# Patient Record
Sex: MALE | Race: OTHER | NOT HISPANIC OR LATINO | URBAN - METROPOLITAN AREA
[De-identification: names, ages, dates, MRNs, and addresses within clinical notes are randomized per-mention and may not be internally consistent; named-entity substitution may affect disease eponyms.]

---

## 2017-02-10 ENCOUNTER — EMERGENCY (EMERGENCY)
Facility: HOSPITAL | Age: 67
LOS: 1 days | Discharge: HOME | End: 2017-02-10
Attending: EMERGENCY MEDICINE
Payer: COMMERCIAL

## 2017-02-10 VITALS
HEART RATE: 72 BPM | RESPIRATION RATE: 18 BRPM | OXYGEN SATURATION: 99 % | TEMPERATURE: 100 F | WEIGHT: 192.9 LBS | DIASTOLIC BLOOD PRESSURE: 50 MMHG | HEIGHT: 74 IN | SYSTOLIC BLOOD PRESSURE: 121 MMHG

## 2017-02-10 DIAGNOSIS — Z96.41 PRESENCE OF INSULIN PUMP (EXTERNAL) (INTERNAL): ICD-10-CM

## 2017-02-10 DIAGNOSIS — I10 ESSENTIAL (PRIMARY) HYPERTENSION: ICD-10-CM

## 2017-02-10 DIAGNOSIS — Z79.4 LONG TERM (CURRENT) USE OF INSULIN: ICD-10-CM

## 2017-02-10 DIAGNOSIS — J11.1 INFLUENZA DUE TO UNIDENTIFIED INFLUENZA VIRUS WITH OTHER RESPIRATORY MANIFESTATIONS: ICD-10-CM

## 2017-02-10 DIAGNOSIS — E78.5 HYPERLIPIDEMIA, UNSPECIFIED: ICD-10-CM

## 2017-02-10 DIAGNOSIS — E11.9 TYPE 2 DIABETES MELLITUS WITHOUT COMPLICATIONS: ICD-10-CM

## 2017-02-10 LAB
ACETONE SERPL-MCNC: NEGATIVE — SIGNIFICANT CHANGE UP
ANION GAP SERPL CALC-SCNC: 9 MMOL/L — SIGNIFICANT CHANGE UP (ref 5–17)
APPEARANCE UR: CLEAR — SIGNIFICANT CHANGE UP
BASOPHILS # BLD AUTO: 0.1 K/UL — SIGNIFICANT CHANGE UP (ref 0–0.2)
BASOPHILS NFR BLD AUTO: 0.8 % — SIGNIFICANT CHANGE UP (ref 0–2)
BILIRUB UR-MCNC: NEGATIVE — SIGNIFICANT CHANGE UP
BUN SERPL-MCNC: 21 MG/DL — HIGH (ref 7–18)
CALCIUM SERPL-MCNC: 8.3 MG/DL — LOW (ref 8.4–10.5)
CHLORIDE SERPL-SCNC: 103 MMOL/L — SIGNIFICANT CHANGE UP (ref 96–108)
CO2 SERPL-SCNC: 28 MMOL/L — SIGNIFICANT CHANGE UP (ref 22–31)
COLOR SPEC: YELLOW — SIGNIFICANT CHANGE UP
CREAT SERPL-MCNC: 1.15 MG/DL — SIGNIFICANT CHANGE UP (ref 0.5–1.3)
DIFF PNL FLD: ABNORMAL
EOSINOPHIL # BLD AUTO: 0 K/UL — SIGNIFICANT CHANGE UP (ref 0–0.5)
EOSINOPHIL NFR BLD AUTO: 0 % — SIGNIFICANT CHANGE UP (ref 0–6)
GLUCOSE SERPL-MCNC: 292 MG/DL — HIGH (ref 70–99)
GLUCOSE UR QL: 1000 MG/DL
HCT VFR BLD CALC: 35.9 % — LOW (ref 39–50)
HGB BLD-MCNC: 11.6 G/DL — LOW (ref 13–17)
KETONES UR-MCNC: ABNORMAL
LEUKOCYTE ESTERASE UR-ACNC: NEGATIVE — SIGNIFICANT CHANGE UP
LYMPHOCYTES # BLD AUTO: 0.9 K/UL — LOW (ref 1–3.3)
LYMPHOCYTES # BLD AUTO: 13.8 % — SIGNIFICANT CHANGE UP (ref 13–44)
MCHC RBC-ENTMCNC: 29.7 PG — SIGNIFICANT CHANGE UP (ref 27–34)
MCHC RBC-ENTMCNC: 32.3 GM/DL — SIGNIFICANT CHANGE UP (ref 32–36)
MCV RBC AUTO: 91.9 FL — SIGNIFICANT CHANGE UP (ref 80–100)
MONOCYTES # BLD AUTO: 0.7 K/UL — SIGNIFICANT CHANGE UP (ref 0–0.9)
MONOCYTES NFR BLD AUTO: 10.8 % — SIGNIFICANT CHANGE UP (ref 2–14)
NEUTROPHILS # BLD AUTO: 5 K/UL — SIGNIFICANT CHANGE UP (ref 1.8–7.4)
NEUTROPHILS NFR BLD AUTO: 74.5 % — SIGNIFICANT CHANGE UP (ref 43–77)
NITRITE UR-MCNC: NEGATIVE — SIGNIFICANT CHANGE UP
PH UR: 6.5 — SIGNIFICANT CHANGE UP (ref 4.8–8)
PLATELET # BLD AUTO: 208 K/UL — SIGNIFICANT CHANGE UP (ref 150–400)
POTASSIUM SERPL-MCNC: 4 MMOL/L — SIGNIFICANT CHANGE UP (ref 3.5–5.3)
POTASSIUM SERPL-SCNC: 4 MMOL/L — SIGNIFICANT CHANGE UP (ref 3.5–5.3)
PROT UR-MCNC: 30 MG/DL
RBC # BLD: 3.91 M/UL — LOW (ref 4.2–5.8)
RBC # FLD: 13.4 % — SIGNIFICANT CHANGE UP (ref 10.3–14.5)
SODIUM SERPL-SCNC: 140 MMOL/L — SIGNIFICANT CHANGE UP (ref 135–145)
SP GR SPEC: 1.01 — SIGNIFICANT CHANGE UP (ref 1.01–1.02)
UROBILINOGEN FLD QL: NEGATIVE — SIGNIFICANT CHANGE UP
WBC # BLD: 6.7 K/UL — SIGNIFICANT CHANGE UP (ref 3.8–10.5)
WBC # FLD AUTO: 6.7 K/UL — SIGNIFICANT CHANGE UP (ref 3.8–10.5)

## 2017-02-10 PROCEDURE — 87486 CHLMYD PNEUM DNA AMP PROBE: CPT

## 2017-02-10 PROCEDURE — 71046 X-RAY EXAM CHEST 2 VIEWS: CPT

## 2017-02-10 PROCEDURE — 87633 RESP VIRUS 12-25 TARGETS: CPT

## 2017-02-10 PROCEDURE — 81001 URINALYSIS AUTO W/SCOPE: CPT

## 2017-02-10 PROCEDURE — 99284 EMERGENCY DEPT VISIT MOD MDM: CPT | Mod: 25

## 2017-02-10 PROCEDURE — 85027 COMPLETE CBC AUTOMATED: CPT

## 2017-02-10 PROCEDURE — 87581 M.PNEUMON DNA AMP PROBE: CPT

## 2017-02-10 PROCEDURE — 82009 KETONE BODYS QUAL: CPT

## 2017-02-10 PROCEDURE — 96374 THER/PROPH/DIAG INJ IV PUSH: CPT

## 2017-02-10 PROCEDURE — 80048 BASIC METABOLIC PNL TOTAL CA: CPT

## 2017-02-10 PROCEDURE — 99285 EMERGENCY DEPT VISIT HI MDM: CPT

## 2017-02-10 PROCEDURE — 87798 DETECT AGENT NOS DNA AMP: CPT

## 2017-02-10 PROCEDURE — 71020: CPT | Mod: 26

## 2017-02-10 RX ORDER — KETOROLAC TROMETHAMINE 30 MG/ML
15 SYRINGE (ML) INJECTION ONCE
Qty: 0 | Refills: 0 | Status: DISCONTINUED | OUTPATIENT
Start: 2017-02-10 | End: 2017-02-10

## 2017-02-10 RX ORDER — SODIUM CHLORIDE 9 MG/ML
2000 INJECTION INTRAMUSCULAR; INTRAVENOUS; SUBCUTANEOUS ONCE
Qty: 0 | Refills: 0 | Status: COMPLETED | OUTPATIENT
Start: 2017-02-10 | End: 2017-02-10

## 2017-02-10 RX ORDER — ACETAMINOPHEN 500 MG
975 TABLET ORAL ONCE
Qty: 0 | Refills: 0 | Status: COMPLETED | OUTPATIENT
Start: 2017-02-10 | End: 2017-02-10

## 2017-02-10 RX ADMIN — Medication 975 MILLIGRAM(S): at 18:46

## 2017-02-10 RX ADMIN — Medication 15 MILLIGRAM(S): at 21:48

## 2017-02-10 RX ADMIN — SODIUM CHLORIDE 3000 MILLILITER(S): 9 INJECTION INTRAMUSCULAR; INTRAVENOUS; SUBCUTANEOUS at 21:47

## 2017-02-10 NOTE — ED PROVIDER NOTE - OBJECTIVE STATEMENT
67 y/o male with significant PMHx of HLD HTN, IDDM (on insulin pump) presents to the ED c/o cough, fever, body aches and congestion x 2 days. Pt describes cough as non productive in nature, associated with diffuse body aches and a fever of Tmax:103F. Pt states he took Tylenol to no relief. Pt denies sore throat, rhinorrhea, N/V/D, abd pain, cough, SOB, CP, recent travel, recent sick contacts, or any other complaints. NKDA. (+) Flu-Shot UTD. 65 y/o male with significant PMHx of HLD HTN, IDDM (on insulin pump) presents to the ED c/o cough, fever, body aches and congestion x 2 days. Pt describes cough as non productive in nature, associated with diffuse body aches and a fever of Tmax:103F. Pt states cough is worse at night, with no alleviating factors; took Tylenol to no relief. Pt states he took OTC meds, making him feel weak. Pt notes decreased oral intake with liquids and solids.  Pt denies sore throat, rhinorrhea, N/V/D, abd pain, cough, SOB, CP, recent travel, recent sick contacts, or any other complaints. NKDA. (+) Flu-Shot UTD. 67 y/o male with significant PMHx of HLD HTN, IDDM (on insulin pump) presents to the ED c/o cough, fever, body aches and congestion x 2 days. Pt describes cough as non productive in nature, associated with diffuse body aches and a fever of Tmax:103F. Pt states cough is worse at night, with no alleviating factors; took Tylenol to no relief. Pt states he took OTC meds, making him feel weak. Pt notes decreased oral intake with liquids and solids.  Pt denies sore throat, rhinorrhea, N/V/D, abd pain, SOB, CP, recent travel, recent sick contacts, or any other complaints. NKDA. (+) Flu-Shot UTD.

## 2017-02-10 NOTE — ED ADULT NURSE NOTE - OBJECTIVE STATEMENT
Patient came to the ED a/o x 3 ambulates c/o back pain and fever this afternoon. Patient's temp at triage is 99.8, stated that he had 103 F at home.

## 2017-02-10 NOTE — ED PROVIDER NOTE - MEDICAL DECISION MAKING DETAILS
65 y/o M with cough, fever, and body aches. Will order flu-swab given 48 hours of onset. Will order CXR to r/o PNA, check acetone to r/o DKA; however low suspicion due to being on insulin. Will give IV fluids, and reassess. Likely d/c. Pt is well appearing, and all vital signs WNL. 67 y/o M with cough, fever, and body aches. Will order flu-swab given 48 hours of onset. Will order CXR to r/o PNA, check acetone to r/o DKA; however low suspicion due to being on insulin. Will give IV fluids, and reassess. Likely d/c. Pt is well appearing, and all vital signs WNL. d/w pt treatment options for flu with possible side effects. pt declines tamiflu. will give symptomatic and supportive care. Pt is well appearing, stable for discharge and follow up without fail with medical doctor. I had a detailed discussion with the patient and/or guardian regarding the historical points, exam findings, and any diagnostic results supporting the discharge diagnosis. Pt educated on care and need for follow up. Strict return instructions and red flag signs and symptoms discussed with patient. Medications for discharge discussed and adverse effects reviewed. Questions answered. Pt shows understanding of discharge information and agrees to follow.

## 2017-02-10 NOTE — ED PROVIDER NOTE - PHYSICAL EXAMINATION
GEN: WD, WN, NAD. Non-toxic appearance  HEENT: NC, AT, MMM,  PERRLA, EOMI, no scleral icterus  NECK: FROM, No meningismus, Trachea midline  CV: Regular rate, regular rhythm. Normal S1/2. No extra heart sounds  PULM: CTA-B. No Crackles, No Wheezes, No Ronchi  ABD: soft, ND, NT, no pulsatile masses  SKIN: (+) diaphoretic, warm, no rash  BACK: No midline vertebral tenderness, no CVA tenderness  MSK/EXT: FROM x4. distal pulses intact and palpable, No deformities  NEURO: A&O, CN intact, STOUT, SILTx4, no focal deficits noted  PSYCH: Appropriate Affect, judgment, mood, and insight

## 2017-02-10 NOTE — ED PROVIDER NOTE - NS ED MD SCRIBE ATTENDING SCRIBE SECTIONS
VITAL SIGNS( Pullset)/REVIEW OF SYSTEMS/PHYSICAL EXAM/HISTORY OF PRESENT ILLNESS/HIV/PAST MEDICAL/SURGICAL/SOCIAL HISTORY/DISPOSITION

## 2017-02-11 VITALS
OXYGEN SATURATION: 100 % | RESPIRATION RATE: 17 BRPM | TEMPERATURE: 98 F | HEART RATE: 55 BPM | SYSTOLIC BLOOD PRESSURE: 136 MMHG | DIASTOLIC BLOOD PRESSURE: 59 MMHG

## 2017-02-11 LAB
FLUBV RNA SPEC QL NAA+PROBE: DETECTED
RAPID RVP RESULT: DETECTED

## 2019-02-10 PROCEDURE — 0: CUSTOM

## 2021-10-06 NOTE — ED ADULT TRIAGE NOTE - AS O2 DELIVERY
room air Alert-The patient is alert, awake and responds to voice. The patient is oriented to time, place, and person. The triage nurse is able to obtain subjective information.

## 2022-05-04 ENCOUNTER — EMERGENCY (EMERGENCY)
Facility: HOSPITAL | Age: 72
LOS: 1 days | Discharge: HOME | End: 2022-05-04
Attending: EMERGENCY MEDICINE
Payer: COMMERCIAL

## 2022-05-04 VITALS
OXYGEN SATURATION: 98 % | DIASTOLIC BLOOD PRESSURE: 70 MMHG | SYSTOLIC BLOOD PRESSURE: 126 MMHG | HEART RATE: 72 BPM | RESPIRATION RATE: 18 BRPM | TEMPERATURE: 98 F

## 2022-05-04 VITALS
HEIGHT: 74 IN | TEMPERATURE: 98 F | DIASTOLIC BLOOD PRESSURE: 53 MMHG | SYSTOLIC BLOOD PRESSURE: 97 MMHG | RESPIRATION RATE: 18 BRPM | WEIGHT: 199.96 LBS | HEART RATE: 90 BPM | OXYGEN SATURATION: 99 %

## 2022-05-04 LAB
ANION GAP SERPL CALC-SCNC: 13 MMOL/L — SIGNIFICANT CHANGE UP (ref 5–17)
BASOPHILS # BLD AUTO: 0.02 K/UL — SIGNIFICANT CHANGE UP (ref 0–0.2)
BASOPHILS NFR BLD AUTO: 0.2 % — SIGNIFICANT CHANGE UP (ref 0–2)
BUN SERPL-MCNC: 27 MG/DL — HIGH (ref 7–18)
CALCIUM SERPL-MCNC: 9.2 MG/DL — SIGNIFICANT CHANGE UP (ref 8.4–10.5)
CHLORIDE SERPL-SCNC: 104 MMOL/L — SIGNIFICANT CHANGE UP (ref 96–108)
CO2 SERPL-SCNC: 21 MMOL/L — LOW (ref 22–31)
CREAT SERPL-MCNC: 1.15 MG/DL — SIGNIFICANT CHANGE UP (ref 0.5–1.3)
EGFR: 67 ML/MIN/1.73M2 — SIGNIFICANT CHANGE UP
EOSINOPHIL # BLD AUTO: 0 K/UL — SIGNIFICANT CHANGE UP (ref 0–0.5)
EOSINOPHIL NFR BLD AUTO: 0 % — SIGNIFICANT CHANGE UP (ref 0–6)
GLUCOSE SERPL-MCNC: 316 MG/DL — HIGH (ref 70–99)
HCT VFR BLD CALC: 32.8 % — LOW (ref 39–50)
HGB BLD-MCNC: 10.6 G/DL — LOW (ref 13–17)
IMM GRANULOCYTES NFR BLD AUTO: 0.5 % — SIGNIFICANT CHANGE UP (ref 0–1.5)
LYMPHOCYTES # BLD AUTO: 0.62 K/UL — LOW (ref 1–3.3)
LYMPHOCYTES # BLD AUTO: 6 % — LOW (ref 13–44)
MCHC RBC-ENTMCNC: 29.3 PG — SIGNIFICANT CHANGE UP (ref 27–34)
MCHC RBC-ENTMCNC: 32.3 GM/DL — SIGNIFICANT CHANGE UP (ref 32–36)
MCV RBC AUTO: 90.6 FL — SIGNIFICANT CHANGE UP (ref 80–100)
MONOCYTES # BLD AUTO: 0.74 K/UL — SIGNIFICANT CHANGE UP (ref 0–0.9)
MONOCYTES NFR BLD AUTO: 7.1 % — SIGNIFICANT CHANGE UP (ref 2–14)
NEUTROPHILS # BLD AUTO: 8.99 K/UL — HIGH (ref 1.8–7.4)
NEUTROPHILS NFR BLD AUTO: 86.2 % — HIGH (ref 43–77)
NRBC # BLD: 0 /100 WBCS — SIGNIFICANT CHANGE UP (ref 0–0)
PLATELET # BLD AUTO: 226 K/UL — SIGNIFICANT CHANGE UP (ref 150–400)
POTASSIUM SERPL-MCNC: 4.1 MMOL/L — SIGNIFICANT CHANGE UP (ref 3.5–5.3)
POTASSIUM SERPL-SCNC: 4.1 MMOL/L — SIGNIFICANT CHANGE UP (ref 3.5–5.3)
RBC # BLD: 3.62 M/UL — LOW (ref 4.2–5.8)
RBC # FLD: 14.3 % — SIGNIFICANT CHANGE UP (ref 10.3–14.5)
SODIUM SERPL-SCNC: 138 MMOL/L — SIGNIFICANT CHANGE UP (ref 135–145)
WBC # BLD: 10.42 K/UL — SIGNIFICANT CHANGE UP (ref 3.8–10.5)
WBC # FLD AUTO: 10.42 K/UL — SIGNIFICANT CHANGE UP (ref 3.8–10.5)

## 2022-05-04 PROCEDURE — 99285 EMERGENCY DEPT VISIT HI MDM: CPT

## 2022-05-04 PROCEDURE — 85025 COMPLETE CBC W/AUTO DIFF WBC: CPT

## 2022-05-04 PROCEDURE — 99283 EMERGENCY DEPT VISIT LOW MDM: CPT

## 2022-05-04 PROCEDURE — 80048 BASIC METABOLIC PNL TOTAL CA: CPT

## 2022-05-04 PROCEDURE — 93005 ELECTROCARDIOGRAM TRACING: CPT

## 2022-05-04 PROCEDURE — 84484 ASSAY OF TROPONIN QUANT: CPT

## 2022-05-04 PROCEDURE — 82962 GLUCOSE BLOOD TEST: CPT

## 2022-05-04 PROCEDURE — 36415 COLL VENOUS BLD VENIPUNCTURE: CPT

## 2022-05-04 RX ORDER — SODIUM CHLORIDE 9 MG/ML
2000 INJECTION INTRAMUSCULAR; INTRAVENOUS; SUBCUTANEOUS ONCE
Refills: 0 | Status: COMPLETED | OUTPATIENT
Start: 2022-05-04 | End: 2022-05-04

## 2022-05-04 RX ADMIN — SODIUM CHLORIDE 2000 MILLILITER(S): 9 INJECTION INTRAMUSCULAR; INTRAVENOUS; SUBCUTANEOUS at 13:05

## 2022-05-04 NOTE — ED PROVIDER NOTE - CLINICAL SUMMARY MEDICAL DECISION MAKING FREE TEXT BOX
Patient with hyperglycemia due to pump malfunction, vomiting, b/l arm pain. Will check EKG, labs, give IVF, reassess.

## 2022-05-04 NOTE — ED PROVIDER NOTE - SKIN, MLM
The patient is a 33y Male complaining of ear pain.
Skin normal color for race, warm, dry and intact. No evidence of rash.

## 2022-05-04 NOTE — ED PROVIDER NOTE - PATIENT PORTAL LINK FT
You can access the FollowMyHealth Patient Portal offered by [facility] by registering at the following website: [email or web address]. By joining [employer]’s FollowMyHealth portal, you will also be able to view your health information using other applications (apps) compatible with our system.

## 2022-05-04 NOTE — ED PROVIDER NOTE - OBJECTIVE STATEMENT
Patient reports his insulin pump was malfunctioning last night. His blood sugar went up to the 400's. He had pain in both his arms and vomited x1, NBNB. No fever, cp, sob, ap, diarrhea. Now feels much better. Pump now working.

## 2022-05-04 NOTE — ED PROVIDER NOTE - PROGRESS NOTE DETAILS
Patient is resting comfortably, NAD. Benign abdominal exam. Tolerating water. To f/u with PMD in 1-2 days. Return to the ED immediately if getting worse, not improving, or if having any new or troubling symptoms.

## 2022-07-10 ENCOUNTER — EMERGENCY (EMERGENCY)
Facility: HOSPITAL | Age: 72
LOS: 1 days | Discharge: HOME | End: 2022-07-10
Attending: STUDENT IN AN ORGANIZED HEALTH CARE EDUCATION/TRAINING PROGRAM
Payer: COMMERCIAL

## 2022-07-10 VITALS
RESPIRATION RATE: 16 BRPM | WEIGHT: 205.03 LBS | SYSTOLIC BLOOD PRESSURE: 142 MMHG | OXYGEN SATURATION: 96 % | HEART RATE: 80 BPM | HEIGHT: 74 IN | TEMPERATURE: 100 F | DIASTOLIC BLOOD PRESSURE: 65 MMHG

## 2022-07-10 VITALS
TEMPERATURE: 98 F | SYSTOLIC BLOOD PRESSURE: 133 MMHG | HEART RATE: 65 BPM | OXYGEN SATURATION: 96 % | RESPIRATION RATE: 18 BRPM | DIASTOLIC BLOOD PRESSURE: 76 MMHG

## 2022-07-10 LAB
ALBUMIN SERPL ELPH-MCNC: 2.9 G/DL — LOW (ref 3.5–5)
ALP SERPL-CCNC: 63 U/L — SIGNIFICANT CHANGE UP (ref 40–120)
ALT FLD-CCNC: 22 U/L DA — SIGNIFICANT CHANGE UP (ref 10–60)
ANION GAP SERPL CALC-SCNC: 4 MMOL/L — LOW (ref 5–17)
AST SERPL-CCNC: 20 U/L — SIGNIFICANT CHANGE UP (ref 10–40)
BASOPHILS # BLD AUTO: 0.01 K/UL — SIGNIFICANT CHANGE UP (ref 0–0.2)
BASOPHILS NFR BLD AUTO: 0.2 % — SIGNIFICANT CHANGE UP (ref 0–2)
BILIRUB SERPL-MCNC: 0.4 MG/DL — SIGNIFICANT CHANGE UP (ref 0.2–1.2)
BUN SERPL-MCNC: 17 MG/DL — SIGNIFICANT CHANGE UP (ref 7–18)
CALCIUM SERPL-MCNC: 8.8 MG/DL — SIGNIFICANT CHANGE UP (ref 8.4–10.5)
CHLORIDE SERPL-SCNC: 109 MMOL/L — HIGH (ref 96–108)
CO2 SERPL-SCNC: 27 MMOL/L — SIGNIFICANT CHANGE UP (ref 22–31)
CREAT SERPL-MCNC: 0.84 MG/DL — SIGNIFICANT CHANGE UP (ref 0.5–1.3)
EGFR: 92 ML/MIN/1.73M2 — SIGNIFICANT CHANGE UP
EOSINOPHIL # BLD AUTO: 0 K/UL — SIGNIFICANT CHANGE UP (ref 0–0.5)
EOSINOPHIL NFR BLD AUTO: 0 % — SIGNIFICANT CHANGE UP (ref 0–6)
GLUCOSE SERPL-MCNC: 226 MG/DL — HIGH (ref 70–99)
HCT VFR BLD CALC: 32.9 % — LOW (ref 39–50)
HGB BLD-MCNC: 10.7 G/DL — LOW (ref 13–17)
IMM GRANULOCYTES NFR BLD AUTO: 0.3 % — SIGNIFICANT CHANGE UP (ref 0–1.5)
LYMPHOCYTES # BLD AUTO: 0.4 K/UL — LOW (ref 1–3.3)
LYMPHOCYTES # BLD AUTO: 6.5 % — LOW (ref 13–44)
MCHC RBC-ENTMCNC: 29.4 PG — SIGNIFICANT CHANGE UP (ref 27–34)
MCHC RBC-ENTMCNC: 32.5 GM/DL — SIGNIFICANT CHANGE UP (ref 32–36)
MCV RBC AUTO: 90.4 FL — SIGNIFICANT CHANGE UP (ref 80–100)
MONOCYTES # BLD AUTO: 0.55 K/UL — SIGNIFICANT CHANGE UP (ref 0–0.9)
MONOCYTES NFR BLD AUTO: 9 % — SIGNIFICANT CHANGE UP (ref 2–14)
NEUTROPHILS # BLD AUTO: 5.13 K/UL — SIGNIFICANT CHANGE UP (ref 1.8–7.4)
NEUTROPHILS NFR BLD AUTO: 84 % — HIGH (ref 43–77)
NRBC # BLD: 0 /100 WBCS — SIGNIFICANT CHANGE UP (ref 0–0)
PLATELET # BLD AUTO: 212 K/UL — SIGNIFICANT CHANGE UP (ref 150–400)
POTASSIUM SERPL-MCNC: 3.7 MMOL/L — SIGNIFICANT CHANGE UP (ref 3.5–5.3)
POTASSIUM SERPL-SCNC: 3.7 MMOL/L — SIGNIFICANT CHANGE UP (ref 3.5–5.3)
PROT SERPL-MCNC: 6.5 G/DL — SIGNIFICANT CHANGE UP (ref 6–8.3)
RBC # BLD: 3.64 M/UL — LOW (ref 4.2–5.8)
RBC # FLD: 14.7 % — HIGH (ref 10.3–14.5)
SARS-COV-2 RNA SPEC QL NAA+PROBE: DETECTED
SODIUM SERPL-SCNC: 140 MMOL/L — SIGNIFICANT CHANGE UP (ref 135–145)
WBC # BLD: 6.3 K/UL — SIGNIFICANT CHANGE UP (ref 3.8–10.5)
WBC # FLD AUTO: 6.3 K/UL — SIGNIFICANT CHANGE UP (ref 3.8–10.5)

## 2022-07-10 PROCEDURE — 71045 X-RAY EXAM CHEST 1 VIEW: CPT | Mod: 26

## 2022-07-10 PROCEDURE — 85025 COMPLETE CBC W/AUTO DIFF WBC: CPT

## 2022-07-10 PROCEDURE — 72170 X-RAY EXAM OF PELVIS: CPT

## 2022-07-10 PROCEDURE — 72170 X-RAY EXAM OF PELVIS: CPT | Mod: 26

## 2022-07-10 PROCEDURE — 96374 THER/PROPH/DIAG INJ IV PUSH: CPT

## 2022-07-10 PROCEDURE — 99284 EMERGENCY DEPT VISIT MOD MDM: CPT

## 2022-07-10 PROCEDURE — 36415 COLL VENOUS BLD VENIPUNCTURE: CPT

## 2022-07-10 PROCEDURE — 80053 COMPREHEN METABOLIC PANEL: CPT

## 2022-07-10 PROCEDURE — 99285 EMERGENCY DEPT VISIT HI MDM: CPT | Mod: 25

## 2022-07-10 PROCEDURE — 71045 X-RAY EXAM CHEST 1 VIEW: CPT

## 2022-07-10 PROCEDURE — 87635 SARS-COV-2 COVID-19 AMP PRB: CPT

## 2022-07-10 PROCEDURE — 87040 BLOOD CULTURE FOR BACTERIA: CPT

## 2022-07-10 RX ORDER — KETOROLAC TROMETHAMINE 30 MG/ML
30 SYRINGE (ML) INJECTION ONCE
Refills: 0 | Status: DISCONTINUED | OUTPATIENT
Start: 2022-07-10 | End: 2022-07-10

## 2022-07-10 RX ORDER — ACETAMINOPHEN 500 MG
650 TABLET ORAL ONCE
Refills: 0 | Status: COMPLETED | OUTPATIENT
Start: 2022-07-10 | End: 2022-07-10

## 2022-07-10 RX ADMIN — Medication 30 MILLIGRAM(S): at 04:35

## 2022-07-10 NOTE — ED ADULT NURSE NOTE - NS ED NURSE LEVEL OF CONSCIOUSNESS MENTAL STATUS
Awake/Alert Returned Demonstration/Simple: Patient demonstrates quick and easy understanding/Verbalized Understanding

## 2022-07-10 NOTE — ED ADULT NURSE NOTE - NSFALLRSKUNASSIST_ED_ALL_ED
Encounter addended by: Dori Pratt on: 5/29/2017  8:44 AM<BR>    Actions taken: Letter status changed no

## 2022-07-10 NOTE — ED PROVIDER NOTE - OBJECTIVE STATEMENT
71 year old male with a PHMx of hypertension and diabetes presents to the ED with complaints of bilateral hip pain. Patient also noted experiencing cough and fever. Patient endorses having difficulty getting out of bed today. Patient denies any nausea, vomiting, dysuria, abdominal pain and fall. NKDA.

## 2022-07-10 NOTE — ED ADULT NURSE NOTE - OBJECTIVE STATEMENT
Pt presents to the ED with c/o bilateral hip pain and fever x 1 day. As per pt, he was having difficulty getting out of bed to use the bathroom today. Denies nausea, vomiting, shortness of breath, or fall.

## 2022-07-10 NOTE — ED PROVIDER NOTE - CLINICAL SUMMARY MEDICAL DECISION MAKING FREE TEXT BOX
Patient presenting with cough and hip pain. noting low grade temp. saturation stable. likely viral illness with bodyache. will obtain xray, r.o bony injury, lab, ed obs and reassess

## 2022-07-10 NOTE — ED ADULT NURSE NOTE - NSIMPLEMENTINTERV_GEN_ALL_ED
Implemented All Universal Safety Interventions:  [place] to call system. Call bell, personal items and telephone within reach. Instruct patient to call for assistance. Room bathroom lighting operational. Non-slip footwear when patient is off stretcher. Physically safe environment: no spills, clutter or unnecessary equipment. Stretcher in lowest position, wheels locked, appropriate side rails in place.

## 2022-07-10 NOTE — ED PROVIDER NOTE - PATIENT PORTAL LINK FT
You can access the FollowMyHealth Patient Portal offered by [facility] by registering at the following website: [email or web address]. By joining [employer]’s FollowMyHealth portal, you will also be able to view your health information using other applications (apps) compatible with our system.

## 2022-07-10 NOTE — ED PROVIDER NOTE - PROGRESS NOTE DETAILS
Patient lab wnl. no signs of distress. noting + covid 19 with stable saturation. given return precautions and instructed to f.u pmd, patient state he has pulse ox and antipyretic at home.

## 2022-07-15 LAB
CULTURE RESULTS: SIGNIFICANT CHANGE UP
CULTURE RESULTS: SIGNIFICANT CHANGE UP
SPECIMEN SOURCE: SIGNIFICANT CHANGE UP
SPECIMEN SOURCE: SIGNIFICANT CHANGE UP

## 2023-03-15 ENCOUNTER — INPATIENT (INPATIENT)
Facility: HOSPITAL | Age: 73
LOS: 6 days | Discharge: ANOTHER HOSPITAL | DRG: 305 | End: 2023-03-22
Attending: STUDENT IN AN ORGANIZED HEALTH CARE EDUCATION/TRAINING PROGRAM | Admitting: STUDENT IN AN ORGANIZED HEALTH CARE EDUCATION/TRAINING PROGRAM
Payer: MEDICARE

## 2023-03-15 VITALS
HEART RATE: 107 BPM | DIASTOLIC BLOOD PRESSURE: 44 MMHG | TEMPERATURE: 98 F | RESPIRATION RATE: 18 BRPM | WEIGHT: 199.96 LBS | OXYGEN SATURATION: 98 % | SYSTOLIC BLOOD PRESSURE: 108 MMHG | HEIGHT: 74 IN

## 2023-03-15 DIAGNOSIS — E11.10 TYPE 2 DIABETES MELLITUS WITH KETOACIDOSIS WITHOUT COMA: ICD-10-CM

## 2023-03-15 LAB
ALBUMIN SERPL ELPH-MCNC: 3.5 G/DL — SIGNIFICANT CHANGE UP (ref 3.5–5)
ALP SERPL-CCNC: 70 U/L — SIGNIFICANT CHANGE UP (ref 40–120)
ALT FLD-CCNC: 30 U/L DA — SIGNIFICANT CHANGE UP (ref 10–60)
ANION GAP SERPL CALC-SCNC: 22 MMOL/L — HIGH (ref 5–17)
APPEARANCE UR: CLEAR — SIGNIFICANT CHANGE UP
AST SERPL-CCNC: 25 U/L — SIGNIFICANT CHANGE UP (ref 10–40)
BACTERIA # UR AUTO: ABNORMAL /HPF
BASE EXCESS BLDA CALC-SCNC: -11.7 MMOL/L — LOW (ref -2–3)
BASOPHILS # BLD AUTO: 0.03 K/UL — SIGNIFICANT CHANGE UP (ref 0–0.2)
BASOPHILS NFR BLD AUTO: 0.2 % — SIGNIFICANT CHANGE UP (ref 0–2)
BILIRUB SERPL-MCNC: 0.7 MG/DL — SIGNIFICANT CHANGE UP (ref 0.2–1.2)
BILIRUB UR-MCNC: NEGATIVE — SIGNIFICANT CHANGE UP
BLOOD GAS COMMENTS ARTERIAL: SIGNIFICANT CHANGE UP
BUN SERPL-MCNC: 35 MG/DL — HIGH (ref 7–18)
CALCIUM SERPL-MCNC: 9.4 MG/DL — SIGNIFICANT CHANGE UP (ref 8.4–10.5)
CHLORIDE SERPL-SCNC: 97 MMOL/L — SIGNIFICANT CHANGE UP (ref 96–108)
CO2 SERPL-SCNC: 14 MMOL/L — LOW (ref 22–31)
COLOR SPEC: YELLOW — SIGNIFICANT CHANGE UP
CREAT SERPL-MCNC: 1.68 MG/DL — HIGH (ref 0.5–1.3)
DIFF PNL FLD: NEGATIVE — SIGNIFICANT CHANGE UP
EGFR: 42 ML/MIN/1.73M2 — LOW
EOSINOPHIL # BLD AUTO: 0 K/UL — SIGNIFICANT CHANGE UP (ref 0–0.5)
EOSINOPHIL NFR BLD AUTO: 0 % — SIGNIFICANT CHANGE UP (ref 0–6)
EPI CELLS # UR: SIGNIFICANT CHANGE UP /HPF
GLUCOSE BLDC GLUCOMTR-MCNC: 513 MG/DL — CRITICAL HIGH (ref 70–99)
GLUCOSE SERPL-MCNC: 652 MG/DL — CRITICAL HIGH (ref 70–99)
GLUCOSE UR QL: 1000 MG/DL
HCO3 BLDA-SCNC: 12 MMOL/L — LOW (ref 21–28)
HCT VFR BLD CALC: 35.8 % — LOW (ref 39–50)
HGB BLD-MCNC: 11.1 G/DL — LOW (ref 13–17)
HIV 1 & 2 AB SERPL IA.RAPID: SIGNIFICANT CHANGE UP
HOROWITZ INDEX BLDA+IHG-RTO: 21 — SIGNIFICANT CHANGE UP
IMM GRANULOCYTES NFR BLD AUTO: 0.7 % — SIGNIFICANT CHANGE UP (ref 0–0.9)
KETONES UR-MCNC: ABNORMAL
LEUKOCYTE ESTERASE UR-ACNC: ABNORMAL
LIDOCAIN IGE QN: 40 U/L — LOW (ref 73–393)
LYMPHOCYTES # BLD AUTO: 0.73 K/UL — LOW (ref 1–3.3)
LYMPHOCYTES # BLD AUTO: 5.4 % — LOW (ref 13–44)
MAGNESIUM SERPL-MCNC: 1.9 MG/DL — SIGNIFICANT CHANGE UP (ref 1.6–2.6)
MCHC RBC-ENTMCNC: 28.7 PG — SIGNIFICANT CHANGE UP (ref 27–34)
MCHC RBC-ENTMCNC: 31 GM/DL — LOW (ref 32–36)
MCV RBC AUTO: 92.5 FL — SIGNIFICANT CHANGE UP (ref 80–100)
MONOCYTES # BLD AUTO: 0.97 K/UL — HIGH (ref 0–0.9)
MONOCYTES NFR BLD AUTO: 7.1 % — SIGNIFICANT CHANGE UP (ref 2–14)
NEUTROPHILS # BLD AUTO: 11.79 K/UL — HIGH (ref 1.8–7.4)
NEUTROPHILS NFR BLD AUTO: 86.6 % — HIGH (ref 43–77)
NITRITE UR-MCNC: NEGATIVE — SIGNIFICANT CHANGE UP
NRBC # BLD: 0 /100 WBCS — SIGNIFICANT CHANGE UP (ref 0–0)
NT-PROBNP SERPL-SCNC: 381 PG/ML — SIGNIFICANT CHANGE UP (ref 0–450)
PCO2 BLDA: 21 MMHG — LOW (ref 35–48)
PH BLDA: 7.36 — SIGNIFICANT CHANGE UP (ref 7.35–7.45)
PH UR: 5 — SIGNIFICANT CHANGE UP (ref 5–8)
PLATELET # BLD AUTO: 283 K/UL — SIGNIFICANT CHANGE UP (ref 150–400)
PO2 BLDA: 123 MMHG — HIGH (ref 83–108)
POTASSIUM SERPL-MCNC: 5.9 MMOL/L — HIGH (ref 3.5–5.3)
POTASSIUM SERPL-SCNC: 5.9 MMOL/L — HIGH (ref 3.5–5.3)
PROT SERPL-MCNC: 7.2 G/DL — SIGNIFICANT CHANGE UP (ref 6–8.3)
PROT UR-MCNC: 15
RBC # BLD: 3.87 M/UL — LOW (ref 4.2–5.8)
RBC # FLD: 14.8 % — HIGH (ref 10.3–14.5)
RBC CASTS # UR COMP ASSIST: SIGNIFICANT CHANGE UP /HPF (ref 0–2)
SAO2 % BLDA: 97 % — SIGNIFICANT CHANGE UP
SARS-COV-2 RNA SPEC QL NAA+PROBE: SIGNIFICANT CHANGE UP
SODIUM SERPL-SCNC: 133 MMOL/L — LOW (ref 135–145)
SP GR SPEC: 1.01 — SIGNIFICANT CHANGE UP (ref 1.01–1.02)
TROPONIN I, HIGH SENSITIVITY RESULT: 26.1 NG/L — SIGNIFICANT CHANGE UP
UROBILINOGEN FLD QL: NEGATIVE — SIGNIFICANT CHANGE UP
WBC # BLD: 13.61 K/UL — HIGH (ref 3.8–10.5)
WBC # FLD AUTO: 13.61 K/UL — HIGH (ref 3.8–10.5)
WBC UR QL: ABNORMAL /HPF (ref 0–5)

## 2023-03-15 PROCEDURE — 99291 CRITICAL CARE FIRST HOUR: CPT

## 2023-03-15 PROCEDURE — 71045 X-RAY EXAM CHEST 1 VIEW: CPT | Mod: 26

## 2023-03-15 RX ORDER — CEFTRIAXONE 500 MG/1
1000 INJECTION, POWDER, FOR SOLUTION INTRAMUSCULAR; INTRAVENOUS EVERY 24 HOURS
Refills: 0 | Status: DISCONTINUED | OUTPATIENT
Start: 2023-03-15 | End: 2023-03-20

## 2023-03-15 RX ORDER — SODIUM CHLORIDE 9 MG/ML
1000 INJECTION INTRAMUSCULAR; INTRAVENOUS; SUBCUTANEOUS ONCE
Refills: 0 | Status: COMPLETED | OUTPATIENT
Start: 2023-03-15 | End: 2023-03-15

## 2023-03-15 RX ORDER — ONDANSETRON 8 MG/1
4 TABLET, FILM COATED ORAL ONCE
Refills: 0 | Status: COMPLETED | OUTPATIENT
Start: 2023-03-15 | End: 2023-03-15

## 2023-03-15 RX ORDER — CHLORHEXIDINE GLUCONATE 213 G/1000ML
1 SOLUTION TOPICAL
Refills: 0 | Status: DISCONTINUED | OUTPATIENT
Start: 2023-03-15 | End: 2023-03-16

## 2023-03-15 RX ORDER — INSULIN HUMAN 100 [IU]/ML
9 INJECTION, SOLUTION SUBCUTANEOUS
Qty: 100 | Refills: 0 | Status: DISCONTINUED | OUTPATIENT
Start: 2023-03-15 | End: 2023-03-16

## 2023-03-15 RX ORDER — INSULIN HUMAN 100 [IU]/ML
18 INJECTION, SOLUTION SUBCUTANEOUS ONCE
Refills: 0 | Status: COMPLETED | OUTPATIENT
Start: 2023-03-15 | End: 2023-03-15

## 2023-03-15 RX ORDER — SODIUM CHLORIDE 9 MG/ML
1000 INJECTION INTRAMUSCULAR; INTRAVENOUS; SUBCUTANEOUS
Refills: 0 | Status: DISCONTINUED | OUTPATIENT
Start: 2023-03-15 | End: 2023-03-16

## 2023-03-15 RX ADMIN — SODIUM CHLORIDE 1000 MILLILITER(S): 9 INJECTION INTRAMUSCULAR; INTRAVENOUS; SUBCUTANEOUS at 21:09

## 2023-03-15 RX ADMIN — INSULIN HUMAN 9 UNIT(S)/HR: 100 INJECTION, SOLUTION SUBCUTANEOUS at 23:20

## 2023-03-15 RX ADMIN — SODIUM CHLORIDE 150 MILLILITER(S): 9 INJECTION INTRAMUSCULAR; INTRAVENOUS; SUBCUTANEOUS at 22:51

## 2023-03-15 RX ADMIN — ONDANSETRON 4 MILLIGRAM(S): 8 TABLET, FILM COATED ORAL at 22:55

## 2023-03-15 RX ADMIN — INSULIN HUMAN 18 UNIT(S): 100 INJECTION, SOLUTION SUBCUTANEOUS at 22:52

## 2023-03-15 NOTE — ED ADULT TRIAGE NOTE - CHIEF COMPLAINT QUOTE
Pt BIBA for c/o high sugar. Pt is on insulin pump. pt c/o stomach pain with nausea, vomiting and diarrhea. Pt fingerstick in triage was 551mg/dl

## 2023-03-15 NOTE — ED ADULT TRIAGE NOTE - PAIN RATING/NUMBER SCALE (0-10): ACTIVITY
8 (severe pain) Alert and oriented to person, place and time/Patient baseline mental status/Awake/Symptoms improved

## 2023-03-15 NOTE — ED PROVIDER NOTE - OBJECTIVE STATEMENT
72 y.o. male with H/o IDDM, last dose 5pm, pt c/o feeling lightheaded, weakness this am, today with nonradiating MSCP, dry cough for 3 days, no SOB, diaphoresis, abd pain, dysuria.  Pt with good appetite

## 2023-03-15 NOTE — ED PROVIDER NOTE - CARE PLAN
Principal Discharge DX:	DKA (diabetic ketoacidosis)  Secondary Diagnosis:	JOANNA (acute kidney injury)   1

## 2023-03-15 NOTE — ED ADULT NURSE REASSESSMENT NOTE - NS ED NURSE REASSESS COMMENT FT1
pt transported w/ 2 nurses and monitor, pt vitals stable, pt 9ml/hr insulin, no other concerns noted.

## 2023-03-15 NOTE — ED PROVIDER NOTE - CPE EDP GASTRO NORM
I reviewed the H&P, examined the patient, and there are no changes in the patient’s condition.  
- - -

## 2023-03-16 LAB
ALBUMIN SERPL ELPH-MCNC: 3.2 G/DL — LOW (ref 3.5–5)
ALP SERPL-CCNC: 63 U/L — SIGNIFICANT CHANGE UP (ref 40–120)
ALT FLD-CCNC: 29 U/L DA — SIGNIFICANT CHANGE UP (ref 10–60)
ANION GAP SERPL CALC-SCNC: 20 MMOL/L — HIGH (ref 5–17)
ANION GAP SERPL CALC-SCNC: 5 MMOL/L — SIGNIFICANT CHANGE UP (ref 5–17)
ANION GAP SERPL CALC-SCNC: 9 MMOL/L — SIGNIFICANT CHANGE UP (ref 5–17)
ANION GAP SERPL CALC-SCNC: 9 MMOL/L — SIGNIFICANT CHANGE UP (ref 5–17)
AST SERPL-CCNC: 21 U/L — SIGNIFICANT CHANGE UP (ref 10–40)
BASOPHILS # BLD AUTO: 0.02 K/UL — SIGNIFICANT CHANGE UP (ref 0–0.2)
BASOPHILS NFR BLD AUTO: 0.1 % — SIGNIFICANT CHANGE UP (ref 0–2)
BILIRUB SERPL-MCNC: 0.6 MG/DL — SIGNIFICANT CHANGE UP (ref 0.2–1.2)
BUN SERPL-MCNC: 28 MG/DL — HIGH (ref 7–18)
BUN SERPL-MCNC: 29 MG/DL — HIGH (ref 7–18)
BUN SERPL-MCNC: 33 MG/DL — HIGH (ref 7–18)
BUN SERPL-MCNC: 36 MG/DL — HIGH (ref 7–18)
CALCIUM SERPL-MCNC: 8.5 MG/DL — SIGNIFICANT CHANGE UP (ref 8.4–10.5)
CALCIUM SERPL-MCNC: 8.6 MG/DL — SIGNIFICANT CHANGE UP (ref 8.4–10.5)
CALCIUM SERPL-MCNC: 8.6 MG/DL — SIGNIFICANT CHANGE UP (ref 8.4–10.5)
CALCIUM SERPL-MCNC: 8.7 MG/DL — SIGNIFICANT CHANGE UP (ref 8.4–10.5)
CHLORIDE SERPL-SCNC: 105 MMOL/L — SIGNIFICANT CHANGE UP (ref 96–108)
CHLORIDE SERPL-SCNC: 109 MMOL/L — HIGH (ref 96–108)
CHLORIDE SERPL-SCNC: 109 MMOL/L — HIGH (ref 96–108)
CHLORIDE SERPL-SCNC: 112 MMOL/L — HIGH (ref 96–108)
CK MB BLD-MCNC: 6.9 % — HIGH (ref 0–3.5)
CK MB CFR SERPL CALC: 38.4 NG/ML — HIGH (ref 0–3.6)
CK SERPL-CCNC: 560 U/L — HIGH (ref 35–232)
CO2 SERPL-SCNC: 14 MMOL/L — LOW (ref 22–31)
CO2 SERPL-SCNC: 23 MMOL/L — SIGNIFICANT CHANGE UP (ref 22–31)
CO2 SERPL-SCNC: 24 MMOL/L — SIGNIFICANT CHANGE UP (ref 22–31)
CO2 SERPL-SCNC: 25 MMOL/L — SIGNIFICANT CHANGE UP (ref 22–31)
CREAT SERPL-MCNC: 0.86 MG/DL — SIGNIFICANT CHANGE UP (ref 0.5–1.3)
CREAT SERPL-MCNC: 1.13 MG/DL — SIGNIFICANT CHANGE UP (ref 0.5–1.3)
CREAT SERPL-MCNC: 1.36 MG/DL — HIGH (ref 0.5–1.3)
CREAT SERPL-MCNC: 1.77 MG/DL — HIGH (ref 0.5–1.3)
EGFR: 40 ML/MIN/1.73M2 — LOW
EGFR: 55 ML/MIN/1.73M2 — LOW
EGFR: 68 ML/MIN/1.73M2 — SIGNIFICANT CHANGE UP
EGFR: 91 ML/MIN/1.73M2 — SIGNIFICANT CHANGE UP
EOSINOPHIL # BLD AUTO: 0 K/UL — SIGNIFICANT CHANGE UP (ref 0–0.5)
EOSINOPHIL NFR BLD AUTO: 0 % — SIGNIFICANT CHANGE UP (ref 0–6)
GLUCOSE BLDC GLUCOMTR-MCNC: 115 MG/DL — HIGH (ref 70–99)
GLUCOSE BLDC GLUCOMTR-MCNC: 136 MG/DL — HIGH (ref 70–99)
GLUCOSE BLDC GLUCOMTR-MCNC: 141 MG/DL — HIGH (ref 70–99)
GLUCOSE BLDC GLUCOMTR-MCNC: 151 MG/DL — HIGH (ref 70–99)
GLUCOSE BLDC GLUCOMTR-MCNC: 191 MG/DL — HIGH (ref 70–99)
GLUCOSE BLDC GLUCOMTR-MCNC: 221 MG/DL — HIGH (ref 70–99)
GLUCOSE BLDC GLUCOMTR-MCNC: 222 MG/DL — HIGH (ref 70–99)
GLUCOSE BLDC GLUCOMTR-MCNC: 223 MG/DL — HIGH (ref 70–99)
GLUCOSE BLDC GLUCOMTR-MCNC: 238 MG/DL — HIGH (ref 70–99)
GLUCOSE BLDC GLUCOMTR-MCNC: 242 MG/DL — HIGH (ref 70–99)
GLUCOSE BLDC GLUCOMTR-MCNC: 252 MG/DL — HIGH (ref 70–99)
GLUCOSE BLDC GLUCOMTR-MCNC: 328 MG/DL — HIGH (ref 70–99)
GLUCOSE BLDC GLUCOMTR-MCNC: 63 MG/DL — LOW (ref 70–99)
GLUCOSE BLDC GLUCOMTR-MCNC: 74 MG/DL — SIGNIFICANT CHANGE UP (ref 70–99)
GLUCOSE SERPL-MCNC: 220 MG/DL — HIGH (ref 70–99)
GLUCOSE SERPL-MCNC: 289 MG/DL — HIGH (ref 70–99)
GLUCOSE SERPL-MCNC: 458 MG/DL — CRITICAL HIGH (ref 70–99)
GLUCOSE SERPL-MCNC: 69 MG/DL — LOW (ref 70–99)
HCT VFR BLD CALC: 29.4 % — LOW (ref 39–50)
HCT VFR BLD CALC: 30.6 % — LOW (ref 39–50)
HGB BLD-MCNC: 9.4 G/DL — LOW (ref 13–17)
HGB BLD-MCNC: 9.4 G/DL — LOW (ref 13–17)
IMM GRANULOCYTES NFR BLD AUTO: 0.6 % — SIGNIFICANT CHANGE UP (ref 0–0.9)
LACTATE SERPL-SCNC: 1.8 MMOL/L — SIGNIFICANT CHANGE UP (ref 0.7–2)
LACTATE SERPL-SCNC: 3.4 MMOL/L — HIGH (ref 0.7–2)
LACTATE SERPL-SCNC: 9.4 MMOL/L — CRITICAL HIGH (ref 0.7–2)
LYMPHOCYTES # BLD AUTO: 0.93 K/UL — LOW (ref 1–3.3)
LYMPHOCYTES # BLD AUTO: 5.4 % — LOW (ref 13–44)
MAGNESIUM SERPL-MCNC: 1.7 MG/DL — SIGNIFICANT CHANGE UP (ref 1.6–2.6)
MAGNESIUM SERPL-MCNC: 1.8 MG/DL — SIGNIFICANT CHANGE UP (ref 1.6–2.6)
MAGNESIUM SERPL-MCNC: 1.8 MG/DL — SIGNIFICANT CHANGE UP (ref 1.6–2.6)
MCHC RBC-ENTMCNC: 28.9 PG — SIGNIFICANT CHANGE UP (ref 27–34)
MCHC RBC-ENTMCNC: 29 PG — SIGNIFICANT CHANGE UP (ref 27–34)
MCHC RBC-ENTMCNC: 30.7 GM/DL — LOW (ref 32–36)
MCHC RBC-ENTMCNC: 32 GM/DL — SIGNIFICANT CHANGE UP (ref 32–36)
MCV RBC AUTO: 90.7 FL — SIGNIFICANT CHANGE UP (ref 80–100)
MCV RBC AUTO: 94.2 FL — SIGNIFICANT CHANGE UP (ref 80–100)
MONOCYTES # BLD AUTO: 1.36 K/UL — HIGH (ref 0–0.9)
MONOCYTES NFR BLD AUTO: 7.9 % — SIGNIFICANT CHANGE UP (ref 2–14)
MRSA PCR RESULT.: SIGNIFICANT CHANGE UP
NEUTROPHILS # BLD AUTO: 14.78 K/UL — HIGH (ref 1.8–7.4)
NEUTROPHILS NFR BLD AUTO: 86 % — HIGH (ref 43–77)
NRBC # BLD: 0 /100 WBCS — SIGNIFICANT CHANGE UP (ref 0–0)
NRBC # BLD: 0 /100 WBCS — SIGNIFICANT CHANGE UP (ref 0–0)
PHOSPHATE SERPL-MCNC: 1.2 MG/DL — LOW (ref 2.5–4.5)
PHOSPHATE SERPL-MCNC: 2.3 MG/DL — LOW (ref 2.5–4.5)
PHOSPHATE SERPL-MCNC: 2.6 MG/DL — SIGNIFICANT CHANGE UP (ref 2.5–4.5)
PHOSPHATE SERPL-MCNC: 3.2 MG/DL — SIGNIFICANT CHANGE UP (ref 2.5–4.5)
PLATELET # BLD AUTO: 237 K/UL — SIGNIFICANT CHANGE UP (ref 150–400)
PLATELET # BLD AUTO: 246 K/UL — SIGNIFICANT CHANGE UP (ref 150–400)
POTASSIUM SERPL-MCNC: 3.7 MMOL/L — SIGNIFICANT CHANGE UP (ref 3.5–5.3)
POTASSIUM SERPL-MCNC: 3.7 MMOL/L — SIGNIFICANT CHANGE UP (ref 3.5–5.3)
POTASSIUM SERPL-MCNC: 3.9 MMOL/L — SIGNIFICANT CHANGE UP (ref 3.5–5.3)
POTASSIUM SERPL-MCNC: 4.1 MMOL/L — SIGNIFICANT CHANGE UP (ref 3.5–5.3)
POTASSIUM SERPL-SCNC: 3.7 MMOL/L — SIGNIFICANT CHANGE UP (ref 3.5–5.3)
POTASSIUM SERPL-SCNC: 3.7 MMOL/L — SIGNIFICANT CHANGE UP (ref 3.5–5.3)
POTASSIUM SERPL-SCNC: 3.9 MMOL/L — SIGNIFICANT CHANGE UP (ref 3.5–5.3)
POTASSIUM SERPL-SCNC: 4.1 MMOL/L — SIGNIFICANT CHANGE UP (ref 3.5–5.3)
PROT SERPL-MCNC: 6.4 G/DL — SIGNIFICANT CHANGE UP (ref 6–8.3)
RBC # BLD: 3.24 M/UL — LOW (ref 4.2–5.8)
RBC # BLD: 3.25 M/UL — LOW (ref 4.2–5.8)
RBC # FLD: 14.7 % — HIGH (ref 10.3–14.5)
RBC # FLD: 14.8 % — HIGH (ref 10.3–14.5)
S AUREUS DNA NOSE QL NAA+PROBE: SIGNIFICANT CHANGE UP
SODIUM SERPL-SCNC: 139 MMOL/L — SIGNIFICANT CHANGE UP (ref 135–145)
SODIUM SERPL-SCNC: 139 MMOL/L — SIGNIFICANT CHANGE UP (ref 135–145)
SODIUM SERPL-SCNC: 141 MMOL/L — SIGNIFICANT CHANGE UP (ref 135–145)
SODIUM SERPL-SCNC: 145 MMOL/L — SIGNIFICANT CHANGE UP (ref 135–145)
TROPONIN I, HIGH SENSITIVITY RESULT: HIGH NG/L
WBC # BLD: 17.2 K/UL — HIGH (ref 3.8–10.5)
WBC # BLD: 18.16 K/UL — HIGH (ref 3.8–10.5)
WBC # FLD AUTO: 17.2 K/UL — HIGH (ref 3.8–10.5)
WBC # FLD AUTO: 18.16 K/UL — HIGH (ref 3.8–10.5)

## 2023-03-16 PROCEDURE — 93010 ELECTROCARDIOGRAM REPORT: CPT

## 2023-03-16 PROCEDURE — 93306 TTE W/DOPPLER COMPLETE: CPT | Mod: 26

## 2023-03-16 PROCEDURE — 99222 1ST HOSP IP/OBS MODERATE 55: CPT | Mod: GC

## 2023-03-16 RX ORDER — POTASSIUM PHOSPHATE, MONOBASIC POTASSIUM PHOSPHATE, DIBASIC 236; 224 MG/ML; MG/ML
15 INJECTION, SOLUTION INTRAVENOUS ONCE
Refills: 0 | Status: COMPLETED | OUTPATIENT
Start: 2023-03-16 | End: 2023-03-16

## 2023-03-16 RX ORDER — SODIUM CHLORIDE 9 MG/ML
500 INJECTION, SOLUTION INTRAVENOUS ONCE
Refills: 0 | Status: COMPLETED | OUTPATIENT
Start: 2023-03-16 | End: 2023-03-16

## 2023-03-16 RX ORDER — ENOXAPARIN SODIUM 100 MG/ML
80 INJECTION SUBCUTANEOUS EVERY 12 HOURS
Refills: 0 | Status: DISCONTINUED | OUTPATIENT
Start: 2023-03-16 | End: 2023-03-21

## 2023-03-16 RX ORDER — INSULIN LISPRO 100/ML
7 VIAL (ML) SUBCUTANEOUS
Refills: 0 | Status: DISCONTINUED | OUTPATIENT
Start: 2023-03-16 | End: 2023-03-16

## 2023-03-16 RX ORDER — CHLORHEXIDINE GLUCONATE 213 G/1000ML
1 SOLUTION TOPICAL DAILY
Refills: 0 | Status: DISCONTINUED | OUTPATIENT
Start: 2023-03-16 | End: 2023-03-19

## 2023-03-16 RX ORDER — INSULIN LISPRO 100/ML
VIAL (ML) SUBCUTANEOUS AT BEDTIME
Refills: 0 | Status: DISCONTINUED | OUTPATIENT
Start: 2023-03-16 | End: 2023-03-18

## 2023-03-16 RX ORDER — POTASSIUM PHOSPHATE, MONOBASIC POTASSIUM PHOSPHATE, DIBASIC 236; 224 MG/ML; MG/ML
30 INJECTION, SOLUTION INTRAVENOUS ONCE
Refills: 0 | Status: COMPLETED | OUTPATIENT
Start: 2023-03-16 | End: 2023-03-16

## 2023-03-16 RX ORDER — ATORVASTATIN CALCIUM 80 MG/1
40 TABLET, FILM COATED ORAL AT BEDTIME
Refills: 0 | Status: DISCONTINUED | OUTPATIENT
Start: 2023-03-16 | End: 2023-03-22

## 2023-03-16 RX ORDER — ONDANSETRON 8 MG/1
4 TABLET, FILM COATED ORAL ONCE
Refills: 0 | Status: COMPLETED | OUTPATIENT
Start: 2023-03-16 | End: 2023-03-16

## 2023-03-16 RX ORDER — ONDANSETRON 8 MG/1
4 TABLET, FILM COATED ORAL EVERY 6 HOURS
Refills: 0 | Status: DISCONTINUED | OUTPATIENT
Start: 2023-03-16 | End: 2023-03-22

## 2023-03-16 RX ORDER — DEXTROSE 50 % IN WATER 50 %
50 SYRINGE (ML) INTRAVENOUS ONCE
Refills: 0 | Status: COMPLETED | OUTPATIENT
Start: 2023-03-16 | End: 2023-03-16

## 2023-03-16 RX ORDER — BENZOCAINE AND MENTHOL 5; 1 G/100ML; G/100ML
1 LIQUID ORAL THREE TIMES A DAY
Refills: 0 | Status: DISCONTINUED | OUTPATIENT
Start: 2023-03-16 | End: 2023-03-22

## 2023-03-16 RX ORDER — INSULIN GLARGINE 100 [IU]/ML
22 INJECTION, SOLUTION SUBCUTANEOUS EVERY MORNING
Refills: 0 | Status: DISCONTINUED | OUTPATIENT
Start: 2023-03-16 | End: 2023-03-17

## 2023-03-16 RX ORDER — MECLIZINE HCL 12.5 MG
25 TABLET ORAL EVERY 8 HOURS
Refills: 0 | Status: DISCONTINUED | OUTPATIENT
Start: 2023-03-16 | End: 2023-03-22

## 2023-03-16 RX ORDER — ASPIRIN/CALCIUM CARB/MAGNESIUM 324 MG
324 TABLET ORAL ONCE
Refills: 0 | Status: COMPLETED | OUTPATIENT
Start: 2023-03-16 | End: 2023-03-16

## 2023-03-16 RX ORDER — ENOXAPARIN SODIUM 100 MG/ML
30 INJECTION SUBCUTANEOUS EVERY 24 HOURS
Refills: 0 | Status: DISCONTINUED | OUTPATIENT
Start: 2023-03-16 | End: 2023-03-16

## 2023-03-16 RX ORDER — INSULIN LISPRO 100/ML
VIAL (ML) SUBCUTANEOUS
Refills: 0 | Status: DISCONTINUED | OUTPATIENT
Start: 2023-03-16 | End: 2023-03-18

## 2023-03-16 RX ORDER — ENOXAPARIN SODIUM 100 MG/ML
40 INJECTION SUBCUTANEOUS EVERY 24 HOURS
Refills: 0 | Status: DISCONTINUED | OUTPATIENT
Start: 2023-03-16 | End: 2023-03-16

## 2023-03-16 RX ADMIN — SODIUM CHLORIDE 150 MILLILITER(S): 9 INJECTION INTRAMUSCULAR; INTRAVENOUS; SUBCUTANEOUS at 05:25

## 2023-03-16 RX ADMIN — Medication 50 MILLILITER(S): at 16:36

## 2023-03-16 RX ADMIN — ENOXAPARIN SODIUM 80 MILLIGRAM(S): 100 INJECTION SUBCUTANEOUS at 18:19

## 2023-03-16 RX ADMIN — SODIUM CHLORIDE 1000 MILLILITER(S): 9 INJECTION INTRAMUSCULAR; INTRAVENOUS; SUBCUTANEOUS at 02:00

## 2023-03-16 RX ADMIN — Medication 25 MILLIGRAM(S): at 05:25

## 2023-03-16 RX ADMIN — Medication 1: at 11:35

## 2023-03-16 RX ADMIN — ATORVASTATIN CALCIUM 40 MILLIGRAM(S): 80 TABLET, FILM COATED ORAL at 21:23

## 2023-03-16 RX ADMIN — Medication 7 UNIT(S): at 11:35

## 2023-03-16 RX ADMIN — BENZOCAINE AND MENTHOL 1 LOZENGE: 5; 1 LIQUID ORAL at 12:05

## 2023-03-16 RX ADMIN — CEFTRIAXONE 100 MILLIGRAM(S): 500 INJECTION, POWDER, FOR SOLUTION INTRAMUSCULAR; INTRAVENOUS at 05:25

## 2023-03-16 RX ADMIN — POTASSIUM PHOSPHATE, MONOBASIC POTASSIUM PHOSPHATE, DIBASIC 83.33 MILLIMOLE(S): 236; 224 INJECTION, SOLUTION INTRAVENOUS at 05:25

## 2023-03-16 RX ADMIN — Medication 25 MILLIGRAM(S): at 21:23

## 2023-03-16 RX ADMIN — ENOXAPARIN SODIUM 30 MILLIGRAM(S): 100 INJECTION SUBCUTANEOUS at 05:26

## 2023-03-16 RX ADMIN — INSULIN GLARGINE 22 UNIT(S): 100 INJECTION, SOLUTION SUBCUTANEOUS at 09:09

## 2023-03-16 RX ADMIN — ONDANSETRON 4 MILLIGRAM(S): 8 TABLET, FILM COATED ORAL at 15:31

## 2023-03-16 RX ADMIN — Medication 324 MILLIGRAM(S): at 15:29

## 2023-03-16 RX ADMIN — SODIUM CHLORIDE 500 MILLILITER(S): 9 INJECTION, SOLUTION INTRAVENOUS at 15:31

## 2023-03-16 RX ADMIN — CHLORHEXIDINE GLUCONATE 1 APPLICATION(S): 213 SOLUTION TOPICAL at 05:42

## 2023-03-16 NOTE — H&P ADULT - ASSESSMENT
Patient is 72 years old male with past medical history of Type I DM (50 years ago, on Insulin pump for last 15 years), Vertigo and BPH who was brought to ED due to Abdominal pain, Nausea and vomiting. Patient states that in morning, he changed the insulin pump but wasn't sure what happened and around afternoon, He started feeling abdominal stiffness, rigidity and vomited three times in home and 2 times when came to ED. Patient usually takes 60-70 units of insulin pre meals as he mentioned and he adjusted how many unites based on his diet as he mentioned. In ED, Patient started on IV fluids, got 18 units of insulin and started on Insulin IV infusion. BS >600, Anion Gap 21, Bicarb 14. Patient will be admitted to ICU for DKA management.    #Diabetic Ketoacidosis   #Elevated anion gap metabolic acidosis   #Leukocytosis   #UTI   #Hyponatremia   #Acute renal Injury        Patient is 72 years old male with past medical history of Type I DM (50 years ago, on Insulin pump for last 15 years), Vertigo and BPH who was brought to ED due to Abdominal pain, Nausea and vomiting. Patient states that in morning, he changed the insulin pump but wasn't sure what happened and around afternoon, He started feeling abdominal stiffness, rigidity and vomited three times in home and 2 times when came to ED. Patient usually takes 60-70 units of insulin pre meals as he mentioned and he adjusted how many unites based on his diet as he mentioned. In ED, Patient started on IV fluids, got 18 units of insulin and started on Insulin IV infusion. BS >600, Anion Gap 21, Bicarb 14. Patient will be admitted to ICU for DKA management.    #Diabetic Ketoacidosis   #Elevated anion gap metabolic acidosis   #Leukocytosis   #UTI   #Hyponatremia   #Acute renal Injury     CNS   Patient is AAOx3  Vertigo: Meclizine PRN     CVS   Hypotension   Secondary to fluids loss i/e vomiting   Patient received IV bolus 1L NS in ED   Will give 30cc/kg equal to 2.7L   Keep on Maintenance fluids 200cc/hr      Pulm  No issues   Serial exam for fluid overload     Hematology     Leukocytosis   Likely reactive, However with Urine WBCs although not symptomatic; Will start Rocephin   Repeat CBC     Gastro    NPO for now   IV insulin infusion   IV fluids for replacement     Endocrine     DKA; BS >600  Patient received 18 units bolus then started on 9ml/hr   Hold off insulin pump   Finger Stick q1  BMP q4; Potassium replacement if needed   Monitor Anion gap and Bicarb   Will bridge with closed anion gap and normal Bicarb and patient able to eat   Endo consult when patient is stable       Infectious   As above   Continue on Rocephin     Renal   Acute renal injury   Will repeat BMP after the boluses; Likely due to dehydration   If not improved; Will send U mike    High anion gap metabolic acidosis   Plan as above   Monitor Bicarb and Anion Gap   Replace K if needed     Prophylaxis   Lovenox

## 2023-03-16 NOTE — PROGRESS NOTE ADULT - SUBJECTIVE AND OBJECTIVE BOX
INTERVAL HPI/OVERNIGHT EVENTS:     PRESSORS: [ ] YES [ ] NO  WHICH:    ANTIBIOTICS:                  DATE STARTED:  ANTIBIOTICS:                  DATE STARTED:  ANTIBIOTICS:                  DATE STARTED:    Antimicrobial:  cefTRIAXone   IVPB 1000 milliGRAM(s) IV Intermittent every 24 hours    Cardiovascular:    Pulmonary:    Hematalogic:  enoxaparin Injectable 30 milliGRAM(s) SubCutaneous every 24 hours    Other:  atorvastatin 40 milliGRAM(s) Oral at bedtime  chlorhexidine 4% Liquid 1 Application(s) Topical <User Schedule>  insulin glargine Injectable (LANTUS) 22 Unit(s) SubCutaneous every morning  insulin lispro (ADMELOG) corrective regimen sliding scale   SubCutaneous three times a day before meals  insulin lispro (ADMELOG) corrective regimen sliding scale   SubCutaneous at bedtime  insulin lispro Injectable (ADMELOG) 7 Unit(s) SubCutaneous three times a day before meals  insulin regular Infusion 9 Unit(s)/Hr IV Continuous <Continuous>  meclizine 25 milliGRAM(s) Oral every 8 hours PRN  sodium chloride 0.9%. 1000 milliLiter(s) IV Continuous <Continuous>      Drug Dosing Weight  Height (cm): 188 (15 Mar 2023 20:00)  Weight (kg): 89.6 (16 Mar 2023 00:00)  BMI (kg/m2): 25.4 (16 Mar 2023 00:00)  BSA (m2): 2.16 (16 Mar 2023 00:00)    CENTRAL LINE: [ ] YES [ ] NO  LOCATION:   DATE INSERTED:  REMOVE: [ ] YES [ ] NO  EXPLAIN:    LEONG: [ ] YES [ ] NO    DATE INSERTED:  REMOVE:  [ ] YES [ ] NO  EXPLAIN:    A-LINE:  [ ] YES [ ] NO  LOCATION:   DATE INSERTED:  REMOVE:  [ ] YES [ ] NO  EXPLAIN:    PMH/Social Hx/Fam Hx -reviewed admission note, no change since admission  PAST MEDICAL & SURGICAL HISTORY:  Diabetes      Hypertension      S/P cataract surgery  right eye        Heart faliure: acute [ ] chronic [ ] acute or chronic [ ] diastolic [ ] systolic [ ] combied systolic and diastolic[ ]  JOANNA: ATN[ ] renal medullary necrosis [ ] CKD I [ ]CKDII [ ]CKD III [ ]CKD IV [ ]CKD V [ ]Other pathological lesions [ ]  Abdominal Nutrition Status: malnutrition [ ] cachexia [ ] morbid obesity/BMI=40 [ ] Supplement ordered [___________]     T(C): 37.1 (23 @ 05:00), Max: 37.1 (23 @ 05:00)  HR: 84 (23 @ 07:00)  BP: 104/52 (23 @ 07:00)  BP(mean): 63 (23 @ 07:00)  ABP: --  ABP(mean): --  RR: 16 (23 @ 07:00)  SpO2: 96% (23 @ 07:00)  Wt(kg): --    ABG - ( 15 Mar 2023 20:52 )  pH, Arterial: 7.36  pH, Blood: x     /  pCO2: 21    /  pO2: 123   / HCO3: 12    / Base Excess: -11.7 /  SaO2: 97                    03-15 @ 07:01  -   @ 07:00  --------------------------------------------------------  IN: 2616.2 mL / OUT: 470 mL / NET: 2146.2 mL            PHYSICAL EXAM:    GENERAL: [ ]NAD, [ ]well-groomed, [ ]well-developed  HEAD:  [ ]Atraumatic, [ ]Normocephalic  EYES: [ ]EOMI, [ ]PERRLA, [ ]conjunctiva and sclera clear  ENMT: [ ]No tonsillar erythema, exudates, or enlargement; [ ]Moist mucous membranes, [ ]Good dentition, [ ]No lesions  NECK: [ ]Supple, normal appearance, [ ]No JVD; [ ]Normal thyroid; [ ]Trachea midline  NERVOUS SYSTEM:  [ ]Alert & Oriented X3, [ ]Good concentration; [ ]Motor Strength 5/5 B/L upper and lower extremities; [ ]DTRs 2+ intact and symmetric  CHEST/LUNG: [ ]No chest deformity; [ ]Normal percussion bilaterally; [ ]No rales, rhonchi, wheezing; [ ]Crackles at bases  HEART: [ ]Regular rate and rhythm; [ ]No murmurs, rubs, or gallops  ABDOMEN: [ ]Soft, Nontender, Nondistended; [ ]Bowel sounds present  EXTREMITIES:  [ ]2+ Peripheral Pulses, [ ]No clubbing, cyanosis, or edema [ ]Bilat lower extremity edema  LYMPH: [ ]No lymphadenopathy noted  SKIN: [ ]No rashes or lesions; [ ]Good capillary refill      LABS:  CBC Full  -  ( 16 Mar 2023 03:38 )  WBC Count : 17.20 K/uL  RBC Count : 3.24 M/uL  Hemoglobin : 9.4 g/dL  Hematocrit : 29.4 %  Platelet Count - Automated : 237 K/uL  Mean Cell Volume : 90.7 fl  Mean Cell Hemoglobin : 29.0 pg  Mean Cell Hemoglobin Concentration : 32.0 gm/dL  Auto Neutrophil # : 14.78 K/uL  Auto Lymphocyte # : 0.93 K/uL  Auto Monocyte # : 1.36 K/uL  Auto Eosinophil # : 0.00 K/uL  Auto Basophil # : 0.02 K/uL  Auto Neutrophil % : 86.0 %  Auto Lymphocyte % : 5.4 %  Auto Monocyte % : 7.9 %  Auto Eosinophil % : 0.0 %  Auto Basophil % : 0.1 %    03-16    141  |  109<H>  |  33<H>  ----------------------------<  289<H>  3.7   |  23  |  1.36<H>    Ca    8.7      16 Mar 2023 03:38  Phos  1.2     03-16  Mg     1.8     -16    TPro  6.4  /  Alb  3.2<L>  /  TBili  0.6  /  DBili  x   /  AST  21  /  ALT  29  /  AlkPhos  63  03-16      Urinalysis Basic - ( 15 Mar 2023 22:46 )    Color: Yellow / Appearance: Clear / S.015 / pH: x  Gluc: x / Ketone: Moderate  / Bili: Negative / Urobili: Negative   Blood: x / Protein: 15 / Nitrite: Negative   Leuk Esterase: Trace / RBC: 0-2 /HPF / WBC 26-50 /HPF   Sq Epi: x / Non Sq Epi: Few /HPF / Bacteria: Many /HPF          RADIOLOGY & ADDITIONAL STUDIES REVIEWED:      [ ]GOALS OF CARE DISCUSSION WITH PATIENT/FAMILY/PROXY:    CRITICAL CARE TIME SPENT: 35 minutes INTERVAL HPI/OVERNIGHT EVENTS:   no acute overnight events, pt. seen and examined at bedside, Pt feels better today, He denies any vomiting or nausea. He states that he has dry cough.     PRESSORS: [ ] YES [x ] NO  WHICH:    ANTIBIOTICS:        CTX          DATE STARTED: 3/16  ANTIBIOTICS:                  DATE STARTED:  ANTIBIOTICS:                  DATE STARTED:    Antimicrobial:  cefTRIAXone   IVPB 1000 milliGRAM(s) IV Intermittent every 24 hours    Cardiovascular:    Pulmonary:    Hematalogic:  enoxaparin Injectable 30 milliGRAM(s) SubCutaneous every 24 hours    Other:  atorvastatin 40 milliGRAM(s) Oral at bedtime  chlorhexidine 4% Liquid 1 Application(s) Topical <User Schedule>  insulin glargine Injectable (LANTUS) 22 Unit(s) SubCutaneous every morning  insulin lispro (ADMELOG) corrective regimen sliding scale   SubCutaneous three times a day before meals  insulin lispro (ADMELOG) corrective regimen sliding scale   SubCutaneous at bedtime  insulin lispro Injectable (ADMELOG) 7 Unit(s) SubCutaneous three times a day before meals  insulin regular Infusion 9 Unit(s)/Hr IV Continuous <Continuous>  meclizine 25 milliGRAM(s) Oral every 8 hours PRN  sodium chloride 0.9%. 1000 milliLiter(s) IV Continuous <Continuous>      Drug Dosing Weight  Height (cm): 188 (15 Mar 2023 20:00)  Weight (kg): 89.6 (16 Mar 2023 00:00)  BMI (kg/m2): 25.4 (16 Mar 2023 00:00)  BSA (m2): 2.16 (16 Mar 2023 00:00)    CENTRAL LINE: [ ] YES [x ] NO  LOCATION:   DATE INSERTED:  REMOVE: [ ] YES [ ] NO  EXPLAIN:    LEONG: [ ] YES [x ] NO    DATE INSERTED:  REMOVE:  [ ] YES [ ] NO  EXPLAIN:    A-LINE:  [ ] YES [x ] NO  LOCATION:   DATE INSERTED:  REMOVE:  [ ] YES [ ] NO  EXPLAIN:    PMH/Social Hx/Fam Hx -reviewed admission note, no change since admission  PAST MEDICAL & SURGICAL HISTORY:  Diabetes      Hypertension      S/P cataract surgery  right eye        Heart faliure: acute [ ] chronic [ ] acute or chronic [ ] diastolic [ ] systolic [ ] combied systolic and diastolic[ ]  JOANNA: ATN[ ] renal medullary necrosis [ ] CKD I [ ]CKDII [ ]CKD III [ ]CKD IV [ ]CKD V [ ]Other pathological lesions [ ]  Abdominal Nutrition Status: malnutrition [ ] cachexia [ ] morbid obesity/BMI=40 [ ] Supplement ordered [___________]     T(C): 37.1 (23 @ 05:00), Max: 37.1 (23 @ 05:00)  HR: 84 (23 @ 07:00)  BP: 104/52 (23 @ 07:00)  BP(mean): 63 (23 @ 07:00)  ABP: --  ABP(mean): --  RR: 16 (23 @ 07:00)  SpO2: 96% (23 @ 07:00)  Wt(kg): --    ABG - ( 15 Mar 2023 20:52 )  pH, Arterial: 7.36  pH, Blood: x     /  pCO2: 21    /  pO2: 123   / HCO3: 12    / Base Excess: -11.7 /  SaO2: 97                    03-15 @ 07:01  -   @ 07:00  --------------------------------------------------------  IN: 2616.2 mL / OUT: 470 mL / NET: 2146.2 mL            PHYSICAL EXAM:  GENERAL: NAD, well-groomed, well-developed  HEAD:  Atraumatic, Normocephalic  EYES: EOMI, PERRLA, conjunctiva and sclera clear  ENMT: No tonsillar erythema, exudates, or enlargement; Moist mucous membranes, Good dentition, No lesions  NECK: Supple, normal appearance, No JVD; Normal thyroid; Trachea midline  NERVOUS SYSTEM:  Alert & Oriented X3,  Motor Strength 5/5 B/L upper and lower extremities, sensation intact  CHEST/LUNG: Lungs clear to auscultation bilaterally, No rales, rhonchi, wheezing   HEART: Regular rate and rhythm; No murmurs, rubs, or gallops  ABDOMEN: Soft, Nontender, Nondistended; Bowel sounds present  EXTREMITIES:  2+ Peripheral Pulses, No clubbing, cyanosis, or edema  LYMPH: No lymphadenopathy noted  SKIN: No rashes or lesions;  Good capillary refill      LABS:  CBC Full  -  ( 16 Mar 2023 03:38 )  WBC Count : 17.20 K/uL  RBC Count : 3.24 M/uL  Hemoglobin : 9.4 g/dL  Hematocrit : 29.4 %  Platelet Count - Automated : 237 K/uL  Mean Cell Volume : 90.7 fl  Mean Cell Hemoglobin : 29.0 pg  Mean Cell Hemoglobin Concentration : 32.0 gm/dL  Auto Neutrophil # : 14.78 K/uL  Auto Lymphocyte # : 0.93 K/uL  Auto Monocyte # : 1.36 K/uL  Auto Eosinophil # : 0.00 K/uL  Auto Basophil # : 0.02 K/uL  Auto Neutrophil % : 86.0 %  Auto Lymphocyte % : 5.4 %  Auto Monocyte % : 7.9 %  Auto Eosinophil % : 0.0 %  Auto Basophil % : 0.1 %    -    141  |  109<H>  |  33<H>  ----------------------------<  289<H>  3.7   |  23  |  1.36<H>    Ca    8.7      16 Mar 2023 03:38  Phos  1.2     03-16  Mg     1.8     -16    TPro  6.4  /  Alb  3.2<L>  /  TBili  0.6  /  DBili  x   /  AST  21  /  ALT  29  /  AlkPhos  63  03-16      Urinalysis Basic - ( 15 Mar 2023 22:46 )    Color: Yellow / Appearance: Clear / S.015 / pH: x  Gluc: x / Ketone: Moderate  / Bili: Negative / Urobili: Negative   Blood: x / Protein: 15 / Nitrite: Negative   Leuk Esterase: Trace / RBC: 0-2 /HPF / WBC 26-50 /HPF   Sq Epi: x / Non Sq Epi: Few /HPF / Bacteria: Many /HPF          RADIOLOGY & ADDITIONAL STUDIES REVIEWED:      [ ]GOALS OF CARE DISCUSSION WITH PATIENT/FAMILY/PROXY:    CRITICAL CARE TIME SPENT: 35 minutes

## 2023-03-16 NOTE — PROVIDER CONTACT NOTE (CRITICAL VALUE NOTIFICATION) - ACTION/TREATMENT ORDERED:
pt already on insulin gtt, q1hr checks  NS bolus given pt already on insulin gtt, q1hr checks  NS bolus given, EKG, no further intervention

## 2023-03-16 NOTE — H&P ADULT - HISTORY OF PRESENT ILLNESS
Patient is 72 years old male with past  Patient is 72 years old male with past medical history of Type I DM (50 years ago, on Insulin pump for last 15 years), Vertigo and BPH who was brought to ED due to Abdominal pain, Nausea and vomiting. Patient states that in morning, he changed the insulin pump but wasn't sure what happened and around afternoon, He started feeling abdominal stiffness, rigidity and vomited three times in home and 2 times when came to ED. Patient usually takes 60-70 units of insulin pre meals as he mentioned and he adjusted how many unites based on his diet as he mentioned. In ED, Patient started on IV fluids, got 18 units of insulin and started on Insulin IV infusion. Patient denied chest pain, dizziness, respiratory distress, change in bowel movement. Patient was transferred to ICU for DKA management.

## 2023-03-16 NOTE — PATIENT PROFILE ADULT - FALL HARM RISK - HARM RISK INTERVENTIONS
Assistance with ambulation/Assistance OOB with selected safe patient handling equipment/Communicate Risk of Fall with Harm to all staff/Discuss with provider need for PT consult/Monitor gait and stability/Provide patient with walking aids - walker, cane, crutches/Reinforce activity limits and safety measures with patient and family/Sit up slowly, dangle for a short time, stand at bedside before walking/Tailored Fall Risk Interventions/Visual Cue: Yellow wristband and red socks/Bed in lowest position, wheels locked, appropriate side rails in place/Call bell, personal items and telephone in reach/Instruct patient to call for assistance before getting out of bed or chair/Non-slip footwear when patient is out of bed/[place] to call system/Physically safe environment - no spills, clutter or unnecessary equipment/Purposeful Proactive Rounding/Room/bathroom lighting operational, light cord in reach

## 2023-03-16 NOTE — PATIENT PROFILE ADULT - LEGAL HELP
Pt provided with discharge instructions, instructions for follow up appointment with PCP, s/s of when to seek emergency care.  Pt verbalizes understanding.  Pt discharged in good condition.    no

## 2023-03-16 NOTE — PATIENT PROFILE ADULT - VISION (WITH CORRECTIVE LENSES IF THE PATIENT USUALLY WEARS THEM):
glassess/Partially impaired: cannot see medication labels or newsprint, but can see obstacles in path, and the surrounding layout; can count fingers at arm's length

## 2023-03-16 NOTE — PROGRESS NOTE ADULT - ASSESSMENT
Patient is 72 years old male with past medical history of Type I DM (50 years ago, on Insulin pump for last 15 years), Vertigo and BPH who was brought to ED due to Abdominal pain, Nausea and vomiting. Patient states that in morning, he changed the insulin pump but wasn't sure what happened and around afternoon, He started feeling abdominal stiffness, rigidity and vomited three times in home and 2 times when came to ED. Patient usually takes 60-70 units of insulin pre meals as he mentioned and he adjusted how many unites based on his diet as he mentioned. In ED, Patient started on IV fluids, got 18 units of insulin and started on Insulin IV infusion. BS >600, Anion Gap 21, Bicarb 14. Patient will be admitted to ICU for DKA management.    #Diabetic Ketoacidosis   #Elevated anion gap metabolic acidosis   #Leukocytosis   #UTI   #Hyponatremia   #Acute renal Injury     CNS   Patient is AAOx3  Vertigo: Meclizine PRN     CVS   Hypotension   Secondary to fluids loss i/e vomiting   Patient received IV bolus 1L NS in ED   Will give 30cc/kg equal to 2.7L   Keep on Maintenance fluids 200cc/hr      Pulm  No issues   Serial exam for fluid overload     Hematology     Leukocytosis   Likely reactive, However with Urine WBCs although not symptomatic; Will start Rocephin   Repeat CBC     Gastro    NPO for now   IV insulin infusion   IV fluids for replacement     Endocrine     DKA; BS >600  Patient received 18 units bolus then started on 9ml/hr   Hold off insulin pump   Finger Stick q1  BMP q4; Potassium replacement if needed   Monitor Anion gap and Bicarb   Will bridge with closed anion gap and normal Bicarb and patient able to eat   Endo consult when patient is stable       Infectious   As above   Continue on Rocephin     Renal   Acute renal injury   Will repeat BMP after the boluses; Likely due to dehydration   If not improved; Will send U mike    High anion gap metabolic acidosis   Plan as above   Monitor Bicarb and Anion Gap   Replace K if needed     Prophylaxis   Lovenox          Patient is 72 years old male with past medical history of Type I DM (50 years ago, on Insulin pump for last 15 years), Vertigo and BPH who was brought to ED due to Abdominal pain, Nausea and vomiting. Patient states that in morning, he changed the insulin pump but wasn't sure what happened and around afternoon, He started feeling abdominal stiffness, rigidity and vomited three times in home and 2 times when came to ED. Patient usually takes 60-70 units of insulin pre meals as he mentioned and he adjusted how many unites based on his diet as he mentioned. In ED, Patient started on IV fluids, got 18 units of insulin and started on Insulin IV infusion. BS >600, Anion Gap 21, Bicarb 14. Patient will be admitted to ICU for DKA management.    #Diabetic Ketoacidosis   #Elevated anion gap metabolic acidosis   #Leukocytosis   #UTI   #Hyponatremia   #Acute renal Injury     CNS   Patient is AAOx3  Vertigo: Meclizine PRN     CVS   Hypotension   Secondary to fluids loss i/e vomiting   Patient received IV bolus 1L NS in ED   s/p  30cc/kg equal to 2.7L   d/c fluids      Elevated Troponin  Trop elevated to 034[address]  f/u Repeat Trop  f/u EKG     Pulm  cough  xray is neg  c/w cepaco    Hematology     Leukocytosis   Likely reactive, However with Urine WBCs although not symptomatic; Will start Rocephin   Repeat CBC     Gastro    advanced diet  d/c IV insulin infusion   started on ISS    Endocrine     DKA; BS >600  Patient received 18 units bolus then started on 9ml/hr   Hold off insulin pump   Finger Stick q1  BMP q4; Potassium replacement if needed   Anion gap and Bicarb normalized  patient able to eat   Will bridge   Endo consulted Dr. Matthews      Infectious   As above   Continue on Rocephin     Renal   Acute renal injury   Will repeat BMP after the boluses; Likely due to dehydration   Cr 1.13  Resolved    UTI  Pt is asymptomatic  Pt has UA+  leukocytosis  f/u UCx  c/w CTX      High anion gap metabolic acidosis   Plan as above   Monitor Bicarb and Anion Gap   Replace K if needed   Resolved    Prophylaxis   Lovenox

## 2023-03-16 NOTE — CHART NOTE - NSCHARTNOTEFT_GEN_A_CORE
Pt has elevated troponin that has been rising to 25k. EKG shows NSR. Pt states that he doesn't have chest pain but he has pain when he coughs. He also states that he feels pressure like symptoms on his chest. Intensivist Dr. ewing and Senior resident Dr. Locke were made aware. Consulted Cardiologist Dr. Mitchell. Plan to get a stat Echo and trend troponin. Spoke with Dr. Clinton cardiologist to obtain STAT echo. Rest of plan to follow pending results.

## 2023-03-16 NOTE — H&P ADULT - NSHPREVIEWOFSYSTEMS_GEN_ALL_CORE
REVIEW OF SYSTEMS:  CONSTITUTIONAL: No weakness, fevers or chills  EYES/ENT: No visual changes;  No vertigo or throat pain   RESPIRATORY: No cough, wheezing, hemoptysis; No shortness of breath  CARDIOVASCULAR: No chest pain or palpitations  GASTROINTESTINAL: + abdominal  pain and vomiting. No diarrhea or constipation. No melena or hematochezia.  GENITOURINARY: No dysuria, frequency or hematuria  NEUROLOGICAL: No numbness or weakness  SKIN: No itching, rashes

## 2023-03-16 NOTE — PATIENT PROFILE ADULT - FUNCTIONAL ASSESSMENT - BASIC MOBILITY 6.
2-calculated by average/Not able to assess (calculate score using [facility] averaging method)

## 2023-03-16 NOTE — H&P ADULT - NSHPPHYSICALEXAM_GEN_ALL_CORE
ICU Vital Signs Last 24 Hrs  T(C): 36.5 (16 Mar 2023 00:00), Max: 36.7 (15 Mar 2023 20:00)  T(F): 97.7 (16 Mar 2023 00:00), Max: 98.1 (15 Mar 2023 20:00)  HR: 107 (16 Mar 2023 00:04) (107 - 108)  BP: 97/45 (16 Mar 2023 00:04) (75/48 - 108/44)  BP(mean): 58 (16 Mar 2023 00:04) (55 - 58)  RR: 18 (16 Mar 2023 00:04) (18 - 22)  SpO2: 97% (16 Mar 2023 00:04) (97% - 98%)  O2 Parameters below as of 16 Mar 2023 00:00  Patient On (Oxygen Delivery Method): room air    GENERAL: NAD, lying in bed comfortably, moderate abdominal pain   HEAD:  Atraumatic, Normocephalic  EYES: EOMI, PERRLA, conjunctiva and sclera clear  ENT: Dry mucous membranes  NECK: Supple, No JVD  CHEST/LUNG: Clear to auscultation bilaterally; No rales, rhonchi, wheezing, or rubs. Unlabored respirations  HEART: Regular rate and rhythm; No murmurs, rubs, or gallops  ABDOMEN: Bowel sounds present; Soft, Nontender, Nondistended. No hepatomegally  EXTREMITIES:  2+ Peripheral Pulses, brisk capillary refill. No clubbing, cyanosis, or edema  NERVOUS SYSTEM:  Alert & Oriented X3, speech clear. No deficits   MSK: FROM all 4 extremities, full and equal strength  SKIN: No rashes or lesions

## 2023-03-16 NOTE — H&P ADULT - ATTENDING COMMENTS
71 y/o with PMH of DM type ! on insulin pump brought to ER with abd pain, nausea and vomiting. Admitted to ICU for the Mx of DKA    A/P DKA   DM   ARF   dehydration   Hyper K   UTI     MICU   Aggressive hydration   insulin drip as per ICU protocol   monitor lytes  Monitor renal function, urinary output   Panculture   Iv antibiotics  DVT/GI prophylaxis

## 2023-03-17 DIAGNOSIS — E11.10 TYPE 2 DIABETES MELLITUS WITH KETOACIDOSIS WITHOUT COMA: ICD-10-CM

## 2023-03-17 LAB
A1C WITH ESTIMATED AVERAGE GLUCOSE RESULT: 7.7 % — HIGH (ref 4–5.6)
ALBUMIN SERPL ELPH-MCNC: 2.7 G/DL — LOW (ref 3.5–5)
ALP SERPL-CCNC: 63 U/L — SIGNIFICANT CHANGE UP (ref 40–120)
ALT FLD-CCNC: 32 U/L DA — SIGNIFICANT CHANGE UP (ref 10–60)
ANION GAP SERPL CALC-SCNC: 8 MMOL/L — SIGNIFICANT CHANGE UP (ref 5–17)
AST SERPL-CCNC: 75 U/L — HIGH (ref 10–40)
BASOPHILS # BLD AUTO: 0.03 K/UL — SIGNIFICANT CHANGE UP (ref 0–0.2)
BASOPHILS NFR BLD AUTO: 0.2 % — SIGNIFICANT CHANGE UP (ref 0–2)
BILIRUB SERPL-MCNC: 0.5 MG/DL — SIGNIFICANT CHANGE UP (ref 0.2–1.2)
BUN SERPL-MCNC: 20 MG/DL — HIGH (ref 7–18)
CALCIUM SERPL-MCNC: 8.3 MG/DL — LOW (ref 8.4–10.5)
CHLORIDE SERPL-SCNC: 109 MMOL/L — HIGH (ref 96–108)
CO2 SERPL-SCNC: 23 MMOL/L — SIGNIFICANT CHANGE UP (ref 22–31)
CREAT SERPL-MCNC: 0.77 MG/DL — SIGNIFICANT CHANGE UP (ref 0.5–1.3)
EGFR: 94 ML/MIN/1.73M2 — SIGNIFICANT CHANGE UP
EOSINOPHIL # BLD AUTO: 0.05 K/UL — SIGNIFICANT CHANGE UP (ref 0–0.5)
EOSINOPHIL NFR BLD AUTO: 0.3 % — SIGNIFICANT CHANGE UP (ref 0–6)
ESTIMATED AVERAGE GLUCOSE: 174 MG/DL — HIGH (ref 68–114)
GLUCOSE BLDC GLUCOMTR-MCNC: 195 MG/DL — HIGH (ref 70–99)
GLUCOSE BLDC GLUCOMTR-MCNC: 218 MG/DL — HIGH (ref 70–99)
GLUCOSE BLDC GLUCOMTR-MCNC: 239 MG/DL — HIGH (ref 70–99)
GLUCOSE BLDC GLUCOMTR-MCNC: 243 MG/DL — HIGH (ref 70–99)
GLUCOSE BLDC GLUCOMTR-MCNC: 250 MG/DL — HIGH (ref 70–99)
GLUCOSE BLDC GLUCOMTR-MCNC: 308 MG/DL — HIGH (ref 70–99)
GLUCOSE SERPL-MCNC: 160 MG/DL — HIGH (ref 70–99)
HCT VFR BLD CALC: 31.7 % — LOW (ref 39–50)
HGB BLD-MCNC: 10.2 G/DL — LOW (ref 13–17)
IMM GRANULOCYTES NFR BLD AUTO: 0.8 % — SIGNIFICANT CHANGE UP (ref 0–0.9)
LYMPHOCYTES # BLD AUTO: 0.72 K/UL — LOW (ref 1–3.3)
LYMPHOCYTES # BLD AUTO: 4.1 % — LOW (ref 13–44)
MAGNESIUM SERPL-MCNC: 1.7 MG/DL — SIGNIFICANT CHANGE UP (ref 1.6–2.6)
MCHC RBC-ENTMCNC: 28.8 PG — SIGNIFICANT CHANGE UP (ref 27–34)
MCHC RBC-ENTMCNC: 32.2 GM/DL — SIGNIFICANT CHANGE UP (ref 32–36)
MCV RBC AUTO: 89.5 FL — SIGNIFICANT CHANGE UP (ref 80–100)
MONOCYTES # BLD AUTO: 0.73 K/UL — SIGNIFICANT CHANGE UP (ref 0–0.9)
MONOCYTES NFR BLD AUTO: 4.1 % — SIGNIFICANT CHANGE UP (ref 2–14)
NEUTROPHILS # BLD AUTO: 15.99 K/UL — HIGH (ref 1.8–7.4)
NEUTROPHILS NFR BLD AUTO: 90.5 % — HIGH (ref 43–77)
NRBC # BLD: 0 /100 WBCS — SIGNIFICANT CHANGE UP (ref 0–0)
PHOSPHATE SERPL-MCNC: 2.6 MG/DL — SIGNIFICANT CHANGE UP (ref 2.5–4.5)
PLATELET # BLD AUTO: 232 K/UL — SIGNIFICANT CHANGE UP (ref 150–400)
POTASSIUM SERPL-MCNC: 3.7 MMOL/L — SIGNIFICANT CHANGE UP (ref 3.5–5.3)
POTASSIUM SERPL-SCNC: 3.7 MMOL/L — SIGNIFICANT CHANGE UP (ref 3.5–5.3)
PROT SERPL-MCNC: 6.3 G/DL — SIGNIFICANT CHANGE UP (ref 6–8.3)
RAPID RVP RESULT: SIGNIFICANT CHANGE UP
RBC # BLD: 3.54 M/UL — LOW (ref 4.2–5.8)
RBC # FLD: 15.2 % — HIGH (ref 10.3–14.5)
S PNEUM AG UR QL: NEGATIVE — SIGNIFICANT CHANGE UP
SARS-COV-2 RNA SPEC QL NAA+PROBE: SIGNIFICANT CHANGE UP
SODIUM SERPL-SCNC: 140 MMOL/L — SIGNIFICANT CHANGE UP (ref 135–145)
TROPONIN I, HIGH SENSITIVITY RESULT: HIGH NG/L
WBC # BLD: 17.67 K/UL — HIGH (ref 3.8–10.5)
WBC # FLD AUTO: 17.67 K/UL — HIGH (ref 3.8–10.5)

## 2023-03-17 PROCEDURE — 71045 X-RAY EXAM CHEST 1 VIEW: CPT | Mod: 26

## 2023-03-17 RX ORDER — ASPIRIN/CALCIUM CARB/MAGNESIUM 324 MG
325 TABLET ORAL DAILY
Refills: 0 | Status: DISCONTINUED | OUTPATIENT
Start: 2023-03-17 | End: 2023-03-17

## 2023-03-17 RX ORDER — FUROSEMIDE 40 MG
40 TABLET ORAL ONCE
Refills: 0 | Status: COMPLETED | OUTPATIENT
Start: 2023-03-17 | End: 2023-03-17

## 2023-03-17 RX ORDER — ONDANSETRON 8 MG/1
4 TABLET, FILM COATED ORAL ONCE
Refills: 0 | Status: COMPLETED | OUTPATIENT
Start: 2023-03-17 | End: 2023-03-17

## 2023-03-17 RX ORDER — MECLIZINE HCL 12.5 MG
0 TABLET ORAL
Qty: 0 | Refills: 0 | DISCHARGE

## 2023-03-17 RX ORDER — TAMSULOSIN HYDROCHLORIDE 0.4 MG/1
0.4 CAPSULE ORAL AT BEDTIME
Refills: 0 | Status: DISCONTINUED | OUTPATIENT
Start: 2023-03-17 | End: 2023-03-22

## 2023-03-17 RX ORDER — ACETAMINOPHEN 500 MG
650 TABLET ORAL ONCE
Refills: 0 | Status: COMPLETED | OUTPATIENT
Start: 2023-03-17 | End: 2023-03-17

## 2023-03-17 RX ORDER — LOSARTAN POTASSIUM 100 MG/1
100 TABLET, FILM COATED ORAL DAILY
Refills: 0 | Status: DISCONTINUED | OUTPATIENT
Start: 2023-03-17 | End: 2023-03-22

## 2023-03-17 RX ORDER — METOPROLOL TARTRATE 50 MG
25 TABLET ORAL
Refills: 0 | Status: DISCONTINUED | OUTPATIENT
Start: 2023-03-17 | End: 2023-03-22

## 2023-03-17 RX ORDER — INSULIN GLARGINE 100 [IU]/ML
26 INJECTION, SOLUTION SUBCUTANEOUS EVERY MORNING
Refills: 0 | Status: DISCONTINUED | OUTPATIENT
Start: 2023-03-18 | End: 2023-03-20

## 2023-03-17 RX ORDER — LOSARTAN POTASSIUM 100 MG/1
0 TABLET, FILM COATED ORAL
Qty: 0 | Refills: 0 | DISCHARGE

## 2023-03-17 RX ORDER — ASPIRIN/CALCIUM CARB/MAGNESIUM 324 MG
81 TABLET ORAL DAILY
Refills: 0 | Status: DISCONTINUED | OUTPATIENT
Start: 2023-03-17 | End: 2023-03-22

## 2023-03-17 RX ORDER — MONTELUKAST 4 MG/1
10 TABLET, CHEWABLE ORAL AT BEDTIME
Refills: 0 | Status: DISCONTINUED | OUTPATIENT
Start: 2023-03-17 | End: 2023-03-22

## 2023-03-17 RX ORDER — LISINOPRIL 2.5 MG/1
2.5 TABLET ORAL DAILY
Refills: 0 | Status: DISCONTINUED | OUTPATIENT
Start: 2023-03-17 | End: 2023-03-17

## 2023-03-17 RX ADMIN — Medication 650 MILLIGRAM(S): at 00:57

## 2023-03-17 RX ADMIN — CHLORHEXIDINE GLUCONATE 1 APPLICATION(S): 213 SOLUTION TOPICAL at 11:26

## 2023-03-17 RX ADMIN — ONDANSETRON 4 MILLIGRAM(S): 8 TABLET, FILM COATED ORAL at 10:10

## 2023-03-17 RX ADMIN — MONTELUKAST 10 MILLIGRAM(S): 4 TABLET, CHEWABLE ORAL at 21:27

## 2023-03-17 RX ADMIN — Medication 81 MILLIGRAM(S): at 11:25

## 2023-03-17 RX ADMIN — Medication 650 MILLIGRAM(S): at 01:57

## 2023-03-17 RX ADMIN — ENOXAPARIN SODIUM 80 MILLIGRAM(S): 100 INJECTION SUBCUTANEOUS at 17:12

## 2023-03-17 RX ADMIN — ONDANSETRON 4 MILLIGRAM(S): 8 TABLET, FILM COATED ORAL at 16:18

## 2023-03-17 RX ADMIN — Medication 40 MILLIGRAM(S): at 10:10

## 2023-03-17 RX ADMIN — INSULIN GLARGINE 22 UNIT(S): 100 INJECTION, SOLUTION SUBCUTANEOUS at 08:00

## 2023-03-17 RX ADMIN — Medication 1: at 06:15

## 2023-03-17 RX ADMIN — CEFTRIAXONE 100 MILLIGRAM(S): 500 INJECTION, POWDER, FOR SOLUTION INTRAMUSCULAR; INTRAVENOUS at 05:17

## 2023-03-17 RX ADMIN — Medication 2: at 11:05

## 2023-03-17 RX ADMIN — ONDANSETRON 4 MILLIGRAM(S): 8 TABLET, FILM COATED ORAL at 21:26

## 2023-03-17 RX ADMIN — ENOXAPARIN SODIUM 80 MILLIGRAM(S): 100 INJECTION SUBCUTANEOUS at 06:15

## 2023-03-17 RX ADMIN — TAMSULOSIN HYDROCHLORIDE 0.4 MILLIGRAM(S): 0.4 CAPSULE ORAL at 21:26

## 2023-03-17 RX ADMIN — ONDANSETRON 4 MILLIGRAM(S): 8 TABLET, FILM COATED ORAL at 05:18

## 2023-03-17 RX ADMIN — LISINOPRIL 2.5 MILLIGRAM(S): 2.5 TABLET ORAL at 10:10

## 2023-03-17 RX ADMIN — Medication 4: at 16:26

## 2023-03-17 RX ADMIN — Medication 25 MILLIGRAM(S): at 17:13

## 2023-03-17 RX ADMIN — ATORVASTATIN CALCIUM 40 MILLIGRAM(S): 80 TABLET, FILM COATED ORAL at 21:26

## 2023-03-17 NOTE — CONSULT NOTE ADULT - ASSESSMENT
Patient is 72 years old male with past medical history of Type I DM (50 years ago, on Insulin pump for last 15 years), Vertigo and BPH who was brought to ED due to Abdominal pain, Nausea and vomiting. Pt is on [employer] insulin pump ? had malfunction in the past as well. Does not recall basal rates, carb/insulin ratio- 1:5?

## 2023-03-17 NOTE — PROGRESS NOTE ADULT - ASSESSMENT
Patient is 72 years old male with past medical history of Type I DM (50 years ago, on Insulin pump for last 15 years), Vertigo and BPH who was brought to ED due to Abdominal pain, Nausea and vomiting. Patient states that in morning, he changed the insulin pump but wasn't sure what happened and around afternoon, He started feeling abdominal stiffness, rigidity and vomited three times in home and 2 times when came to ED. Patient usually takes 60-70 units of insulin pre meals as he mentioned and he adjusted how many unites based on his diet as he mentioned. In ED, Patient started on IV fluids, got 18 units of insulin and started on Insulin IV infusion. BS >600, Anion Gap 21, Bicarb 14. Patient will be admitted to ICU for DKA management.    #Diabetic Ketoacidosis   #Elevated anion gap metabolic acidosis   #Leukocytosis   #UTI   #Hyponatremia   #Acute renal Injury     CNS   Patient is AAOx3  Vertigo: Meclizine PRN     CVS   Hypotension   Secondary to fluids loss i/e vomiting   Patient received IV bolus 1L NS in ED   s/p  30cc/kg equal to 2.7L   d/c fluids      Elevated Troponin  [place] elevated to [address]  f/u Repeat Trop  f/u EKG     Pulm  cough  xray is neg  c/w cepaco    Hematology     Leukocytosis   Likely reactive, However with Urine WBCs although not symptomatic; Will start Rocephin   Repeat CBC     Gastro    advanced diet  d/c IV insulin infusion   started on ISS    Endocrine     DKA; BS >600  Patient received 18 units bolus then started on 9ml/hr   Hold off insulin pump   Finger Stick q1  BMP q4; Potassium replacement if needed   Anion gap and Bicarb normalized  patient able to eat   Will bridge   Endo consulted Dr. Matthews      Infectious   As above   Continue on Rocephin     Renal   Acute renal injury   Will repeat BMP after the boluses; Likely due to dehydration   Cr 1.13  Resolved    UTI  Pt is asymptomatic  Pt has UA+  leukocytosis  f/u UCx  c/w CTX      High anion gap metabolic acidosis   Plan as above   Monitor Bicarb and Anion Gap   Replace K if needed   Resolved    Prophylaxis   Lovenox          Patient is 72 years old male with past medical history of Type I DM (50 years ago, on Insulin pump for last 15 years), Vertigo, BPH, HTN, HLD who was brought to ED due to Abdominal pain, Nausea and vomiting. Patient states that in morning, he changed the insulin pump but wasn't sure what happened and around afternoon, He started feeling abdominal stiffness, rigidity and vomited three times in home and 2 times when came to ED. Patient usually takes 60-70 units of insulin pre meals as he mentioned and he adjusted how many unites based on his diet as he mentioned. In ED, Patient started on IV fluids, got 18 units of insulin and started on Insulin IV infusion. BS >600, Anion Gap 21, Bicarb 14. Patient will be admitted to ICU for DKA management.    #Diabetic Ketoacidosis   #Elevated anion gap metabolic acidosis   #Leukocytosis   #UTI   #Hyponatremia   #Acute renal Injury     CNS   Patient is AAOx3  Vertigo: Meclizine PRN     CVS   Hypotension   Resolved  Secondary to fluids loss i/e vomiting   Patient received IV bolus 1L NS in ED   s/p  30cc/kg equal to 2.7L   d/c fluids      NSTEI  Trop elevated to [address]  Troponin downtrended  EKG shows NSR  Echo with no wall motion abnormalities Normal EF.   Dr. Mitchell consulted  c/w ASA, statin, BB, ACEI, FD Lovenox  pt will need transfer for cath lab    Pulm  cough  xray is neg  repeat xray prelim shows congestion  c/w cepacol  s/p lasix 40 IV x1    Hematology     Leukocytosis   Likely reactive, However with Urine WBCs although not symptomatic; Will start Rocephin   leukocytosis still elevated    Gastro    advanced diet  d/c IV insulin infusion   started on ISS    Endocrine     DKA; BS >600  Patient received 18 units bolus then started on 9ml/hr   Hold off insulin pump   Finger Stick q1  BMP q4; Potassium replacement if needed   Anion gap and Bicarb normalized  patient able to eat   s/p bridge   Endo consulted Dr. Matthews  increased lantus to 26U      Infectious   As above   Continue on Rocephin     Renal   Acute renal injury   Will repeat BMP after the boluses; Likely due to dehydration   Cr 1.13  Resolved    UTI  Pt is asymptomatic  Pt has UA+  leukocytosis  f/u UCx  c/w CTX      High anion gap metabolic acidosis   Plan as above   Monitor Bicarb and Anion Gap   Replace K if needed   Resolved    Prophylaxis   Lovenox

## 2023-03-17 NOTE — CONSULT NOTE ADULT - SUBJECTIVE AND OBJECTIVE BOX
C A R D I O L O G Y  *********************    DATE OF SERVICE: 23    HISTORY OF PRESENT ILLNESS: HPI:  Patient is 72 years old male with pmh of Type I DM (50 years ago, on Insulin pump for last 15 years), Vertigo and BPH who was brought to ED due to Abdominal pain, Nausea and vomiting.     Patient states that in morning, he changed the insulin pump but wasn't sure what happened and around afternoon, He started feeling abdominal stiffness, rigidity and vomited three times in home and 2 times when came to ED. Patient usually takes 60-70 units of insulin pre meals as he mentioned and he adjusted how many unites based on his diet as he mentioned. In ED, Patient started on IV fluids, got 18 units of insulin and started on Insulin IV infusion. Found to be in DKA. Patient was transferred to ICU for DKA management. Cardiology consulted for increased troponin      PAST MEDICAL & SURGICAL HISTORY:  Diabetes type 1  Hypertension  S/P cataract surgery right eye      MEDICATIONS:  MEDICATIONS  (STANDING):  aspirin enteric coated 81 milliGRAM(s) Oral daily  atorvastatin 40 milliGRAM(s) Oral at bedtime  cefTRIAXone   IVPB 1000 milliGRAM(s) IV Intermittent every 24 hours  chlorhexidine 2% Cloths 1 Application(s) Topical daily  enoxaparin Injectable 80 milliGRAM(s) SubCutaneous every 12 hours  insulin glargine Injectable (LANTUS) 22 Unit(s) SubCutaneous every morning  insulin lispro (ADMELOG) corrective regimen sliding scale   SubCutaneous three times a day before meals  insulin lispro (ADMELOG) corrective regimen sliding scale   SubCutaneous at bedtime  lisinopril 2.5 milliGRAM(s) Oral daily  metoprolol tartrate 25 milliGRAM(s) Oral two times a day      Allergies: Neosporin (Rash)    FAMILY HISTORY:  Non-contributary for premature coronary disease or sudden cardiac death    SOCIAL HISTORY:    [X ] Non-smoker  [ ] Smoker  [ ] Alcohol    REVIEW OF SYSTEMS:  [ ]chest pain  [  ]shortness of breath  [  ]palpitations  [  ]syncope  [ ]near syncope [ ]upper extremity weakness   [ ] lower extremity weakness  [  ]diplopia  [  ]altered mental status   [  ]fevers  [ ]chills [ ]nausea  [ ]vomiting  [  ]dysphagia    [ ]abdominal pain  [ ]melena  [ ]BRBPR    [  ]epistaxis  [  ]rash    [ ]lower extremity edema    [X] All others negative	  [ ] Unable to obtain    LABS:	 	    CARDIAC MARKERS:  CARDIAC MARKERS ( 16 Mar 2023 12:35 )  x     / x     / 560 U/L / x     / 38.4 ng/mL                          10.2   17.67 )-----------( 232      ( 17 Mar 2023 03:00 )             31.7     Hb Trend: 10.2<--        140  |  109<H>  |  20<H>  ----------------------------<  160<H>  3.7   |  23  |  0.77    Ca    8.3<L>      17 Mar 2023 03:00  Phos  2.6       Mg     1.7         TPro  6.3  /  Alb  2.7<L>  /  TBili  0.5  /  DBili  x   /  AST  75<H>  /  ALT  32  /  AlkPhos  63      Creatinine Trend: 0.77<--, 0.86<--, 1.13<--, 1.36<--, 1.77<--, 1.68<--      PHYSICAL EXAM:  T(C): 37.3 (23 @ 07:00), Max: 38.3 (23 @ 00:14)  HR: 84 (23 @ 10:00) (63 - 91)  BP: 149/69 (23 @ 10:00) (109/55 - 161/75)  RR: 17 (23 @ 10:00) (15 - 23)  SpO2: 96% (23 @ 10:00) (88% - 98%)  Wt(kg): --   BMI (kg/m2): 25.4 (23 @ 00:00)  I&O's Summary    16 Mar 2023 07:01  -  17 Mar 2023 07:00  --------------------------------------------------------  IN: 1928.3 mL / OUT: 850 mL / NET: 1078.3 mL        Gen: Appears well in NAD  HEENT:  (-)icterus (-)pallor  CV: N S1 S2 1/6 RONI (+)2 Pulses B/l  Resp:  Clear to auscultation B/L, normal effort  GI: (+) BS Soft, NT, ND  Lymph:  (-)Edema, (-)obvious lymphadenopathy  Skin: Warm to touch, Normal turgor  Psych: Appropriate mood and affect      TELEMETRY: 	NSR            TTE:DIMENSIONS:  Dimensions:     Normal Values:  LA:     4.1 cm    2.0 - 4.0 cm  Ao:     2.9 cm    2.0 - 3.8 cm  SEPTUM: 0.8 cm    0.6 - 1.2 cm  PWT:    0.9 cm    0.6 - 1.1 cm  LVIDd:  4.7 cm    3.0 - 5.6 cm  LVIDs:  3.2 cm    1.8 - 4.0 cm  Derived Variables:  LVMI: 62 g/m2  RWT: 0.38  Ejection Fraction Clark: 53 %  -----------------------------------------------------------------------  OBSERVATIONS:  Mitral Valve: Normal mitral valve. Mildly calcified chordae tendinae.  Mild mitral regurgitation.  Aortic Root: Normal aortic root.  Aortic Valve: Calcified aortic valve was not well visualized but has normal opening. No aortic stenosis. No aortic valve regurgitation seen.  Left Atrium: Normal left atrium.  LA volume index = 26 cc/m2.  Left Ventricle: Low-normal Left Ventricular Systolic Function,  (EF = 53% by biplane). No regional wall motion abnormalities. Ultrasound LV opacification agent was used to enhance endocardial definition. Normal left ventricular  internal dimensions and wall thicknesses. The diastolic function is indeterminate based on current diagnostic criteria.  Right Heart: Normal right atrium. Right ventricle not well visualized. Normal RV systolic function (RV tissue Doppler 11 cm/s). Normal tricuspid valve. Moderate tricuspid regurgitation.  Pulmonic valve not well seen. No pulmonic  insufficiency is noted.  Pericardium/PleuraNo pericardial effusion.Hemodynamic: RA Pressure is 8 mm Hg. RV systolic pressure is borderline normal at  33 mm Hg.  ------------------------------------------------------------------------  CONCLUSIONS:  1. Normal mitral valve. Mildly calcified chordae tendinae. Mild mitral regurgitation.  2. Calcified aortic valve was not well visualized but has normal opening. No aortic stenosis. No aortic valve regurgitation seen.  3. Normal left ventricular internal dimensions and wall thicknesses.  4. Low-normal Left Ventricular Systolic Function,  (EF = 53% by biplane). No regional wall motion abnormalities. Ultrasound LV opacification agent was used to enhanceendocardial definition.  5. Right ventricle not well visualized. Normal RV systolic function (RV tissue Doppler 11 cm/s). 6. RV systolic pressure is borderline normal at  33 mm Hg.7. Normal tricuspid valve. Moderate tricuspid  regurgitation.  8. No pericardial effusion.      EK/16 12:45 NSR   14:40 NSR    ASSESSMENT/PLAN: Patient is 72 years old male with pmh of Type I DM (50 years ago, on Insulin pump for last 15 years), Vertigo and BPH who was brought to ED due to Abdominal pain, Nausea and vomiting. Cardiology consulted for increased troponin.    1. NSTEMI  - EKG non-ischemic  - TTE without wall motion  - currently denies anginal chesty pain  - c/w ASA, statin and Lovenox  - DKA tx per MICU, ABX per [facility]  - maintain tele  - possible [facility] next week      Nacho Nix MD  Pager: [phone]

## 2023-03-17 NOTE — CONSULT NOTE ADULT - SUBJECTIVE AND OBJECTIVE BOX
Patient is a 72y old  Male who presents with a chief complaint of DKA (17 Mar 2023 11:13)      HPI:  Patient is 72 years old male with past medical history of Type I DM (50 years ago, on Insulin pump for last 15 years), Vertigo and BPH who was brought to ED due to Abdominal pain, Nausea and vomiting. Patient states that in morning, he changed the insulin pump but wasn't sure what happened and around afternoon, He started feeling abdominal stiffness, rigidity and vomited three times in home and 2 times when came to ED. Patient usually takes 60-70 units of insulin pre meals as he mentioned and he adjusted how many unites based on his diet as he mentioned. In ED, Patient started on IV fluids, got 18 units of insulin and started on Insulin IV infusion. Patient denied chest pain, dizziness, respiratory distress, change in bowel movement. Patient was transferred to ICU for DKA management.    (16 Mar 2023 00:06)      PAST MEDICAL & SURGICAL HISTORY:  Diabetes      Hypertension      S/P cataract surgery  right eye             MEDICATIONS  (STANDING):  aspirin enteric coated 81 milliGRAM(s) Oral daily  atorvastatin 40 milliGRAM(s) Oral at bedtime  cefTRIAXone   IVPB 1000 milliGRAM(s) IV Intermittent every 24 hours  chlorhexidine 2% Cloths 1 Application(s) Topical daily  enoxaparin Injectable 80 milliGRAM(s) SubCutaneous every 12 hours  insulin lispro (ADMELOG) corrective regimen sliding scale   SubCutaneous three times a day before meals  insulin lispro (ADMELOG) corrective regimen sliding scale   SubCutaneous at bedtime  lisinopril 2.5 milliGRAM(s) Oral daily  metoprolol tartrate 25 milliGRAM(s) Oral two times a day    MEDICATIONS  (PRN):  benzocaine/menthol Lozenge 1 Lozenge Oral three times a day PRN Sore Throat  meclizine 25 milliGRAM(s) Oral every 8 hours PRN Dizziness  ondansetron Injectable 4 milliGRAM(s) IV Push every 6 hours PRN Nausea and/or Vomiting      FAMILY HISTORY:      SOCIAL HISTORY:      REVIEW OF SYSTEMS:  CONSTITUTIONAL: No fever, weight loss, or fatigue  EYES: No eye pain, visual disturbances, or discharge  ENT:  No difficulty hearing, tinnitus, vertigo; No sinus or throat pain  NECK: No pain or stiffness  RESPIRATORY: No cough, wheezing, chills or hemoptysis; No Shortness of Breath  CARDIOVASCULAR: No chest pain, palpitations, passing out, dizziness, or leg swelling  GASTROINTESTINAL: No abdominal or epigastric pain. No nausea, vomiting, or hematemesis; No diarrhea or constipation. No melena or hematochezia.  GENITOURINARY: No dysuria, frequency, hematuria, or incontinence  NEUROLOGICAL: No headaches, memory loss, loss of strength, numbness, or tremors  SKIN: No itching, burning, rashes, or lesions   LYMPH Nodes: No enlarged glands  ENDOCRINE: No heat or cold intolerance; No hair loss  MUSCULOSKELETAL: No joint pain or swelling; No muscle, back, or extremity pain  PSYCHIATRIC: No depression, anxiety, mood swings, or difficulty sleeping  HEME/LYMPH: No easy bruising, or bleeding gums  ALLERGY AND IMMUNOLOGIC: No hives or eczema	        Vital Signs Last 24 Hrs  T(C): 37.3 (17 Mar 2023 07:00), Max: 38.3 (17 Mar 2023 00:14)  T(F): 99.1 (17 Mar 2023 07:00), Max: 100.9 (17 Mar 2023 00:14)  HR: 73 (17 Mar 2023 11:00) (63 - 89)  BP: 160/64 (17 Mar 2023 11:00) (109/55 - 161/75)  BP(mean): 88 (17 Mar 2023 11:00) (66 - 98)  RR: 18 (17 Mar 2023 11:00) (15 - 23)  SpO2: 95% (17 Mar 2023 11:00) (88% - 98%)    Parameters below as of 16 Mar 2023 15:00  Patient On (Oxygen Delivery Method): nasal cannula  O2 Flow (L/min): 2        Constitutional:    HEENT: nad    Neck:  No JVD, bruits or thyromegaly    Respiratory:  Clear without rales or rhonchi    Cardiovascular:  RR without murmur, rub or gallop.    Gastrointestinal: Soft without hepatosplenomegaly.    Extremities: without cyanosis, clubbing or edema.    Neurological:  Oriented   x   3   . No gross sensory or motor defects.        LABS:                        10.2   17.67 )-----------( 232      ( 17 Mar 2023 03:00 )             31.7     03-17    140  |  109<H>  |  20<H>  ----------------------------<  160<H>  3.7   |  23  |  0.77    Ca    8.3<L>      17 Mar 2023 03:00  Phos  2.6     -  Mg     1.7         TPro  6.3  /  Alb  2.7<L>  /  TBili  0.5  /  DBili  x   /  AST  75<H>  /  ALT  32  /  AlkPhos  63  03-    CARDIAC MARKERS ( 16 Mar 2023 12:35 )  x     / x     / 560 U/L / x     / 38.4 ng/mL        Urinalysis Basic - ( 15 Mar 2023 22:46 )    Color: Yellow / Appearance: Clear / S.015 / pH: x  Gluc: x / Ketone: Moderate  / Bili: Negative / Urobili: Negative   Blood: x / Protein: 15 / Nitrite: Negative   Leuk Esterase: Trace / RBC: 0-2 /HPF / WBC 26-50 /HPF   Sq Epi: x / Non Sq Epi: Few /HPF / Bacteria: Many /HPF      CAPILLARY BLOOD GLUCOSE      POCT Blood Glucose.: 239 mg/dL (17 Mar 2023 11:03)  POCT Blood Glucose.: 250 mg/dL (17 Mar 2023 07:56)  POCT Blood Glucose.: 195 mg/dL (17 Mar 2023 06:13)  POCT Blood Glucose.: 218 mg/dL (17 Mar 2023 06:11)  POCT Blood Glucose.: 115 mg/dL (16 Mar 2023 22:54)  POCT Blood Glucose.: 136 mg/dL (16 Mar 2023 16:42)  POCT Blood Glucose.: 63 mg/dL (16 Mar 2023 16:27)  POCT Blood Glucose.: 74 mg/dL (16 Mar 2023 16:03)      RADIOLOGY & ADDITIONAL STUDIES: Patient is a 72y old  Male who presents with a chief complaint of DKA (17 Mar 2023 11:13)      HPI:  Patient is 72 years old male with past medical history of Type I DM (50 years ago, on Insulin pump for last 15 years), Vertigo and BPH who was brought to ED due to Abdominal pain, Nausea and vomiting. Patient states that in morning, he changed the insulin pump but wasn't sure what happened and around afternoon, He started feeling abdominal stiffness, rigidity and vomited three times in home and 2 times when came to ED. Patient usually takes 60-70 units of insulin pre meals as he mentioned and he adjusted how many unites based on his diet as he mentioned. In ED, Patient started on IV fluids, got 18 units of insulin and started on Insulin IV infusion. Patient denied chest pain, dizziness, respiratory distress, change in bowel movement. Patient was transferred to ICU for DKA management. Pt is on [employer] insulin pump ? had malfunction in the past as well. Does not recall basal rates, carb/insulin ratio- 1:5?        PAST MEDICAL & SURGICAL HISTORY:  Diabetes      Hypertension      S/P cataract surgery  right eye             MEDICATIONS  (STANDING):  aspirin enteric coated 81 milliGRAM(s) Oral daily  atorvastatin 40 milliGRAM(s) Oral at bedtime  cefTRIAXone   IVPB 1000 milliGRAM(s) IV Intermittent every 24 hours  chlorhexidine 2% Cloths 1 Application(s) Topical daily  enoxaparin Injectable 80 milliGRAM(s) SubCutaneous every 12 hours  insulin lispro (ADMELOG) corrective regimen sliding scale   SubCutaneous three times a day before meals  insulin lispro (ADMELOG) corrective regimen sliding scale   SubCutaneous at bedtime  lisinopril 2.5 milliGRAM(s) Oral daily  metoprolol tartrate 25 milliGRAM(s) Oral two times a day    MEDICATIONS  (PRN):  benzocaine/menthol Lozenge 1 Lozenge Oral three times a day PRN Sore Throat  meclizine 25 milliGRAM(s) Oral every 8 hours PRN Dizziness  ondansetron Injectable 4 milliGRAM(s) IV Push every 6 hours PRN Nausea and/or Vomiting      FAMILY HISTORY:      SOCIAL HISTORY:      REVIEW OF SYSTEMS:  CONSTITUTIONAL: No fever, weight loss, or fatigue  EYES: No eye pain, visual disturbances, or discharge  ENT:  No difficulty hearing, tinnitus, vertigo; No sinus or throat pain  NECK: No pain or stiffness  RESPIRATORY: No cough, wheezing, chills or hemoptysis; No Shortness of Breath  CARDIOVASCULAR: No chest pain, palpitations, passing out, dizziness, or leg swelling  GASTROINTESTINAL: No abdominal or epigastric pain. No nausea, vomiting, or hematemesis; No diarrhea or constipation. No melena or hematochezia.  GENITOURINARY: No dysuria, frequency, hematuria, or incontinence  NEUROLOGICAL: No headaches, memory loss, loss of strength, numbness, or tremors  SKIN: No itching, burning, rashes, or lesions   LYMPH Nodes: No enlarged glands  ENDOCRINE: No heat or cold intolerance; No hair loss  MUSCULOSKELETAL: No joint pain or swelling; No muscle, back, or extremity pain  PSYCHIATRIC: No depression, anxiety, mood swings, or difficulty sleeping  HEME/LYMPH: No easy bruising, or bleeding gums  ALLERGY AND IMMUNOLOGIC: No hives or eczema	        Vital Signs Last 24 Hrs  T(C): 37.3 (17 Mar 2023 07:00), Max: 38.3 (17 Mar 2023 00:14)  T(F): 99.1 (17 Mar 2023 07:00), Max: 100.9 (17 Mar 2023 00:14)  HR: 73 (17 Mar 2023 11:00) (63 - 89)  BP: 160/64 (17 Mar 2023 11:00) (109/55 - 161/75)  BP(mean): 88 (17 Mar 2023 11:00) (66 - 98)  RR: 18 (17 Mar 2023 11:00) (15 - 23)  SpO2: 95% (17 Mar 2023 11:00) (88% - 98%)    Parameters below as of 16 Mar 2023 15:00  Patient On (Oxygen Delivery Method): nasal cannula  O2 Flow (L/min): 2        Constitutional:    HEENT: nad    Neck:  No JVD, bruits or thyromegaly    Respiratory:  Clear without rales or rhonchi    Cardiovascular:  RR without murmur, rub or gallop.    Gastrointestinal: Soft without hepatosplenomegaly.    Extremities: without cyanosis, clubbing or edema.    Neurological:  Oriented   x   3   . No gross sensory or motor defects.        LABS:                        10.2   17.67 )-----------( 232      ( 17 Mar 2023 03:00 )             31.7     03-17    140  |  109<H>  |  20<H>  ----------------------------<  160<H>  3.7   |  23  |  0.77    Ca    8.3<L>      17 Mar 2023 03:00  Phos  2.6     03-17  Mg     1.7     -17    TPro  6.3  /  Alb  2.7<L>  /  TBili  0.5  /  DBili  x   /  AST  75<H>  /  ALT  32  /  AlkPhos  63  03-17    CARDIAC MARKERS ( 16 Mar 2023 12:35 )  x     / x     / 560 U/L / x     / 38.4 ng/mL        Urinalysis Basic - ( 15 Mar 2023 22:46 )    Color: Yellow / Appearance: Clear / S.015 / pH: x  Gluc: x / Ketone: Moderate  / Bili: Negative / Urobili: Negative   Blood: x / Protein: 15 / Nitrite: Negative   Leuk Esterase: Trace / RBC: 0-2 /HPF / WBC 26-50 /HPF   Sq Epi: x / Non Sq Epi: Few /HPF / Bacteria: Many /HPF      CAPILLARY BLOOD GLUCOSE      POCT Blood Glucose.: 239 mg/dL (17 Mar 2023 11:03)  POCT Blood Glucose.: 250 mg/dL (17 Mar 2023 07:56)  POCT Blood Glucose.: 195 mg/dL (17 Mar 2023 06:13)  POCT Blood Glucose.: 218 mg/dL (17 Mar 2023 06:11)  POCT Blood Glucose.: 115 mg/dL (16 Mar 2023 22:54)  POCT Blood Glucose.: 136 mg/dL (16 Mar 2023 16:42)  POCT Blood Glucose.: 63 mg/dL (16 Mar 2023 16:27)  POCT Blood Glucose.: 74 mg/dL (16 Mar 2023 16:03)      RADIOLOGY & ADDITIONAL STUDIES:

## 2023-03-17 NOTE — CONSULT NOTE ADULT - PROBLEM SELECTOR RECOMMENDATION 9
due to pump malfunction  change lantus to 26 units qam  add admelog low dose when able to tolerate po intake  consider protonix/reglan for nausea  fsg ac and hs  check a1c  nutrition eval  hold pump until seen by out pt provider  d/w icu team

## 2023-03-17 NOTE — PROGRESS NOTE ADULT - SUBJECTIVE AND OBJECTIVE BOX
INTERVAL HPI/OVERNIGHT EVENTS:     PRESSORS: [ ] YES [ ] NO  WHICH:    ANTIBIOTICS:                  DATE STARTED:  ANTIBIOTICS:                  DATE STARTED:  ANTIBIOTICS:                  DATE STARTED:    Antimicrobial:  cefTRIAXone   IVPB 1000 milliGRAM(s) IV Intermittent every 24 hours    Cardiovascular:    Pulmonary:    Hematalogic:  enoxaparin Injectable 80 milliGRAM(s) SubCutaneous every 12 hours    Other:  atorvastatin 40 milliGRAM(s) Oral at bedtime  benzocaine/menthol Lozenge 1 Lozenge Oral three times a day PRN  chlorhexidine 2% Cloths 1 Application(s) Topical daily  insulin glargine Injectable (LANTUS) 22 Unit(s) SubCutaneous every morning  insulin lispro (ADMELOG) corrective regimen sliding scale   SubCutaneous three times a day before meals  insulin lispro (ADMELOG) corrective regimen sliding scale   SubCutaneous at bedtime  meclizine 25 milliGRAM(s) Oral every 8 hours PRN  ondansetron Injectable 4 milliGRAM(s) IV Push every 6 hours PRN      Drug Dosing Weight  Height (cm): 188 (15 Mar 2023 20:00)  Weight (kg): 89.6 (16 Mar 2023 00:00)  BMI (kg/m2): 25.4 (16 Mar 2023 00:00)  BSA (m2): 2.16 (16 Mar 2023 00:00)    CENTRAL LINE: [ ] YES [ ] NO  LOCATION:   DATE INSERTED:  REMOVE: [ ] YES [ ] NO  EXPLAIN:    LEONG: [ ] YES [ ] NO    DATE INSERTED:  REMOVE:  [ ] YES [ ] NO  EXPLAIN:    A-LINE:  [ ] YES [ ] NO  LOCATION:   DATE INSERTED:  REMOVE:  [ ] YES [ ] NO  EXPLAIN:    PMH/Social Hx/Fam Hx -reviewed admission note, no change since admission  PAST MEDICAL & SURGICAL HISTORY:  Diabetes      Hypertension      S/P cataract surgery  right eye        Heart faliure: acute [ ] chronic [ ] acute or chronic [ ] diastolic [ ] systolic [ ] combied systolic and diastolic[ ]  JOANNA: ATN[ ] renal medullary necrosis [ ] CKD I [ ]CKDII [ ]CKD III [ ]CKD IV [ ]CKD V [ ]Other pathological lesions [ ]  Abdominal Nutrition Status: malnutrition [ ] cachexia [ ] morbid obesity/BMI=40 [ ] Supplement ordered [___________]     T(C): 37.3 (23 @ 07:00), Max: 38.3 (23 @ 00:14)  HR: 71 (23 @ 07:00)  BP: 159/75 (23 @ 07:00)  BP(mean): 94 (23 @ 07:00)  ABP: --  ABP(mean): --  RR: 18 (23 @ 07:00)  SpO2: 95% (23 @ 07:00)  Wt(kg): --    ABG - ( 15 Mar 2023 20:52 )  pH, Arterial: 7.36  pH, Blood: x     /  pCO2: 21    /  pO2: 123   / HCO3: 12    / Base Excess: -11.7 /  SaO2: 97                     @ 07:01  -   @ 07:00  --------------------------------------------------------  IN: 1928.3 mL / OUT: 850 mL / NET: 1078.3 mL            PHYSICAL EXAM:    GENERAL: [ ]NAD, [ ]well-groomed, [ ]well-developed  HEAD:  [ ]Atraumatic, [ ]Normocephalic  EYES: [ ]EOMI, [ ]PERRLA, [ ]conjunctiva and sclera clear  ENMT: [ ]No tonsillar erythema, exudates, or enlargement; [ ]Moist mucous membranes, [ ]Good dentition, [ ]No lesions  NECK: [ ]Supple, normal appearance, [ ]No JVD; [ ]Normal thyroid; [ ]Trachea midline  NERVOUS SYSTEM:  [ ]Alert & Oriented X3, [ ]Good concentration; [ ]Motor Strength 5/5 B/L upper and lower extremities; [ ]DTRs 2+ intact and symmetric  CHEST/LUNG: [ ]No chest deformity; [ ]Normal percussion bilaterally; [ ]No rales, rhonchi, wheezing; [ ]Crackles at bases  HEART: [ ]Regular rate and rhythm; [ ]No murmurs, rubs, or gallops  ABDOMEN: [ ]Soft, Nontender, Nondistended; [ ]Bowel sounds present  EXTREMITIES:  [ ]2+ Peripheral Pulses, [ ]No clubbing, cyanosis, or edema [ ]Bilat lower extremity edema  LYMPH: [ ]No lymphadenopathy noted  SKIN: [ ]No rashes or lesions; [ ]Good capillary refill      LABS:  CBC Full  -  ( 17 Mar 2023 03:00 )  WBC Count : 17.67 K/uL  RBC Count : 3.54 M/uL  Hemoglobin : 10.2 g/dL  Hematocrit : 31.7 %  Platelet Count - Automated : 232 K/uL  Mean Cell Volume : 89.5 fl  Mean Cell Hemoglobin : 28.8 pg  Mean Cell Hemoglobin Concentration : 32.2 gm/dL  Auto Neutrophil # : 15.99 K/uL  Auto Lymphocyte # : 0.72 K/uL  Auto Monocyte # : 0.73 K/uL  Auto Eosinophil # : 0.05 K/uL  Auto Basophil # : 0.03 K/uL  Auto Neutrophil % : 90.5 %  Auto Lymphocyte % : 4.1 %  Auto Monocyte % : 4.1 %  Auto Eosinophil % : 0.3 %  Auto Basophil % : 0.2 %    03    140  |  109<H>  |  20<H>  ----------------------------<  160<H>  3.7   |  23  |  0.77    Ca    8.3<L>      17 Mar 2023 03:00  Phos  2.6       Mg     1.7         TPro  6.3  /  Alb  2.7<L>  /  TBili  0.5  /  DBili  x   /  AST  75<H>  /  ALT  32  /  AlkPhos  63  -      Urinalysis Basic - ( 15 Mar 2023 22:46 )    Color: Yellow / Appearance: Clear / S.015 / pH: x  Gluc: x / Ketone: Moderate  / Bili: Negative / Urobili: Negative   Blood: x / Protein: 15 / Nitrite: Negative   Leuk Esterase: Trace / RBC: 0-2 /HPF / WBC 26-50 /HPF   Sq Epi: x / Non Sq Epi: Few /HPF / Bacteria: Many /HPF      Culture Results:   No growth to date. ( @ 00:42)  Culture Results:   No growth to date. ( @ 00:27)  Culture Results:   >100,000 CFU/ml Gram Negative Rods (03-15 @ 22:46)      RADIOLOGY & ADDITIONAL STUDIES REVIEWED:      [ ]GOALS OF CARE DISCUSSION WITH PATIENT/FAMILY/PROXY:    CRITICAL CARE TIME SPENT: 35 minutes INTERVAL HPI/OVERNIGHT EVENTS:   overnight pt is febrile, he had 2 episodes of NBNB emesis. pt. seen and examined at bedside, He states that he feels nausea but no vomitus today. He states that he still feels pain in his chest but only with coughing and vomiting. He denies pain at rest or substernal chest pain.     PRESSORS: [ ] YES [ x] NO  WHICH:    Antimicrobial:  cefTRIAXone   IVPB 1000 milliGRAM(s) IV Intermittent every 24 hours    Cardiovascular:    Pulmonary:    Hematalogic:  enoxaparin Injectable 80 milliGRAM(s) SubCutaneous every 12 hours    Other:  atorvastatin 40 milliGRAM(s) Oral at bedtime  benzocaine/menthol Lozenge 1 Lozenge Oral three times a day PRN  chlorhexidine 2% Cloths 1 Application(s) Topical daily  insulin glargine Injectable (LANTUS) 22 Unit(s) SubCutaneous every morning  insulin lispro (ADMELOG) corrective regimen sliding scale   SubCutaneous three times a day before meals  insulin lispro (ADMELOG) corrective regimen sliding scale   SubCutaneous at bedtime  meclizine 25 milliGRAM(s) Oral every 8 hours PRN  ondansetron Injectable 4 milliGRAM(s) IV Push every 6 hours PRN      Drug Dosing Weight  Height (cm): 188 (15 Mar 2023 20:00)  Weight (kg): 89.6 (16 Mar 2023 00:00)  BMI (kg/m2): 25.4 (16 Mar 2023 00:00)  BSA (m2): 2.16 (16 Mar 2023 00:00)    CENTRAL LINE: [ ] YES [ x] NO  LOCATION:   DATE INSERTED:  REMOVE: [ ] YES [ ] NO  EXPLAIN:    LEONG: [ ] YES [x ] NO    DATE INSERTED:  REMOVE:  [ ] YES [ ] NO  EXPLAIN:    A-LINE:  [ ] YES [x ] NO  LOCATION:   DATE INSERTED:  REMOVE:  [ ] YES [ ] NO  EXPLAIN:    PMH/Social Hx/Fam Hx -reviewed admission note, no change since admission  PAST MEDICAL & SURGICAL HISTORY:  Diabetes      Hypertension      S/P cataract surgery  right eye        Heart faliure: acute [ ] chronic [ ] acute or chronic [ ] diastolic [ ] systolic [ ] combied systolic and diastolic[ ]  JOANNA: ATN[ ] renal medullary necrosis [ ] CKD I [ ]CKDII [ ]CKD III [ ]CKD IV [ ]CKD V [ ]Other pathological lesions [ ]  Abdominal Nutrition Status: malnutrition [ ] cachexia [ ] morbid obesity/BMI=40 [ ] Supplement ordered [___________]     T(C): 37.3 (23 @ 07:00), Max: 38.3 (23 @ 00:14)  HR: 71 (23 @ 07:00)  BP: 159/75 (23 @ 07:00)  BP(mean): 94 (23 @ 07:00)  ABP: --  ABP(mean): --  RR: 18 (23 @ 07:00)  SpO2: 95% (23 @ 07:00)  Wt(kg): --    ABG - ( 15 Mar 2023 20:52 )  pH, Arterial: 7.36  pH, Blood: x     /  pCO2: 21    /  pO2: 123   / HCO3: 12    / Base Excess: -11.7 /  SaO2: 97                    03-16 @ 07:01  -   @ 07:00  --------------------------------------------------------  IN: 1928.3 mL / OUT: 850 mL / NET: 1078.3 mL            PHYSICAL EXAM:  GENERAL: NAD, well-groomed, well-developed  HEAD:  Atraumatic, Normocephalic  EYES: EOMI, PERRLA, conjunctiva and sclera clear  ENMT: No tonsillar erythema, exudates, or enlargement; Moist mucous membranes, Good dentition, No lesions  NECK: Supple, normal appearance, No JVD; Normal thyroid; Trachea midline  NERVOUS SYSTEM:  Alert & Oriented X3,  Motor Strength 5/5 B/L upper and lower extremities, sensation intact  CHEST/LUNG: Lungs crackles bilaterally, No rales, rhonchi, wheezing   HEART: Regular rate and rhythm; No murmurs, rubs, or gallops  ABDOMEN: Soft, Nontender, Nondistended; Bowel sounds present  EXTREMITIES:  2+ Peripheral Pulses, No clubbing, cyanosis, or edema  LYMPH: No lymphadenopathy noted  SKIN: No rashes or lesions;  Good capillary refill      LABS:  CBC Full  -  ( 17 Mar 2023 03:00 )  WBC Count : 17.67 K/uL  RBC Count : 3.54 M/uL  Hemoglobin : 10.2 g/dL  Hematocrit : 31.7 %  Platelet Count - Automated : 232 K/uL  Mean Cell Volume : 89.5 fl  Mean Cell Hemoglobin : 28.8 pg  Mean Cell Hemoglobin Concentration : 32.2 gm/dL  Auto Neutrophil # : 15.99 K/uL  Auto Lymphocyte # : 0.72 K/uL  Auto Monocyte # : 0.73 K/uL  Auto Eosinophil # : 0.05 K/uL  Auto Basophil # : 0.03 K/uL  Auto Neutrophil % : 90.5 %  Auto Lymphocyte % : 4.1 %  Auto Monocyte % : 4.1 %  Auto Eosinophil % : 0.3 %  Auto Basophil % : 0.2 %        140  |  109<H>  |  20<H>  ----------------------------<  160<H>  3.7   |  23  |  0.77    Ca    8.3<L>      17 Mar 2023 03:00  Phos  2.6       Mg     1.7         TPro  6.3  /  Alb  2.7<L>  /  TBili  0.5  /  DBili  x   /  AST  75<H>  /  ALT  32  /  AlkPhos  63        Urinalysis Basic - ( 15 Mar 2023 22:46 )    Color: Yellow / Appearance: Clear / S.015 / pH: x  Gluc: x / Ketone: Moderate  / Bili: Negative / Urobili: Negative   Blood: x / Protein: 15 / Nitrite: Negative   Leuk Esterase: Trace / RBC: 0-2 /HPF / WBC 26-50 /HPF   Sq Epi: x / Non Sq Epi: Few /HPF / Bacteria: Many /HPF      Culture Results:   No growth to date. ( @ 00:42)  Culture Results:   No growth to date. ( @ 00:27)  Culture Results:   >100,000 CFU/ml Gram Negative Rods (03-15 @ 22:46)      RADIOLOGY & ADDITIONAL STUDIES REVIEWED:      [ ]GOALS OF CARE DISCUSSION WITH PATIENT/FAMILY/PROXY:    CRITICAL CARE TIME SPENT: 35 minutes INTERVAL HPI/OVERNIGHT EVENTS:   overnight pt is febrile, he had 2 episodes of NBNB emesis. pt. seen and examined at bedside, He states that he feels nausea but no vomitus today. He states that he still feels pain in his chest but only with coughing and vomiting. He denies pain at rest or substernal chest pain.     PRESSORS: [ ] YES [ x] NO  WHICH:    Antimicrobial:  cefTRIAXone   IVPB 1000 milliGRAM(s) IV Intermittent every 24 hours    Cardiovascular:    Pulmonary:    Hematalogic:  enoxaparin Injectable 80 milliGRAM(s) SubCutaneous every 12 hours    Other:  atorvastatin 40 milliGRAM(s) Oral at bedtime  benzocaine/menthol Lozenge 1 Lozenge Oral three times a day PRN  chlorhexidine 2% Cloths 1 Application(s) Topical daily  insulin glargine Injectable (LANTUS) 22 Unit(s) SubCutaneous every morning  insulin lispro (ADMELOG) corrective regimen sliding scale   SubCutaneous three times a day before meals  insulin lispro (ADMELOG) corrective regimen sliding scale   SubCutaneous at bedtime  meclizine 25 milliGRAM(s) Oral every 8 hours PRN  ondansetron Injectable 4 milliGRAM(s) IV Push every 6 hours PRN      Drug Dosing Weight  Height (cm): 188 (15 Mar 2023 20:00)  Weight (kg): 89.6 (16 Mar 2023 00:00)  BMI (kg/m2): 25.4 (16 Mar 2023 00:00)  BSA (m2): 2.16 (16 Mar 2023 00:00)    CENTRAL LINE: [ ] YES [ x] NO  LOCATION:   DATE INSERTED:  REMOVE: [ ] YES [ ] NO  EXPLAIN:    LEONG: [ ] YES [x ] NO    DATE INSERTED:  REMOVE:  [ ] YES [ ] NO  EXPLAIN:    A-LINE:  [ ] YES [x ] NO  LOCATION:   DATE INSERTED:  REMOVE:  [ ] YES [ ] NO  EXPLAIN:    PMH/Social Hx/Fam Hx -reviewed admission note, no change since admission  PAST MEDICAL & SURGICAL HISTORY:  Diabetes      Hypertension      S/P cataract surgery  right eye        Heart faliure: acute [ ] chronic [ ] acute or chronic [ ] diastolic [ ] systolic [ ] combied systolic and diastolic[ ]  JOANNA: ATN[ ] renal medullary necrosis [ ] CKD I [ ]CKDII [ ]CKD III [ ]CKD IV [ ]CKD V [ ]Other pathological lesions [ ]  Abdominal Nutrition Status: malnutrition [ ] cachexia [ ] morbid obesity/BMI=40 [ ] Supplement ordered [___________]     T(C): 37.3 (23 @ 07:00), Max: 38.3 (23 @ 00:14)  HR: 71 (23 @ 07:00)  BP: 159/75 (23 @ 07:00)  BP(mean): 94 (23 @ 07:00)  ABP: --  ABP(mean): --  RR: 18 (23 @ 07:00)  SpO2: 95% (23 @ 07:00)  Wt(kg): --    ABG - ( 15 Mar 2023 20:52 )  pH, Arterial: 7.36  pH, Blood: x     /  pCO2: 21    /  pO2: 123   / HCO3: 12    / Base Excess: -11.7 /  SaO2: 97                    03-16 @ 07:01  -   @ 07:00  --------------------------------------------------------  IN: 1928.3 mL / OUT: 850 mL / NET: 1078.3 mL            PHYSICAL EXAM:  GENERAL: NAD, well-groomed, well-developed  HEAD:  Atraumatic, Normocephalic  EYES: EOMI, PERRLA, conjunctiva and sclera clear  ENMT: No tonsillar erythema, exudates, or enlargement; Moist mucous membranes, Good dentition, No lesions  NECK: Supple, normal appearance, No JVD; Normal thyroid; Trachea midline  NERVOUS SYSTEM:  Alert & Oriented X3,  Motor Strength 5/5 B/L upper and lower extremities, sensation intact  CHEST/LUNG: Lungs crackles bilaterally, No rales, rhonchi, wheezing   HEART: Regular rate and rhythm; No murmurs, rubs, or gallops  ABDOMEN: Soft, Nontender, Nondistended; Bowel sounds present  EXTREMITIES:  2+ Peripheral Pulses, No clubbing, cyanosis, or edema  LYMPH: No lymphadenopathy noted  SKIN: No rashes or lesions;  Good capillary refill      LABS:  CBC Full  -  ( 17 Mar 2023 03:00 )  WBC Count : 17.67 K/uL  RBC Count : 3.54 M/uL  Hemoglobin : 10.2 g/dL  Hematocrit : 31.7 %  Platelet Count - Automated : 232 K/uL  Mean Cell Volume : 89.5 fl  Mean Cell Hemoglobin : 28.8 pg  Mean Cell Hemoglobin Concentration : 32.2 gm/dL  Auto Neutrophil # : 15.99 K/uL  Auto Lymphocyte # : 0.72 K/uL  Auto Monocyte # : 0.73 K/uL  Auto Eosinophil # : 0.05 K/uL  Auto Basophil # : 0.03 K/uL  Auto Neutrophil % : 90.5 %  Auto Lymphocyte % : 4.1 %  Auto Monocyte % : 4.1 %  Auto Eosinophil % : 0.3 %  Auto Basophil % : 0.2 %        140  |  109<H>  |  20<H>  ----------------------------<  160<H>  3.7   |  23  |  0.77    Ca    8.3<L>      17 Mar 2023 03:00  Phos  2.6       Mg     1.7         TPro  6.3  /  Alb  2.7<L>  /  TBili  0.5  /  DBili  x   /  AST  75<H>  /  ALT  32  /  AlkPhos  63        Urinalysis Basic - ( 15 Mar 2023 22:46 )    Color: Yellow / Appearance: Clear / S.015 / pH: x  Gluc: x / Ketone: Moderate  / Bili: Negative / Urobili: Negative   Blood: x / Protein: 15 / Nitrite: Negative   Leuk Esterase: Trace / RBC: 0-2 /HPF / WBC 26-50 /HPF   Sq Epi: x / Non Sq Epi: Few /HPF / Bacteria: Many /HPF      Culture Results:   No growth to date. ( @ 00:42)  Culture Results:   No growth to date. ( @ 00:27)  Culture Results:   >100,000 CFU/ml Gram Negative Rods (03-15 @ 22:46)      RADIOLOGY & ADDITIONAL STUDIES REVIEWED:      < from: TTE with Doppler (w/Cont) (23 @ 13:36) >    Patient name: JOANIE MESA  YOB: 1950   Age: 72 (M)   MR#: [id number]  Study Date: 3/16/2023  [id number] Lul Bell RDCS  Study quality: Fair  Referring Physician:  JIL OLIVAREZ MD  Blood Pressure: 128/59 mmHg  Height: 188 cm  Weight: 89 kg  BSA: 2.2 m2  ------------------------------------------------------------------------    PROCEDURE: Transthoracic echocardiogram with 2-D, M-Mode  and complete spectral and color flow Doppler.  Verbal consent was obtained for injection of ultrasound  enhancing agent  following a discussion of risks and  benefits. Following intravenous injection of ultrasound  enhancing agent, harmonic imaging was performed. 2_cc of  ultrasound enhancing agent injected intravenously.  INDICATION: Chest pain, unspecified (R07.9)  HISTORY:  ------------------------------------------------------------------------  DIMENSIONS:  Dimensions:     Normal Values:  LA:     4.1 cm    2.0 - 4.0 cm  Ao:     2.9 cm    2.0 - 3.8 cm  SEPTUM: 0.8 cm    0.6 - 1.2 cm  PWT:    0.9 cm    0.6 - 1.1 cm  LVIDd:  4.7 cm    3.0 - 5.6 cm  LVIDs:  3.2 cm    1.8 - 4.0 cm      Derived Variables:  LVMI: 62 g/m2  RWT: 0.38  Ejection Fraction Clark: 53 %    ------------------------------------------------------------------------  OBSERVATIONS:  Mitral Valve: Normal mitral valve. Mildly calcified chordae  tendinae.  Mild mitral regurgitation.  Aortic Root: Normal aortic root.  Aortic Valve: Calcified aortic valve was not well  visualized but has normal opening. No aortic stenosis. No  aortic valve regurgitation seen.  Left Atrium: Normal left atrium.  LA volume index = 26  cc/m2.  Left Ventricle: Low-normal Left Ventricular Systolic  Function,  (EF = 53% by biplane). No regional wall motion  abnormalities. Ultrasound LV opacification agent was used  to enhance endocardial definition. Normal left ventricular  internal dimensions and wall thicknesses. The diastolic  function is indeterminate based on current diagnostic  criteria.  Right Heart: Normal right atrium. Right ventricle not well  visualized. Normal RV systolic function (RV tissue Doppler  11 cm/s). Normal tricuspid valve. Moderate tricuspid  regurgitation.  Pulmonic valve not well seen. No pulmonic  insufficiency is noted.  Pericardium/PleuraNo pericardial effusion.  Hemodynamic: RA Pressure is 8 mm Hg. RV systolic pressure  is borderline normal at  33 mm Hg.  ------------------------------------------------------------------------  CONCLUSIONS:  1. Normal mitral valve. Mildly calcified chordae tendinae.  Mild mitral regurgitation.  2. Calcified aortic valve was not well visualized but has  normal opening. No aortic stenosis. No aortic valve  regurgitation seen.  3. Normal left ventricular internal dimensions and wall  thicknesses.  4. Low-normal Left Ventricular Systolic Function,  (EF =  53% by biplane). No regional wall motion abnormalities.  Ultrasound LV opacification agent was used to enhance  endocardial definition.  5. Right ventricle not well visualized. Normal RVsystolic  function (RV tissue Doppler 11 cm/s).  6. RV systolic pressure is borderline normal at  33 mm Hg.  7. Normal tricuspid valve. Moderate tricuspid  regurgitation.  8. No pericardial effusion.    *** No previous Echo exam.  ------------------------------------------------------------------------  Confirmed on  3/16/2023 - 17:38:19 by Walt Clinton MD  ------------------------------------------------------------------------    < end of copied text >  [ ]GOALS OF CARE DISCUSSION WITH PATIENT/FAMILY/PROXY:    CRITICAL CARE TIME SPENT: 35 minutes

## 2023-03-18 LAB
-  AMIKACIN: SIGNIFICANT CHANGE UP
-  AMOXICILLIN/CLAVULANIC ACID: SIGNIFICANT CHANGE UP
-  AMPICILLIN/SULBACTAM: SIGNIFICANT CHANGE UP
-  AMPICILLIN: SIGNIFICANT CHANGE UP
-  AZTREONAM: SIGNIFICANT CHANGE UP
-  CEFAZOLIN: SIGNIFICANT CHANGE UP
-  CEFEPIME: SIGNIFICANT CHANGE UP
-  CEFOXITIN: SIGNIFICANT CHANGE UP
-  CEFTRIAXONE: SIGNIFICANT CHANGE UP
-  CEFUROXIME: SIGNIFICANT CHANGE UP
-  CIPROFLOXACIN: SIGNIFICANT CHANGE UP
-  ERTAPENEM: SIGNIFICANT CHANGE UP
-  GENTAMICIN: SIGNIFICANT CHANGE UP
-  IMIPENEM: SIGNIFICANT CHANGE UP
-  LEVOFLOXACIN: SIGNIFICANT CHANGE UP
-  MEROPENEM: SIGNIFICANT CHANGE UP
-  NITROFURANTOIN: SIGNIFICANT CHANGE UP
-  PIPERACILLIN/TAZOBACTAM: SIGNIFICANT CHANGE UP
-  TOBRAMYCIN: SIGNIFICANT CHANGE UP
-  TRIMETHOPRIM/SULFAMETHOXAZOLE: SIGNIFICANT CHANGE UP
ALBUMIN SERPL ELPH-MCNC: 2.5 G/DL — LOW (ref 3.5–5)
ALP SERPL-CCNC: 64 U/L — SIGNIFICANT CHANGE UP (ref 40–120)
ALT FLD-CCNC: 28 U/L DA — SIGNIFICANT CHANGE UP (ref 10–60)
ANION GAP SERPL CALC-SCNC: 3 MMOL/L — LOW (ref 5–17)
ANION GAP SERPL CALC-SCNC: 6 MMOL/L — SIGNIFICANT CHANGE UP (ref 5–17)
AST SERPL-CCNC: 47 U/L — HIGH (ref 10–40)
BASOPHILS # BLD AUTO: 0.02 K/UL — SIGNIFICANT CHANGE UP (ref 0–0.2)
BASOPHILS NFR BLD AUTO: 0.2 % — SIGNIFICANT CHANGE UP (ref 0–2)
BILIRUB SERPL-MCNC: 0.9 MG/DL — SIGNIFICANT CHANGE UP (ref 0.2–1.2)
BUN SERPL-MCNC: 20 MG/DL — HIGH (ref 7–18)
BUN SERPL-MCNC: 20 MG/DL — HIGH (ref 7–18)
CALCIUM SERPL-MCNC: 8.7 MG/DL — SIGNIFICANT CHANGE UP (ref 8.4–10.5)
CALCIUM SERPL-MCNC: 8.8 MG/DL — SIGNIFICANT CHANGE UP (ref 8.4–10.5)
CHLORIDE SERPL-SCNC: 103 MMOL/L — SIGNIFICANT CHANGE UP (ref 96–108)
CHLORIDE SERPL-SCNC: 108 MMOL/L — SIGNIFICANT CHANGE UP (ref 96–108)
CO2 SERPL-SCNC: 29 MMOL/L — SIGNIFICANT CHANGE UP (ref 22–31)
CO2 SERPL-SCNC: 29 MMOL/L — SIGNIFICANT CHANGE UP (ref 22–31)
CREAT SERPL-MCNC: 0.72 MG/DL — SIGNIFICANT CHANGE UP (ref 0.5–1.3)
CREAT SERPL-MCNC: 0.84 MG/DL — SIGNIFICANT CHANGE UP (ref 0.5–1.3)
CULTURE RESULTS: SIGNIFICANT CHANGE UP
EGFR: 92 ML/MIN/1.73M2 — SIGNIFICANT CHANGE UP
EGFR: 96 ML/MIN/1.73M2 — SIGNIFICANT CHANGE UP
EOSINOPHIL # BLD AUTO: 0.01 K/UL — SIGNIFICANT CHANGE UP (ref 0–0.5)
EOSINOPHIL NFR BLD AUTO: 0.1 % — SIGNIFICANT CHANGE UP (ref 0–6)
GLUCOSE BLDC GLUCOMTR-MCNC: 201 MG/DL — HIGH (ref 70–99)
GLUCOSE BLDC GLUCOMTR-MCNC: 259 MG/DL — HIGH (ref 70–99)
GLUCOSE BLDC GLUCOMTR-MCNC: 277 MG/DL — HIGH (ref 70–99)
GLUCOSE BLDC GLUCOMTR-MCNC: 324 MG/DL — HIGH (ref 70–99)
GLUCOSE SERPL-MCNC: 223 MG/DL — HIGH (ref 70–99)
GLUCOSE SERPL-MCNC: 320 MG/DL — HIGH (ref 70–99)
HCT VFR BLD CALC: 32 % — LOW (ref 39–50)
HGB BLD-MCNC: 10.3 G/DL — LOW (ref 13–17)
IMM GRANULOCYTES NFR BLD AUTO: 0.8 % — SIGNIFICANT CHANGE UP (ref 0–0.9)
LEGIONELLA AG UR QL: NEGATIVE — SIGNIFICANT CHANGE UP
LMWH PPP CHRO-ACNC: 0.36 IU/ML — LOW (ref 0.5–1.1)
LYMPHOCYTES # BLD AUTO: 1.9 K/UL — SIGNIFICANT CHANGE UP (ref 1–3.3)
LYMPHOCYTES # BLD AUTO: 14.6 % — SIGNIFICANT CHANGE UP (ref 13–44)
MAGNESIUM SERPL-MCNC: 1.7 MG/DL — SIGNIFICANT CHANGE UP (ref 1.6–2.6)
MAGNESIUM SERPL-MCNC: 2.1 MG/DL — SIGNIFICANT CHANGE UP (ref 1.6–2.6)
MCHC RBC-ENTMCNC: 29.1 PG — SIGNIFICANT CHANGE UP (ref 27–34)
MCHC RBC-ENTMCNC: 32.2 GM/DL — SIGNIFICANT CHANGE UP (ref 32–36)
MCV RBC AUTO: 90.4 FL — SIGNIFICANT CHANGE UP (ref 80–100)
METHOD TYPE: SIGNIFICANT CHANGE UP
MONOCYTES # BLD AUTO: 0.94 K/UL — HIGH (ref 0–0.9)
MONOCYTES NFR BLD AUTO: 7.2 % — SIGNIFICANT CHANGE UP (ref 2–14)
NEUTROPHILS # BLD AUTO: 10.01 K/UL — HIGH (ref 1.8–7.4)
NEUTROPHILS NFR BLD AUTO: 77.1 % — HIGH (ref 43–77)
NRBC # BLD: 0 /100 WBCS — SIGNIFICANT CHANGE UP (ref 0–0)
ORGANISM # SPEC MICROSCOPIC CNT: SIGNIFICANT CHANGE UP
ORGANISM # SPEC MICROSCOPIC CNT: SIGNIFICANT CHANGE UP
PHOSPHATE SERPL-MCNC: 1.3 MG/DL — LOW (ref 2.5–4.5)
PHOSPHATE SERPL-MCNC: 1.6 MG/DL — LOW (ref 2.5–4.5)
PLATELET # BLD AUTO: 220 K/UL — SIGNIFICANT CHANGE UP (ref 150–400)
POTASSIUM SERPL-MCNC: 3.7 MMOL/L — SIGNIFICANT CHANGE UP (ref 3.5–5.3)
POTASSIUM SERPL-MCNC: 3.8 MMOL/L — SIGNIFICANT CHANGE UP (ref 3.5–5.3)
POTASSIUM SERPL-SCNC: 3.7 MMOL/L — SIGNIFICANT CHANGE UP (ref 3.5–5.3)
POTASSIUM SERPL-SCNC: 3.8 MMOL/L — SIGNIFICANT CHANGE UP (ref 3.5–5.3)
PROT SERPL-MCNC: 6 G/DL — SIGNIFICANT CHANGE UP (ref 6–8.3)
RBC # BLD: 3.54 M/UL — LOW (ref 4.2–5.8)
RBC # FLD: 15.1 % — HIGH (ref 10.3–14.5)
SODIUM SERPL-SCNC: 138 MMOL/L — SIGNIFICANT CHANGE UP (ref 135–145)
SODIUM SERPL-SCNC: 140 MMOL/L — SIGNIFICANT CHANGE UP (ref 135–145)
SPECIMEN SOURCE: SIGNIFICANT CHANGE UP
WBC # BLD: 12.98 K/UL — HIGH (ref 3.8–10.5)
WBC # FLD AUTO: 12.98 K/UL — HIGH (ref 3.8–10.5)

## 2023-03-18 RX ORDER — SODIUM,POTASSIUM PHOSPHATES 278-250MG
1 POWDER IN PACKET (EA) ORAL ONCE
Refills: 0 | Status: COMPLETED | OUTPATIENT
Start: 2023-03-18 | End: 2023-03-18

## 2023-03-18 RX ORDER — POTASSIUM PHOSPHATE, MONOBASIC POTASSIUM PHOSPHATE, DIBASIC 236; 224 MG/ML; MG/ML
30 INJECTION, SOLUTION INTRAVENOUS ONCE
Refills: 0 | Status: COMPLETED | OUTPATIENT
Start: 2023-03-18 | End: 2023-03-18

## 2023-03-18 RX ORDER — FUROSEMIDE 40 MG
40 TABLET ORAL ONCE
Refills: 0 | Status: COMPLETED | OUTPATIENT
Start: 2023-03-18 | End: 2023-03-18

## 2023-03-18 RX ORDER — INSULIN LISPRO 100/ML
VIAL (ML) SUBCUTANEOUS AT BEDTIME
Refills: 0 | Status: DISCONTINUED | OUTPATIENT
Start: 2023-03-18 | End: 2023-03-22

## 2023-03-18 RX ORDER — INSULIN LISPRO 100/ML
VIAL (ML) SUBCUTANEOUS
Refills: 0 | Status: DISCONTINUED | OUTPATIENT
Start: 2023-03-18 | End: 2023-03-22

## 2023-03-18 RX ADMIN — LOSARTAN POTASSIUM 100 MILLIGRAM(S): 100 TABLET, FILM COATED ORAL at 05:20

## 2023-03-18 RX ADMIN — ONDANSETRON 4 MILLIGRAM(S): 8 TABLET, FILM COATED ORAL at 11:01

## 2023-03-18 RX ADMIN — MONTELUKAST 10 MILLIGRAM(S): 4 TABLET, CHEWABLE ORAL at 21:58

## 2023-03-18 RX ADMIN — POTASSIUM PHOSPHATE, MONOBASIC POTASSIUM PHOSPHATE, DIBASIC 83.33 MILLIMOLE(S): 236; 224 INJECTION, SOLUTION INTRAVENOUS at 05:52

## 2023-03-18 RX ADMIN — Medication 4: at 16:09

## 2023-03-18 RX ADMIN — ONDANSETRON 4 MILLIGRAM(S): 8 TABLET, FILM COATED ORAL at 16:09

## 2023-03-18 RX ADMIN — ENOXAPARIN SODIUM 80 MILLIGRAM(S): 100 INJECTION SUBCUTANEOUS at 17:18

## 2023-03-18 RX ADMIN — CHLORHEXIDINE GLUCONATE 1 APPLICATION(S): 213 SOLUTION TOPICAL at 17:40

## 2023-03-18 RX ADMIN — ENOXAPARIN SODIUM 80 MILLIGRAM(S): 100 INJECTION SUBCUTANEOUS at 05:20

## 2023-03-18 RX ADMIN — TAMSULOSIN HYDROCHLORIDE 0.4 MILLIGRAM(S): 0.4 CAPSULE ORAL at 21:58

## 2023-03-18 RX ADMIN — Medication 25 MILLIGRAM(S): at 17:18

## 2023-03-18 RX ADMIN — ATORVASTATIN CALCIUM 40 MILLIGRAM(S): 80 TABLET, FILM COATED ORAL at 21:58

## 2023-03-18 RX ADMIN — ONDANSETRON 4 MILLIGRAM(S): 8 TABLET, FILM COATED ORAL at 05:20

## 2023-03-18 RX ADMIN — ONDANSETRON 4 MILLIGRAM(S): 8 TABLET, FILM COATED ORAL at 21:59

## 2023-03-18 RX ADMIN — Medication 2: at 21:59

## 2023-03-18 RX ADMIN — Medication 81 MILLIGRAM(S): at 11:32

## 2023-03-18 RX ADMIN — Medication 2: at 06:04

## 2023-03-18 RX ADMIN — Medication 3: at 11:32

## 2023-03-18 RX ADMIN — Medication 40 MILLIGRAM(S): at 10:35

## 2023-03-18 RX ADMIN — Medication 1 PACKET(S): at 10:32

## 2023-03-18 RX ADMIN — Medication 25 MILLIGRAM(S): at 05:20

## 2023-03-18 RX ADMIN — INSULIN GLARGINE 26 UNIT(S): 100 INJECTION, SOLUTION SUBCUTANEOUS at 08:19

## 2023-03-18 RX ADMIN — CEFTRIAXONE 100 MILLIGRAM(S): 500 INJECTION, POWDER, FOR SOLUTION INTRAMUSCULAR; INTRAVENOUS at 05:20

## 2023-03-18 RX ADMIN — POTASSIUM PHOSPHATE, MONOBASIC POTASSIUM PHOSPHATE, DIBASIC 83.33 MILLIMOLE(S): 236; 224 INJECTION, SOLUTION INTRAVENOUS at 17:18

## 2023-03-18 NOTE — DIETITIAN INITIAL EVALUATION ADULT - OTHER INFO
Pt visited in ICU . Pt OOb to chair. Pt about to start Lunch meal.. Per Pt H/O DM  X ~ 50 Years. Pt states he is on Insulin Pump x ~ 12 Years. C/O Pump malfunction x  ~ 1 day Per Pt. Pt is aware of his  diet and Follows Diabetic diet. A1c 7.7.  Endo consult noted. Wt stable. HT 6 feet 2 inches,  Lb. Dosing wt 208 lb. Labs Noted. Phos 1.3, BUN 20.  Meds noted  On Zofran (PRN). Pt visited in ICU . Pt OOb to chair. Pt about to start Lunch meal.. Per Pt H/O DM  X ~ 50 Years. Pt states he is on Insulin Pump x ~ 12 Years. C/O Pump malfunction x  ~ 1 day Per Pt. Pt is aware of his  diet and Follows Diabetic diet. A1c 7.7.Per Pt NKFA. No chewing or swallowing difficulty Reported.  Endo consult noted. Wt stable. HT 6 feet 2 inches,  Lb. Dosing wt 208 lb. Labs Noted. Phos 1.3, BUN 20.  Meds noted  On Zofran (PRN).

## 2023-03-18 NOTE — DIETITIAN INITIAL EVALUATION ADULT - PERTINENT LABORATORY DATA
03-18    140  |  108  |  20<H>  ----------------------------<  223<H>  3.8   |  29  |  0.72    Ca    8.8      18 Mar 2023 04:00  Phos  1.3     03-18  Mg     2.1     03-18    TPro  6.0  /  Alb  2.5<L>  /  TBili  0.9  /  DBili  x   /  AST  47<H>  /  ALT  28  /  AlkPhos  64  03-18  POCT Blood Glucose.: 259 mg/dL (03-18-23 @ 11:14)  A1C with Estimated Average Glucose Result: 7.7 % (03-16-23 @ 03:38)

## 2023-03-18 NOTE — PHYSICAL THERAPY INITIAL EVALUATION ADULT - GENERAL OBSERVATIONS, REHAB EVAL
Pt seen supine in bed w/IV, [place] cath, cardiac cath, c/o nausea. Pt was cooperative during assessment

## 2023-03-18 NOTE — PROGRESS NOTE ADULT - SUBJECTIVE AND OBJECTIVE BOX
Patient is a 72y old  Male who presents with a chief complaint of DKA (17 Mar 2023 12:39)    INTERVAL HISTORY/ OVERNIGHT EVENTS: No overnight events noted      PRESSORS: [ ] YES [x ] NO    Antimicrobial:  cefTRIAXone   IVPB 1000 milliGRAM(s) IV Intermittent every 24 hours    Cardiovascular:  losartan 100 milliGRAM(s) Oral daily  metoprolol tartrate 25 milliGRAM(s) Oral two times a day    Pulmonary:  montelukast 10 milliGRAM(s) Oral at bedtime    Hematalogic:  aspirin enteric coated 81 milliGRAM(s) Oral daily  enoxaparin Injectable 80 milliGRAM(s) SubCutaneous every 12 hours    Other:  atorvastatin 40 milliGRAM(s) Oral at bedtime  benzocaine/menthol Lozenge 1 Lozenge Oral three times a day PRN  chlorhexidine 2% Cloths 1 Application(s) Topical daily  insulin glargine Injectable (LANTUS) 26 Unit(s) SubCutaneous every morning  insulin lispro (ADMELOG) corrective regimen sliding scale   SubCutaneous three times a day before meals  insulin lispro (ADMELOG) corrective regimen sliding scale   SubCutaneous at bedtime  meclizine 25 milliGRAM(s) Oral every 8 hours PRN  ondansetron Injectable 4 milliGRAM(s) IV Push every 6 hours PRN  tamsulosin 0.4 milliGRAM(s) Oral at bedtime    aspirin enteric coated 81 milliGRAM(s) Oral daily  atorvastatin 40 milliGRAM(s) Oral at bedtime  benzocaine/menthol Lozenge 1 Lozenge Oral three times a day PRN  cefTRIAXone   IVPB 1000 milliGRAM(s) IV Intermittent every 24 hours  chlorhexidine 2% Cloths 1 Application(s) Topical daily  enoxaparin Injectable 80 milliGRAM(s) SubCutaneous every 12 hours  insulin glargine Injectable (LANTUS) 26 Unit(s) SubCutaneous every morning  insulin lispro (ADMELOG) corrective regimen sliding scale   SubCutaneous three times a day before meals  insulin lispro (ADMELOG) corrective regimen sliding scale   SubCutaneous at bedtime  losartan 100 milliGRAM(s) Oral daily  meclizine 25 milliGRAM(s) Oral every 8 hours PRN  metoprolol tartrate 25 milliGRAM(s) Oral two times a day  montelukast 10 milliGRAM(s) Oral at bedtime  ondansetron Injectable 4 milliGRAM(s) IV Push every 6 hours PRN  tamsulosin 0.4 milliGRAM(s) Oral at bedtime    Drug Dosing Weight  Height (cm): 188 (15 Mar 2023 20:00)  Weight (kg): 89.6 (16 Mar 2023 00:00)  BMI (kg/m2): 25.4 (16 Mar 2023 00:00)  BSA (m2): 2.16 (16 Mar 2023 00:00)    CENTRAL LINE: [ ] YES [x ] NO  LOCATION:     LEONG: [ ] YES [x ] NO      A-LINE:  [ ] YES [x ] NO  LOCATION:         ICU Vital Signs Last 24 Hrs  T(C): 36.7 (17 Mar 2023 19:30), Max: 38.2 (17 Mar 2023 02:00)  T(F): 98.1 (17 Mar 2023 19:30), Max: 100.7 (17 Mar 2023 02:00)  HR: 63 (18 Mar 2023 00:00) (59 - 85)  BP: 125/63 (18 Mar 2023 00:00) (102/51 - 165/83)  BP(mean): 80 (18 Mar 2023 00:00) (63 - 105)  ABP: --  ABP(mean): --  RR: 14 (18 Mar 2023 00:00) (13 - 21)  SpO2: 95% (18 Mar 2023 00:00) (94% - 98%)              03-16 @ 07:01  -  03-17 @ 07:00  --------------------------------------------------------  IN: 1928.3 mL / OUT: 850 mL / NET: 1078.3 mL            PHYSICAL EXAM:    GENERAL: NAD, well-groomed, well-developed  HEAD:  Atraumatic, Normocephalic  EYES: EOMI, PERRLA, conjunctiva and sclera clear  ENMT: No tonsillar erythema, exudates, or enlargement; Moist mucous membranes, Good dentition, No lesions  NECK: Supple, normal appearance, No JVD; Normal thyroid; Trachea midline  NERVOUS SYSTEM:  Alert & Oriented X3,  Motor Strength 5/5 B/L upper and lower extremities, sensation intact  CHEST/LUNG: Lungs crackles bilaterally, No rales, rhonchi, wheezing   HEART: Regular rate and rhythm; No murmurs, rubs, or gallops  ABDOMEN: Soft, Nontender, Nondistended; Bowel sounds present  EXTREMITIES:  2+ Peripheral Pulses, No clubbing, cyanosis, or edema  LYMPH: No lymphadenopathy noted  SKIN: No rashes or lesions;  Good capillary refill        LABS:                        10.2   17.67 )-----------( 232      ( 17 Mar 2023 03:00 )             31.7     03-17    140  |  109<H>  |  20<H>  ----------------------------<  160<H>  3.7   |  23  |  0.77    Ca    8.3<L>      17 Mar 2023 03:00  Phos  2.6     03-17  Mg     1.7     03-17    TPro  6.3  /  Alb  2.7<L>  /  TBili  0.5  /  DBili  x   /  AST  75<H>  /  ALT  32  /  AlkPhos  63  03-17        CAPILLARY BLOOD GLUCOSE      POCT Blood Glucose.: 243 mg/dL (17 Mar 2023 21:34)  POCT Blood Glucose.: 308 mg/dL (17 Mar 2023 16:24)  POCT Blood Glucose.: 239 mg/dL (17 Mar 2023 11:03)  POCT Blood Glucose.: 250 mg/dL (17 Mar 2023 07:56)  POCT Blood Glucose.: 195 mg/dL (17 Mar 2023 06:13)  POCT Blood Glucose.: 218 mg/dL (17 Mar 2023 06:11)    Culture Results:   No growth to date. (03-16 @ 00:42)  Culture Results:   No growth to date. (03-16 @ 00:27)  Culture Results:   >100,000 CFU/ml Klebsiella pneumoniae (03-15 @ 22:46)      RADIOLOGY & ADDITIONAL STUDIES REVIEWED:  ***     Patient is a 72y old  Male who presents with a chief complaint of DKA (17 Mar 2023 12:39)    INTERVAL HISTORY/ OVERNIGHT EVENTS: No overnight events noted      PRESSORS: [ ] YES [x] NO    Antimicrobial:  cefTRIAXone   IVPB 1000 milliGRAM(s) IV Intermittent every 24 hours    Cardiovascular:  losartan 100 milliGRAM(s) Oral daily  metoprolol tartrate 25 milliGRAM(s) Oral two times a day    Pulmonary:  montelukast 10 milliGRAM(s) Oral at bedtime    Hematalogic:  aspirin enteric coated 81 milliGRAM(s) Oral daily  enoxaparin Injectable 80 milliGRAM(s) SubCutaneous every 12 hours    Other:  atorvastatin 40 milliGRAM(s) Oral at bedtime  benzocaine/menthol Lozenge 1 Lozenge Oral three times a day PRN  chlorhexidine 2% Cloths 1 Application(s) Topical daily  insulin glargine Injectable (LANTUS) 26 Unit(s) SubCutaneous every morning  insulin lispro (ADMELOG) corrective regimen sliding scale   SubCutaneous three times a day before meals  insulin lispro (ADMELOG) corrective regimen sliding scale   SubCutaneous at bedtime  meclizine 25 milliGRAM(s) Oral every 8 hours PRN  ondansetron Injectable 4 milliGRAM(s) IV Push every 6 hours PRN  tamsulosin 0.4 milliGRAM(s) Oral at bedtime    aspirin enteric coated 81 milliGRAM(s) Oral daily  atorvastatin 40 milliGRAM(s) Oral at bedtime  benzocaine/menthol Lozenge 1 Lozenge Oral three times a day PRN  cefTRIAXone   IVPB 1000 milliGRAM(s) IV Intermittent every 24 hours  chlorhexidine 2% Cloths 1 Application(s) Topical daily  enoxaparin Injectable 80 milliGRAM(s) SubCutaneous every 12 hours  insulin glargine Injectable (LANTUS) 26 Unit(s) SubCutaneous every morning  insulin lispro (ADMELOG) corrective regimen sliding scale   SubCutaneous three times a day before meals  insulin lispro (ADMELOG) corrective regimen sliding scale   SubCutaneous at bedtime  losartan 100 milliGRAM(s) Oral daily  meclizine 25 milliGRAM(s) Oral every 8 hours PRN  metoprolol tartrate 25 milliGRAM(s) Oral two times a day  montelukast 10 milliGRAM(s) Oral at bedtime  ondansetron Injectable 4 milliGRAM(s) IV Push every 6 hours PRN  tamsulosin 0.4 milliGRAM(s) Oral at bedtime    Drug Dosing Weight  Height (cm): 188 (15 Mar 2023 20:00)  Weight (kg): 89.6 (16 Mar 2023 00:00)  BMI (kg/m2): 25.4 (16 Mar 2023 00:00)  BSA (m2): 2.16 (16 Mar 2023 00:00)    CENTRAL LINE: [ ] YES [x ] NO  LOCATION:     LEONG: [ ] YES [x ] NO      A-LINE:  [ ] YES [x ] NO  LOCATION:         ICU Vital Signs Last 24 Hrs  T(C): 36.7 (17 Mar 2023 19:30), Max: 38.2 (17 Mar 2023 02:00)  T(F): 98.1 (17 Mar 2023 19:30), Max: 100.7 (17 Mar 2023 02:00)  HR: 63 (18 Mar 2023 00:00) (59 - 85)  BP: 125/63 (18 Mar 2023 00:00) (102/51 - 165/83)  BP(mean): 80 (18 Mar 2023 00:00) (63 - 105)  ABP: --  ABP(mean): --  RR: 14 (18 Mar 2023 00:00) (13 - 21)  SpO2: 95% (18 Mar 2023 00:00) (94% - 98%)              03-16 @ 07:01  -  03-17 @ 07:00  --------------------------------------------------------  IN: 1928.3 mL / OUT: 850 mL / NET: 1078.3 mL            PHYSICAL EXAM:    GENERAL: NAD  HEAD:  Atraumatic, Normocephalic  EYES: EOMI, PERRLA, conjunctiva and sclera clear  ENMT: No tonsillar erythema, exudates, or enlargement; Moist mucous membranes, Good dentition, No lesions  NECK: Supple, normal appearance, No JVD; Normal thyroid; Trachea midline  NERVOUS SYSTEM:  Alert & Oriented X3,  Motor Strength 5/5 B/L upper and lower extremities, sensation intact  CHEST/LUNG: Lungs crackles bilaterally, No rales, rhonchi, wheezing   HEART: Regular rate and rhythm; No murmurs, rubs, or gallops  ABDOMEN: Soft, Nontender, Nondistended; Bowel sounds present  EXTREMITIES:  2+ Peripheral Pulses, No clubbing, cyanosis, or edema  LYMPH: No lymphadenopathy noted  SKIN: No rashes or lesions;  Good capillary refill        LABS:                        10.2   17.67 )-----------( 232      ( 17 Mar 2023 03:00 )             31.7     03-17    140  |  109<H>  |  20<H>  ----------------------------<  160<H>  3.7   |  23  |  0.77    Ca    8.3<L>      17 Mar 2023 03:00  Phos  2.6     03-17  Mg     1.7     03-17    TPro  6.3  /  Alb  2.7<L>  /  TBili  0.5  /  DBili  x   /  AST  75<H>  /  ALT  32  /  AlkPhos  63  03-17        CAPILLARY BLOOD GLUCOSE      POCT Blood Glucose.: 243 mg/dL (17 Mar 2023 21:34)  POCT Blood Glucose.: 308 mg/dL (17 Mar 2023 16:24)  POCT Blood Glucose.: 239 mg/dL (17 Mar 2023 11:03)  POCT Blood Glucose.: 250 mg/dL (17 Mar 2023 07:56)  POCT Blood Glucose.: 195 mg/dL (17 Mar 2023 06:13)  POCT Blood Glucose.: 218 mg/dL (17 Mar 2023 06:11)    Culture Results:   No growth to date. (03-16 @ 00:42)  Culture Results:   No growth to date. (03-16 @ 00:27)  Culture Results:   >100,000 CFU/ml Klebsiella pneumoniae (03-15 @ 22:46)      RADIOLOGY & ADDITIONAL STUDIES REVIEWED:  ***

## 2023-03-18 NOTE — CHART NOTE - NSCHARTNOTEFT_GEN_A_CORE
72 year old male with PMHx Type I DM (dx 50 years ago, on Insulin pump for last 15 years), Vertigo and BPH who was brought to ED due to Abdominal pain, Nausea and vomiting. Pt was found to be in DKA with BS >600, Anion Gap 21, Bicarb 14. In ED, Patient started on IV fluids, got 18 units of insulin and started on Insulin IV infusion. Patient was admitted to ICU for DKA management. AG was closed and BG was controlled. Pt was bridged to insulin sliding scale and lantus. Pt developed elevated troponin to 25k. EKG shows NSR. Echo showed no wall motion abnormalities, normal EF. Cardiology was consulted Dr. Mitchell. Pt was started on FD lovenox, aspirin, statin, ACEi and bblocker. Pt started on rocephin for UTI. JOANNA resolved with fluid hydration.      Patient is stable for downgrade to floor under care of Dr. Caro for further management, covering resident Dr. Lr was informed. Family notified.    Primary team to follow:   - pt for possible transfer for cath for NSTEMI  - f/u Endo recs  - DC planning 72 year old male with PMHx Type I DM (dx 50 years ago, on Insulin pump for last 15 years), Vertigo and BPH who was brought to ED due to Abdominal pain, Nausea and vomiting. Pt was found to be in DKA with BS >600, Anion Gap 21, Bicarb 14. In ED, Patient started on IV fluids, got 18 units of insulin and started on Insulin IV infusion. Patient was admitted to ICU for DKA management. AG was closed and BG was controlled. Pt was bridged to insulin sliding scale and lantus. Endo Dr. Matthews consulted. Pt developed elevated troponin to 25k. EKG shows NSR. Echo showed no wall motion abnormalities, normal EF. Cardiology was consulted Dr. Mitchell. Pt was started on FD lovenox, aspirin, statin, ACEi and bblocker. Pt started on rocephin for UTI. JOANNA resolved with fluid hydration.      Patient is stable for downgrade to floor under care of Dr. Caro for further management, covering resident Dr. Lr was informed. Family notified.    Primary team to follow:   - pt for possible transfer for cath for NSTEMI  - f/u Endo recs  - DC planning

## 2023-03-18 NOTE — DIETITIAN INITIAL EVALUATION ADULT - PERTINENT MEDS FT
MEDICATIONS  (STANDING):  aspirin enteric coated 81 milliGRAM(s) Oral daily  atorvastatin 40 milliGRAM(s) Oral at bedtime  cefTRIAXone   IVPB 1000 milliGRAM(s) IV Intermittent every 24 hours  chlorhexidine 2% Cloths 1 Application(s) Topical daily  enoxaparin Injectable 80 milliGRAM(s) SubCutaneous every 12 hours  insulin glargine Injectable (LANTUS) 26 Unit(s) SubCutaneous every morning  insulin lispro (ADMELOG) corrective regimen sliding scale   SubCutaneous three times a day before meals  insulin lispro (ADMELOG) corrective regimen sliding scale   SubCutaneous at bedtime  losartan 100 milliGRAM(s) Oral daily  metoprolol tartrate 25 milliGRAM(s) Oral two times a day  montelukast 10 milliGRAM(s) Oral at bedtime  tamsulosin 0.4 milliGRAM(s) Oral at bedtime    MEDICATIONS  (PRN):  benzocaine/menthol Lozenge 1 Lozenge Oral three times a day PRN Sore Throat  meclizine 25 milliGRAM(s) Oral every 8 hours PRN Dizziness  ondansetron Injectable 4 milliGRAM(s) IV Push every 6 hours PRN Nausea and/or Vomiting

## 2023-03-18 NOTE — PHYSICAL THERAPY INITIAL EVALUATION ADULT - GAIT DEVIATIONS NOTED, PT EVAL
decreased lenore/increased time in double stance/decreased velocity of limb motion/decreased step length/decreased stride length

## 2023-03-18 NOTE — PROGRESS NOTE ADULT - SUBJECTIVE AND OBJECTIVE BOX
DATE OF SERVICE: 23    Patient denies chest pain or shortness of breath.   Review of symptoms otherwise negative.    MEDICATIONS:  aspirin enteric coated 81 milliGRAM(s) Oral daily  atorvastatin 40 milliGRAM(s) Oral at bedtime  benzocaine/menthol Lozenge 1 Lozenge Oral three times a day PRN  cefTRIAXone   IVPB 1000 milliGRAM(s) IV Intermittent every 24 hours  chlorhexidine 2% Cloths 1 Application(s) Topical daily  enoxaparin Injectable 80 milliGRAM(s) SubCutaneous every 12 hours  insulin glargine Injectable (LANTUS) 26 Unit(s) SubCutaneous every morning  insulin lispro (ADMELOG) corrective regimen sliding scale   SubCutaneous three times a day before meals  insulin lispro (ADMELOG) corrective regimen sliding scale   SubCutaneous at bedtime  losartan 100 milliGRAM(s) Oral daily  meclizine 25 milliGRAM(s) Oral every 8 hours PRN  metoprolol tartrate 25 milliGRAM(s) Oral two times a day  montelukast 10 milliGRAM(s) Oral at bedtime  ondansetron Injectable 4 milliGRAM(s) IV Push every 6 hours PRN  potassium phosphate / sodium phosphate Powder (PHOS-NaK) 1 Packet(s) Oral once  tamsulosin 0.4 milliGRAM(s) Oral at bedtime      LABS:                        10.3   12.98 )-----------( 220      ( 18 Mar 2023 04:00 )             32.0       Hemoglobin: 10.3 g/dL ( @ 04:00)  Hemoglobin: 10.2 g/dL ( @ 03:00)  Hemoglobin: 9.4 g/dL ( @ 03:38)  Hemoglobin: 9.4 g/dL ( @ 01:09)  Hemoglobin: 11.1 g/dL (03-15 @ 21:12)          140  |  108  |  20<H>  ----------------------------<  223<H>  3.8   |  29  |  0.72    Ca    8.8      18 Mar 2023 04:00  Phos  1.3       Mg     2.1         TPro  6.0  /  Alb  2.5<L>  /  TBili  0.9  /  DBili  x   /  AST  47<H>  /  ALT  28  /  AlkPhos  64      Creatinine Trend: 0.72<--, 0.77<--, 0.86<--, 1.13<--, 1.36<--, 1.77<--    COAGS:     CARDIAC MARKERS ( 16 Mar 2023 12:35 )  x     / x     / 560 U/L / x     / 38.4 ng/mL        PHYSICAL EXAM:  T(C): 36.2 (23 @ 07:00), Max: 37 (23 @ 15:57)  HR: 64 (23 @ 07:00) (58 - 85)  BP: 139/72 (23 @ 07:00) (102/51 - 165/83)  RR: 15 (23 @ 07:00) (12 - 18)  SpO2: 96% (23 @ 07:00) (93% - 98%)  Wt(kg): --    I&O's Summary    17 Mar 2023 07:01  -  18 Mar 2023 07:00  --------------------------------------------------------  IN: 323.3 mL / OUT: 1650 mL / NET: -1326.7 mL    18 Mar 2023 07:01  -  18 Mar 2023 08:35  --------------------------------------------------------  IN: 83.3 mL / OUT: 0 mL / NET: 83.3 mL        TELEMETRY: 	NSR            TTE:DIMENSIONS:  Dimensions:     Normal Values:  LA:     4.1 cm    2.0 - 4.0 cm  Ao:     2.9 cm    2.0 - 3.8 cm  SEPTUM: 0.8 cm    0.6 - 1.2 cm  PWT:    0.9 cm    0.6 - 1.1 cm  LVIDd:  4.7 cm    3.0 - 5.6 cm  LVIDs:  3.2 cm    1.8 - 4.0 cm  Derived Variables:  LVMI: 62 g/m2  RWT: 0.38  Ejection Fraction Clark: 53 %  -----------------------------------------------------------------------  OBSERVATIONS:  Mitral Valve: Normal mitral valve. Mildly calcified chordae tendinae.  Mild mitral regurgitation.  Aortic Root: Normal aortic root.  Aortic Valve: Calcified aortic valve was not well visualized but has normal opening. No aortic stenosis. No aortic valve regurgitation seen.  Left Atrium: Normal left atrium.  LA volume index = 26 cc/m2.  Left Ventricle: Low-normal Left Ventricular Systolic Function,  (EF = 53% by biplane). No regional wall motion abnormalities. Ultrasound LV opacification agent was used to enhance endocardial definition. Normal left ventricular  internal dimensions and wall thicknesses. The diastolic function is indeterminate based on current diagnostic criteria.  Right Heart: Normal right atrium. Right ventricle not well visualized. Normal RV systolic function (RV tissue Doppler 11 cm/s). Normal tricuspid valve. Moderate tricuspid regurgitation.  Pulmonic valve not well seen. No pulmonic  insufficiency is noted.  Pericardium/PleuraNo pericardial effusion.Hemodynamic: RA Pressure is 8 mm Hg. RV systolic pressure is borderline normal at  33 mm Hg.  ------------------------------------------------------------------------  CONCLUSIONS:  1. Normal mitral valve. Mildly calcified chordae tendinae. Mild mitral regurgitation.  2. Calcified aortic valve was not well visualized but has normal opening. No aortic stenosis. No aortic valve regurgitation seen.  3. Normal left ventricular internal dimensions and wall thicknesses.  4. Low-normal Left Ventricular Systolic Function,  (EF = 53% by biplane). No regional wall motion abnormalities. Ultrasound LV opacification agent was used to enhanceendocardial definition.  5. Right ventricle not well visualized. Normal RV systolic function (RV tissue Doppler 11 cm/s). 6. RV systolic pressure is borderline normal at  33 mm Hg.7. Normal tricuspid valve. Moderate tricuspid  regurgitation.  8. No pericardial effusion.      EK/16 12:45 NSR   14:40 NSR    ASSESSMENT/PLAN: Patient is 72 years old male with pmh of Type I DM (50 years ago, on Insulin pump for last 15 years), Vertigo and BPH who was brought to ED due to Abdominal pain, Nausea and vomiting. Cardiology consulted for increased troponin.    1. NSTEMI  - EKG non-ischemic  - TTE without wall motion  - currently denies anginal chest pain  - c/w ASA, statin and Lovenox  - DKA tx per MICU, ABX per MICU  - maintain tele  - possible [facility] next week      Nacho Nix MD  Pager: [phone]

## 2023-03-18 NOTE — PROGRESS NOTE ADULT - ASSESSMENT
Patient is 72 years old male with past medical history of Type I DM (50 years ago, on Insulin pump for last 15 years), Vertigo, BPH, HTN, HLD who was brought to ED due to Abdominal pain, Nausea and vomiting. Patient states that in morning, he changed the insulin pump but wasn't sure what happened and around afternoon, He started feeling abdominal stiffness, rigidity and vomited three times in home and 2 times when came to ED. Patient usually takes 60-70 units of insulin pre meals as he mentioned and he adjusted how many unites based on his diet as he mentioned. In ED, Patient started on IV fluids, got 18 units of insulin and started on Insulin IV infusion. BS >600, Anion Gap 21, Bicarb 14. Patient will be admitted to ICU for DKA management.    #Diabetic Ketoacidosis   #Elevated anion gap metabolic acidosis   #Leukocytosis   #UTI   #Hyponatremia   #Acute renal Injury     CNS   Patient is AAOx3  Vertigo: Meclizine PRN     CVS   Hypotension   Resolved  Secondary to fluids loss i/e vomiting   Patient received IV bolus 1L NS in ED   s/p  30cc/kg equal to 2.7L   d/c fluids      NSTEMI  Trop elevated to 095[address]  Troponin downtrended  EKG shows NSR  Echo with no wall motion abnormalities Normal EF.   Dr. Mitchell consulted  c/w ASA, statin, BB, ACEI, FD Lovenox  pt will need transfer for cath lab, likely next week    Pulm  cough  xray is neg  repeat xray prelim shows congestion  c/w cepacol  s/p lasix 40 IV x1 on 3/17    Hematology     Leukocytosis   Likely reactive, However with Urine WBCs although not symptomatic; Will start Rocephin   leukocytosis still elevated    Gastro    advanced diet  d/c IV insulin infusion   started on ISS    Endocrine     DKA; BS >600  Patient received 18 units bolus then started on 9ml/hr   Hold off insulin pump   Finger Stick q1  BMP q4; Potassium replacement if needed   Anion gap and Bicarb normalized  patient able to eat   s/p bridge   Endo consulted Dr. Matthews  increased lantus to 26U      Infectious   As above   Continue on Rocephin     Renal   Acute renal injury   Will repeat BMP after the boluses; Likely due to dehydration   Cr 1.13  Resolved    UTI  Pt is asymptomatic  Pt has UA+  leukocytosis  f/u UCx  c/w CTX      High anion gap metabolic acidosis   Plan as above   Monitor Bicarb and Anion Gap   Replace K if needed   Resolved    Prophylaxis   Lovenox          Patient is 72 years old male with past medical history of Type I DM (50 years ago, on Insulin pump for last 15 years), Vertigo, BPH, HTN, HLD who was brought to ED due to Abdominal pain, Nausea and vomiting. Patient states that in morning, he changed the insulin pump but wasn't sure what happened and around afternoon, He started feeling abdominal stiffness, rigidity and vomited three times in home and 2 times when came to ED. Patient usually takes 60-70 units of insulin pre meals as he mentioned and he adjusted how many unites based on his diet as he mentioned. In ED, Patient started on IV fluids, got 18 units of insulin and started on Insulin IV infusion. BS >600, Anion Gap 21, Bicarb 14. Patient will be admitted to ICU for DKA management.    #Diabetic Ketoacidosis   #Elevated anion gap metabolic acidosis   #Leukocytosis   #UTI   #Hyponatremia   #Acute renal Injury     CNS   Patient is AAOx3  Vertigo: Meclizine PRN   Nausea: Zofran PRN    CVS   Hypotension   Resolved  Secondary to fluids loss i/e vomiting   Patient received IV bolus 1L NS in ED   s/p  30cc/kg equal to 2.7L   d/c fluids      NSTEMI  Trop trended up to >25K and than downtrended  EKG shows NSR  Echo with no wall motion abnormalities Normal EF.   Dr. Mitchell consulted  c/w ASA, statin, BB, ACEI, FD Lovenox  pt will need transfer for cath lab, likely next week, possibly Tuesday    Pulm  cough  xray is neg  repeat xray prelim shows congestion  c/w cepacol  s/p lasix 40 IV x1 on 3/17    Hematology     Leukocytosis   Likely reactive, However with Urine WBCs although not symptomatic; Will start Rocephin   leukocytosis still elevated    Gastro    advanced diet  d/c IV insulin infusion   started on ISS    Endocrine     DKA; BS >600  Patient received 18 units bolus then started on 9ml/hr   Hold off insulin pump   Finger Stick q1  BMP q4; Potassium replacement if needed   Anion gap and Bicarb normalized  patient able to eat   s/p bridge   Endo consulted Dr. Matthews  increased lantus to 26U      Infectious   As above   Continue on Rocephin     Renal   Acute renal injury   Will repeat BMP after the boluses; Likely due to dehydration   Cr 1.13  Resolved    UTI  Pt is asymptomatic  Pt has UA+  leukocytosis  f/u UCx  c/w CTX      High anion gap metabolic acidosis   Plan as above   Monitor Bicarb and Anion Gap   Replace K if needed   Resolved    Prophylaxis   Lovenox       Dispo  Stable for transfer to medical floor

## 2023-03-19 LAB
ANION GAP SERPL CALC-SCNC: 11 MMOL/L — SIGNIFICANT CHANGE UP (ref 5–17)
BUN SERPL-MCNC: 16 MG/DL — SIGNIFICANT CHANGE UP (ref 7–18)
CALCIUM SERPL-MCNC: 8.6 MG/DL — SIGNIFICANT CHANGE UP (ref 8.4–10.5)
CHLORIDE SERPL-SCNC: 105 MMOL/L — SIGNIFICANT CHANGE UP (ref 96–108)
CO2 SERPL-SCNC: 26 MMOL/L — SIGNIFICANT CHANGE UP (ref 22–31)
CREAT SERPL-MCNC: 0.69 MG/DL — SIGNIFICANT CHANGE UP (ref 0.5–1.3)
EGFR: 97 ML/MIN/1.73M2 — SIGNIFICANT CHANGE UP
GLUCOSE BLDC GLUCOMTR-MCNC: 223 MG/DL — HIGH (ref 70–99)
GLUCOSE BLDC GLUCOMTR-MCNC: 240 MG/DL — HIGH (ref 70–99)
GLUCOSE BLDC GLUCOMTR-MCNC: 270 MG/DL — HIGH (ref 70–99)
GLUCOSE BLDC GLUCOMTR-MCNC: 302 MG/DL — HIGH (ref 70–99)
GLUCOSE SERPL-MCNC: 304 MG/DL — HIGH (ref 70–99)
HCT VFR BLD CALC: 32.2 % — LOW (ref 39–50)
HGB BLD-MCNC: 10.7 G/DL — LOW (ref 13–17)
MAGNESIUM SERPL-MCNC: 2 MG/DL — SIGNIFICANT CHANGE UP (ref 1.6–2.6)
MCHC RBC-ENTMCNC: 29.2 PG — SIGNIFICANT CHANGE UP (ref 27–34)
MCHC RBC-ENTMCNC: 33.2 GM/DL — SIGNIFICANT CHANGE UP (ref 32–36)
MCV RBC AUTO: 88 FL — SIGNIFICANT CHANGE UP (ref 80–100)
NRBC # BLD: 0 /100 WBCS — SIGNIFICANT CHANGE UP (ref 0–0)
PHOSPHATE SERPL-MCNC: 2.4 MG/DL — LOW (ref 2.5–4.5)
PLATELET # BLD AUTO: 252 K/UL — SIGNIFICANT CHANGE UP (ref 150–400)
POTASSIUM SERPL-MCNC: 4 MMOL/L — SIGNIFICANT CHANGE UP (ref 3.5–5.3)
POTASSIUM SERPL-SCNC: 4 MMOL/L — SIGNIFICANT CHANGE UP (ref 3.5–5.3)
RBC # BLD: 3.66 M/UL — LOW (ref 4.2–5.8)
RBC # FLD: 15 % — HIGH (ref 10.3–14.5)
SODIUM SERPL-SCNC: 142 MMOL/L — SIGNIFICANT CHANGE UP (ref 135–145)
WBC # BLD: 10.25 K/UL — SIGNIFICANT CHANGE UP (ref 3.8–10.5)
WBC # FLD AUTO: 10.25 K/UL — SIGNIFICANT CHANGE UP (ref 3.8–10.5)

## 2023-03-19 RX ORDER — PANTOPRAZOLE SODIUM 20 MG/1
40 TABLET, DELAYED RELEASE ORAL
Refills: 0 | Status: DISCONTINUED | OUTPATIENT
Start: 2023-03-19 | End: 2023-03-19

## 2023-03-19 RX ORDER — METOCLOPRAMIDE HCL 10 MG
10 TABLET ORAL THREE TIMES A DAY
Refills: 0 | Status: DISCONTINUED | OUTPATIENT
Start: 2023-03-19 | End: 2023-03-21

## 2023-03-19 RX ORDER — INSULIN LISPRO 100/ML
3 VIAL (ML) SUBCUTANEOUS
Refills: 0 | Status: DISCONTINUED | OUTPATIENT
Start: 2023-03-19 | End: 2023-03-20

## 2023-03-19 RX ORDER — PANTOPRAZOLE SODIUM 20 MG/1
40 TABLET, DELAYED RELEASE ORAL ONCE
Refills: 0 | Status: COMPLETED | OUTPATIENT
Start: 2023-03-19 | End: 2023-03-19

## 2023-03-19 RX ORDER — PANTOPRAZOLE SODIUM 20 MG/1
40 TABLET, DELAYED RELEASE ORAL
Refills: 0 | Status: DISCONTINUED | OUTPATIENT
Start: 2023-03-19 | End: 2023-03-22

## 2023-03-19 RX ADMIN — ONDANSETRON 4 MILLIGRAM(S): 8 TABLET, FILM COATED ORAL at 10:24

## 2023-03-19 RX ADMIN — INSULIN GLARGINE 26 UNIT(S): 100 INJECTION, SOLUTION SUBCUTANEOUS at 08:43

## 2023-03-19 RX ADMIN — Medication 10 MILLIGRAM(S): at 21:09

## 2023-03-19 RX ADMIN — PANTOPRAZOLE SODIUM 40 MILLIGRAM(S): 20 TABLET, DELAYED RELEASE ORAL at 10:22

## 2023-03-19 RX ADMIN — Medication 6: at 17:08

## 2023-03-19 RX ADMIN — TAMSULOSIN HYDROCHLORIDE 0.4 MILLIGRAM(S): 0.4 CAPSULE ORAL at 21:09

## 2023-03-19 RX ADMIN — ENOXAPARIN SODIUM 80 MILLIGRAM(S): 100 INJECTION SUBCUTANEOUS at 17:10

## 2023-03-19 RX ADMIN — CEFTRIAXONE 100 MILLIGRAM(S): 500 INJECTION, POWDER, FOR SOLUTION INTRAMUSCULAR; INTRAVENOUS at 05:25

## 2023-03-19 RX ADMIN — ATORVASTATIN CALCIUM 40 MILLIGRAM(S): 80 TABLET, FILM COATED ORAL at 21:09

## 2023-03-19 RX ADMIN — LOSARTAN POTASSIUM 100 MILLIGRAM(S): 100 TABLET, FILM COATED ORAL at 05:25

## 2023-03-19 RX ADMIN — Medication 3 UNIT(S): at 17:08

## 2023-03-19 RX ADMIN — Medication 10 MILLIGRAM(S): at 14:31

## 2023-03-19 RX ADMIN — ENOXAPARIN SODIUM 80 MILLIGRAM(S): 100 INJECTION SUBCUTANEOUS at 05:25

## 2023-03-19 RX ADMIN — Medication 8: at 08:44

## 2023-03-19 RX ADMIN — Medication 25 MILLIGRAM(S): at 17:10

## 2023-03-19 RX ADMIN — Medication 4: at 12:37

## 2023-03-19 RX ADMIN — Medication 81 MILLIGRAM(S): at 12:38

## 2023-03-19 RX ADMIN — MONTELUKAST 10 MILLIGRAM(S): 4 TABLET, CHEWABLE ORAL at 21:09

## 2023-03-19 RX ADMIN — Medication 25 MILLIGRAM(S): at 07:26

## 2023-03-19 NOTE — PROGRESS NOTE ADULT - SUBJECTIVE AND OBJECTIVE BOX
DATE OF SERVICE: 23      Pt down graded yesterday, resting in bed this am , offers no complaints       aspirin enteric coated 81 milliGRAM(s) Oral daily  atorvastatin 40 milliGRAM(s) Oral at bedtime  benzocaine/menthol Lozenge 1 Lozenge Oral three times a day PRN  cefTRIAXone   IVPB 1000 milliGRAM(s) IV Intermittent every 24 hours  enoxaparin Injectable 80 milliGRAM(s) SubCutaneous every 12 hours  insulin glargine Injectable (LANTUS) 26 Unit(s) SubCutaneous every morning  insulin lispro (ADMELOG) corrective regimen sliding scale   SubCutaneous three times a day before meals  insulin lispro (ADMELOG) corrective regimen sliding scale   SubCutaneous at bedtime  losartan 100 milliGRAM(s) Oral daily  meclizine 25 milliGRAM(s) Oral every 8 hours PRN  metoprolol tartrate 25 milliGRAM(s) Oral two times a day  montelukast 10 milliGRAM(s) Oral at bedtime  ondansetron Injectable 4 milliGRAM(s) IV Push every 6 hours PRN  tamsulosin 0.4 milliGRAM(s) Oral at bedtime                            10.7   10.25 )-----------( 252      ( 19 Mar 2023 06:45 )             32.2       Hemoglobin: 10.7 g/dL ( @ 06:45)  Hemoglobin: 10.3 g/dL ( @ 04:00)  Hemoglobin: 10.2 g/dL ( @ 03:00)  Hemoglobin: 9.4 g/dL ( @ 03:38)  Hemoglobin: 9.4 g/dL ( @ 01:09)          142  |  105  |  16  ----------------------------<  304<H>  4.0   |  26  |  0.69    Ca    8.6      19 Mar 2023 06:45  Phos  2.4       Mg     2.0         TPro  6.0  /  Alb  2.5<L>  /  TBili  0.9  /  DBili  x   /  AST  47<H>  /  ALT  28  /  AlkPhos  64      Creatinine Trend: 0.69<--, 0.84<--, 0.72<--, 0.77<--, 0.86<--, 1.13<--    COAGS:     CARDIAC MARKERS ( 16 Mar 2023 12:35 )  x     / x     / 560 U/L / x     / 38.4 ng/mL        T(C): 37.5 (23 @ 04:25), Max: 37.5 (23 @ 04:25)  HR: 83 (23 @ 04:25) (68 - 100)  BP: 159/73 (23 @ 04:25) (139/72 - 171/73)  RR: 17 (23 @ 04:25) (12 - 23)  SpO2: 95% (23 @ 04:25) (92% - 97%)  Wt(kg): --    I&O's Summary    18 Mar 2023 07:01  -  19 Mar 2023 07:00  --------------------------------------------------------  IN: 2429.7 mL / OUT: 3050 mL / NET: -620.3 mL    Alert / oriented   No JVd   clear horacio , no wheeze.  reg s1s2   soft , + bs , non tender   + pulses , + edema     TELEMETRY: 	NSR      TTE:DIMENSIONS:  Dimensions:     Normal Values:  LA:     4.1 cm    2.0 - 4.0 cm  Ao:     2.9 cm    2.0 - 3.8 cm  SEPTUM: 0.8 cm    0.6 - 1.2 cm  PWT:    0.9 cm    0.6 - 1.1 cm  LVIDd:  4.7 cm    3.0 - 5.6 cm  LVIDs:  3.2 cm    1.8 - 4.0 cm  Derived Variables:  LVMI: 62 g/m2  RWT: 0.38  Ejection Fraction Clark: 53 %  -----------------------------------------------------------------------  OBSERVATIONS:  Mitral Valve: Normal mitral valve. Mildly calcified chordae tendinae.  Mild mitral regurgitation.  Aortic Root: Normal aortic root.  Aortic Valve: Calcified aortic valve was not well visualized but has normal opening. No aortic stenosis. No aortic valve regurgitation seen.  Left Atrium: Normal left atrium.  LA volume index = 26 cc/m2.  Left Ventricle: Low-normal Left Ventricular Systolic Function,  (EF = 53% by biplane). No regional wall motion abnormalities. Ultrasound LV opacification agent was used to enhance endocardial definition. Normal left ventricular  internal dimensions and wall thicknesses. The diastolic function is indeterminate based on current diagnostic criteria.  Right Heart: Normal right atrium. Right ventricle not well visualized. Normal RV systolic function (RV tissue Doppler 11 cm/s). Normal tricuspid valve. Moderate tricuspid regurgitation.  Pulmonic valve not well seen. No pulmonic  insufficiency is noted.  Pericardium/PleuraNo pericardial effusion.Hemodynamic: RA Pressure is 8 mm Hg. RV systolic pressure is borderline normal at  33 mm Hg.  ------------------------------------------------------------------------  CONCLUSIONS:  1. Normal mitral valve. Mildly calcified chordae tendinae. Mild mitral regurgitation.  2. Calcified aortic valve was not well visualized but has normal opening. No aortic stenosis. No aortic valve regurgitation seen.  3. Normal left ventricular internal dimensions and wall thicknesses.  4. Low-normal Left Ventricular Systolic Function,  (EF = 53% by biplane). No regional wall motion abnormalities. Ultrasound LV opacification agent was used to enhanceendocardial definition.  5. Right ventricle not well visualized. Normal RV systolic function (RV tissue Doppler 11 cm/s). 6. RV systolic pressure is borderline normal at  33 mm Hg.7. Normal tricuspid valve. Moderate tricuspid  regurgitation.  8. No pericardial effusion.      EK/16 12:45 NSR   14:40 NSR    ASSESSMENT/PLAN: Patient is 72 years old male with pmh of Type I DM (50 years ago, on Insulin pump for last 15 years), Vertigo and BPH who was brought to ED due to Abdominal pain, Nausea and vomiting. Cardiology consulted for increased troponin.    1. NSTEMI  - EKG non-ischemic  - TTE without wall motion  - currently denies anginal chest pain  - c/w ASA, statin    - DKA tx per MICU, cont all long acting Insulin   - ABX per Medicine   -  tele stable  - cont lovenox q 12   - possible LHC next week

## 2023-03-19 NOTE — PROGRESS NOTE ADULT - SUBJECTIVE AND OBJECTIVE BOX
Admission assessment complete as noted. Patient oriented to room and unit. Plan of care reviewed and patient verbalizes understanding. Questions encouraged and answered. Patent encouraged to call for needs or concerns. Safety Teaching reviewed:   1. Hand hygiene prior to handling the infant. 2. Use of bulb syringe. 3. Bracelets with matching numbers are placed on mother and infant  4. An infant security tag  [facility]) is placed on the infant's ankle and monitored  5. All OB nurses wear pink Employee badges - do not give your baby to anyone without proper identification. 6. Never leave the baby alone in the room. 7. The infant should be placed on their back to sleep. on a firm mattress. No toys should be placed in the crib. (safe sleep video offered to view)  8. Never shake the baby (video offered to view)  9. Infant fall prevention - do not sleep with the baby, and place the baby in the crib while ambulating. 8. Mother and Baby Care booklet given to Mother. Interval Events:  pt in nad    Allergies    Neosporin (Rash)    Intolerances      Endocrine/Metabolic Medications:  atorvastatin 40 milliGRAM(s) Oral at bedtime  insulin glargine Injectable (LANTUS) 26 Unit(s) SubCutaneous every morning  insulin lispro (ADMELOG) corrective regimen sliding scale   SubCutaneous three times a day before meals  insulin lispro (ADMELOG) corrective regimen sliding scale   SubCutaneous at bedtime      Vital Signs Last 24 Hrs  T(C): 37.5 (19 Mar 2023 11:09), Max: 37.5 (19 Mar 2023 04:25)  T(F): 99.5 (19 Mar 2023 11:09), Max: 99.5 (19 Mar 2023 04:25)  HR: 74 (19 Mar 2023 11:09) (68 - 100)  BP: 151/75 (19 Mar 2023 11:09) (139/72 - 166/68)  BP(mean): 99 (19 Mar 2023 00:00) (89 - 99)  RR: 17 (19 Mar 2023 11:09) (12 - 23)  SpO2: 96% (19 Mar 2023 11:09) (92% - 97%)    Parameters below as of 19 Mar 2023 11:09  Patient On (Oxygen Delivery Method): room air          PHYSICAL EXAM  All physical exam findings normal, except those marked:  General:	Alert, active, cooperative, NAD, well hydrated  .		[] Abnormal:  Neck		Normal: supple, no cervical adenopathy, no palpable thyroid  .		[] Abnormal:  Cardiovascular	Normal: regular rate, normal S1, S2, no murmurs  .		[] Abnormal:  Respiratory	Normal: no chest wall deformity, normal respiratory pattern, CTA B/L  .		[] Abnormal:  Abdominal	Normal: soft, ND, NT, bowel sounds present, no masses, no organomegaly  .		[] Abnormal:  		Normal normal genitalia, testes descended, circumcised/uncircumcised  .		Hosea stage:			Breast hosea:  .		Menstrual history:  .		[] Abnormal:  Extremities	Normal: FROM x4  .		[] Abnormal:  Skin		Normal: intact and not indurated, no rash, no acanthosis nigricans  .		[] Abnormal:  Neurologic	Normal: grossly intact  .		[] Abnormal:    LABS                        10.7   10.25 )-----------( 252      ( 19 Mar 2023 06:45 )             32.2                               142    |  105    |  16                  Calcium: 8.6   / iCa: x      (03-19 @ 06:45)    ----------------------------<  304       Magnesium: 2.0                              4.0     |  26     |  0.69             Phosphorous: 2.4        CAPILLARY BLOOD GLUCOSE      POCT Blood Glucose.: 302 mg/dL (19 Mar 2023 08:16)  POCT Blood Glucose.: 277 mg/dL (18 Mar 2023 21:45)  POCT Blood Glucose.: 324 mg/dL (18 Mar 2023 15:49)        Assesment/plan    Patient is 72 years old male with past medical history of Type I DM (50 years ago, on Insulin pump for last 15 years), Vertigo and BPH who was brought to ED due to Abdominal pain, Nausea and vomiting. Pt is on Tandem insulin pump ? had malfunction in the past as well. Does not recall basal rates, carb/insulin ratio- 1:5?       Problem/Recommendation - 1:  ·  Problem: DKA (diabetic ketoacidosis).   ·  Recommendation: due to pump malfunction  change lantus to 30units qam  add admelog 4 ac meals   start reglan for nausea- likley gastroparesis  fsg ac and hs  a1c- 7.7  nutrition eval  hold pump until seen by out pt provider

## 2023-03-19 NOTE — PROGRESS NOTE ADULT - NS ATTEND AMEND GEN_ALL_CORE FT
Patient seen and examined. Agree with plan as detailed in PA/NP Note.    Would switch to heparin if patient to be cathed    Nacho Nix MD  Pager: [phone]

## 2023-03-20 DIAGNOSIS — E11.9 TYPE 2 DIABETES MELLITUS WITHOUT COMPLICATIONS: ICD-10-CM

## 2023-03-20 DIAGNOSIS — R42 DIZZINESS AND GIDDINESS: ICD-10-CM

## 2023-03-20 DIAGNOSIS — N40.0 BENIGN PROSTATIC HYPERPLASIA WITHOUT LOWER URINARY TRACT SYMPTOMS: ICD-10-CM

## 2023-03-20 DIAGNOSIS — I21.4 NON-ST ELEVATION (NSTEMI) MYOCARDIAL INFARCTION: ICD-10-CM

## 2023-03-20 DIAGNOSIS — Z29.9 ENCOUNTER FOR PROPHYLACTIC MEASURES, UNSPECIFIED: ICD-10-CM

## 2023-03-20 DIAGNOSIS — D64.9 ANEMIA, UNSPECIFIED: ICD-10-CM

## 2023-03-20 LAB
ANION GAP SERPL CALC-SCNC: 7 MMOL/L — SIGNIFICANT CHANGE UP (ref 5–17)
BUN SERPL-MCNC: 14 MG/DL — SIGNIFICANT CHANGE UP (ref 7–18)
CALCIUM SERPL-MCNC: 8.5 MG/DL — SIGNIFICANT CHANGE UP (ref 8.4–10.5)
CHLORIDE SERPL-SCNC: 105 MMOL/L — SIGNIFICANT CHANGE UP (ref 96–108)
CO2 SERPL-SCNC: 30 MMOL/L — SIGNIFICANT CHANGE UP (ref 22–31)
CREAT SERPL-MCNC: 0.7 MG/DL — SIGNIFICANT CHANGE UP (ref 0.5–1.3)
EGFR: 97 ML/MIN/1.73M2 — SIGNIFICANT CHANGE UP
GLUCOSE BLDC GLUCOMTR-MCNC: 173 MG/DL — HIGH (ref 70–99)
GLUCOSE BLDC GLUCOMTR-MCNC: 173 MG/DL — HIGH (ref 70–99)
GLUCOSE BLDC GLUCOMTR-MCNC: 178 MG/DL — HIGH (ref 70–99)
GLUCOSE BLDC GLUCOMTR-MCNC: 261 MG/DL — HIGH (ref 70–99)
GLUCOSE SERPL-MCNC: 268 MG/DL — HIGH (ref 70–99)
HCT VFR BLD CALC: 33.8 % — LOW (ref 39–50)
HGB BLD-MCNC: 10.9 G/DL — LOW (ref 13–17)
M PNEUMO IGM SER-ACNC: 0.38 INDEX — SIGNIFICANT CHANGE UP (ref 0–0.9)
MAGNESIUM SERPL-MCNC: 2.1 MG/DL — SIGNIFICANT CHANGE UP (ref 1.6–2.6)
MCHC RBC-ENTMCNC: 28.7 PG — SIGNIFICANT CHANGE UP (ref 27–34)
MCHC RBC-ENTMCNC: 32.2 GM/DL — SIGNIFICANT CHANGE UP (ref 32–36)
MCV RBC AUTO: 88.9 FL — SIGNIFICANT CHANGE UP (ref 80–100)
MYCOPLASMA AG SPEC QL: NEGATIVE — SIGNIFICANT CHANGE UP
NRBC # BLD: 0 /100 WBCS — SIGNIFICANT CHANGE UP (ref 0–0)
PHOSPHATE SERPL-MCNC: 2 MG/DL — LOW (ref 2.5–4.5)
PLATELET # BLD AUTO: 274 K/UL — SIGNIFICANT CHANGE UP (ref 150–400)
POTASSIUM SERPL-MCNC: 3.5 MMOL/L — SIGNIFICANT CHANGE UP (ref 3.5–5.3)
POTASSIUM SERPL-SCNC: 3.5 MMOL/L — SIGNIFICANT CHANGE UP (ref 3.5–5.3)
RBC # BLD: 3.8 M/UL — LOW (ref 4.2–5.8)
RBC # FLD: 14.6 % — HIGH (ref 10.3–14.5)
SODIUM SERPL-SCNC: 142 MMOL/L — SIGNIFICANT CHANGE UP (ref 135–145)
TROPONIN I, HIGH SENSITIVITY RESULT: 1821.1 NG/L — HIGH
WBC # BLD: 8.14 K/UL — SIGNIFICANT CHANGE UP (ref 3.8–10.5)
WBC # FLD AUTO: 8.14 K/UL — SIGNIFICANT CHANGE UP (ref 3.8–10.5)

## 2023-03-20 RX ORDER — SODIUM,POTASSIUM PHOSPHATES 278-250MG
1 POWDER IN PACKET (EA) ORAL ONCE
Refills: 0 | Status: COMPLETED | OUTPATIENT
Start: 2023-03-20 | End: 2023-03-20

## 2023-03-20 RX ORDER — INSULIN LISPRO 100/ML
4 VIAL (ML) SUBCUTANEOUS
Refills: 0 | Status: DISCONTINUED | OUTPATIENT
Start: 2023-03-20 | End: 2023-03-22

## 2023-03-20 RX ORDER — INSULIN GLARGINE 100 [IU]/ML
30 INJECTION, SOLUTION SUBCUTANEOUS EVERY MORNING
Refills: 0 | Status: DISCONTINUED | OUTPATIENT
Start: 2023-03-20 | End: 2023-03-22

## 2023-03-20 RX ADMIN — Medication 10 MILLIGRAM(S): at 23:12

## 2023-03-20 RX ADMIN — TAMSULOSIN HYDROCHLORIDE 0.4 MILLIGRAM(S): 0.4 CAPSULE ORAL at 23:12

## 2023-03-20 RX ADMIN — Medication 4 UNIT(S): at 17:26

## 2023-03-20 RX ADMIN — PANTOPRAZOLE SODIUM 40 MILLIGRAM(S): 20 TABLET, DELAYED RELEASE ORAL at 05:24

## 2023-03-20 RX ADMIN — Medication 6: at 08:22

## 2023-03-20 RX ADMIN — CEFTRIAXONE 100 MILLIGRAM(S): 500 INJECTION, POWDER, FOR SOLUTION INTRAMUSCULAR; INTRAVENOUS at 05:25

## 2023-03-20 RX ADMIN — Medication 4 UNIT(S): at 12:17

## 2023-03-20 RX ADMIN — Medication 2: at 12:16

## 2023-03-20 RX ADMIN — Medication 10 MILLIGRAM(S): at 05:25

## 2023-03-20 RX ADMIN — Medication 25 MILLIGRAM(S): at 05:23

## 2023-03-20 RX ADMIN — Medication 81 MILLIGRAM(S): at 12:16

## 2023-03-20 RX ADMIN — LOSARTAN POTASSIUM 100 MILLIGRAM(S): 100 TABLET, FILM COATED ORAL at 05:24

## 2023-03-20 RX ADMIN — ENOXAPARIN SODIUM 80 MILLIGRAM(S): 100 INJECTION SUBCUTANEOUS at 05:23

## 2023-03-20 RX ADMIN — Medication 25 MILLIGRAM(S): at 17:27

## 2023-03-20 RX ADMIN — ENOXAPARIN SODIUM 80 MILLIGRAM(S): 100 INJECTION SUBCUTANEOUS at 17:27

## 2023-03-20 RX ADMIN — Medication 1 PACKET(S): at 12:16

## 2023-03-20 RX ADMIN — ATORVASTATIN CALCIUM 40 MILLIGRAM(S): 80 TABLET, FILM COATED ORAL at 23:12

## 2023-03-20 RX ADMIN — Medication 3 UNIT(S): at 08:24

## 2023-03-20 RX ADMIN — MONTELUKAST 10 MILLIGRAM(S): 4 TABLET, CHEWABLE ORAL at 23:12

## 2023-03-20 RX ADMIN — INSULIN GLARGINE 26 UNIT(S): 100 INJECTION, SOLUTION SUBCUTANEOUS at 08:25

## 2023-03-20 RX ADMIN — Medication 10 MILLIGRAM(S): at 14:04

## 2023-03-20 RX ADMIN — Medication 2: at 17:26

## 2023-03-20 NOTE — ACUTE INTERFACILITY TRANSFER NOTE - HOSPITAL COURSE
72 year old male with PMHx Type I DM (dx 50 years ago, on Insulin pump for last 15 years), Vertigo and BPH who was brought to ED due to Abdominal pain, Nausea and vomiting. Pt was found to be in DKA with BS >600, Anion Gap 21, Bicarb 14. In ED, Patient started on IV fluids, got 18 units of insulin and started on Insulin IV infusion. Patient was admitted to ICU for DKA management. AG was closed and BG was controlled. Pt was bridged to insulin sliding scale and lantus. Endo Dr. Matthews consulted. Pt developed elevated troponin to 25k. EKG shows NSR. Echo showed no wall motion abnormalities, normal EF. Cardiology was consulted Dr. Mitchell. Pt was started on FD Lovenox aspirin, statin, ACEi and bblocker. Pt started on rocephin for UTI. JOANNA resolved with fluid hydration. Downgraded to medicine for further management. Now to be transfer to [facility] for Cardiac Cath.     Patient is stable for transfer as per primary team.   Please refer to patient's complete medical chart with documents for a full hospital course, for this is only a brief summary.

## 2023-03-20 NOTE — PROGRESS NOTE ADULT - PROBLEM SELECTOR PLAN 2
HA1c 7.7%  DKA resolved  Lantus increased from 26 u to 30 u in the morning   Lispro 3u to 4 u TID before meals   ISS  Monitor BG  Patient with gastroparesis   Continue Reglan

## 2023-03-20 NOTE — ACUTE INTERFACILITY TRANSFER NOTE - PLAN OF CARE
NSTEMI  Plan for transfer to [facility] for Cardiac Cath in ICU on insulin drip  gap closed NSTEMI  Plan for transfer to [facility] for Cardiac Cath resolved

## 2023-03-20 NOTE — PROGRESS NOTE ADULT - ASSESSMENT
72 year old male with PMHx of Type I DM, initially admitted to ICU for DKA. NSTEMI during the ICU stay. Downgraded to medicine for further management.

## 2023-03-20 NOTE — PROGRESS NOTE ADULT - SUBJECTIVE AND OBJECTIVE BOX
Interval Events:  pt feels better     Allergies    Neosporin (Rash)    Intolerances      Endocrine/Metabolic Medications:  atorvastatin 40 milliGRAM(s) Oral at bedtime  insulin glargine Injectable (LANTUS) 30 Unit(s) SubCutaneous every morning  insulin lispro (ADMELOG) corrective regimen sliding scale   SubCutaneous three times a day before meals  insulin lispro (ADMELOG) corrective regimen sliding scale   SubCutaneous at bedtime  insulin lispro Injectable (ADMELOG) 4 Unit(s) SubCutaneous three times a day before meals      Vital Signs Last 24 Hrs  T(C): 37.2 (20 Mar 2023 07:28), Max: 37.8 (19 Mar 2023 23:20)  T(F): 99 (20 Mar 2023 07:28), Max: 100 (19 Mar 2023 23:20)  HR: 74 (20 Mar 2023 07:28) (74 - 81)  BP: 153/74 (20 Mar 2023 07:28) (119/58 - 159/82)  BP(mean): --  RR: 17 (20 Mar 2023 07:28) (17 - 18)  SpO2: 96% (20 Mar 2023 07:28) (93% - 96%)    Parameters below as of 20 Mar 2023 07:28  Patient On (Oxygen Delivery Method): room air          PHYSICAL EXAM  All physical exam findings normal, except those marked:  General:	Alert, active, cooperative, NAD, well hydrated  .		[] Abnormal:  Neck		Normal: supple, no cervical adenopathy, no palpable thyroid  .		[] Abnormal:  Cardiovascular	Normal: regular rate, normal S1, S2, no murmurs  .		[] Abnormal:  Respiratory	Normal: no chest wall deformity, normal respiratory pattern, CTA B/L  .		[] Abnormal:  Abdominal	Normal: soft, ND, NT, bowel sounds present, no masses, no organomegaly  .		[] Abnormal:  		Normal normal genitalia, testes descended, circumcised/uncircumcised  .		Hosea stage:			Breast hosea:  .		Menstrual history:  .		[] Abnormal:  Extremities	Normal: FROM x4  .		[] Abnormal:  Skin		Normal: intact and not indurated, no rash, no acanthosis nigricans  .		[] Abnormal:  Neurologic	Normal: grossly intact  .		[] Abnormal:    LABS                        10.9   8.14  )-----------( 274      ( 20 Mar 2023 07:05 )             33.8                               142    |  105    |  14                  Calcium: 8.5   / iCa: x      (03-20 @ 07:05)    ----------------------------<  268       Magnesium: 2.1                              3.5     |  30     |  0.70             Phosphorous: 2.0        CAPILLARY BLOOD GLUCOSE      POCT Blood Glucose.: 261 mg/dL (20 Mar 2023 08:09)  POCT Blood Glucose.: 223 mg/dL (19 Mar 2023 20:53)  POCT Blood Glucose.: 270 mg/dL (19 Mar 2023 16:34)  POCT Blood Glucose.: 240 mg/dL (19 Mar 2023 11:42)        Assesment/plan      Patient is 72 years old male with past medical history of Type I DM (50 years ago, on Insulin pump for last 15 years), Vertigo and BPH who was brought to ED due to Abdominal pain, Nausea and vomiting. Pt is on Tandem insulin pump ? had malfunction in the past as well. Does not recall basal rates, carb/insulin ratio- 1:5?       Problem/Recommendation - 1:  ·  Problem: DKA (diabetic ketoacidosis).   ·  Recommendation: due to pump malfunction  change lantus to 30units qam- recieved today  cont admelog 3 ac meals - inc dose as po intake improves  start reglan for nausea- likley gastroparesis  fsg ac and hs  a1c- 7.7  nutrition eval  hold pump until seen by out pt provider    Tx to [facility] for cardiac w/u  d/w prim team

## 2023-03-20 NOTE — PROGRESS NOTE ADULT - SUBJECTIVE AND OBJECTIVE BOX
PGY-1 Progress Note discussed with attending    PAGER #: [[phone]] TILL 5:00 PM  PLEASE CONTACT ON CALL TEAM:  - On Call Team (Please refer to Kesha) FROM 5:00 PM - 8:30PM  - Nightfloat Team FROM 8:30 -7:30 AM      INTERVAL HPI/OVERNIGHT EVENTS:   Patient seen and examined at bedside. No acute events overnight.    ROS  Constitutional: (-)fever, (-)night sweats, (-)chills, (-)cold intolerance, (-)fatigue  Eyes: (-)change in vision, (-)loss of vision, (-)blurred vision  Ears: (-)difficulty hearing, (-)hearing loss  Nose: (-)nasal discharge  Mouth/Throat/Voice: (-)lip sores, (-)dysphagia, (-)odynophagia  Neck: (-)neck pain, (-)neck stiffness, (-)neck lumps, (-)neck swelling  Respiratory: (-)dyspnea, (+)cough, (-)cough productive of sputum, (-)hemoptysis, (-)wheezing  Cardiovascular: (-)chest pain, (-)palpitations, (-)dyspnea at rest, (-)dyspnea with activity, (-)orthopnea  Gastrointestinal: (-)abdominal pain, (-)rectal pain, (-)nausea, (-)vomiting  Urinary: (-)dysuria, (-)hematuria, (-)urinary hesitancy, (-)difficulty initiating urine stream  Genital/Reproductive: (-)change in libido  Dermatologic/Integumentary: (-)change in hair texture, (-)change in skin texture, (-)change in nail appearance, (-)dry hair  Musculoskeletal: (-)muscle pain, (-)back pain, (-)tender points, (-)muscle cramps  Neurological: (-)headaches, (-)vertigo, (-)lightheadedness, (-)fainting  Psychiatric: (-)change in mood, (-)depression, (-)sadness interfering with function, (-)anxiety, (-)nervousness  Hematologic/Lymphatic: (-)easy bruising, (-)difficulty stopping blood flow    aspirin enteric coated 81 milliGRAM(s) Oral daily  atorvastatin 40 milliGRAM(s) Oral at bedtime  benzocaine/menthol Lozenge 1 Lozenge Oral three times a day PRN  enoxaparin Injectable 80 milliGRAM(s) SubCutaneous every 12 hours  guaiFENesin Oral Liquid (Sugar-Free) 100 milliGRAM(s) Oral every 6 hours PRN  insulin glargine Injectable (LANTUS) 30 Unit(s) SubCutaneous every morning  insulin lispro (ADMELOG) corrective regimen sliding scale   SubCutaneous three times a day before meals  insulin lispro (ADMELOG) corrective regimen sliding scale   SubCutaneous at bedtime  insulin lispro Injectable (ADMELOG) 4 Unit(s) SubCutaneous three times a day before meals  losartan 100 milliGRAM(s) Oral daily  meclizine 25 milliGRAM(s) Oral every 8 hours PRN  metoclopramide Injectable 10 milliGRAM(s) IV Push three times a day  metoprolol tartrate 25 milliGRAM(s) Oral two times a day  montelukast 10 milliGRAM(s) Oral at bedtime  ondansetron Injectable 4 milliGRAM(s) IV Push every 6 hours PRN  pantoprazole    Tablet 40 milliGRAM(s) Oral before breakfast  tamsulosin 0.4 milliGRAM(s) Oral at bedtime      Vital Signs Last 24 Hrs  T(C): 37.4 (20 Mar 2023 11:26), Max: 37.8 (19 Mar 2023 23:20)  T(F): 99.3 (20 Mar 2023 11:26), Max: 100 (19 Mar 2023 23:20)  HR: 80 (20 Mar 2023 11:26) (74 - 81)  BP: 152/81 (20 Mar 2023 11:26) (119/58 - 159/82)  BP(mean): --  RR: 17 (20 Mar 2023 11:26) (17 - 18)  SpO2: 95% (20 Mar 2023 11:26) (93% - 96%)    Parameters below as of 20 Mar 2023 11:26  Patient On (Oxygen Delivery Method): room air        PHYSICAL EXAMINATION:  GENERAL: NAD  HEAD:  Atraumatic, Normocephalic  EYES:  Conjunctiva and sclera clear, pupils are equal, round, and reactive to light and accommodation.  NECK: Supple, No JVD, trachea is midline, no evidence of thyroid enlargement, no lymphadenopathy or tenderness.  CHEST/LUNG: Clear to auscultation; No rales, rhonchi, wheezing, or rubs  HEART: Regular rate and rhythm; No murmurs, rubs, or gallops  ABDOMEN: Soft, Nontender, Nondistended; Bowel sounds present  NERVOUS SYSTEM:  Alert & Oriented X3; No focal sensory or motor deficits are noted; Recent and remote memory is intact; Appropriate mood and affect.  EXTREMITIES:  2+ Peripheral Pulses, No clubbing, cyanosis, or edema  SKIN: Warm, dry, and well perfused; Good turgor; No lesions, nodules or rashes are noted.                          10.9   8.14  )-----------( 274      ( 20 Mar 2023 07:05 )             33.8     03-20    142  |  105  |  14  ----------------------------<  268<H>  3.5   |  30  |  0.70    Ca    8.5      20 Mar 2023 07:05  Phos  2.0     03-20  Mg     2.1     03-20                CAPILLARY BLOOD GLUCOSE      RADIOLOGY & ADDITIONAL TESTS:  < from: TTE with Doppler (w/Cont) (03.16.23 @ 13:36) >  1. Normal mitral valve. Mildly calcified chordae tendinae.  Mild mitral regurgitation.  2. Calcified aortic valve was not well visualized but has  normal opening. No aortic stenosis. No aortic valve  regurgitation seen.  3. Normal left ventricular internal dimensions and wall  thicknesses.  4. Low-normal Left Ventricular Systolic Function,  (EF =  53% by biplane). No regional wall motion abnormalities.  Ultrasound LV opacification agent was used to enhance  endocardial definition.  5. Right ventricle not well visualized. Normal RVsystolic  function (RV tissue Doppler 11 cm/s).  6. RV systolic pressure is borderline normal at  33 mm Hg.  7. Normal tricuspid valve. Moderate tricuspid  regurgitation.  8. No pericardial effusion.      < end of copied text >  < from: Xray Chest 1 View- PORTABLE-Routine (Xray Chest 1 View- PORTABLE-Routine in AM.) (03.17.23 @ 10:36) >  Left basilar linear effusion or atelectasis.    < end of copied text >

## 2023-03-20 NOTE — PROGRESS NOTE ADULT - PROBLEM SELECTOR PLAN 1
Cardio onboard   Trop >25k in the ICU  Downtrending   Chest pain at that time as per patient   Continue FD Lovenox  Continue ASA  Continue high intensity statin   Continue Metoprolol   Continue Losartan   Telemetry   BP target <140/90 mmHg  Monitor BP   Plan for transfer to cardiac cath at [facility]

## 2023-03-20 NOTE — PROGRESS NOTE ADULT - SUBJECTIVE AND OBJECTIVE BOX
CARDIOLOGY    DATE OF SERVICE: 03-18-23    Patient denies chest pain still with cough and gagging with food   Review of symptoms otherwise negative.    MEDICATIONS:  aspirin enteric coated 81 milliGRAM(s) Oral daily  atorvastatin 40 milliGRAM(s) Oral at bedtime  benzocaine/menthol Lozenge 1 Lozenge Oral three times a day PRN  cefTRIAXone   IVPB 1000 milliGRAM(s) IV Intermittent every 24 hours  chlorhexidine 2% Cloths 1 Application(s) Topical daily  enoxaparin Injectable 80 milliGRAM(s) SubCutaneous every 12 hours  insulin glargine Injectable (LANTUS) 26 Unit(s) SubCutaneous every morning  insulin lispro (ADMELOG) corrective regimen sliding scale   SubCutaneous three times a day before meals  insulin lispro (ADMELOG) corrective regimen sliding scale   SubCutaneous at bedtime  losartan 100 milliGRAM(s) Oral daily  meclizine 25 milliGRAM(s) Oral every 8 hours PRN  metoprolol tartrate 25 milliGRAM(s) Oral two times a day  montelukast 10 milliGRAM(s) Oral at bedtime  ondansetron Injectable 4 milliGRAM(s) IV Push every 6 hours PRN  potassium phosphate / sodium phosphate Powder (PHOS-NaK) 1 Packet(s) Oral once  tamsulosin 0.4 milliGRAM(s) Oral at bedtime      LABS:                        10.3   12.98 )-----------( 220      ( 18 Mar 2023 04:00 )             32.0       Hemoglobin: 10.3 g/dL (03-18 @ 04:00)  Hemoglobin: 10.2 g/dL (03-17 @ 03:00)  Hemoglobin: 9.4 g/dL (03-16 @ 03:38)  Hemoglobin: 9.4 g/dL (03-16 @ 01:09)  Hemoglobin: 11.1 g/dL (03-15 @ 21:12)      03-18    140  |  108  |  20<H>  ----------------------------<  223<H>  3.8   |  29  |  0.72    Ca    8.8      18 Mar 2023 04:00  Phos  1.3     03-18  Mg     2.1     03-18    TPro  6.0  /  Alb  2.5<L>  /  TBili  0.9  /  DBili  x   /  AST  47<H>  /  ALT  28  /  AlkPhos  64  03-18    Creatinine Trend: 0.72<--, 0.77<--, 0.86<--, 1.13<--, 1.36<--, 1.77<--    COAGS:     CARDIAC MARKERS ( 16 Mar 2023 12:35 )  x     / x     / 560 U/L / x     / 38.4 ng/mL        PHYSICAL EXAM:  T(C): 36.2 (03-18-23 @ 07:00), Max: 37 (03-17-23 @ 15:57)  HR: 64 (03-18-23 @ 07:00) (58 - 85)  BP: 139/72 (03-18-23 @ 07:00) (102/51 - 165/83)  RR: 15 (03-18-23 @ 07:00) (12 - 18)  SpO2: 96% (03-18-23 @ 07:00) (93% - 98%)  Wt(kg): --    I&O's Summary    17 Mar 2023 07:01  -  18 Mar 2023 07:00  --------------------------------------------------------  IN: 323.3 mL / OUT: 1650 mL / NET: -1326.7 mL    18 Mar 2023 07:01  -  18 Mar 2023 08:35  --------------------------------------------------------  IN: 83.3 mL / OUT: 0 mL / NET: 83.3 mL      HEENT:  (-)icterus (-)pallor  CV: N S1 S2 1/6 RONI (+)2 Pulses B/l  Resp:  Clear to ausculatation B/L, normal effort  GI: (+) BS Soft, NT, ND  Lymph:  (-)Edema, (-)obvious lymphadenopathy  Skin: Warm to touch, Normal turgor  Psych: Appropriate mood and affect      TELEMETRY: 	NSR            ASSESSMENT/PLAN: Patient is 72 years old male with pmh of Type I DM (50 years ago, on Insulin pump for last 15 years), Vertigo and BPH who was brought to ED due to Abdominal pain, Nausea and vomiting. Cardiology consulted for increased troponin.    1. NSTEMI  - EKG non-ischemic  - TTE without wall motion  - currently denies anginal chest pain  - c/w ASA, statin and Lovenox  - glycemic control per med  - maintain tele  - possible LHC next week    2. Gagging  - suspected gastroparesis started on Reglan per med  - Monitor for symptoms    Alec Mitchell MD, [facility]  BEEPER [phone]

## 2023-03-20 NOTE — ACUTE INTERFACILITY TRANSFER NOTE - OTHER SIGNIFICANT FINDINGS
Time-based billing (NON-critical care)   35   minutes spent on total encounter; more than 50% of the visit was spent counseling and / or coordinating care by the attending physician.    The necessity of the time spent during the encounter on this date of service was due to:     - Counseling patient on DKA, NSTEMI, cardiac catherization  - Coordination of care with  and case management  - Preparation of discharge paperwork

## 2023-03-21 LAB
ALBUMIN SERPL ELPH-MCNC: 2.6 G/DL — LOW (ref 3.5–5)
ALP SERPL-CCNC: 65 U/L — SIGNIFICANT CHANGE UP (ref 40–120)
ALT FLD-CCNC: 28 U/L DA — SIGNIFICANT CHANGE UP (ref 10–60)
ANION GAP SERPL CALC-SCNC: 10 MMOL/L — SIGNIFICANT CHANGE UP (ref 5–17)
AST SERPL-CCNC: 25 U/L — SIGNIFICANT CHANGE UP (ref 10–40)
BILIRUB SERPL-MCNC: 0.8 MG/DL — SIGNIFICANT CHANGE UP (ref 0.2–1.2)
BUN SERPL-MCNC: 12 MG/DL — SIGNIFICANT CHANGE UP (ref 7–18)
CALCIUM SERPL-MCNC: 8.9 MG/DL — SIGNIFICANT CHANGE UP (ref 8.4–10.5)
CHLORIDE SERPL-SCNC: 101 MMOL/L — SIGNIFICANT CHANGE UP (ref 96–108)
CO2 SERPL-SCNC: 26 MMOL/L — SIGNIFICANT CHANGE UP (ref 22–31)
CREAT SERPL-MCNC: 0.71 MG/DL — SIGNIFICANT CHANGE UP (ref 0.5–1.3)
CULTURE RESULTS: SIGNIFICANT CHANGE UP
CULTURE RESULTS: SIGNIFICANT CHANGE UP
EGFR: 96 ML/MIN/1.73M2 — SIGNIFICANT CHANGE UP
GLUCOSE BLDC GLUCOMTR-MCNC: 189 MG/DL — HIGH (ref 70–99)
GLUCOSE BLDC GLUCOMTR-MCNC: 251 MG/DL — HIGH (ref 70–99)
GLUCOSE BLDC GLUCOMTR-MCNC: 266 MG/DL — HIGH (ref 70–99)
GLUCOSE BLDC GLUCOMTR-MCNC: 300 MG/DL — HIGH (ref 70–99)
GLUCOSE SERPL-MCNC: 319 MG/DL — HIGH (ref 70–99)
HCT VFR BLD CALC: 34.5 % — LOW (ref 39–50)
HGB BLD-MCNC: 11.2 G/DL — LOW (ref 13–17)
MAGNESIUM SERPL-MCNC: 2 MG/DL — SIGNIFICANT CHANGE UP (ref 1.6–2.6)
MCHC RBC-ENTMCNC: 28.9 PG — SIGNIFICANT CHANGE UP (ref 27–34)
MCHC RBC-ENTMCNC: 32.5 GM/DL — SIGNIFICANT CHANGE UP (ref 32–36)
MCV RBC AUTO: 88.9 FL — SIGNIFICANT CHANGE UP (ref 80–100)
NRBC # BLD: 0 /100 WBCS — SIGNIFICANT CHANGE UP (ref 0–0)
PHOSPHATE SERPL-MCNC: 2.8 MG/DL — SIGNIFICANT CHANGE UP (ref 2.5–4.5)
PLATELET # BLD AUTO: 299 K/UL — SIGNIFICANT CHANGE UP (ref 150–400)
POTASSIUM SERPL-MCNC: 3.6 MMOL/L — SIGNIFICANT CHANGE UP (ref 3.5–5.3)
POTASSIUM SERPL-SCNC: 3.6 MMOL/L — SIGNIFICANT CHANGE UP (ref 3.5–5.3)
PROT SERPL-MCNC: 6.3 G/DL — SIGNIFICANT CHANGE UP (ref 6–8.3)
RBC # BLD: 3.88 M/UL — LOW (ref 4.2–5.8)
RBC # FLD: 14.6 % — HIGH (ref 10.3–14.5)
SARS-COV-2 RNA SPEC QL NAA+PROBE: SIGNIFICANT CHANGE UP
SODIUM SERPL-SCNC: 137 MMOL/L — SIGNIFICANT CHANGE UP (ref 135–145)
SPECIMEN SOURCE: SIGNIFICANT CHANGE UP
SPECIMEN SOURCE: SIGNIFICANT CHANGE UP
WBC # BLD: 8.19 K/UL — SIGNIFICANT CHANGE UP (ref 3.8–10.5)
WBC # FLD AUTO: 8.19 K/UL — SIGNIFICANT CHANGE UP (ref 3.8–10.5)

## 2023-03-21 PROCEDURE — 99232 SBSQ HOSP IP/OBS MODERATE 35: CPT | Mod: GC

## 2023-03-21 RX ORDER — METOCLOPRAMIDE HCL 10 MG
10 TABLET ORAL THREE TIMES A DAY
Refills: 0 | Status: DISCONTINUED | OUTPATIENT
Start: 2023-03-21 | End: 2023-03-22

## 2023-03-21 RX ORDER — DIPHENHYDRAMINE HYDROCHLORIDE AND LIDOCAINE HYDROCHLORIDE AND ALUMINUM HYDROXIDE AND MAGNESIUM HYDRO
30 KIT
Refills: 0 | Status: DISCONTINUED | OUTPATIENT
Start: 2023-03-21 | End: 2023-03-22

## 2023-03-21 RX ORDER — HEPARIN SODIUM 5000 [USP'U]/ML
5000 INJECTION INTRAVENOUS; SUBCUTANEOUS EVERY 8 HOURS
Refills: 0 | Status: DISCONTINUED | OUTPATIENT
Start: 2023-03-22 | End: 2023-03-22

## 2023-03-21 RX ORDER — HYDRALAZINE HCL 50 MG
10 TABLET ORAL ONCE
Refills: 0 | Status: COMPLETED | OUTPATIENT
Start: 2023-03-21 | End: 2023-03-21

## 2023-03-21 RX ADMIN — MONTELUKAST 10 MILLIGRAM(S): 4 TABLET, CHEWABLE ORAL at 21:49

## 2023-03-21 RX ADMIN — Medication 4 UNIT(S): at 11:52

## 2023-03-21 RX ADMIN — ATORVASTATIN CALCIUM 40 MILLIGRAM(S): 80 TABLET, FILM COATED ORAL at 21:49

## 2023-03-21 RX ADMIN — Medication 4 UNIT(S): at 08:00

## 2023-03-21 RX ADMIN — Medication 2: at 21:49

## 2023-03-21 RX ADMIN — Medication 25 MILLIGRAM(S): at 06:41

## 2023-03-21 RX ADMIN — Medication 10 MILLIGRAM(S): at 11:51

## 2023-03-21 RX ADMIN — Medication 25 MILLIGRAM(S): at 17:21

## 2023-03-21 RX ADMIN — LOSARTAN POTASSIUM 100 MILLIGRAM(S): 100 TABLET, FILM COATED ORAL at 06:41

## 2023-03-21 RX ADMIN — Medication 81 MILLIGRAM(S): at 11:51

## 2023-03-21 RX ADMIN — Medication 6: at 08:00

## 2023-03-21 RX ADMIN — Medication 10 MILLIGRAM(S): at 06:44

## 2023-03-21 RX ADMIN — Medication 2: at 17:20

## 2023-03-21 RX ADMIN — ENOXAPARIN SODIUM 80 MILLIGRAM(S): 100 INJECTION SUBCUTANEOUS at 06:42

## 2023-03-21 RX ADMIN — TAMSULOSIN HYDROCHLORIDE 0.4 MILLIGRAM(S): 0.4 CAPSULE ORAL at 21:49

## 2023-03-21 RX ADMIN — Medication 6: at 11:51

## 2023-03-21 RX ADMIN — Medication 4 UNIT(S): at 17:21

## 2023-03-21 RX ADMIN — PANTOPRAZOLE SODIUM 40 MILLIGRAM(S): 20 TABLET, DELAYED RELEASE ORAL at 06:41

## 2023-03-21 RX ADMIN — INSULIN GLARGINE 30 UNIT(S): 100 INJECTION, SOLUTION SUBCUTANEOUS at 08:02

## 2023-03-21 NOTE — PROGRESS NOTE ADULT - SUBJECTIVE AND OBJECTIVE BOX
PGY-1 Progress Note discussed with attending    PAGER #: [phone]] TILL 5:00 PM  PLEASE CONTACT ON CALL TEAM:  - On Call Team (Please refer to Kesha) FROM 5:00 PM - 8:30PM  - Nightfloat Team FROM 8:30 -7:30 AM      INTERVAL HPI/OVERNIGHT EVENTS:   Patient seen and examined at bedside. No acute events overnight.    ROS  Constitutional: (-)fever, (-)night sweats, (-)chills, (-)cold intolerance, (-)fatigue  Eyes: (-)change in vision, (-)loss of vision, (-)blurred vision  Ears: (-)difficulty hearing, (-)hearing loss  Nose: (-)nasal discharge  Mouth/Throat/Voice: (-)lip sores, (-)dysphagia, (-)odynophagia  Neck: (-)neck pain, (-)neck stiffness, (-)neck lumps, (-)neck swelling  Respiratory: (-)dyspnea, (+)cough, (-)cough productive of sputum, (-)hemoptysis, (-)wheezing  Cardiovascular: (-)chest pain, (-)palpitations, (-)dyspnea at rest, (-)dyspnea with activity, (-)orthopnea  Gastrointestinal: (-)abdominal pain, (-)rectal pain, (-)nausea, (-)vomiting  Urinary: (-)dysuria, (-)hematuria, (-)urinary hesitancy, (-)difficulty initiating urine stream  Genital/Reproductive: (-)change in libido  Dermatologic/Integumentary: (-)change in hair texture, (-)change in skin texture, (-)change in nail appearance, (-)dry hair  Musculoskeletal: (-)muscle pain, (-)back pain, (-)tender points, (-)muscle cramps  Neurological: (-)headaches, (-)vertigo, (-)lightheadedness, (-)fainting  Psychiatric: (-)change in mood, (-)depression, (-)sadness interfering with function, (-)anxiety, (-)nervousness  Hematologic/Lymphatic: (-)easy bruising, (-)difficulty stopping blood flow    aspirin enteric coated 81 milliGRAM(s) Oral daily  atorvastatin 40 milliGRAM(s) Oral at bedtime  benzocaine/menthol Lozenge 1 Lozenge Oral three times a day PRN  guaiFENesin Oral Liquid (Sugar-Free) 100 milliGRAM(s) Oral every 6 hours PRN  insulin glargine Injectable (LANTUS) 30 Unit(s) SubCutaneous every morning  insulin lispro (ADMELOG) corrective regimen sliding scale   SubCutaneous three times a day before meals  insulin lispro (ADMELOG) corrective regimen sliding scale   SubCutaneous at bedtime  insulin lispro Injectable (ADMELOG) 4 Unit(s) SubCutaneous three times a day before meals  losartan 100 milliGRAM(s) Oral daily  meclizine 25 milliGRAM(s) Oral every 8 hours PRN  metoclopramide Injectable 10 milliGRAM(s) IV Push three times a day  metoprolol tartrate 25 milliGRAM(s) Oral two times a day  montelukast 10 milliGRAM(s) Oral at bedtime  ondansetron Injectable 4 milliGRAM(s) IV Push every 6 hours PRN  pantoprazole    Tablet 40 milliGRAM(s) Oral before breakfast  tamsulosin 0.4 milliGRAM(s) Oral at bedtime      Vital Signs Last 24 Hrs  T(C): 37.4 (21 Mar 2023 07:54), Max: 37.5 (20 Mar 2023 20:28)  T(F): 99.3 (21 Mar 2023 07:54), Max: 99.5 (20 Mar 2023 20:28)  HR: 73 (21 Mar 2023 07:54) (72 - 85)  BP: 169/89 (21 Mar 2023 07:54) (140/78 - 169/89)  BP(mean): --  RR: 18 (21 Mar 2023 07:54) (17 - 18)  SpO2: 95% (21 Mar 2023 07:54) (95% - 96%)    Parameters below as of 21 Mar 2023 07:54  Patient On (Oxygen Delivery Method): room air        PHYSICAL EXAMINATION:  GENERAL: NAD  HEAD:  Atraumatic, Normocephalic  EYES:  Conjunctiva and sclera clear, pupils are equal, round, and reactive to light and accommodation.  NECK: Supple, No JVD, trachea is midline, no evidence of thyroid enlargement, no lymphadenopathy or tenderness.  CHEST/LUNG: Clear to auscultation; No rales, rhonchi, wheezing, or rubs  HEART: Regular rate and rhythm; No murmurs, rubs, or gallops  ABDOMEN: Soft, Nontender, Nondistended; Bowel sounds present  NERVOUS SYSTEM:  Alert & Oriented X3; No focal sensory or motor deficits are noted; Recent and remote memory is intact; Appropriate mood and affect.  EXTREMITIES:  2+ Peripheral Pulses, No clubbing, cyanosis, or edema  SKIN: Warm, dry, and well perfused; Good turgor; No lesions, nodules or rashes are noted.                          11.2   8.19  )-----------( 299      ( 21 Mar 2023 06:16 )             34.5     03-21    137  |  101  |  12  ----------------------------<  319<H>  3.6   |  26  |  0.71    Ca    8.9      21 Mar 2023 06:16  Phos  2.8     03-21  Mg     2.0     03-21    TPro  6.3  /  Alb  2.6<L>  /  TBili  0.8  /  DBili  x   /  AST  25  /  ALT  28  /  AlkPhos  65  03-21    LIVER FUNCTIONS - ( 21 Mar 2023 06:16 )  Alb: 2.6 g/dL / Pro: 6.3 g/dL / ALK PHOS: 65 U/L / ALT: 28 U/L DA / AST: 25 U/L / GGT: x                   CAPILLARY BLOOD GLUCOSE      RADIOLOGY & ADDITIONAL TESTS:  < from: TTE with Doppler (w/Cont) (03.16.23 @ 13:36) >  1. Normal mitral valve. Mildly calcified chordae tendinae.  Mild mitral regurgitation.  2. Calcified aortic valve was not well visualized but has  normal opening. No aortic stenosis. No aortic valve  regurgitation seen.  3. Normal left ventricular internal dimensions and wall  thicknesses.  4. Low-normal Left Ventricular Systolic Function,  (EF =  53% by biplane). No regional wall motion abnormalities.  Ultrasound LV opacification agent was used to enhance  endocardial definition.  5. Right ventricle not well visualized. Normal RVsystolic  function (RV tissue Doppler 11 cm/s).  6. RV systolic pressure is borderline normal at  33 mm Hg.  7. Normal tricuspid valve. Moderate tricuspid  regurgitation.  8. No pericardial effusion.      < end of copied text >  < from: Xray Chest 1 View- PORTABLE-Routine (Xray Chest 1 View- PORTABLE-Routine in AM.) (03.17.23 @ 10:36) >  Left basilar linear effusion or atelectasis.    < end of copied text >

## 2023-03-21 NOTE — PROGRESS NOTE ADULT - PROBLEM SELECTOR PLAN 4
Normocytic anemia   No signs of bleeding   Likely anemia of chronic disease  Will monitor for now
Normocytic anemia   No signs of bleeding   Likely anemia of chronic disease  Will monitor for now

## 2023-03-21 NOTE — PROGRESS NOTE ADULT - PROBLEM SELECTOR PLAN 1
Cardio onboard   Trop >25k in the ICU  Downtrending   Chest pain at that time as per patient   Continue FD Lovenox  Continue ASA  Continue high intensity statin   Continue Metoprolol   Continue Losartan   Telemetry   BP target <140/90 mmHg  Monitor BP   Plan for transfer to cardiac cath at [facility] Cardio onboard   Trop >25k in the ICU  Downtrending   Chest pain at that time as per patient   Discontinue FD Lovenox. Will start Heparin 5000 units SQ q8hr on 3/22/23 for DVT prophylaxis   Continue ASA  Continue high intensity statin   Continue Metoprolol   Continue Losartan   Telemetry   BP target <140/90 mmHg  Monitor BP   Plan for transfer to cardiac cath at [facility]

## 2023-03-21 NOTE — PROGRESS NOTE ADULT - SUBJECTIVE AND OBJECTIVE BOX
Interval Events:  pt in nad    Allergies    Neosporin (Rash)    Intolerances      Endocrine/Metabolic Medications:  atorvastatin 40 milliGRAM(s) Oral at bedtime  insulin glargine Injectable (LANTUS) 30 Unit(s) SubCutaneous every morning  insulin lispro (ADMELOG) corrective regimen sliding scale   SubCutaneous three times a day before meals  insulin lispro (ADMELOG) corrective regimen sliding scale   SubCutaneous at bedtime  insulin lispro Injectable (ADMELOG) 4 Unit(s) SubCutaneous three times a day before meals      Vital Signs Last 24 Hrs  T(C): 37.4 (21 Mar 2023 07:54), Max: 37.5 (20 Mar 2023 20:28)  T(F): 99.3 (21 Mar 2023 07:54), Max: 99.5 (20 Mar 2023 20:28)  HR: 73 (21 Mar 2023 07:54) (72 - 85)  BP: 169/89 (21 Mar 2023 07:54) (140/78 - 169/89)  BP(mean): --  RR: 18 (21 Mar 2023 07:54) (17 - 18)  SpO2: 95% (21 Mar 2023 07:54) (95% - 96%)    Parameters below as of 21 Mar 2023 07:54  Patient On (Oxygen Delivery Method): room air          PHYSICAL EXAM  All physical exam findings normal, except those marked:  General:	Alert, active, cooperative, NAD, well hydrated  .		[] Abnormal:  Neck		Normal: supple, no cervical adenopathy, no palpable thyroid  .		[] Abnormal:  Cardiovascular	Normal: regular rate, normal S1, S2, no murmurs  .		[] Abnormal:  Respiratory	Normal: no chest wall deformity, normal respiratory pattern, CTA B/L  .		[] Abnormal:  Abdominal	Normal: soft, ND, NT, bowel sounds present, no masses, no organomegaly  .		[] Abnormal:  		Normal normal genitalia, testes descended, circumcised/uncircumcised  .		Hosea stage:			Breast hosea:  .		Menstrual history:  .		[] Abnormal:  Extremities	Normal: FROM x4  .		[] Abnormal:  Skin		Normal: intact and not indurated, no rash, no acanthosis nigricans  .		[] Abnormal:  Neurologic	Normal: grossly intact  .		[] Abnormal:    LABS                        11.2   8.19  )-----------( 299      ( 21 Mar 2023 06:16 )             34.5                               137    |  101    |  12                  Calcium: 8.9   / iCa: x      (03-21 @ 06:16)    ----------------------------<  319       Magnesium: 2.0                              3.6     |  26     |  0.71             Phosphorous: 2.8      TPro  6.3    /  Alb  2.6    /  TBili  0.8    /  DBili  x      /  AST  25     /  ALT  28     /  AlkPhos  65     21 Mar 2023 06:16    CAPILLARY BLOOD GLUCOSE      POCT Blood Glucose.: 300 mg/dL (21 Mar 2023 07:31)  POCT Blood Glucose.: 173 mg/dL (20 Mar 2023 21:52)  POCT Blood Glucose.: 178 mg/dL (20 Mar 2023 16:57)  POCT Blood Glucose.: 173 mg/dL (20 Mar 2023 11:52)        Assesment/plan    Patient is 72 years old male with past medical history of Type I DM (50 years ago, on Insulin pump for last 15 years), Vertigo and BPH who was brought to ED due to Abdominal pain, Nausea and vomiting. Pt is on Tandem insulin pump ? had malfunction in the past as well. Does not recall basal rates, carb/insulin ratio- 1:5?       Problem/Recommendation - 1:  ·  Problem: DKA (diabetic ketoacidosis).   ·  Recommendation: due to pump malfunction  fsating hyperglycemia  cont lantus to 30units qam- change to qhs when stable  cont admelog 3 ac meals - inc dose as po intake improves  iv reglan for nausea- likley gastroparesis improving  fsg ac and hs  a1c- 7.7  nutrition eval  hold pump until seen by out pt provider    Tx to [facility] for cardiac w/u- NSTEMI  d/w prim team

## 2023-03-21 NOTE — PROGRESS NOTE ADULT - ATTENDING COMMENTS
IMP: This is a  72 years old man  with  Type I DM (50 years ago, on Insulin pump for last 15 years), Vertigo and BPH who was brought to ED due to Abdominal pain, Nausea and vomiting. Patient states that in morning, he changed the insulin pump but wasn't sure what happened and around afternoon, He started feeling abdominal stiffness, rigidity and vomited three times in home and 2 times when came to ED. Patient usually takes 60-70 units of insulin pre meals as he mentioned and he adjusted how many unites based on his diet as he mentioned. In ED, Patient started on IV fluids, got 18 units of insulin and started on Insulin IV infusion. BS >600, Anion Gap 21, Bicarb 14. Patient  admitted to ICU for DKA management.           ASSESSMENT   - Diabetic Ketoacidosis   - DM  - Elevated anion gap metabolic acidosis   - MI Type-2   - Leukocytosis   - UTI   - Hyponatremia   - Acute renal Injury         Plan     - O2 supp as needed  - Hemodynamic monitoring   - No chest pain , no wall motion abnormality as per echo   - Troponin trend down   - Anticoag  - ASA , statin , Beta -   - Cards eval noted , will need cardiac cath   - 2DECHO noted   - Diet   - Taper off iv insulin   - Correct electrolyte   - Endo eval   - Antibx for UTI   - JOANNA resolved   - Monitor renal fx   - DVT GI prophy .   - Transfer to tele
IMP: This is a  72 years old man  with  Type I DM (50 years ago, on Insulin pump for last 15 years), Vertigo and BPH who was brought to ED due to Abdominal pain, Nausea and vomiting. Patient states that in morning, he changed the insulin pump but wasn't sure what happened and around afternoon, He started feeling abdominal stiffness, rigidity and vomited three times in home and 2 times when came to ED. Patient usually takes 60-70 units of insulin pre meals as he mentioned and he adjusted how many unites based on his diet as he mentioned. In ED, Patient started on IV fluids, got 18 units of insulin and started on Insulin IV infusion. BS >600, Anion Gap 21, Bicarb 14. Patient  admitted to ICU for DKA management.           ASSESSMENT   - Diabetic Ketoacidosis   - DM  - Elevated anion gap metabolic acidosis   - MI Type-2   - Leukocytosis   - UTI   - Hyponatremia   - Acute renal Injury         Plan     - O2 supp as needed  - Hemodynamic monitoring   - No chest pain , no wall motion abnormality as per echo   - Troponin trend down   - Anticoag  - ASA , statin , Beta -   - Cards eval noted , will need cardiac cath   - 2DECHO noted   - Diet   - Taper off iv insulin   - A1c   - Correct electrolyte   - Endo eval   - Antibx for UTI   - F/U cultures   - Monitor renal fx   - DVT GI prophy .
IMP: This is a  72 years old man  with  Type I DM (50 years ago, on Insulin pump for last 15 years), Vertigo and BPH who was brought to ED due to Abdominal pain, Nausea and vomiting. Patient states that in morning, he changed the insulin pump but wasn't sure what happened and around afternoon, He started feeling abdominal stiffness, rigidity and vomited three times in home and 2 times when came to ED. Patient usually takes 60-70 units of insulin pre meals as he mentioned and he adjusted how many unites based on his diet as he mentioned. In ED, Patient started on IV fluids, got 18 units of insulin and started on Insulin IV infusion. BS >600, Anion Gap 21, Bicarb 14. Patient  admitted to ICU for DKA management.           ASSESSMENT   - Diabetic Ketoacidosis   - DM  - Elevated anion gap metabolic acidosis   - MI Type-2   - Leukocytosis   - UTI   - Hyponatremia   - Acute renal Injury         Plan     - O2 supp as needed  - Hemodynamic monitoring   - Trend troponin   - Anticoag  - ASA , statin , Beta -   - Cards eval   - 2DECHO   - NPO   - Taper off iv insulin   - A1c   - Correct electrolyte   - Endo eval   - Antibx for UTI   - F/U cultures   - Monitor renal fx   - DVT GI prophy
71yo M PMHx of T1DM who presented with DKA after insulin pump malfunction, course complicated by NSTEMI. Fingersticks elevated, uptitrate insulin with endocrine recommendations. Patient complains of oral pain, start on magic mouthwash. Continue on aspirin. Reglan PRN for gastroparesis. Plan for transfer to [facility] for cardiac catherization tomorrow.

## 2023-03-21 NOTE — PROGRESS NOTE ADULT - PROVIDER SPECIALTY LIST ADULT
Cardiology
Critical Care
Cardiology
Critical Care
Cardiology
Cardiology
Critical Care
Endocrinology
Internal Medicine
Internal Medicine

## 2023-03-21 NOTE — PROGRESS NOTE ADULT - PROBLEM SELECTOR PLAN 2
Needed to get extension on semen analysis. Order  coming in next week.   HA1c 7.7%  DKA resolved  Lantus increased from 26 u to 30 u in the morning   Lispro 3u to 4 u TID before meals   ISS  Monitor BG  Patient with gastroparesis   Continue Reglan HA1c 7.7%  DKA resolved  Continue Lantus 30 u in the morning   Continue Lispro 4 u TID before meals   ISS  Monitor BG  Patient with gastroparesis   Continue Reglan  F/U Endo recommendations

## 2023-03-21 NOTE — PROGRESS NOTE ADULT - SUBJECTIVE AND OBJECTIVE BOX
CARDIOLOGY    DATE OF SERVICE: 03-21-23    Patient denies chest pain or shortness of breath. started on reglan for gastroparesis tolerating PO  Review of symptoms otherwise negative.    aspirin enteric coated 81 milliGRAM(s) Oral daily  atorvastatin 40 milliGRAM(s) Oral at bedtime  benzocaine/menthol Lozenge 1 Lozenge Oral three times a day PRN  guaiFENesin Oral Liquid (Sugar-Free) 100 milliGRAM(s) Oral every 6 hours PRN  hydrALAZINE 10 milliGRAM(s) Oral once  insulin glargine Injectable (LANTUS) 30 Unit(s) SubCutaneous every morning  insulin lispro (ADMELOG) corrective regimen sliding scale   SubCutaneous three times a day before meals  insulin lispro (ADMELOG) corrective regimen sliding scale   SubCutaneous at bedtime  insulin lispro Injectable (ADMELOG) 4 Unit(s) SubCutaneous three times a day before meals  losartan 100 milliGRAM(s) Oral daily  meclizine 25 milliGRAM(s) Oral every 8 hours PRN  metoclopramide Injectable 10 milliGRAM(s) IV Push three times a day  metoprolol tartrate 25 milliGRAM(s) Oral two times a day  montelukast 10 milliGRAM(s) Oral at bedtime  ondansetron Injectable 4 milliGRAM(s) IV Push every 6 hours PRN  pantoprazole    Tablet 40 milliGRAM(s) Oral before breakfast  tamsulosin 0.4 milliGRAM(s) Oral at bedtime                            11.2   8.19  )-----------( 299      ( 21 Mar 2023 06:16 )             34.5       Hemoglobin: 11.2 g/dL (03-21 @ 06:16)  Hemoglobin: 10.9 g/dL (03-20 @ 07:05)  Hemoglobin: 10.7 g/dL (03-19 @ 06:45)  Hemoglobin: 10.3 g/dL (03-18 @ 04:00)  Hemoglobin: 10.2 g/dL (03-17 @ 03:00)      03-21    137  |  101  |  12  ----------------------------<  319<H>  3.6   |  26  |  0.71    Ca    8.9      21 Mar 2023 06:16  Phos  2.8     03-21  Mg     2.0     03-21    TPro  6.3  /  Alb  2.6<L>  /  TBili  0.8  /  DBili  x   /  AST  25  /  ALT  28  /  AlkPhos  65  03-21    Creatinine Trend: 0.71<--, 0.70<--, 0.69<--, 0.84<--, 0.72<--, 0.77<--    COAGS:           T(C): 37.4 (03-21-23 @ 07:54), Max: 37.5 (03-20-23 @ 20:28)  HR: 73 (03-21-23 @ 07:54) (72 - 85)  BP: 169/89 (03-21-23 @ 07:54) (140/78 - 169/89)  RR: 18 (03-21-23 @ 07:54) (17 - 18)  SpO2: 95% (03-21-23 @ 07:54) (95% - 96%)  Wt(kg): --    I&O's Summary    20 Mar 2023 07:01  -  21 Mar 2023 07:00  --------------------------------------------------------  IN: 550 mL / OUT: 300 mL / NET: 250 mL        HEENT:  (-)icterus (-)pallor  CV: N S1 S2 1/6 RONI (+)2 Pulses B/l  Resp:  Clear to ausculatation B/L, normal effort  GI: (+) BS Soft, NT, ND  Lymph:  (-)Edema, (-)obvious lymphadenopathy  Skin: Warm to touch, Normal turgor  Psych: Appropriate mood and affect      TELEMETRY: 	NSR            ASSESSMENT/PLAN: Patient is 72 years old male with pmh of Type I DM (50 years ago, on Insulin pump for last 15 years), Vertigo and BPH who was brought to ED due to Abdominal pain, Nausea and vomiting. Cardiology consulted for increased troponin.    1. NSTEMI  - EKG non-ischemic  - TTE without wall motion  - currently denies anginal chest pain  - c/w ASA, statin   - glycemic control per med  - maintain tele  - lovenox held for cath today    2. Gagging  - suspected gastroparesis started on Reglan per med  - Monitor for symptoms    Alec Mitchell MD, [facility]  BEEPER [phone]

## 2023-03-22 ENCOUNTER — INPATIENT (INPATIENT)
Facility: HOSPITAL | Age: 73
LOS: 11 days | Discharge: HOME HEALTH | DRG: 236 | End: 2023-04-03
Attending: STUDENT IN AN ORGANIZED HEALTH CARE EDUCATION/TRAINING PROGRAM | Admitting: THORACIC SURGERY (CARDIOTHORACIC VASCULAR SURGERY)
Payer: OTHER GOVERNMENT

## 2023-03-22 ENCOUNTER — INPATIENT (INPATIENT)
Facility: HOSPITAL | Age: 73
LOS: 0 days | Discharge: ANOTHER HOSPITAL | End: 2023-03-22
Attending: HOSPITALIST | Admitting: HOSPITALIST
Payer: MEDICARE

## 2023-03-22 VITALS
DIASTOLIC BLOOD PRESSURE: 80 MMHG | SYSTOLIC BLOOD PRESSURE: 171 MMHG | RESPIRATION RATE: 18 BRPM | OXYGEN SATURATION: 95 % | TEMPERATURE: 99 F | HEART RATE: 71 BPM

## 2023-03-22 VITALS
TEMPERATURE: 98 F | SYSTOLIC BLOOD PRESSURE: 133 MMHG | RESPIRATION RATE: 18 BRPM | OXYGEN SATURATION: 97 % | DIASTOLIC BLOOD PRESSURE: 75 MMHG | HEART RATE: 90 BPM

## 2023-03-22 DIAGNOSIS — I21.4 NON-ST ELEVATION (NSTEMI) MYOCARDIAL INFARCTION: ICD-10-CM

## 2023-03-22 DIAGNOSIS — I25.10 ATHEROSCLEROTIC HEART DISEASE OF NATIVE CORONARY ARTERY WITHOUT ANGINA PECTORIS: ICD-10-CM

## 2023-03-22 DIAGNOSIS — E11.10 TYPE 2 DIABETES MELLITUS WITH KETOACIDOSIS WITHOUT COMA: ICD-10-CM

## 2023-03-22 DIAGNOSIS — R07.9 CHEST PAIN, UNSPECIFIED: ICD-10-CM

## 2023-03-22 DIAGNOSIS — Z96.7 PRESENCE OF OTHER BONE AND TENDON IMPLANTS: Chronic | ICD-10-CM

## 2023-03-22 DIAGNOSIS — E87.6 HYPOKALEMIA: ICD-10-CM

## 2023-03-22 DIAGNOSIS — E11.9 TYPE 2 DIABETES MELLITUS WITHOUT COMPLICATIONS: ICD-10-CM

## 2023-03-22 DIAGNOSIS — E10.9 TYPE 1 DIABETES MELLITUS WITHOUT COMPLICATIONS: ICD-10-CM

## 2023-03-22 DIAGNOSIS — E78.5 HYPERLIPIDEMIA, UNSPECIFIED: ICD-10-CM

## 2023-03-22 DIAGNOSIS — I10 ESSENTIAL (PRIMARY) HYPERTENSION: ICD-10-CM

## 2023-03-22 DIAGNOSIS — N40.0 BENIGN PROSTATIC HYPERPLASIA WITHOUT LOWER URINARY TRACT SYMPTOMS: ICD-10-CM

## 2023-03-22 LAB
ALBUMIN SERPL ELPH-MCNC: 2.4 G/DL — LOW (ref 3.5–5)
ALBUMIN SERPL ELPH-MCNC: 3.4 G/DL — SIGNIFICANT CHANGE UP (ref 3.3–5)
ALP SERPL-CCNC: 58 U/L — SIGNIFICANT CHANGE UP (ref 40–120)
ALP SERPL-CCNC: 73 U/L — SIGNIFICANT CHANGE UP (ref 40–120)
ALT FLD-CCNC: 33 U/L DA — SIGNIFICANT CHANGE UP (ref 10–60)
ALT FLD-CCNC: 42 U/L — SIGNIFICANT CHANGE UP (ref 10–45)
ANION GAP SERPL CALC-SCNC: 12 MMOL/L — SIGNIFICANT CHANGE UP (ref 5–17)
ANION GAP SERPL CALC-SCNC: 6 MMOL/L — SIGNIFICANT CHANGE UP (ref 5–17)
APPEARANCE UR: CLEAR — SIGNIFICANT CHANGE UP
APTT BLD: 26.4 SEC — LOW (ref 27.5–35.5)
AST SERPL-CCNC: 30 U/L — SIGNIFICANT CHANGE UP (ref 10–40)
AST SERPL-CCNC: 43 U/L — HIGH (ref 10–40)
B-OH-BUTYR SERPL-SCNC: 0.3 MMOL/L — SIGNIFICANT CHANGE UP
BASE EXCESS BLDV CALC-SCNC: 5.6 MMOL/L — HIGH (ref -2–3)
BASOPHILS # BLD AUTO: 0.02 K/UL — SIGNIFICANT CHANGE UP (ref 0–0.2)
BASOPHILS NFR BLD AUTO: 0.3 % — SIGNIFICANT CHANGE UP (ref 0–2)
BILIRUB SERPL-MCNC: 0.6 MG/DL — SIGNIFICANT CHANGE UP (ref 0.2–1.2)
BILIRUB SERPL-MCNC: 0.6 MG/DL — SIGNIFICANT CHANGE UP (ref 0.2–1.2)
BILIRUB UR-MCNC: NEGATIVE — SIGNIFICANT CHANGE UP
BLD GP AB SCN SERPL QL: NEGATIVE — SIGNIFICANT CHANGE UP
BUN SERPL-MCNC: 10 MG/DL — SIGNIFICANT CHANGE UP (ref 7–18)
BUN SERPL-MCNC: 12 MG/DL — SIGNIFICANT CHANGE UP (ref 7–23)
CALCIUM SERPL-MCNC: 8.6 MG/DL — SIGNIFICANT CHANGE UP (ref 8.4–10.5)
CALCIUM SERPL-MCNC: 8.9 MG/DL — SIGNIFICANT CHANGE UP (ref 8.4–10.5)
CHLORIDE SERPL-SCNC: 100 MMOL/L — SIGNIFICANT CHANGE UP (ref 96–108)
CHLORIDE SERPL-SCNC: 102 MMOL/L — SIGNIFICANT CHANGE UP (ref 96–108)
CO2 BLDV-SCNC: 32 MMOL/L — HIGH (ref 22–26)
CO2 SERPL-SCNC: 27 MMOL/L — SIGNIFICANT CHANGE UP (ref 22–31)
CO2 SERPL-SCNC: 30 MMOL/L — SIGNIFICANT CHANGE UP (ref 22–31)
COLOR SPEC: SIGNIFICANT CHANGE UP
CREAT SERPL-MCNC: 0.54 MG/DL — SIGNIFICANT CHANGE UP (ref 0.5–1.3)
CREAT SERPL-MCNC: 0.62 MG/DL — SIGNIFICANT CHANGE UP (ref 0.5–1.3)
DIFF PNL FLD: NEGATIVE — SIGNIFICANT CHANGE UP
EGFR: 100 ML/MIN/1.73M2 — SIGNIFICANT CHANGE UP
EGFR: 105 ML/MIN/1.73M2 — SIGNIFICANT CHANGE UP
EOSINOPHIL # BLD AUTO: 0.08 K/UL — SIGNIFICANT CHANGE UP (ref 0–0.5)
EOSINOPHIL NFR BLD AUTO: 1.1 % — SIGNIFICANT CHANGE UP (ref 0–6)
FIBRINOGEN PPP-MCNC: 437 MG/DL — SIGNIFICANT CHANGE UP (ref 200–445)
GLUCOSE BLDC GLUCOMTR-MCNC: 208 MG/DL — HIGH (ref 70–99)
GLUCOSE BLDC GLUCOMTR-MCNC: 214 MG/DL — HIGH (ref 70–99)
GLUCOSE BLDC GLUCOMTR-MCNC: 278 MG/DL — HIGH (ref 70–99)
GLUCOSE SERPL-MCNC: 145 MG/DL — HIGH (ref 70–99)
GLUCOSE SERPL-MCNC: 213 MG/DL — HIGH (ref 70–99)
GLUCOSE UR QL: ABNORMAL
HCO3 BLDV-SCNC: 31 MMOL/L — HIGH (ref 22–29)
HCT VFR BLD CALC: 31.2 % — LOW (ref 39–50)
HCT VFR BLD CALC: 35.9 % — LOW (ref 39–50)
HGB BLD-MCNC: 10.3 G/DL — LOW (ref 13–17)
HGB BLD-MCNC: 11.5 G/DL — LOW (ref 13–17)
IMM GRANULOCYTES NFR BLD AUTO: 0.8 % — SIGNIFICANT CHANGE UP (ref 0–0.9)
INR BLD: 1.23 RATIO — HIGH (ref 0.88–1.16)
KETONES UR-MCNC: ABNORMAL
LEUKOCYTE ESTERASE UR-ACNC: NEGATIVE — SIGNIFICANT CHANGE UP
LYMPHOCYTES # BLD AUTO: 1.42 K/UL — SIGNIFICANT CHANGE UP (ref 1–3.3)
LYMPHOCYTES # BLD AUTO: 20 % — SIGNIFICANT CHANGE UP (ref 13–44)
MAGNESIUM SERPL-MCNC: 1.8 MG/DL — SIGNIFICANT CHANGE UP (ref 1.6–2.6)
MCHC RBC-ENTMCNC: 28.9 PG — SIGNIFICANT CHANGE UP (ref 27–34)
MCHC RBC-ENTMCNC: 29 PG — SIGNIFICANT CHANGE UP (ref 27–34)
MCHC RBC-ENTMCNC: 32 GM/DL — SIGNIFICANT CHANGE UP (ref 32–36)
MCHC RBC-ENTMCNC: 33 GM/DL — SIGNIFICANT CHANGE UP (ref 32–36)
MCV RBC AUTO: 87.4 FL — SIGNIFICANT CHANGE UP (ref 80–100)
MCV RBC AUTO: 90.4 FL — SIGNIFICANT CHANGE UP (ref 80–100)
MONOCYTES # BLD AUTO: 0.78 K/UL — SIGNIFICANT CHANGE UP (ref 0–0.9)
MONOCYTES NFR BLD AUTO: 11 % — SIGNIFICANT CHANGE UP (ref 2–14)
NEUTROPHILS # BLD AUTO: 4.75 K/UL — SIGNIFICANT CHANGE UP (ref 1.8–7.4)
NEUTROPHILS NFR BLD AUTO: 66.8 % — SIGNIFICANT CHANGE UP (ref 43–77)
NITRITE UR-MCNC: NEGATIVE — SIGNIFICANT CHANGE UP
NRBC # BLD: 0 /100 WBCS — SIGNIFICANT CHANGE UP (ref 0–0)
NRBC # BLD: 0 /100 WBCS — SIGNIFICANT CHANGE UP (ref 0–0)
NT-PROBNP SERPL-SCNC: 2504 PG/ML — HIGH (ref 0–300)
PA ADP PRP-ACNC: 296 PRU — SIGNIFICANT CHANGE UP (ref 194–417)
PCO2 BLDV: 45 MMHG — SIGNIFICANT CHANGE UP (ref 42–55)
PH BLDV: 7.44 — HIGH (ref 7.32–7.43)
PH UR: 7 — SIGNIFICANT CHANGE UP (ref 5–8)
PHOSPHATE SERPL-MCNC: 3.2 MG/DL — SIGNIFICANT CHANGE UP (ref 2.5–4.5)
PLATELET # BLD AUTO: 279 K/UL — SIGNIFICANT CHANGE UP (ref 150–400)
PLATELET # BLD AUTO: 297 K/UL — SIGNIFICANT CHANGE UP (ref 150–400)
PO2 BLDV: 41 MMHG — SIGNIFICANT CHANGE UP (ref 25–45)
POTASSIUM SERPL-MCNC: 3.2 MMOL/L — LOW (ref 3.5–5.3)
POTASSIUM SERPL-MCNC: 3.4 MMOL/L — LOW (ref 3.5–5.3)
POTASSIUM SERPL-SCNC: 3.2 MMOL/L — LOW (ref 3.5–5.3)
POTASSIUM SERPL-SCNC: 3.4 MMOL/L — LOW (ref 3.5–5.3)
PROT SERPL-MCNC: 5.8 G/DL — LOW (ref 6–8.3)
PROT SERPL-MCNC: 6.7 G/DL — SIGNIFICANT CHANGE UP (ref 6–8.3)
PROT UR-MCNC: SIGNIFICANT CHANGE UP
PROTHROM AB SERPL-ACNC: 14.2 SEC — HIGH (ref 10.5–13.4)
RBC # BLD: 3.57 M/UL — LOW (ref 4.2–5.8)
RBC # BLD: 3.97 M/UL — LOW (ref 4.2–5.8)
RBC # FLD: 14.2 % — SIGNIFICANT CHANGE UP (ref 10.3–14.5)
RBC # FLD: 14.5 % — SIGNIFICANT CHANGE UP (ref 10.3–14.5)
RH IG SCN BLD-IMP: POSITIVE — SIGNIFICANT CHANGE UP
SAO2 % BLDV: 71.3 % — SIGNIFICANT CHANGE UP (ref 67–88)
SODIUM SERPL-SCNC: 138 MMOL/L — SIGNIFICANT CHANGE UP (ref 135–145)
SODIUM SERPL-SCNC: 139 MMOL/L — SIGNIFICANT CHANGE UP (ref 135–145)
SP GR SPEC: 1.02 — SIGNIFICANT CHANGE UP (ref 1.01–1.02)
T3 SERPL-MCNC: 76 NG/DL — LOW (ref 80–200)
T4 AB SER-ACNC: 8.3 UG/DL — SIGNIFICANT CHANGE UP (ref 4.6–12)
TSH SERPL-MCNC: 1.82 UIU/ML — SIGNIFICANT CHANGE UP (ref 0.27–4.2)
UROBILINOGEN FLD QL: NEGATIVE — SIGNIFICANT CHANGE UP
WBC # BLD: 7.11 K/UL — SIGNIFICANT CHANGE UP (ref 3.8–10.5)
WBC # BLD: 8.59 K/UL — SIGNIFICANT CHANGE UP (ref 3.8–10.5)
WBC # FLD AUTO: 7.11 K/UL — SIGNIFICANT CHANGE UP (ref 3.8–10.5)
WBC # FLD AUTO: 8.59 K/UL — SIGNIFICANT CHANGE UP (ref 3.8–10.5)

## 2023-03-22 PROCEDURE — 85520 HEPARIN ASSAY: CPT

## 2023-03-22 PROCEDURE — 93005 ELECTROCARDIOGRAM TRACING: CPT

## 2023-03-22 PROCEDURE — 93306 TTE W/DOPPLER COMPLETE: CPT

## 2023-03-22 PROCEDURE — 71045 X-RAY EXAM CHEST 1 VIEW: CPT | Mod: 26

## 2023-03-22 PROCEDURE — 84484 ASSAY OF TROPONIN QUANT: CPT

## 2023-03-22 PROCEDURE — 81001 URINALYSIS AUTO W/SCOPE: CPT

## 2023-03-22 PROCEDURE — 87077 CULTURE AEROBIC IDENTIFY: CPT

## 2023-03-22 PROCEDURE — 99285 EMERGENCY DEPT VISIT HI MDM: CPT | Mod: 25

## 2023-03-22 PROCEDURE — 87040 BLOOD CULTURE FOR BACTERIA: CPT

## 2023-03-22 PROCEDURE — 87635 SARS-COV-2 COVID-19 AMP PRB: CPT

## 2023-03-22 PROCEDURE — 83690 ASSAY OF LIPASE: CPT

## 2023-03-22 PROCEDURE — 82803 BLOOD GASES ANY COMBINATION: CPT

## 2023-03-22 PROCEDURE — 87449 NOS EACH ORGANISM AG IA: CPT

## 2023-03-22 PROCEDURE — 87640 STAPH A DNA AMP PROBE: CPT

## 2023-03-22 PROCEDURE — 87641 MR-STAPH DNA AMP PROBE: CPT

## 2023-03-22 PROCEDURE — 82550 ASSAY OF CK (CPK): CPT

## 2023-03-22 PROCEDURE — 99222 1ST HOSP IP/OBS MODERATE 55: CPT

## 2023-03-22 PROCEDURE — 85025 COMPLETE CBC W/AUTO DIFF WBC: CPT

## 2023-03-22 PROCEDURE — 87899 AGENT NOS ASSAY W/OPTIC: CPT

## 2023-03-22 PROCEDURE — 83880 ASSAY OF NATRIURETIC PEPTIDE: CPT

## 2023-03-22 PROCEDURE — 84100 ASSAY OF PHOSPHORUS: CPT

## 2023-03-22 PROCEDURE — 86738 MYCOPLASMA ANTIBODY: CPT

## 2023-03-22 PROCEDURE — 83036 HEMOGLOBIN GLYCOSYLATED A1C: CPT

## 2023-03-22 PROCEDURE — 82553 CREATINE MB FRACTION: CPT

## 2023-03-22 PROCEDURE — 86703 HIV-1/HIV-2 1 RESULT ANTBDY: CPT

## 2023-03-22 PROCEDURE — 87086 URINE CULTURE/COLONY COUNT: CPT

## 2023-03-22 PROCEDURE — 80053 COMPREHEN METABOLIC PANEL: CPT

## 2023-03-22 PROCEDURE — 97163 PT EVAL HIGH COMPLEX 45 MIN: CPT

## 2023-03-22 PROCEDURE — 85027 COMPLETE CBC AUTOMATED: CPT

## 2023-03-22 PROCEDURE — 83605 ASSAY OF LACTIC ACID: CPT

## 2023-03-22 PROCEDURE — 93880 EXTRACRANIAL BILAT STUDY: CPT | Mod: 26

## 2023-03-22 PROCEDURE — 87186 SC STD MICRODIL/AGAR DIL: CPT

## 2023-03-22 PROCEDURE — 71045 X-RAY EXAM CHEST 1 VIEW: CPT

## 2023-03-22 PROCEDURE — 36415 COLL VENOUS BLD VENIPUNCTURE: CPT

## 2023-03-22 PROCEDURE — 80048 BASIC METABOLIC PNL TOTAL CA: CPT

## 2023-03-22 PROCEDURE — 82962 GLUCOSE BLOOD TEST: CPT

## 2023-03-22 PROCEDURE — 83735 ASSAY OF MAGNESIUM: CPT

## 2023-03-22 PROCEDURE — 93010 ELECTROCARDIOGRAM REPORT: CPT

## 2023-03-22 PROCEDURE — 0225U NFCT DS DNA&RNA 21 SARSCOV2: CPT

## 2023-03-22 RX ORDER — TAMSULOSIN HYDROCHLORIDE 0.4 MG/1
0.4 CAPSULE ORAL AT BEDTIME
Refills: 0 | Status: DISCONTINUED | OUTPATIENT
Start: 2023-03-22 | End: 2023-03-27

## 2023-03-22 RX ORDER — INSULIN LISPRO 100/ML
4 VIAL (ML) SUBCUTANEOUS
Refills: 0 | Status: DISCONTINUED | OUTPATIENT
Start: 2023-03-22 | End: 2023-03-22

## 2023-03-22 RX ORDER — INSULIN LISPRO 100/ML
VIAL (ML) SUBCUTANEOUS
Refills: 0 | Status: DISCONTINUED | OUTPATIENT
Start: 2023-03-22 | End: 2023-03-27

## 2023-03-22 RX ORDER — SODIUM CHLORIDE 9 MG/ML
1000 INJECTION, SOLUTION INTRAVENOUS
Refills: 0 | Status: DISCONTINUED | OUTPATIENT
Start: 2023-03-22 | End: 2023-03-22

## 2023-03-22 RX ORDER — MONTELUKAST 4 MG/1
10 TABLET, CHEWABLE ORAL DAILY
Refills: 0 | Status: DISCONTINUED | OUTPATIENT
Start: 2023-03-22 | End: 2023-03-27

## 2023-03-22 RX ORDER — MECLIZINE HCL 12.5 MG
25 TABLET ORAL DAILY
Refills: 0 | Status: DISCONTINUED | OUTPATIENT
Start: 2023-03-22 | End: 2023-03-27

## 2023-03-22 RX ORDER — LOSARTAN POTASSIUM 100 MG/1
50 TABLET, FILM COATED ORAL DAILY
Refills: 0 | Status: DISCONTINUED | OUTPATIENT
Start: 2023-03-22 | End: 2023-03-23

## 2023-03-22 RX ORDER — TAMSULOSIN HYDROCHLORIDE 0.4 MG/1
0.4 CAPSULE ORAL AT BEDTIME
Refills: 0 | Status: DISCONTINUED | OUTPATIENT
Start: 2023-03-22 | End: 2023-03-22

## 2023-03-22 RX ORDER — INSULIN LISPRO 100/ML
VIAL (ML) SUBCUTANEOUS AT BEDTIME
Refills: 0 | Status: DISCONTINUED | OUTPATIENT
Start: 2023-03-22 | End: 2023-03-22

## 2023-03-22 RX ORDER — SODIUM CHLORIDE 9 MG/ML
3 INJECTION INTRAMUSCULAR; INTRAVENOUS; SUBCUTANEOUS EVERY 8 HOURS
Refills: 0 | Status: DISCONTINUED | OUTPATIENT
Start: 2023-03-22 | End: 2023-03-27

## 2023-03-22 RX ORDER — INSULIN LISPRO 100/ML
6 VIAL (ML) SUBCUTANEOUS
Refills: 0 | Status: DISCONTINUED | OUTPATIENT
Start: 2023-03-22 | End: 2023-03-27

## 2023-03-22 RX ORDER — INSULIN GLARGINE 100 [IU]/ML
35 INJECTION, SOLUTION SUBCUTANEOUS ONCE
Refills: 0 | Status: COMPLETED | OUTPATIENT
Start: 2023-03-22 | End: 2023-03-22

## 2023-03-22 RX ORDER — LOSARTAN POTASSIUM 100 MG/1
100 TABLET, FILM COATED ORAL DAILY
Refills: 0 | Status: DISCONTINUED | OUTPATIENT
Start: 2023-03-22 | End: 2023-03-22

## 2023-03-22 RX ORDER — GLUCAGON INJECTION, SOLUTION 0.5 MG/.1ML
1 INJECTION, SOLUTION SUBCUTANEOUS ONCE
Refills: 0 | Status: DISCONTINUED | OUTPATIENT
Start: 2023-03-22 | End: 2023-03-22

## 2023-03-22 RX ORDER — ASPIRIN/CALCIUM CARB/MAGNESIUM 324 MG
81 TABLET ORAL DAILY
Refills: 0 | Status: DISCONTINUED | OUTPATIENT
Start: 2023-03-22 | End: 2023-03-27

## 2023-03-22 RX ORDER — POTASSIUM CHLORIDE 20 MEQ
20 PACKET (EA) ORAL ONCE
Refills: 0 | Status: COMPLETED | OUTPATIENT
Start: 2023-03-22 | End: 2023-03-22

## 2023-03-22 RX ORDER — INSULIN GLARGINE 100 [IU]/ML
30 INJECTION, SOLUTION SUBCUTANEOUS EVERY MORNING
Refills: 0 | Status: DISCONTINUED | OUTPATIENT
Start: 2023-03-22 | End: 2023-03-22

## 2023-03-22 RX ORDER — ATORVASTATIN CALCIUM 80 MG/1
40 TABLET, FILM COATED ORAL AT BEDTIME
Refills: 0 | Status: DISCONTINUED | OUTPATIENT
Start: 2023-03-22 | End: 2023-03-22

## 2023-03-22 RX ORDER — DEXTROSE 50 % IN WATER 50 %
15 SYRINGE (ML) INTRAVENOUS ONCE
Refills: 0 | Status: DISCONTINUED | OUTPATIENT
Start: 2023-03-22 | End: 2023-03-22

## 2023-03-22 RX ORDER — DIPHENHYDRAMINE HYDROCHLORIDE AND LIDOCAINE HYDROCHLORIDE AND ALUMINUM HYDROXIDE AND MAGNESIUM HYDRO
30 KIT
Refills: 0 | Status: DISCONTINUED | OUTPATIENT
Start: 2023-03-22 | End: 2023-03-22

## 2023-03-22 RX ORDER — PANTOPRAZOLE SODIUM 20 MG/1
40 TABLET, DELAYED RELEASE ORAL
Refills: 0 | Status: DISCONTINUED | OUTPATIENT
Start: 2023-03-22 | End: 2023-03-22

## 2023-03-22 RX ORDER — MONTELUKAST 4 MG/1
10 TABLET, CHEWABLE ORAL DAILY
Refills: 0 | Status: DISCONTINUED | OUTPATIENT
Start: 2023-03-22 | End: 2023-03-22

## 2023-03-22 RX ORDER — INSULIN GLARGINE 100 [IU]/ML
35 INJECTION, SOLUTION SUBCUTANEOUS
Refills: 0 | Status: DISCONTINUED | OUTPATIENT
Start: 2023-03-23 | End: 2023-03-23

## 2023-03-22 RX ORDER — DEXTROSE 50 % IN WATER 50 %
25 SYRINGE (ML) INTRAVENOUS ONCE
Refills: 0 | Status: DISCONTINUED | OUTPATIENT
Start: 2023-03-22 | End: 2023-03-22

## 2023-03-22 RX ORDER — INSULIN LISPRO 100/ML
VIAL (ML) SUBCUTANEOUS
Refills: 0 | Status: DISCONTINUED | OUTPATIENT
Start: 2023-03-22 | End: 2023-03-22

## 2023-03-22 RX ORDER — DEXTROSE 50 % IN WATER 50 %
12.5 SYRINGE (ML) INTRAVENOUS ONCE
Refills: 0 | Status: DISCONTINUED | OUTPATIENT
Start: 2023-03-22 | End: 2023-03-22

## 2023-03-22 RX ORDER — HEPARIN SODIUM 5000 [USP'U]/ML
5000 INJECTION INTRAVENOUS; SUBCUTANEOUS EVERY 8 HOURS
Refills: 0 | Status: DISCONTINUED | OUTPATIENT
Start: 2023-03-22 | End: 2023-03-27

## 2023-03-22 RX ORDER — METOCLOPRAMIDE HCL 10 MG
10 TABLET ORAL THREE TIMES A DAY
Refills: 0 | Status: DISCONTINUED | OUTPATIENT
Start: 2023-03-22 | End: 2023-03-22

## 2023-03-22 RX ORDER — INSULIN LISPRO 100/ML
VIAL (ML) SUBCUTANEOUS AT BEDTIME
Refills: 0 | Status: DISCONTINUED | OUTPATIENT
Start: 2023-03-22 | End: 2023-03-23

## 2023-03-22 RX ORDER — POTASSIUM CHLORIDE 20 MEQ
40 PACKET (EA) ORAL EVERY 4 HOURS
Refills: 0 | Status: DISCONTINUED | OUTPATIENT
Start: 2023-03-22 | End: 2023-03-22

## 2023-03-22 RX ORDER — POTASSIUM BICARBONATE 978 MG/1
25 TABLET, EFFERVESCENT ORAL ONCE
Refills: 0 | Status: COMPLETED | OUTPATIENT
Start: 2023-03-22 | End: 2023-03-22

## 2023-03-22 RX ORDER — ASPIRIN/CALCIUM CARB/MAGNESIUM 324 MG
81 TABLET ORAL DAILY
Refills: 0 | Status: DISCONTINUED | OUTPATIENT
Start: 2023-03-22 | End: 2023-03-22

## 2023-03-22 RX ORDER — METOPROLOL TARTRATE 50 MG
12.5 TABLET ORAL EVERY 12 HOURS
Refills: 0 | Status: DISCONTINUED | OUTPATIENT
Start: 2023-03-22 | End: 2023-03-23

## 2023-03-22 RX ORDER — DIPHENHYDRAMINE HYDROCHLORIDE AND LIDOCAINE HYDROCHLORIDE AND ALUMINUM HYDROXIDE AND MAGNESIUM HYDRO
15 KIT EVERY 8 HOURS
Refills: 0 | Status: DISCONTINUED | OUTPATIENT
Start: 2023-03-22 | End: 2023-03-27

## 2023-03-22 RX ORDER — ATORVASTATIN CALCIUM 80 MG/1
20 TABLET, FILM COATED ORAL AT BEDTIME
Refills: 0 | Status: DISCONTINUED | OUTPATIENT
Start: 2023-03-22 | End: 2023-03-27

## 2023-03-22 RX ORDER — METOPROLOL TARTRATE 50 MG
25 TABLET ORAL
Refills: 0 | Status: DISCONTINUED | OUTPATIENT
Start: 2023-03-22 | End: 2023-03-22

## 2023-03-22 RX ADMIN — PANTOPRAZOLE SODIUM 40 MILLIGRAM(S): 20 TABLET, DELAYED RELEASE ORAL at 06:28

## 2023-03-22 RX ADMIN — TAMSULOSIN HYDROCHLORIDE 0.4 MILLIGRAM(S): 0.4 CAPSULE ORAL at 21:09

## 2023-03-22 RX ADMIN — Medication 81 MILLIGRAM(S): at 17:28

## 2023-03-22 RX ADMIN — Medication 4 UNIT(S): at 12:49

## 2023-03-22 RX ADMIN — Medication 6: at 12:48

## 2023-03-22 RX ADMIN — Medication 12.5 MILLIGRAM(S): at 17:28

## 2023-03-22 RX ADMIN — INSULIN GLARGINE 35 UNIT(S): 100 INJECTION, SOLUTION SUBCUTANEOUS at 12:56

## 2023-03-22 RX ADMIN — SODIUM CHLORIDE 3 MILLILITER(S): 9 INJECTION INTRAMUSCULAR; INTRAVENOUS; SUBCUTANEOUS at 21:09

## 2023-03-22 RX ADMIN — Medication 25 MILLIGRAM(S): at 06:28

## 2023-03-22 RX ADMIN — ATORVASTATIN CALCIUM 20 MILLIGRAM(S): 80 TABLET, FILM COATED ORAL at 21:09

## 2023-03-22 RX ADMIN — Medication 6 UNIT(S): at 17:28

## 2023-03-22 RX ADMIN — Medication 20 MILLIEQUIVALENT(S): at 18:24

## 2023-03-22 RX ADMIN — LOSARTAN POTASSIUM 100 MILLIGRAM(S): 100 TABLET, FILM COATED ORAL at 06:29

## 2023-03-22 RX ADMIN — Medication 81 MILLIGRAM(S): at 12:52

## 2023-03-22 RX ADMIN — HEPARIN SODIUM 5000 UNIT(S): 5000 INJECTION INTRAVENOUS; SUBCUTANEOUS at 21:09

## 2023-03-22 RX ADMIN — POTASSIUM BICARBONATE 25 MILLIEQUIVALENT(S): 978 TABLET, EFFERVESCENT ORAL at 18:25

## 2023-03-22 RX ADMIN — HEPARIN SODIUM 5000 UNIT(S): 5000 INJECTION INTRAVENOUS; SUBCUTANEOUS at 06:28

## 2023-03-22 NOTE — TRANSFER ACCEPTANCE NOTE - ASSESSMENT
Patient is 72 years old male with past medical history of Type I DM (50 years ago, on Insulin pump for last 15 years), Vertigo and BPH who was brought to ED due to Abdominal pain, Nausea and vomiting. Patient states that in morning, he changed the insulin pump but wasn't sure what happened and around afternoon, He started feeling abdominal stiffness, rigidity and vomited three times in home and 2 times when came to ED. Patient usually takes 60-70 units of insulin pre meals as he mentioned and he adjusted how many unites based on his diet as he mentioned. In ED, Patient started on IV fluids, got 18 units of insulin and started on Insulin IV infusion. BS >600, Anion Gap 21, Bicarb 14. Patient admitted to ICU for DKA management. Patient also noted to have elevated troponin and was transferred to [facility] for LHC

## 2023-03-22 NOTE — PROGRESS NOTE ADULT - SUBJECTIVE AND OBJECTIVE BOX
Date of service 03/22/2023    chief complaint: DKA/NSTEMI    extended hpi: Patient is 72 years old male with pmh of Type I DM (50 years ago, on Insulin pump for last 15 years), Vertigo and BPH who was brought to ED due to Abdominal pain, Nausea and vomiting.     Patient states that in morning, he changed the insulin pump but wasn't sure what happened and around afternoon, He started feeling abdominal stiffness, rigidity and vomited three times in home and 2 times when came to ED. Patient usually takes 60-70 units of insulin pre meals as he mentioned and he adjusted how many unites based on his diet as he mentioned. In ED, Patient started on IV fluids, got 18 units of insulin and started on Insulin IV infusion. Found to be in DKA. Patient was transferred to ICU for DKA management. Cardiology consulted for increased troponin        DATE OF SERVICE: 03-22-23    Patient denies chest pain or shortness of breath.   Review of symptoms otherwise negative.    MEDICATIONS:  aspirin enteric coated 81 milliGRAM(s) Oral daily  atorvastatin 40 milliGRAM(s) Oral at bedtime  dextrose 5%. 1000 milliLiter(s) IV Continuous <Continuous>  dextrose 5%. 1000 milliLiter(s) IV Continuous <Continuous>  dextrose 50% Injectable 25 Gram(s) IV Push once  dextrose 50% Injectable 12.5 Gram(s) IV Push once  dextrose 50% Injectable 25 Gram(s) IV Push once  dextrose Oral Gel 15 Gram(s) Oral once PRN  FIRST- Mouthwash  BLM 30 milliLiter(s) Swish and Spit four times a day PRN  glucagon  Injectable 1 milliGRAM(s) IntraMuscular once  insulin glargine Injectable (LANTUS) 30 Unit(s) SubCutaneous every morning  insulin lispro (ADMELOG) corrective regimen sliding scale   SubCutaneous three times a day before meals  insulin lispro (ADMELOG) corrective regimen sliding scale   SubCutaneous at bedtime  insulin lispro Injectable (ADMELOG) 4 Unit(s) SubCutaneous three times a day before meals  losartan 100 milliGRAM(s) Oral daily  metoclopramide Injectable 10 milliGRAM(s) IV Push three times a day PRN  metoprolol tartrate 25 milliGRAM(s) Oral two times a day  montelukast 10 milliGRAM(s) Oral daily  pantoprazole    Tablet 40 milliGRAM(s) Oral before breakfast  tamsulosin 0.4 milliGRAM(s) Oral at bedtime      LABS:                        10.3   8.59  )-----------( 279      ( 22 Mar 2023 06:43 )             31.2     Hemoglobin: 10.3 g/dL (03-22 @ 06:43)  Hemoglobin: 11.2 g/dL (03-21 @ 06:16)  Hemoglobin: 10.9 g/dL (03-20 @ 07:05)  Hemoglobin: 10.7 g/dL (03-19 @ 06:45)  Hemoglobin: 10.3 g/dL (03-18 @ 04:00)      03-22    138  |  102  |  10  ----------------------------<  213<H>  3.2<L>   |  30  |  0.54    Ca    8.6      22 Mar 2023 06:43  Phos  3.2     03-22  Mg     1.8     03-22    TPro  5.8<L>  /  Alb  2.4<L>  /  TBili  0.6  /  DBili  x   /  AST  30  /  ALT  33  /  AlkPhos  58  03-22    Creatinine Trend: 0.54<--, 0.71<--, 0.70<--, 0.69<--, 0.84<--, 0.72<--       PHYSICAL EXAM:  T(C): 37 (03-22-23 @ 07:10), Max: 37.3 (03-21-23 @ 23:44)  HR: 71 (03-22-23 @ 07:10) (70 - 92)  BP: 171/80 (03-22-23 @ 07:10) (148/90 - 176/90)  RR: 18 (03-22-23 @ 07:10) (18 - 19)  SpO2: 95% (03-22-23 @ 07:10) (94% - 97%)  Wt(kg): --    I&O's Summar    General: Well nourished in no acute distress. Alert and Oriented * 3.   Head: Normocephalic and atraumatic.   Neck: No JVD. No bruits. Supple. Does not appear to be enlarged.   Cardiovascular: + S1,S2 ; RRR Soft systolic murmur at the left lower sternal border. No rubs noted.    Lungs: CTA b/l. No rhonchi, rales or wheezes.   Abdomen: + BS, soft. Non tender. Non distended. No rebound. No guarding.   Extremities: No clubbing/cyanosis/edema.   Neurologic: Moves all four extremities. Full range of motion.   Skin: Warm and moist. The patient's skin has normal elasticity and good skin turgor.   Psychiatric: Appropriate mood and affect.  Musculoskeletal: Normal range of motion, normal strength          TELEMETRY: 	NSR        TTE:  1. Normal mitral valve. Mildly calcified chordae tendinae.Mild mitral regurgitation.  2. Calcified aortic valve was not well visualized but hasnormal opening. No aortic stenosis. No aortic valveregurgitation seen.  3. Normal left ventricular internal dimensions and wallthicknesses.  4. Low-normal Left Ventricular Systolic Function,  (EF =53% by biplane). No regional wall motion abnormalities.Ultrasound LV opacification agent was used to enhanceendocardial definition.  5. Right ventricle not well visualized. Normal RV systolicfunction (RV tissue Doppler 11 cm/s).  6. RV systolic pressure is borderline normal at  33 mm Hg.7. Normal tricuspid valve. Moderate tricuspidregurgitation.  8. No pericardial effusion.        ASSESSMENT/PLAN: Patient is 72 years old male with pmh of Type I DM (50 years ago, on Insulin pump for last 15 years), Vertigo and BPH who was brought to ED due to Abdominal pain, Nausea and vomiting. Cardiology consulted for increased troponin.    1. NSTEMI  -- EKG non-ischemic  -- TTE without wall motion  -- currently denies anginal chest pain  -- c/w ASA, statin   -- glycemic control per med  -- maintain tele  -- For [facility] today  -- All risks, benefits, indications, contraindications, inferior alternative options of cardiac cath explained to the patient. The patient understood everything and elects for cardiac cath.     Nacho Nix MD  Pager: [phone]

## 2023-03-22 NOTE — H&P ADULT - PROBLEM SELECTOR PLAN 2
Type 1 DM on insulin pump at home (15 years)   House Endocrine following   ACHS FS/Low correction/ ISS    Continue with Premeals 6U TID   Continue with Lantus 35U in the afternoon as per Endo

## 2023-03-22 NOTE — H&P ADULT - ASSESSMENT
72 years old male with PMHX of HTN, Type I DM (50 years ago, on Insulin pump for last 15 years), Vertigo and BPH who was brought to ED due to Abdominal pain, Nausea and vomiting. Patient also found to have elevated troponin. Patient transferred over from [facility] showing   TVD CAD.      3/22 VSS; Patient seen at bedside, resting comfortably on room air. In NAD, no SOB, or CP at this time.

## 2023-03-22 NOTE — TRANSFER ACCEPTANCE NOTE - PROBLEM SELECTOR PLAN 1
Cardio onboard   Trop >25k in the ICU and then down trended to 1800  Chest pain at that time as per patient   Continue ASA  Continue high intensity statin   Continue Metoprolol   Continue Losartan   Telemetry   BP target <140/90 mmHg  Monitor BP   Plan for transfer to cardiac cath at [facility].

## 2023-03-22 NOTE — H&P ADULT - NSHPLABSRESULTS_GEN_ALL_CORE
< from: Cardiac Catheterization (03.22.23 @ 09:42) >    Cath Lab Report    Diagnostic Cardiologist:       Nacho Nix MD   Referring Physician:           Nacho Nix MD   Referring Physician:           Alec Mitchell MD     Procedures Performed   Procedures:               1.    Arterial Access - Right Radial     2.    Diagnostic Coronary Angiography   3.    Left Heart Cath     Indications:                Myocardial infarction without ST elevation  (NSTEMI)    Diagnostic Conclusions:     Large LAD with severe bifurcating/ostial and heavily calcifed disease   LCX with Bifurcating/ostial disease   RCA with Moderate Disease   Recommendations:     CABG evaluation      Presentation:   Patient is 72 years old male with pmh of Type I DM (50 years ago, on  Insulin pump for last 15 years), Vertigo  and BPH who was brought to ED due to Abdominal pain, Nausea and  vomiting. Found to be  in DKA. Now for  [facility] due to NSTEMI.     Procedure Narrative:   The risks and alternatives of the procedures and conscious sedation  were explained to the patient and informed consent was  obtained. The patient was brought to the cath lab and placed on the  exam table.  Access   Right radial artery:   The puncture site was infiltrated with 2% Lidocaine. Vascular access  was obtained using modified seldinger technique.    Diagnostic Findings:     Coronary Angiography   LM   Left main artery: Angiography shows minor irregularities. BRINDA Flow 3.      LAD   Left anterior descending artery: Angiography shows severe  atherosclerosis. There is a 90% lesion at the proximal to mid  portion of the vessel at theregion of a bifurcation with a Diag,  LEsion is hevaily calcified. BRINDA Flow 3. Proximal left anterior    Patient: JOANIE MESA             MRN: [id number]  Study Date: 03/22/2023   09:42 AM      Page 1 of 3          descending: There is a 50% lesionin the ostial to proximal prtion of  the vessel. First diagonal: Angiography shows severe  atherosclerosis. 80% lesion in ostium of the vessel. Second diagonal:  Angiography shows severe atherosclerosis. 80% lesion i  ostium of vesel. BRINDA Flow 3.      CX   Circumflex: Angiography shows severe atherosclerosis. 90% lesion at  the bifurcation of a moderate sized OM. BRINDA Flow 3.  First obtuse marginal: Angiography shows severe atherosclerosis. There  is a 90% lesion at the ostium of the vessel. BRINDA Flow  3.      RCA   Right coronary artery: Angiography shows moderate atherosclerosis.  Moderate Disease throught the vessel. BRINDA Flow 3.  Proximal right coronary artery: Angiography shows moderate  atherosclerosis. There is a 50% eccentric lesion in proximal  portion of vessel. BRINDA Flow 3.      Ramus   Ramus intermedius: Angiography shows moderate atherosclerosis. BRINDA  Flow 3.    < end of copied text >

## 2023-03-22 NOTE — CONSULT NOTE ADULT - SUBJECTIVE AND OBJECTIVE BOX
DATE OF SERVICE: 03-22-23 @ 11:40    Patient is a 72y old  Male who presents with a chief complaint of DKA (22 Mar 2023 09:25)      HPI:    Patient is 72 years old male with past medical history of Type I DM (diagnosed 50 years ago, on Insulin pump for last 15 years), Vertigo and BPH [facility] due to Abdominal pain, Nausea/Vomiting and diarrhea that started the same day. Patient states that in the morning, he changed the insulin pump but wasn't sure what happened and around afternoon, he started feeling abdominal stiffness, rigidity and vomited three times and 2 times when came to ED with multiple episodes of diarrhea. Patient usually takes 60-70 units of insulin pre meals as he mentioned and he adjusted how many units based on his diet as he mentioned. Patient also noted to have elevated troponin and ws started initially on Lovenox and then admitted to ICU. Patient transferred to [facility] for LHC. At present totally asymptomatic and chest pain free    PAST MEDICAL & SURGICAL HISTORY:    Diabetes      Hypertension      BPH (benign prostatic hyperplasia)      S/P cataract surgery  right eye      Steel plate in right arm          Review of Systems:   CONSTITUTIONAL: No fever, weight loss, or fatigue  EYES: No eye pain, visual disturbances, or discharge  ENMT:  No difficulty hearing, tinnitus, vertigo; No sinus or throat pain  NECK: No pain or stiffness  RESPIRATORY: No cough, wheezing, chills or hemoptysis; No shortness of breath  CARDIOVASCULAR: No chest pain, palpitations, dizziness, leg swelling or sob  GASTROINTESTINAL: No abdominal pain. No nausea, vomiting, diarrhea or constipation  GENITOURINARY: No dysuria, frequency, hematuria, or incontinence  NEUROLOGICAL: No headaches, memory loss, loss of strength, numbness, or tremors  SKIN: No itching, burning, rashes, or lesions   LYMPH NODES: No enlarged glands  ENDOCRINE: No heat or cold intolerance; No hair loss    Allergies    Neosporin (Rash)    Social History: non smoker  no IVDA  no ETOH abuse   lives with wife    FAMILY HISTORY:    No pertinent family history in first degree relatives        MEDICATIONS  (STANDING):  aspirin enteric coated 81 milliGRAM(s) Oral daily  atorvastatin 40 milliGRAM(s) Oral at bedtime  dextrose 5%. 1000 milliLiter(s) (50 mL/Hr) IV Continuous <Continuous>  dextrose 5%. 1000 milliLiter(s) (100 mL/Hr) IV Continuous <Continuous>  dextrose 50% Injectable 25 Gram(s) IV Push once  dextrose 50% Injectable 12.5 Gram(s) IV Push once  dextrose 50% Injectable 25 Gram(s) IV Push once  glucagon  Injectable 1 milliGRAM(s) IntraMuscular once  insulin glargine Injectable (LANTUS) 30 Unit(s) SubCutaneous every morning  insulin lispro (ADMELOG) corrective regimen sliding scale   SubCutaneous three times a day before meals  insulin lispro (ADMELOG) corrective regimen sliding scale   SubCutaneous at bedtime  insulin lispro Injectable (ADMELOG) 4 Unit(s) SubCutaneous three times a day before meals  losartan 100 milliGRAM(s) Oral daily  metoprolol tartrate 25 milliGRAM(s) Oral two times a day  montelukast 10 milliGRAM(s) Oral daily  pantoprazole    Tablet 40 milliGRAM(s) Oral before breakfast  tamsulosin 0.4 milliGRAM(s) Oral at bedtime    MEDICATIONS  (PRN):  dextrose Oral Gel 15 Gram(s) Oral once PRN Blood Glucose LESS THAN 70 milliGRAM(s)/deciliter  FIRST- Mouthwash  BLM 30 milliLiter(s) Swish and Spit four times a day PRN Mouth Care  metoclopramide Injectable 10 milliGRAM(s) IV Push three times a day PRN Gastroparesis      CAPILLARY BLOOD GLUCOSE      POCT Blood Glucose.: 214 mg/dL (22 Mar 2023 08:53)  POCT Blood Glucose.: 208 mg/dL (22 Mar 2023 07:33)  POCT Blood Glucose.: 251 mg/dL (21 Mar 2023 21:14)  POCT Blood Glucose.: 189 mg/dL (21 Mar 2023 16:53)    I&O's Summary      24hrs Vital:  T(C): 37 (03-22-23 @ 07:10), Max: 37.3 (03-21-23 @ 23:44)  HR: 71 (03-22-23 @ 07:10) (71 - 92)  BP: 171/80 (03-22-23 @ 07:10) (148/90 - 176/90)  RR: 18 (03-22-23 @ 07:10) (18 - 19)  SpO2: 95% (03-22-23 @ 07:10) (95% - 97%)    PHYSICAL EXAM:  GENERAL: NAD, well-developed  HEAD:  Atraumatic, Normocephalic  EYES: EOMI, PERRLA, conjunctiva and sclera clear  NECK: Supple, No JVD  CHEST/LUNG: Clear to auscultation bilaterally; No wheeze  HEART: S1S2; No rubs, or gallops, no murmurs  ABDOMEN: Soft, Nontender; Bowel sounds present  EXTREMITIES:  + Peripheral Pulses, No clubbing or cyanosis, trace edema   bilateral   PSYCH: AO x 3,   NEUROLOGY: Alert, no focal motor or sensory deficits  SKIN: No rashes or lesions    LABS:                        10.3   8.59  )-----------( 279      ( 22 Mar 2023 06:43 )             31.2     03-22    138  |  102  |  10  ----------------------------<  213<H>  3.2<L>   |  30  |  0.54    Ca    8.6      22 Mar 2023 06:43  Phos  3.2     03-22  Mg     1.8     03-22    TPro  5.8<L>  /  Alb  2.4<L>  /  TBili  0.6  /  DBili  x   /  AST  30  /  ALT  33  /  AlkPhos  58  03-22              RADIOLOGY & ADDITIONAL TESTS:    Consultant(s) Notes Reviewed:      Care Discussed with Consultants/Other Providers:

## 2023-03-22 NOTE — CONSULT NOTE ADULT - PROBLEM SELECTOR RECOMMENDATION 9
not in ketotic state at present  needs close follow up from endo  continue current regimen  finger sticks with short acting insulin sliding scale  HbA1C 7.7

## 2023-03-22 NOTE — CONSULT NOTE ADULT - ASSESSMENT
This is a 73 yo M /w a PMh of DM1 on insulin pump (Tslim /w novolog) who presents to [facility] due to abdominal pain N/V. Patient found to have DKA secondary to insulin pump malfunction. Patient was treated with an insulin drip in their MICU and then transitioned to lantus 30 units nightly and admelog 4 units before meals. During hospitalization patient noted to have significant troponinemia and he was transferred today to [facility] for left heart cath. Patient now pending transfer to [facility] for evaluation of CABG    #DM1  #rule out DKA  -patient did not get his lantus this morning  -please give stat lantus 35 units  prior to transfer to [facility]. Increased due to fasting hyperglycemia this morning with  on lantus 30 units daily  -please increase admelog to 6 units before meals - he will likely need more insulin before meals given his basal requirements  -low admelog correction scale before meals and before bedtime  -FSG before meals and before bedtime  -FSG goal 100-180  -carb consistent diet  -please check labs for DKA once he arrives at [facility] including BMP, VBG, BHB - he is at risk given that he missed his lantus this morning  -hypoglycemia protocol in place if needed  -patient's wife will bring in insulin pump tomorrow to review settings    #Discharge  -possibly d/c on insulin pump vs basal-bolus  -can follow up with Dr. Ngo at [facility] [phone] extenstion [phone]     #HTN  -BP goal <130/80  -ACEi vs ARB vs Enteresto depending on cardiology    #HLD  -recommend high intensity statin given triple vessel disease  -LDL goal <55  -can continue with atorvastatin 40mg daily for now    Endocrinology will continue to follow over at [facility]    Case discussed with Dr. Saulo Morley MD  Endocrine Fellow  Can be reached via teams. For follow up questions, discharge recommendations, or new consults, please call answering service at [phone] (weekdays); [phone] (nights/weekends)   This is a 73 yo M /w a PMh of DM1 on insulin pump (Tslim /w novolog) who presents to [facility] due to abdominal pain N/V. Patient found to have DKA secondary to insulin pump malfunction. Patient was treated with an insulin drip in their MICU and then transitioned to lantus 30 units nightly and admelog 4 units before meals. During hospitalization patient noted to have significant troponinemia and he was transferred today to [facility] for left heart cath. Patient now pending transfer to [facility] for evaluation of CABG    #DM1  #rule out DKA  -patient did not get his lantus this morning  -please give stat lantus 35 units  prior to transfer to [facility]. Increased due to fasting hyperglycemia this morning with  on lantus 30 units daily  -please increase admelog to 6 units before meals - he will likely need more insulin before meals given his basal requirements  -low admelog correction scale before meals and before bedtime  -FSG before meals and before bedtime  -FSG goal 100-180  -carb consistent diet  -please check labs for DKA once he arrives at [facility] including BMP, VBG, BHB - he is at risk given that he missed his lantus this morning. Pt appears clinically well  -hypoglycemia protocol in place if needed  -patient's wife will bring in insulin pump tomorrow to review settings    #Discharge  -possibly d/c on insulin pump vs basal-bolus  -can follow up with Dr. Ngo at [facility] [phone] extension [phone]     #HTN  -BP goal <130/80  -ACEi vs ARB vs Enteresto depending on cardiology    #HLD  -recommend high intensity statin given triple vessel disease  -LDL goal <55  -can continue with atorvastatin 40mg daily for now    Endocrinology will continue to follow over at [facility]    Case discussed with Dr. Saulo Morley MD  Endocrine Fellow  Can be reached via teams. For follow up questions, discharge recommendations, or new consults, please call answering service at [phone] (weekdays); [phone] (nights/weekends)

## 2023-03-22 NOTE — TRANSFER ACCEPTANCE NOTE - HISTORY OF PRESENT ILLNESS
Patient is 72 years old male with past medical history of Type I DM (diagnosed 50 years ago, on Insulin pump for last 15 years), Vertigo and BPH who was brought to [facility] ED on 03/16 due to Abdominal pain, Nausea/Vomiting and diarrhea that started the same day. Patient states that in the morning, he changed the insulin pump but wasn't sure what happened and around afternoon, he started feeling abdominal stiffness, rigidity and vomited three times and 2 times when came to ED with multiple episodes of diarrhea. Patient usually takes 60-70 units of insulin pre meals as he mentioned and he adjusted how many units based on his diet as he mentioned. In ED, Patient started on IV fluids, got 18 units of insulin and started on Insulin IV infusion. Patient denied chest pain, dizziness, respiratory distress, change in bowel movement. Patient was transferred to ICU for DKA management.    Patient is 72 years old male with past medical history of Type I DM (diagnosed 50 years ago, on Insulin pump for last 15 years), Vertigo and BPH who was brought to [facility] ED on 03/16 due to Abdominal pain, Nausea/Vomiting and diarrhea that started the same day. Patient states that in the morning, he changed the insulin pump but wasn't sure what happened and around afternoon, he started feeling abdominal stiffness, rigidity and vomited three times and 2 times when came to ED with multiple episodes of diarrhea. Patient usually takes 60-70 units of insulin pre meals as he mentioned and he adjusted how many units based on his diet as he mentioned. In ED, Patient started on IV fluids, got 18 units of insulin and started on Insulin IV infusion. Patient otherwise denied any CP, fevers, chills, SOB, dizziness, melena, hematochezia, recent travel, sick contact, cough, body aches, pleuritic or positional chest pain. Patient was transferred to ICU for DKA management.     Patient also noted to have elevated troponin and ws started initially on Lovenox and then admitted to ICU. Patient transferred to [facility] for LHC

## 2023-03-22 NOTE — CONSULT NOTE ADULT - PROBLEM SELECTOR RECOMMENDATION 2
trop coming down  will defer to cardiology  likely transfer to [facility] for CABG evaluation

## 2023-03-22 NOTE — CHART NOTE - NSCHARTNOTEFT_GEN_A_CORE
Endocrinology consulted on Mr. Major who has T1DM on Tslim Endocrinology consulted on Mr. Major who has T1DM on Tslim novolog pump who presented to OSH in DKA, transferred ot [facility] for LHC and then transferred to [facility] for evaluation for CABG. Patient already seen by endocrinology team at [facility] on 3/22 with full consult performed. 35 units lantus already given at 1pm today. HbA1c 7.7, follow with Dr. Ngo.    Recommend  - can check BMP, BHB, VBG to ensure not in DKA  - continue lantus 35 units daily at 1pm, next dose on 3/23 at 1pm, do not hold if npo (patient has T1DM and always needs to have basal insulin, should not be held)  - start admelog 6 units tid premeal, hold if NPO  - low admelog correction scale tid with meals and separate low admelog correction scale at bedtime  - fingersticks qid with meals and bedtime  - hypoglycemia protocol prn  - do not use insulin pump for now, only basal/bolus as above    Endocrinology will continue to follow inpatient.    Obey Cash MD  Endocrinology Fellow Endocrinology consulted on Mr. Major who has T1DM on Tslim novolog pump/dexcomg glucose sensor who presented to OSH in DKA, transferred ot [facility] for LHC and then transferred to [facility] for evaluation for CABG. Patient already seen by endocrinology team at [facility] on 3/22 with full consult performed. 35 units lantus already given at 1pm today. HbA1c 7.7, follow with Dr. Ngo.    Recommend  - can check BMP, BHB, VBG to ensure not in DKA  - continue lantus 35 units daily at 1pm, next dose on 3/23 at 1pm, do not hold if npo (patient has T1DM and always needs to have basal insulin, should not be held)  - start admelog 6 units tid premeal, hold if NPO  - low admelog correction scale tid with meals and separate low admelog correction scale at bedtime  - fingersticks qid with meals and bedtime  - hypoglycemia protocol prn  - target -180  - carb consistent Diet  - do not use insulin pump for now, only basal/bolus as above  - can follow up with Dr. Ngo  - edyta on insulin pump upon evaluation vs basal/bolus    Endocrinology will continue to follow inpatient.    Obey Cash MD  Endocrinology Fellow

## 2023-03-22 NOTE — CONSULT NOTE ADULT - SUBJECTIVE AND OBJECTIVE BOX
HPI:  Patient is 72 years old male with past medical history of Type I DM (diagnosed 50 years ago, on Insulin pump for last 15 years), Vertigo and BPH [facility] due to Abdominal pain, Nausea/Vomiting and diarrhea that started the same day. Patient states that in the morning, he changed the insulin pump but wasn't sure what happened and around afternoon, he started feeling abdominal stiffness, rigidity and vomited three times and 2 times when came to ED with multiple episodes of diarrhea. Patient usually takes 60-70 units of insulin pre meals as he mentioned and he adjusted how many units based on his diet as he mentioned. In ED, Patient started on IV fluids, got 18 units of insulin and started on Insulin IV infusion. Patient otherwise denied any CP, fevers, chills, SOB, dizziness, melena, hematochezia, recent travel, sick contact, cough, body aches, pleuritic or positional chest pain. Patient was transferred to ICU for DKA management.     Patient also noted to have elevated troponin and ws started initially on Lovenox and then admitted to ICU. Patient transferred to [facility] for [facility]    (22 Mar 2023 09:20)      Consulted for: DM1 management  This is a 71 yo M /w a PMh of DM1 on insulin pump (Tslim /w novolog) who presents to [facility] due to abdominal pain N/V. Patient found to have DKA secondary to insulin pump malfunction. Patient was treated with an insulin drip in their MICU and then transitioned to lantus 30 units nightly and admelog 4 units before meals. During hospitalization patient noted to have significant troponinemia and he was transferred today to [facility] for left heart cath. Patient now pending transfer to [facility] for evaluation of CABG    Of note, patient did not receive his lantus this morning. Last dose of lantus was 30 units in the morning of 3/21/23 at around 8AM. FSG now 278. Patient had a tuna sandwich without asking for pre-meal insulin because he was hungry. Patient will receive stat lantus 35 units prior to transfer and admelog 4 units plus low correction scale.    Per patient he has DM1 for 50 years. Follows with Dr. Ngo at [facility]. Patient unaware of his insulin pump settings at this time. He also uses a DEXCOM at home along with his TSlim insulin pump which he uses with Novolog insulin. Denies retinopathy, nephropathy, neuropathy. HbA1c 7.7%.    PAST MEDICAL & SURGICAL HISTORY:  Diabetes      Hypertension      BPH (benign prostatic hyperplasia)      S/P cataract surgery  right eye      Steel plate in right arm          FAMILY HISTORY:  No pertinent family history in first degree relatives        Social History: denies all toxic habits    Home Medications:  atorvastatin 20 mg oral tablet: 1 tab(s) orally once a day (22 Mar 2023 09:22)  losartan 100 mg oral tablet: 1 tab(s) orally once a day (22 Mar 2023 09:22)  MECLIZINE HYDROCHLORIDE  25 MG TABS: 1 tab(s) orally once a day (22 Mar 2023 09:22)  MONTELUKAST SOD 10 MG TABLET: TAKE 1 TABLET BY MOUTH EVERY DAY IN THE EVENING (22 Mar 2023 09:22)  tamsulosin 0.4 mg oral capsule: 1 cap(s) orally once a day (22 Mar 2023 09:22)      MEDICATIONS  (STANDING):  aspirin enteric coated 81 milliGRAM(s) Oral daily  atorvastatin 40 milliGRAM(s) Oral at bedtime  dextrose 5%. 1000 milliLiter(s) (50 mL/Hr) IV Continuous <Continuous>  dextrose 5%. 1000 milliLiter(s) (100 mL/Hr) IV Continuous <Continuous>  dextrose 50% Injectable 25 Gram(s) IV Push once  dextrose 50% Injectable 12.5 Gram(s) IV Push once  dextrose 50% Injectable 25 Gram(s) IV Push once  glucagon  Injectable 1 milliGRAM(s) IntraMuscular once  insulin lispro (ADMELOG) corrective regimen sliding scale   SubCutaneous three times a day before meals  insulin lispro (ADMELOG) corrective regimen sliding scale   SubCutaneous at bedtime  insulin lispro Injectable (ADMELOG) 4 Unit(s) SubCutaneous three times a day before meals  losartan 100 milliGRAM(s) Oral daily  metoprolol tartrate 25 milliGRAM(s) Oral two times a day  montelukast 10 milliGRAM(s) Oral daily  pantoprazole    Tablet 40 milliGRAM(s) Oral before breakfast  potassium chloride    Tablet ER 40 milliEquivalent(s) Oral every 4 hours  tamsulosin 0.4 milliGRAM(s) Oral at bedtime    MEDICATIONS  (PRN):  dextrose Oral Gel 15 Gram(s) Oral once PRN Blood Glucose LESS THAN 70 milliGRAM(s)/deciliter  FIRST- Mouthwash  BLM 30 milliLiter(s) Swish and Spit four times a day PRN Mouth Care  metoclopramide Injectable 10 milliGRAM(s) IV Push three times a day PRN Gastroparesis      Allergies    Neosporin (Rash)    Intolerances      Review of Systems:  Constitutional: No fever  Eyes: No blurry vision  Neuro: No tremors  HEENT: No pain  Cardiovascular: No chest pain, palpitations  Respiratory: No SOB, no cough  GI: No nausea, vomiting, abdominal pain  : No dysuria  Skin: no rash  Psych: no depression  Endocrine: no polyuria, polydipsia  Hem/lymph: no swelling  Osteoporosis: no fractures    PHYSICAL EXAM:  VITALS: T(C): 37 (03-22-23 @ 07:10)  T(F): 98.6 (03-22-23 @ 07:10), Max: 99.1 (03-21-23 @ 23:44)  HR: 71 (03-22-23 @ 07:10) (71 - 92)  BP: 171/80 (03-22-23 @ 07:10) (148/90 - 176/90)  RR:  (18 - 19)  SpO2:  (95% - 97%)  Wt(kg): --  GENERAL: NAD  EYES: No proptosis, no lid lag, anicteric  THYROID: Normal size, no palpable nodules  RESPIRATORY: Clear to auscultation bilaterally  CARDIOVASCULAR: Regular rate and rhythm  GI: Soft, nontender, non distended  EXT: b/l feet without wounds; 2+ pulses  PSYCH: Alert and oriented x 3, reactive mood    POCT Blood Glucose.: 278 mg/dL (03-22-23 @ 12:44)  POCT Blood Glucose.: 214 mg/dL (03-22-23 @ 08:53)  POCT Blood Glucose.: 208 mg/dL (03-22-23 @ 07:33)  POCT Blood Glucose.: 251 mg/dL (03-21-23 @ 21:14)  POCT Blood Glucose.: 189 mg/dL (03-21-23 @ 16:53)  POCT Blood Glucose.: 266 mg/dL (03-21-23 @ 11:39)  POCT Blood Glucose.: 300 mg/dL (03-21-23 @ 07:31)  POCT Blood Glucose.: 173 mg/dL (03-20-23 @ 21:52)  POCT Blood Glucose.: 178 mg/dL (03-20-23 @ 16:57)  POCT Blood Glucose.: 173 mg/dL (03-20-23 @ 11:52)  POCT Blood Glucose.: 261 mg/dL (03-20-23 @ 08:09)  POCT Blood Glucose.: 223 mg/dL (03-19-23 @ 20:53)  POCT Blood Glucose.: 270 mg/dL (03-19-23 @ 16:34)                            10.3   8.59  )-----------( 279      ( 22 Mar 2023 06:43 )             31.2       03-22    138  |  102  |  10  ----------------------------<  213<H>  3.2<L>   |  30  |  0.54    eGFR: 105    Ca    8.6      03-22  Mg     1.8     03-22  Phos  3.2     03-22    TPro  5.8<L>  /  Alb  2.4<L>  /  TBili  0.6  /  DBili  x   /  AST  30  /  ALT  33  /  AlkPhos  58  03-22      Thyroid Function Tests:      A1C with Estimated Average Glucose Result: 7.7 % (03-16-23 @ 03:38)          Radiology:                HPI:  Patient is 72 years old male with past medical history of Type I DM (diagnosed 50 years ago, on Insulin pump for last 15 years), Vertigo and BPH who was brought to [facility] ED on 03/16 due to Abdominal pain, Nausea/Vomiting and diarrhea that started the same day. Patient states that in the morning, he changed the insulin pump but wasn't sure what happened and around afternoon, he started feeling abdominal stiffness, rigidity and vomited three times and 2 times when came to ED with multiple episodes of diarrhea. Patient usually takes 60-70 units of insulin pre meals as he mentioned and he adjusted how many units based on his diet as he mentioned. In ED, Patient started on IV fluids, got 18 units of insulin and started on Insulin IV infusion. Patient otherwise denied any CP, fevers, chills, SOB, dizziness, melena, hematochezia, recent travel, sick contact, cough, body aches, pleuritic or positional chest pain. Patient was transferred to ICU for DKA management.     Patient also noted to have elevated troponin and ws started initially on Lovenox and then admitted to ICU. Patient transferred to [facility] for [facility]    (22 Mar 2023 09:20)      Consulted for: DM1 management  This is a 73 yo M /w a PMh of DM1 on insulin pump (Tslim /w novolog) who presents to [facility] due to abdominal pain N/V. Patient found to have DKA secondary to insulin pump malfunction. Patient was treated with an insulin drip in their MICU and then transitioned to lantus 30 units nightly and admelog 4 units before meals. During hospitalization patient noted to have significant troponinemia and he was transferred today to [facility] for left heart cath. Patient now pending transfer to [facility] for evaluation of CABG    Of note, patient did not receive his lantus this morning. confirmed on review of EMR for [place].  Last dose of lantus was 30 units in the morning of 3/21/23 at around 8AM. FSG now 278. Patient had a tuna sandwich without asking for pre-meal insulin because he was hungry. Patient will receive stat lantus 35 units prior to transfer and admelog 4 units plus low correction scale.    Per patient he has DM1 for 50 years. Follows with Dr. Ngo at [facility]. Patient unaware of his insulin pump settings at this time and does not have his supplies or pump with him. He also uses a DEXCOM at home along with his TSlim insulin pump which he uses with Novolog insulin. Denies retinopathy, nephropathy, neuropathy. HbA1c 7.7%.    PAST MEDICAL & SURGICAL HISTORY:  Diabetes      Hypertension      BPH (benign prostatic hyperplasia)      S/P cataract surgery  right eye      Steel plate in right arm          FAMILY HISTORY:  No pertinent family history in first degree relatives        Social History: denies all toxic habits    Home Medications:  atorvastatin 20 mg oral tablet: 1 tab(s) orally once a day (22 Mar 2023 09:22)  losartan 100 mg oral tablet: 1 tab(s) orally once a day (22 Mar 2023 09:22)  MECLIZINE HYDROCHLORIDE  25 MG TABS: 1 tab(s) orally once a day (22 Mar 2023 09:22)  MONTELUKAST SOD 10 MG TABLET: TAKE 1 TABLET BY MOUTH EVERY DAY IN THE EVENING (22 Mar 2023 09:22)  tamsulosin 0.4 mg oral capsule: 1 cap(s) orally once a day (22 Mar 2023 09:22)      MEDICATIONS  (STANDING):  aspirin enteric coated 81 milliGRAM(s) Oral daily  atorvastatin 40 milliGRAM(s) Oral at bedtime  dextrose 5%. 1000 milliLiter(s) (50 mL/Hr) IV Continuous <Continuous>  dextrose 5%. 1000 milliLiter(s) (100 mL/Hr) IV Continuous <Continuous>  dextrose 50% Injectable 25 Gram(s) IV Push once  dextrose 50% Injectable 12.5 Gram(s) IV Push once  dextrose 50% Injectable 25 Gram(s) IV Push once  glucagon  Injectable 1 milliGRAM(s) IntraMuscular once  insulin lispro (ADMELOG) corrective regimen sliding scale   SubCutaneous three times a day before meals  insulin lispro (ADMELOG) corrective regimen sliding scale   SubCutaneous at bedtime  insulin lispro Injectable (ADMELOG) 4 Unit(s) SubCutaneous three times a day before meals  losartan 100 milliGRAM(s) Oral daily  metoprolol tartrate 25 milliGRAM(s) Oral two times a day  montelukast 10 milliGRAM(s) Oral daily  pantoprazole    Tablet 40 milliGRAM(s) Oral before breakfast  potassium chloride    Tablet ER 40 milliEquivalent(s) Oral every 4 hours  tamsulosin 0.4 milliGRAM(s) Oral at bedtime    MEDICATIONS  (PRN):  dextrose Oral Gel 15 Gram(s) Oral once PRN Blood Glucose LESS THAN 70 milliGRAM(s)/deciliter  FIRST- Mouthwash  BLM 30 milliLiter(s) Swish and Spit four times a day PRN Mouth Care  metoclopramide Injectable 10 milliGRAM(s) IV Push three times a day PRN Gastroparesis      Allergies    Neosporin (Rash)    Intolerances      Review of Systems:  Constitutional: No fever  Eyes: No blurry vision  Neuro: No tremors  HEENT: No pain  Cardiovascular: No chest pain, palpitations  Respiratory: No SOB, no cough  GI: No nausea, vomiting, abdominal pain  : No dysuria  Skin: no rash  Psych: no depression  Endocrine: no polyuria, polydipsia  Hem/lymph: no swelling  Osteoporosis: no fractures    PHYSICAL EXAM:  VITALS: T(C): 37 (03-22-23 @ 07:10)  T(F): 98.6 (03-22-23 @ 07:10), Max: 99.1 (03-21-23 @ 23:44)  HR: 71 (03-22-23 @ 07:10) (71 - 92)  BP: 171/80 (03-22-23 @ 07:10) (148/90 - 176/90)  RR:  (18 - 19)  SpO2:  (95% - 97%)  Wt(kg): --  GENERAL: NAD  EYES: No proptosis, no lid lag, anicteric  THYROID: Normal size, no palpable nodules  RESPIRATORY: Clear to auscultation bilaterally  CARDIOVASCULAR: Regular rate and rhythm  GI: Soft, nontender, non distended  EXT: b/l feet without wounds; 2+ pulses  PSYCH: Alert and oriented x 3, reactive mood    POCT Blood Glucose.: 278 mg/dL (03-22-23 @ 12:44)  POCT Blood Glucose.: 214 mg/dL (03-22-23 @ 08:53)  POCT Blood Glucose.: 208 mg/dL (03-22-23 @ 07:33)  POCT Blood Glucose.: 251 mg/dL (03-21-23 @ 21:14)  POCT Blood Glucose.: 189 mg/dL (03-21-23 @ 16:53)  POCT Blood Glucose.: 266 mg/dL (03-21-23 @ 11:39)  POCT Blood Glucose.: 300 mg/dL (03-21-23 @ 07:31)  POCT Blood Glucose.: 173 mg/dL (03-20-23 @ 21:52)  POCT Blood Glucose.: 178 mg/dL (03-20-23 @ 16:57)  POCT Blood Glucose.: 173 mg/dL (03-20-23 @ 11:52)  POCT Blood Glucose.: 261 mg/dL (03-20-23 @ 08:09)  POCT Blood Glucose.: 223 mg/dL (03-19-23 @ 20:53)  POCT Blood Glucose.: 270 mg/dL (03-19-23 @ 16:34)                            10.3   8.59  )-----------( 279      ( 22 Mar 2023 06:43 )             31.2       03-22    138  |  102  |  10  ----------------------------<  213<H>  3.2<L>   |  30  |  0.54    eGFR: 105    Ca    8.6      03-22  Mg     1.8     03-22  Phos  3.2     03-22    TPro  5.8<L>  /  Alb  2.4<L>  /  TBili  0.6  /  DBili  x   /  AST  30  /  ALT  33  /  AlkPhos  58  03-22      Thyroid Function Tests:      A1C with Estimated Average Glucose Result: 7.7 % (03-16-23 @ 03:38)          Radiology:

## 2023-03-22 NOTE — CONSULT NOTE ADULT - ATTENDING COMMENTS
73 yo M with DM1 on tandem pump uses control IQ, recent DKA at outside hospital and pt was transferred to [facility] for cath but did not receive any basal insulin prior to transfer. Pt was given stat lantus as above. Pt being transferred to [facility] at time of consult. Wife to bring pump supplies for review of pump settings by diabetes team. Recommend checking full set of labs.

## 2023-03-22 NOTE — H&P ADULT - NSHPREVIEWOFSYSTEMS_GEN_ALL_CORE
REVIEW OF SYSTEMS:  CONSTITUTIONAL: No fever, weight loss, or fatigue  EYES: No eye pain, visual disturbances, or discharge  ENMT:  No difficulty hearing, tinnitus, vertigo; No sinus or throat pain  NECK: No pain or stiffness  RESPIRATORY: Has non productive cough, No wheezing, chills or hemoptysis; No shortness of breath  CARDIOVASCULAR: No chest pain, No palpitations, dizziness, or leg swelling  GASTROINTESTINAL: No abdominal or epigastric pain. + nausea, +vomiting, No hematemesis; + Diarrhea, has No melena  GENITOURINARY: No dysuria, frequency, hematuria, or incontinence  NEUROLOGICAL: No headaches, memory loss, loss of strength, numbness, or tremors  SKIN: No itching, burning, rashes, or lesions   LYMPH NODES: No enlarged glands  ENDOCRINE: No heat or cold intolerance; No hair loss  MUSCULOSKELETAL: No joint pain or swelling; No muscle, back, or extremity pain  PSYCHIATRIC: No depression, anxiety, mood swings, or difficulty sleeping  HEME/LYMPH: No easy bruising, or bleeding gums  ALLERGY: No hives or eczema

## 2023-03-22 NOTE — CONSULT NOTE ADULT - ASSESSMENT
Patient is 72 years old male with past medical history of Type I DM (50 years ago, on Insulin pump for last 15 years), Vertigo and BPH who was brought to ED due to Abdominal pain, Nausea and vomiting transferred from [facility] to [facility] for work up and management of NSTEMI

## 2023-03-22 NOTE — TRANSFER ACCEPTANCE NOTE - PROBLEM SELECTOR PLAN 2
HA1c 7.7%  DKA resolved  Continue Lantus 30 u in the morning   Continue Lispro 4 u TID before meals   ISS  Monitor BG  Patient with gastroparesis will continue Reglan  F/U Endo recommendations while in-patient

## 2023-03-22 NOTE — H&P ADULT - NSHPPHYSICALEXAM_GEN_ALL_CORE
General: WN/WD NAD  Neurology: A&Ox3, nonfocal, STOUT x 4  Head:  Normocephalic, atraumatic  ENT:  Mucosa moist, no ulcerations  Neck:  Supple, no sinuses or palpable masses  Lymphatic:  No palpable cervical, supraclavicular, axillary or inguinal adenopathy  Respiratory: CTA B/L  CV: RRR, S1S2, no murmur  Abdominal: Soft, NT, ND no palpable mass  MSK: No edema, + peripheral pulses, FROM all 4 extremity

## 2023-03-22 NOTE — H&P ADULT - HISTORY OF PRESENT ILLNESS
72 years old male with PMHX of HTN, Type I DM (50 years ago, on Insulin pump for last 15 years), Vertigo and BPH who was brought to ED due to Abdominal pain, Nausea and vomiting. Patient also found to have elevated troponin. Patient transferred over from [facility] s/p [facility] showing   TVD CAD.

## 2023-03-22 NOTE — H&P ADULT - PROBLEM SELECTOR PLAN 1
Continue with ASA 81mg daily   Continue with Statin  Continue with Lop 12.5BID   Preop CABG eval in progress  Dr. Alex to review imaging   Started on HSQ for DVT ppx   Pending Bilat Carotid dopplers   f/u Coags, UA, MRSA, PFT, TFTs

## 2023-03-23 DIAGNOSIS — E10.9 TYPE 1 DIABETES MELLITUS WITHOUT COMPLICATIONS: ICD-10-CM

## 2023-03-23 DIAGNOSIS — E78.5 HYPERLIPIDEMIA, UNSPECIFIED: ICD-10-CM

## 2023-03-23 DIAGNOSIS — I10 ESSENTIAL (PRIMARY) HYPERTENSION: ICD-10-CM

## 2023-03-23 LAB
A1C WITH ESTIMATED AVERAGE GLUCOSE RESULT: 8.2 % — HIGH (ref 4–5.6)
ALBUMIN SERPL ELPH-MCNC: 3.4 G/DL — SIGNIFICANT CHANGE UP (ref 3.3–5)
ALP SERPL-CCNC: 68 U/L — SIGNIFICANT CHANGE UP (ref 40–120)
ALT FLD-CCNC: 44 U/L — SIGNIFICANT CHANGE UP (ref 10–45)
ANION GAP SERPL CALC-SCNC: 14 MMOL/L — SIGNIFICANT CHANGE UP (ref 5–17)
AST SERPL-CCNC: 43 U/L — HIGH (ref 10–40)
BASOPHILS # BLD AUTO: 0.02 K/UL — SIGNIFICANT CHANGE UP (ref 0–0.2)
BASOPHILS NFR BLD AUTO: 0.3 % — SIGNIFICANT CHANGE UP (ref 0–2)
BILIRUB SERPL-MCNC: 0.4 MG/DL — SIGNIFICANT CHANGE UP (ref 0.2–1.2)
BUN SERPL-MCNC: 8 MG/DL — SIGNIFICANT CHANGE UP (ref 7–23)
CALCIUM SERPL-MCNC: 8.8 MG/DL — SIGNIFICANT CHANGE UP (ref 8.4–10.5)
CHLORIDE SERPL-SCNC: 102 MMOL/L — SIGNIFICANT CHANGE UP (ref 96–108)
CO2 SERPL-SCNC: 26 MMOL/L — SIGNIFICANT CHANGE UP (ref 22–31)
CREAT SERPL-MCNC: 0.61 MG/DL — SIGNIFICANT CHANGE UP (ref 0.5–1.3)
EGFR: 101 ML/MIN/1.73M2 — SIGNIFICANT CHANGE UP
EOSINOPHIL # BLD AUTO: 0.12 K/UL — SIGNIFICANT CHANGE UP (ref 0–0.5)
EOSINOPHIL NFR BLD AUTO: 1.8 % — SIGNIFICANT CHANGE UP (ref 0–6)
ESTIMATED AVERAGE GLUCOSE: 189 MG/DL — HIGH (ref 68–114)
GLUCOSE BLDC GLUCOMTR-MCNC: 109 MG/DL — HIGH (ref 70–99)
GLUCOSE BLDC GLUCOMTR-MCNC: 131 MG/DL — HIGH (ref 70–99)
GLUCOSE SERPL-MCNC: 87 MG/DL — SIGNIFICANT CHANGE UP (ref 70–99)
HCT VFR BLD CALC: 34.1 % — LOW (ref 39–50)
HCV AB S/CO SERPL IA: 0.13 S/CO — SIGNIFICANT CHANGE UP (ref 0–0.99)
HCV AB SERPL-IMP: SIGNIFICANT CHANGE UP
HGB BLD-MCNC: 10.9 G/DL — LOW (ref 13–17)
IMM GRANULOCYTES NFR BLD AUTO: 0.6 % — SIGNIFICANT CHANGE UP (ref 0–0.9)
LYMPHOCYTES # BLD AUTO: 1.75 K/UL — SIGNIFICANT CHANGE UP (ref 1–3.3)
LYMPHOCYTES # BLD AUTO: 25.8 % — SIGNIFICANT CHANGE UP (ref 13–44)
MCHC RBC-ENTMCNC: 28.3 PG — SIGNIFICANT CHANGE UP (ref 27–34)
MCHC RBC-ENTMCNC: 32 GM/DL — SIGNIFICANT CHANGE UP (ref 32–36)
MCV RBC AUTO: 88.6 FL — SIGNIFICANT CHANGE UP (ref 80–100)
MONOCYTES # BLD AUTO: 0.86 K/UL — SIGNIFICANT CHANGE UP (ref 0–0.9)
MONOCYTES NFR BLD AUTO: 12.7 % — SIGNIFICANT CHANGE UP (ref 2–14)
NEUTROPHILS # BLD AUTO: 4 K/UL — SIGNIFICANT CHANGE UP (ref 1.8–7.4)
NEUTROPHILS NFR BLD AUTO: 58.8 % — SIGNIFICANT CHANGE UP (ref 43–77)
NRBC # BLD: 0 /100 WBCS — SIGNIFICANT CHANGE UP (ref 0–0)
PLATELET # BLD AUTO: 306 K/UL — SIGNIFICANT CHANGE UP (ref 150–400)
POTASSIUM SERPL-MCNC: 3.6 MMOL/L — SIGNIFICANT CHANGE UP (ref 3.5–5.3)
POTASSIUM SERPL-SCNC: 3.6 MMOL/L — SIGNIFICANT CHANGE UP (ref 3.5–5.3)
PROT SERPL-MCNC: 6.3 G/DL — SIGNIFICANT CHANGE UP (ref 6–8.3)
RBC # BLD: 3.85 M/UL — LOW (ref 4.2–5.8)
RBC # FLD: 14.6 % — HIGH (ref 10.3–14.5)
SODIUM SERPL-SCNC: 142 MMOL/L — SIGNIFICANT CHANGE UP (ref 135–145)
WBC # BLD: 6.79 K/UL — SIGNIFICANT CHANGE UP (ref 3.8–10.5)
WBC # FLD AUTO: 6.79 K/UL — SIGNIFICANT CHANGE UP (ref 3.8–10.5)

## 2023-03-23 PROCEDURE — 99231 SBSQ HOSP IP/OBS SF/LOW 25: CPT

## 2023-03-23 PROCEDURE — 99232 SBSQ HOSP IP/OBS MODERATE 35: CPT | Mod: GC

## 2023-03-23 RX ORDER — INSULIN GLARGINE 100 [IU]/ML
30 INJECTION, SOLUTION SUBCUTANEOUS
Refills: 0 | Status: DISCONTINUED | OUTPATIENT
Start: 2023-03-23 | End: 2023-03-26

## 2023-03-23 RX ORDER — INSULIN LISPRO 100/ML
VIAL (ML) SUBCUTANEOUS AT BEDTIME
Refills: 0 | Status: DISCONTINUED | OUTPATIENT
Start: 2023-03-23 | End: 2023-03-27

## 2023-03-23 RX ORDER — ISOSORBIDE MONONITRATE 60 MG/1
30 TABLET, EXTENDED RELEASE ORAL DAILY
Refills: 0 | Status: DISCONTINUED | OUTPATIENT
Start: 2023-03-23 | End: 2023-03-27

## 2023-03-23 RX ORDER — METOPROLOL TARTRATE 50 MG
25 TABLET ORAL
Refills: 0 | Status: DISCONTINUED | OUTPATIENT
Start: 2023-03-23 | End: 2023-03-27

## 2023-03-23 RX ADMIN — MONTELUKAST 10 MILLIGRAM(S): 4 TABLET, CHEWABLE ORAL at 11:42

## 2023-03-23 RX ADMIN — HEPARIN SODIUM 5000 UNIT(S): 5000 INJECTION INTRAVENOUS; SUBCUTANEOUS at 13:29

## 2023-03-23 RX ADMIN — SODIUM CHLORIDE 3 MILLILITER(S): 9 INJECTION INTRAMUSCULAR; INTRAVENOUS; SUBCUTANEOUS at 13:21

## 2023-03-23 RX ADMIN — Medication 12.5 MILLIGRAM(S): at 05:37

## 2023-03-23 RX ADMIN — ISOSORBIDE MONONITRATE 30 MILLIGRAM(S): 60 TABLET, EXTENDED RELEASE ORAL at 11:44

## 2023-03-23 RX ADMIN — Medication 25 MILLIGRAM(S): at 11:41

## 2023-03-23 RX ADMIN — HEPARIN SODIUM 5000 UNIT(S): 5000 INJECTION INTRAVENOUS; SUBCUTANEOUS at 05:36

## 2023-03-23 RX ADMIN — Medication 6 UNIT(S): at 16:49

## 2023-03-23 RX ADMIN — INSULIN GLARGINE 30 UNIT(S): 100 INJECTION, SOLUTION SUBCUTANEOUS at 13:29

## 2023-03-23 RX ADMIN — LOSARTAN POTASSIUM 50 MILLIGRAM(S): 100 TABLET, FILM COATED ORAL at 05:37

## 2023-03-23 RX ADMIN — ATORVASTATIN CALCIUM 20 MILLIGRAM(S): 80 TABLET, FILM COATED ORAL at 21:28

## 2023-03-23 RX ADMIN — SODIUM CHLORIDE 3 MILLILITER(S): 9 INJECTION INTRAMUSCULAR; INTRAVENOUS; SUBCUTANEOUS at 06:11

## 2023-03-23 RX ADMIN — HEPARIN SODIUM 5000 UNIT(S): 5000 INJECTION INTRAVENOUS; SUBCUTANEOUS at 21:13

## 2023-03-23 RX ADMIN — DIPHENHYDRAMINE HYDROCHLORIDE AND LIDOCAINE HYDROCHLORIDE AND ALUMINUM HYDROXIDE AND MAGNESIUM HYDRO 15 MILLILITER(S): KIT at 05:37

## 2023-03-23 RX ADMIN — TAMSULOSIN HYDROCHLORIDE 0.4 MILLIGRAM(S): 0.4 CAPSULE ORAL at 21:12

## 2023-03-23 RX ADMIN — SODIUM CHLORIDE 3 MILLILITER(S): 9 INJECTION INTRAMUSCULAR; INTRAVENOUS; SUBCUTANEOUS at 21:27

## 2023-03-23 RX ADMIN — Medication 6 UNIT(S): at 08:10

## 2023-03-23 RX ADMIN — Medication 81 MILLIGRAM(S): at 11:41

## 2023-03-23 RX ADMIN — Medication 6 UNIT(S): at 11:41

## 2023-03-23 NOTE — PROGRESS NOTE ADULT - SUBJECTIVE AND OBJECTIVE BOX
Patient is a 72y old  Male who presents with a chief complaint of CAD (23 Mar 2023 11:52)      DATE OF SERVICE: 23 @ 13:42    SUBJECTIVE / OVERNIGHT EVENTS: overnight events noted    ROS:  Resp: No cough no sputum production  CVS: No chest pain no palpitations no orthopnea  GI: no N/V/D  : no dysuria, no hematuria  "I feel fine'         MEDICATIONS  (STANDING):  aspirin enteric coated 81 milliGRAM(s) Oral daily  atorvastatin 20 milliGRAM(s) Oral at bedtime  heparin   Injectable 5000 Unit(s) SubCutaneous every 8 hours  insulin glargine Injectable (LANTUS) 30 Unit(s) SubCutaneous <User Schedule>  insulin lispro (ADMELOG) corrective regimen sliding scale   SubCutaneous at bedtime  insulin lispro (ADMELOG) corrective regimen sliding scale   SubCutaneous three times a day before meals  insulin lispro Injectable (ADMELOG) 6 Unit(s) SubCutaneous three times a day before meals  isosorbide   mononitrate ER Tablet (IMDUR) 30 milliGRAM(s) Oral daily  meclizine 25 milliGRAM(s) Oral daily  metoprolol tartrate 25 milliGRAM(s) Oral two times a day  montelukast 10 milliGRAM(s) Oral daily  sodium chloride 0.9% lock flush 3 milliLiter(s) IV Push every 8 hours  tamsulosin 0.4 milliGRAM(s) Oral at bedtime    MEDICATIONS  (PRN):  FIRST- Mouthwash  BLM 15 milliLiter(s) Swish and Spit every 8 hours PRN Mouth Care        CAPILLARY BLOOD GLUCOSE      POCT Blood Glucose.: 87 mg/dL (23 Mar 2023 13:28)  POCT Blood Glucose.: 106 mg/dL (23 Mar 2023 11:38)  POCT Blood Glucose.: 117 mg/dL (23 Mar 2023 07:59)  POCT Blood Glucose.: 67 mg/dL (23 Mar 2023 07:34)  POCT Blood Glucose.: 119 mg/dL (22 Mar 2023 21:43)  POCT Blood Glucose.: 139 mg/dL (22 Mar 2023 16:52)    I&O's Summary    22 Mar 2023 07:01  -  23 Mar 2023 07:00  --------------------------------------------------------  IN: 440 mL / OUT: 900 mL / NET: -460 mL    23 Mar 2023 07:01  -  23 Mar 2023 13:42  --------------------------------------------------------  IN: 720 mL / OUT: 800 mL / NET: -80 mL        Vital Signs Last 24 Hrs  T(C): 37 (23 Mar 2023 13:12), Max: 37.1 (23 Mar 2023 05:29)  T(F): 98.6 (23 Mar 2023 13:12), Max: 98.7 (23 Mar 2023 05:29)  HR: 90 (23 Mar 2023 13:12) (76 - 90)  BP: 117/69 (23 Mar 2023 13:12) (117/69 - 171/81)  BP(mean): --  RR: 18 (23 Mar 2023 13:12) (18 - 18)  SpO2: 94% (23 Mar 2023 13:12) (94% - 97%)    PHYSICAL EXAM:  EYES: EOMI, PERRLA  NECK: Supple, No JVD  CHEST/LUNG: clear  HEART: S1 S2; no murmurs   ABDOMEN: Soft, Nontender  EXTREMITIES:  trace edema bilateral   NEUROLOGY: AO x 3 non-focal  SKIN: No rashes or lesions    LABS:                        10.9   6.79  )-----------( 306      ( 23 Mar 2023 06:18 )             34.1         142  |  102  |  8   ----------------------------<  87  3.6   |  26  |  0.61    Ca    8.8      23 Mar 2023 06:18  Phos  3.2     -  Mg     1.8         TPro  6.3  /  Alb  3.4  /  TBili  0.4  /  DBili  x   /  AST  43<H>  /  ALT  44  /  AlkPhos  68  03    PT/INR - ( 22 Mar 2023 17:20 )   PT: 14.2 sec;   INR: 1.23 ratio         PTT - ( 22 Mar 2023 17:20 )  PTT:26.4 sec      Urinalysis Basic - ( 22 Mar 2023 17:18 )    Color: Light Yellow / Appearance: Clear / S.024 / pH: x  Gluc: x / Ketone: Small  / Bili: Negative / Urobili: Negative   Blood: x / Protein: Trace / Nitrite: Negative   Leuk Esterase: Negative / RBC: x / WBC x   Sq Epi: x / Non Sq Epi: x / Bacteria: x          All consultant(s) notes reviewed and care discussed with other providers        Contact Number, Dr Kelly [phone]

## 2023-03-23 NOTE — PROGRESS NOTE ADULT - ASSESSMENT
Patient is 72 years old male with past medical history of Type I DM (50 years ago, on Insulin pump for last 15 years), Vertigo and BPH who was brought to ED due to Abdominal pain, Nausea and vomiting transferred from [facility] to [facility] for work up and management of NSTEMI

## 2023-03-23 NOTE — PROGRESS NOTE ADULT - SUBJECTIVE AND OBJECTIVE BOX
C A R D I O L O G Y  **********************************     DATE OF SERVICE: 03-23-23    Patient transferred to [facility] for CABG evaluation. denies chest pain or shortness of breath.   Review of systems otherwise negative.  	  MEDICATIONS:  MEDICATIONS  (STANDING):  aspirin enteric coated 81 milliGRAM(s) Oral daily  atorvastatin 20 milliGRAM(s) Oral at bedtime  heparin   Injectable 5000 Unit(s) SubCutaneous every 8 hours  insulin glargine Injectable (LANTUS) 35 Unit(s) SubCutaneous <User Schedule>  insulin lispro (ADMELOG) corrective regimen sliding scale   SubCutaneous three times a day before meals  insulin lispro (ADMELOG) corrective regimen sliding scale   SubCutaneous at bedtime  insulin lispro Injectable (ADMELOG) 6 Unit(s) SubCutaneous three times a day before meals  isosorbide   mononitrate ER Tablet (IMDUR) 30 milliGRAM(s) Oral daily  meclizine 25 milliGRAM(s) Oral daily  metoprolol tartrate 25 milliGRAM(s) Oral two times a day  montelukast 10 milliGRAM(s) Oral daily  sodium chloride 0.9% lock flush 3 milliLiter(s) IV Push every 8 hours  tamsulosin 0.4 milliGRAM(s) Oral at bedtime      LABS:	 	    CARDIAC MARKERS:                                10.9   6.79  )-----------( 306      ( 23 Mar 2023 06:18 )             34.1     Hemoglobin: 10.9 g/dL (03-23 @ 06:18)  Hemoglobin: 11.5 g/dL (03-22 @ 17:20)  Hemoglobin: 10.3 g/dL (03-22 @ 06:43)  Hemoglobin: 11.2 g/dL (03-21 @ 06:16)  Hemoglobin: 10.9 g/dL (03-20 @ 07:05)      03-23    142  |  102  |  8   ----------------------------<  87  3.6   |  26  |  0.61    Ca    8.8      23 Mar 2023 06:18  Phos  3.2     03-22  Mg     1.8     03-22    TPro  6.3  /  Alb  3.4  /  TBili  0.4  /  DBili  x   /  AST  43<H>  /  ALT  44  /  AlkPhos  68  03-23    Creatinine Trend: 0.61<--, 0.62<--, 0.54<--, 0.71<--, 0.70<--, 0.69<--    COAGS:   PT/INR - ( 22 Mar 2023 17:20 )   PT: 14.2 sec;   INR: 1.23 ratio         PTT - ( 22 Mar 2023 17:20 )  PTT:26.4 sec    proBNP:   Lipid Profile:   HgA1c:   TSH: Thyroid Stimulating Hormone, Serum: 1.82 uIU/mL (03-22 @ 17:20)        PHYSICAL EXAM:  T(C): 37.1 (03-23-23 @ 05:29), Max: 37.1 (03-23-23 @ 05:29)  HR: 90 (03-23-23 @ 05:29) (76 - 90)  BP: 162/80 (03-23-23 @ 05:29) (133/75 - 171/81)  RR: 18 (03-23-23 @ 05:29) (18 - 18)  SpO2: 95% (03-23-23 @ 05:29) (95% - 97%)  Wt(kg): --  I&O's Summary    22 Mar 2023 07:01  -  23 Mar 2023 07:00  --------------------------------------------------------  IN: 440 mL / OUT: 900 mL / NET: -460 mL    23 Mar 2023 07:01  -  23 Mar 2023 11:53  --------------------------------------------------------  IN: 360 mL / OUT: 500 mL / NET: -140 mL      Height (cm): 188 (03-23 @ 10:08)  Weight (kg): 89.721 (03-23 @ 10:08)  BMI (kg/m2): 25.4 (03-23 @ 10:08)  BSA (m2): 2.16 (03-23 @ 10:08)    Gen: NAD  HEENT:  (-)icterus (-)pallor  CV: N S1 S2 1/6 RONI (+)2 Pulses B/l  Resp:  Clear to auscultation B/L, normal effort  GI: (+) BS Soft, NT, ND  Lymph:  (-)Edema, (-)obvious lymphadenopathy  Skin: Warm to touch, Normal turgor  Psych: Appropriate mood and affect      TELEMETRY: SR 60-90 	      < from: TTE with Doppler (w/Cont) (03.16.23 @ 13:36) >  CONCLUSIONS:  1. Normal mitral valve. Mildly calcified chordae tendinae.  Mild mitral regurgitation.  2. Calcified aortic valve was not well visualized but has  normal opening. No aortic stenosis. No aortic valve  regurgitation seen.  3. Normal left ventricular internal dimensions and wall  thicknesses.  4. Low-normal Left Ventricular Systolic Function,  (EF =  53% by biplane). No regional wall motion abnormalities.  Ultrasound LV opacification agent was used to enhance  endocardial definition.  5. Right ventricle not well visualized. Normal RVsystolic  function (RV tissue Doppler 11 cm/s).  6. RV systolic pressure is borderline normal at  33 mm Hg.  7. Normal tricuspid valve. Moderate tricuspid  regurgitation.  8. No pericardial effusion.    *** No previous Echo exam.    < end of copied text >    < from: Cardiac Catheterization (03.22.23 @ 09:42) >  Diagnostic Conclusions:     Large LAD with severe bifurcating/ostial and heavily calcifed disease   LCX with Bifurcating/ostial disease   RCA with Moderate Disease   Recommendations:     CABG evaluation      < end of copied text >      ASSESSMENT/PLAN: Patient is 72 year old male with pmh of Type I DM (50 years ago, on Insulin pump for last 15 years), HTN, Vertigo and BPH who was brought to ED due to Abdominal pain, Nausea and vomiting. Cardiology consulted for increased troponin.    #NSTEMI  - EKG non-ischemic  - TTE with low normal LV function and no wall motion abnormalities  - Continue ASA/Lipitor/Metoprolol  - s/p cath with multivessel CAD noted above  - Transferred to [facility] for CABG evaluation  - Follow up CTS recs    #DKA/Type 1 DM  - Glycemic control per endocrinology    #HTN  - Continue Metoprolol and Imdur  - Losartan held preop per [facility]    Mariano Vernon PA-C  Pager: [phone]

## 2023-03-23 NOTE — PROGRESS NOTE ADULT - PROBLEM SELECTOR PLAN 1
Continue with ASA 81mg daily   Continue with Statin  Inc Lop 25 BID   CABG 3/30  Started on HSQ for DVT ppx

## 2023-03-23 NOTE — PROGRESS NOTE ADULT - ASSESSMENT
Mr. Major is a 71 y/o T1DM on insulin pump, HTN, HLD, BPH, Vertigo who presented to OSH in DKA found to have triple vessel disease on C transferred to [facility] for evaluation for CABG. Endocrinology consulted for management of diabetes and DKA.    T1DM with hyperglycemia  - HbA1c: 8.2  - Home Regimen: T slim novolog insulin pump   - Endocrinologist: Dr. Ngo  PLAN  - Hold oral DM agents while inpatient  - Continue lantus 35 units at 1pm daily. DO NOT HOLD IF NPO.  - Continue Admelog 6 units TID pre-meal. HOLD IF NPO.  - Use low dose Admelog correction scale pre-meal  - Use low dose Admelog correction scale at bedtime  - Fingerstick BG before meals and bedtime  - Goal -180  - Carbohydrate consistent diet  Discharge plan:  - Discharge medications: pump vs basal/bolus  - Patient to call doctor with persistent high or low BG at home.   - Ensure patient has glucometer, test strips and lancets on discharge.  - Recommend routine outpatient ophthalmology, podiatry and endocrinology f/u    HTN  - Home regimen: losartan 100mg daily  PLAN  - Can check urine microalbumin outpatient  - Outpatient goal BP <130/80. Management per primary team.    HLD  - Home regimen: atorvastatin 20mg daily  PLAN  - Continue atorvastatin 20mg daily per primary team, would recommend high intensity statin given triple vessel disease  - Can check lipid profile if not done recently    Discussed with primary team.    Obey Cash MD, Endocrinology Fellow  Pager [phone] from 9am to 5pm. After hours and on weekends, please call [phone].   Mr. Major is a 73 y/o T1DM on insulin pump, HTN, HLD, BPH, Vertigo who presented to OSH in DKA found to have triple vessel disease on C transferred to [facility] for evaluation for CABG. Endocrinology consulted for management of diabetes and DKA.    T1DM with hyperglycemia  - HbA1c: 8.2  - Home Regimen: T slim novolog insulin pump   - Endocrinologist: Dr. Ngo  PLAN  - Hold oral DM agents while inpatient  - decrease to lantus 30 units at 1pm daily. DO NOT HOLD IF NPO.  - Continue Admelog 6 units TID pre-meal. HOLD IF NPO.  - Use low dose Admelog correction scale pre-meal  - Use low dose Admelog correction scale at bedtime  - Fingerstick BG before meals and bedtime  - Goal -180  - Carbohydrate consistent diet  Discharge plan:  - Discharge medications: pump vs basal/bolus  - Patient to call doctor with persistent high or low BG at home.   - Ensure patient has glucometer, test strips and lancets on discharge.  - Recommend routine outpatient ophthalmology, podiatry and endocrinology f/u    HTN  - Home regimen: losartan 100mg daily  PLAN  - Can check urine microalbumin outpatient  - Outpatient goal BP <130/80. Management per primary team.    HLD  - Home regimen: atorvastatin 20mg daily  PLAN  - Continue atorvastatin 20mg daily per primary team, would recommend high intensity statin given triple vessel disease  - Can check lipid profile if not done recently    Discussed with primary team.    Obey Cash MD, Endocrinology Fellow  Pager [phone] from 9am to 5pm. After hours and on weekends, please call [phone].   Mr. Major is a 73 y/o T1DM on insulin pump, HTN, HLD, BPH, Vertigo who presented to OSH in DKA found to have triple vessel disease on LHC transferred to [facility] for evaluation for CABG. Endocrinology consulted for management of diabetes and DKA.    T1DM with hyperglycemia  - HbA1c: 8.2  - Home Regimen: T slim novolog insulin pump   basal  12a 0.148  9a 0.75  5p 0.758  10p 0.712  ISF  12a 1:50  5p 1:57  10p 1:50  ICR  12a 1:18  9a 1:17  5p 1:!8  target 100  action time 5hr  - Endocrinologist: Dr. Ngo  PLAN  - Hold oral DM agents while inpatient  - decrease to lantus 30 units at 1pm daily. DO NOT HOLD IF NPO.  - Continue Admelog 6 units TID pre-meal. HOLD IF NPO.  - Use low dose Admelog correction scale pre-meal  - Use low dose Admelog correction scale at bedtime  - Fingerstick BG before meals and bedtime  - Goal -180  - Carbohydrate consistent diet  Discharge plan:  - Discharge medications: pump vs basal/bolus  - Patient to call doctor with persistent high or low BG at home.   - Ensure patient has glucometer, test strips and lancets on discharge.  - Recommend routine outpatient ophthalmology, podiatry and endocrinology f/u    HTN  - Home regimen: losartan 100mg daily  PLAN  - Can check urine microalbumin outpatient  - Outpatient goal BP <130/80. Management per primary team.    HLD  - Home regimen: atorvastatin 20mg daily  PLAN  - Continue atorvastatin 20mg daily per primary team, would recommend high intensity statin given triple vessel disease  - Can check lipid profile if not done recently    Discussed with primary team.    Obey Cash MD, Endocrinology Fellow  Pager [phone] from 9am to 5pm. After hours and on weekends, please call [phone].

## 2023-03-23 NOTE — PROGRESS NOTE ADULT - ASSESSMENT
72 years old male with PMHX of HTN, Type I DM (50 years ago, on Insulin pump for last 15 years), Vertigo and BPH who was brought to ED due to Abdominal pain, Nausea and vomiting. Patient also found to have elevated troponin. Patient transferred over from [facility] showing   TVD CAD.      3/22 VSS; Patient seen at bedside, resting comfortably on room air. In NAD, no SOB, or CP at this time.   3/23 /64 -171/81. OR Thursday 3/30.  Radial mapping.  Pt transferred to Dr Sherwood.  Carotids neg.  EF 53%

## 2023-03-23 NOTE — PROGRESS NOTE ADULT - SUBJECTIVE AND OBJECTIVE BOX
ENDOCRINE FOLLOW UP     Chief Complaint: diabetes    History:     MEDICATIONS  (STANDING):  aspirin enteric coated 81 milliGRAM(s) Oral daily  atorvastatin 20 milliGRAM(s) Oral at bedtime  heparin   Injectable 5000 Unit(s) SubCutaneous every 8 hours  insulin glargine Injectable (LANTUS) 35 Unit(s) SubCutaneous <User Schedule>  insulin lispro (ADMELOG) corrective regimen sliding scale   SubCutaneous three times a day before meals  insulin lispro (ADMELOG) corrective regimen sliding scale   SubCutaneous at bedtime  insulin lispro Injectable (ADMELOG) 6 Unit(s) SubCutaneous three times a day before meals  isosorbide   mononitrate ER Tablet (IMDUR) 30 milliGRAM(s) Oral daily  meclizine 25 milliGRAM(s) Oral daily  metoprolol tartrate 25 milliGRAM(s) Oral two times a day  montelukast 10 milliGRAM(s) Oral daily  sodium chloride 0.9% lock flush 3 milliLiter(s) IV Push every 8 hours  tamsulosin 0.4 milliGRAM(s) Oral at bedtime    MEDICATIONS  (PRN):  FIRST- Mouthwash  BLM 15 milliLiter(s) Swish and Spit every 8 hours PRN Mouth Care      Allergies    Neosporin (Rash)    Intolerances        ROS: All other systems reviewed and negative    PHYSICAL EXAM:  VITALS: T(C): 37.1 (03-23-23 @ 05:29)  T(F): 98.7 (03-23-23 @ 05:29), Max: 98.7 (03-23-23 @ 05:29)  HR: 90 (03-23-23 @ 05:29) (76 - 90)  BP: 162/80 (03-23-23 @ 05:29) (133/75 - 171/81)  RR:  (18 - 18)  SpO2:  (95% - 97%)  Wt(kg): --  GENERAL: NAD, resting comfortably   EYES: No proptosis,  anicteric  HEENT:  Atraumatic, Normocephalic, moist mucous membranes  RESPIRATORY: Nonlabored respirations on room air, normal rate/effort   CARDIOVASCULAR: Did not appear cyanotic, no lower extremity swelling visualized  GI: Soft, nontender, non distended  NEURO: Answering questions appropriately, moves all extremities spontaneously  PSYCH:  reactive affect, euthymic mood    POCT Blood Glucose.: 117 mg/dL (03-23-23 @ 07:59)  POCT Blood Glucose.: 67 mg/dL (03-23-23 @ 07:34)  POCT Blood Glucose.: 119 mg/dL (03-22-23 @ 21:43)  POCT Blood Glucose.: 139 mg/dL (03-22-23 @ 16:52)  POCT Blood Glucose.: 278 mg/dL (03-22-23 @ 12:44)  POCT Blood Glucose.: 214 mg/dL (03-22-23 @ 08:53)  POCT Blood Glucose.: 208 mg/dL (03-22-23 @ 07:33)  POCT Blood Glucose.: 251 mg/dL (03-21-23 @ 21:14)  POCT Blood Glucose.: 189 mg/dL (03-21-23 @ 16:53)  POCT Blood Glucose.: 266 mg/dL (03-21-23 @ 11:39)  POCT Blood Glucose.: 300 mg/dL (03-21-23 @ 07:31)  POCT Blood Glucose.: 173 mg/dL (03-20-23 @ 21:52)  POCT Blood Glucose.: 178 mg/dL (03-20-23 @ 16:57)  POCT Blood Glucose.: 173 mg/dL (03-20-23 @ 11:52)      03-23    142  |  102  |  8   ----------------------------<  87  3.6   |  26  |  0.61    eGFR: 101    Ca    8.8      03-23  Mg     1.8     03-22  Phos  3.2     03-22    TPro  6.3  /  Alb  3.4  /  TBili  0.4  /  DBili  x   /  AST  43<H>  /  ALT  44  /  AlkPhos  68  03-23      A1C with Estimated Average Glucose Result: 8.2 % (03-22-23 @ 17:20)  A1C with Estimated Average Glucose Result: 7.7 % (03-16-23 @ 03:38)      Thyroid Stimulating Hormone, Serum: 1.82 uIU/mL (03-22-23 @ 17:20)   ENDOCRINE FOLLOW UP     Chief Complaint: diabetes    History:   The patient denied complaints currently, eating and drinking okay. Confirmed insulin pump settings. reviewed BGs at home, appears wake up around 150-200, before lunch 150, 3pm 140-270, dinner 170-200, reports lows half a dozen time in the am over the past 3 months.    MEDICATIONS  (STANDING):  aspirin enteric coated 81 milliGRAM(s) Oral daily  atorvastatin 20 milliGRAM(s) Oral at bedtime  heparin   Injectable 5000 Unit(s) SubCutaneous every 8 hours  insulin glargine Injectable (LANTUS) 35 Unit(s) SubCutaneous <User Schedule>  insulin lispro (ADMELOG) corrective regimen sliding scale   SubCutaneous three times a day before meals  insulin lispro (ADMELOG) corrective regimen sliding scale   SubCutaneous at bedtime  insulin lispro Injectable (ADMELOG) 6 Unit(s) SubCutaneous three times a day before meals  isosorbide   mononitrate ER Tablet (IMDUR) 30 milliGRAM(s) Oral daily  meclizine 25 milliGRAM(s) Oral daily  metoprolol tartrate 25 milliGRAM(s) Oral two times a day  montelukast 10 milliGRAM(s) Oral daily  sodium chloride 0.9% lock flush 3 milliLiter(s) IV Push every 8 hours  tamsulosin 0.4 milliGRAM(s) Oral at bedtime    MEDICATIONS  (PRN):  FIRST- Mouthwash  BLM 15 milliLiter(s) Swish and Spit every 8 hours PRN Mouth Care      Allergies    Neosporin (Rash)    Intolerances        ROS: All other systems reviewed and negative    PHYSICAL EXAM:  VITALS: T(C): 37.1 (03-23-23 @ 05:29)  T(F): 98.7 (03-23-23 @ 05:29), Max: 98.7 (03-23-23 @ 05:29)  HR: 90 (03-23-23 @ 05:29) (76 - 90)  BP: 162/80 (03-23-23 @ 05:29) (133/75 - 171/81)  RR:  (18 - 18)  SpO2:  (95% - 97%)  Wt(kg): --  GENERAL: NAD, resting comfortably   EYES: No proptosis,  anicteric  HEENT:  Atraumatic, Normocephalic, moist mucous membranes  RESPIRATORY: Nonlabored respirations on room air, normal rate/effort   CARDIOVASCULAR: Did not appear cyanotic, no lower extremity swelling appreciated  GI: Soft, nontender, non distended  NEURO: Answering questions appropriately, moves all extremities spontaneously  PSYCH:  reactive affect, euthymic mood    POCT Blood Glucose.: 117 mg/dL (03-23-23 @ 07:59)  POCT Blood Glucose.: 67 mg/dL (03-23-23 @ 07:34)  POCT Blood Glucose.: 119 mg/dL (03-22-23 @ 21:43)  POCT Blood Glucose.: 139 mg/dL (03-22-23 @ 16:52)  POCT Blood Glucose.: 278 mg/dL (03-22-23 @ 12:44)  POCT Blood Glucose.: 214 mg/dL (03-22-23 @ 08:53)  POCT Blood Glucose.: 208 mg/dL (03-22-23 @ 07:33)  POCT Blood Glucose.: 251 mg/dL (03-21-23 @ 21:14)  POCT Blood Glucose.: 189 mg/dL (03-21-23 @ 16:53)  POCT Blood Glucose.: 266 mg/dL (03-21-23 @ 11:39)  POCT Blood Glucose.: 300 mg/dL (03-21-23 @ 07:31)  POCT Blood Glucose.: 173 mg/dL (03-20-23 @ 21:52)  POCT Blood Glucose.: 178 mg/dL (03-20-23 @ 16:57)  POCT Blood Glucose.: 173 mg/dL (03-20-23 @ 11:52)      03-23    142  |  102  |  8   ----------------------------<  87  3.6   |  26  |  0.61    eGFR: 101    Ca    8.8      03-23  Mg     1.8     03-22  Phos  3.2     03-22    TPro  6.3  /  Alb  3.4  /  TBili  0.4  /  DBili  x   /  AST  43<H>  /  ALT  44  /  AlkPhos  68  03-23      A1C with Estimated Average Glucose Result: 8.2 % (03-22-23 @ 17:20)  A1C with Estimated Average Glucose Result: 7.7 % (03-16-23 @ 03:38)      Thyroid Stimulating Hormone, Serum: 1.82 uIU/mL (03-22-23 @ 17:20)

## 2023-03-23 NOTE — PROGRESS NOTE ADULT - ASSESSMENT
Patient seen and examined, agree with above assessment and plan as transcribed above.    - CTS f/u for CABG next week    Alec Mitchell MD, [facility]  BEEPER [phone]

## 2023-03-23 NOTE — PROGRESS NOTE ADULT - SUBJECTIVE AND OBJECTIVE BOX
S:  doing ok.  no cp no sob      Telemetry:  SR 60-90    Vital Signs Last 24 Hrs  T(C): 37 (23 @ 13:12), Max: 37.1 (23 @ 05:29)  T(F): 98.6 (23 @ 13:12), Max: 98.7 (23 @ 05:29)  HR: 90 (23 @ 13:12) (76 - 90)  BP: 117/69 (23 @ 13:12) (117/69 - 171/81)  RR: 18 (23 @ 13:12) (18 - 18)  SpO2: 94% (23 @ 13:12) (94% - 97%)                    @ 07:01  -   @ 07:00  --------------------------------------------------------  IN: 440 mL / OUT: 900 mL / NET: -460 mL     @ 07:01  -   @ 14:06  --------------------------------------------------------  IN: 720 mL / OUT: 800 mL / NET: -80 mL          Daily Height in cm: 187.96 (23 Mar 2023 10:08)    Daily Weight in k.7 (23 Mar 2023 10:08)        CAPILLARY BLOOD GLUCOSE      POCT Blood Glucose.: 87 mg/dL (23 Mar 2023 13:28)  POCT Blood Glucose.: 106 mg/dL (23 Mar 2023 11:38)  POCT Blood Glucose.: 117 mg/dL (23 Mar 2023 07:59)  POCT Blood Glucose.: 67 mg/dL (23 Mar 2023 07:34)  POCT Blood Glucose.: 119 mg/dL (22 Mar 2023 21:43)  POCT Blood Glucose.: 139 mg/dL (22 Mar 2023 16:52)                                    10.9   6.79  )-----------( 306      ( 23 Mar 2023 06:18 )             34.1           142  |  102  |  8   ----------------------------<  87  3.6   |  26  |  0.61    Ca    8.8      23 Mar 2023 06:18  Phos  3.2     -  Mg     1.8         TPro  6.3  /  Alb  3.4  /  TBili  0.4  /  DBili  x   /  AST  43<H>  /  ALT  44  /  AlkPhos  68        PT/INR - ( 22 Mar 2023 17:20 )   PT: 14.2 sec;   INR: 1.23 ratio         PTT - ( 22 Mar 2023 17:20 )  PTT:26.4 sec    PHYSICAL EXAM:    Neurology: alert and oriented x 3, nonfocal, no gross deficits    CV : S1S2 heard RRR    Lungs:  clear to ausc    Abdomen: soft, nontender, nondistended, positive bowel sounds, last bowel movement     Extremities:   no edema            aspirin enteric coated 81 milliGRAM(s) Oral daily  atorvastatin 20 milliGRAM(s) Oral at bedtime  FIRST- Mouthwash  BLM 15 milliLiter(s) Swish and Spit every 8 hours PRN  heparin   Injectable 5000 Unit(s) SubCutaneous every 8 hours  insulin glargine Injectable (LANTUS) 30 Unit(s) SubCutaneous <User Schedule>  insulin lispro (ADMELOG) corrective regimen sliding scale   SubCutaneous at bedtime  insulin lispro (ADMELOG) corrective regimen sliding scale   SubCutaneous three times a day before meals  insulin lispro Injectable (ADMELOG) 6 Unit(s) SubCutaneous three times a day before meals  isosorbide   mononitrate ER Tablet (IMDUR) 30 milliGRAM(s) Oral daily  meclizine 25 milliGRAM(s) Oral daily  metoprolol tartrate 25 milliGRAM(s) Oral two times a day  montelukast 10 milliGRAM(s) Oral daily  sodium chloride 0.9% lock flush 3 milliLiter(s) IV Push every 8 hours  tamsulosin 0.4 milliGRAM(s) Oral at bedtime                              Physical Therapy Rec:   Home  [  ]   Home w/ PT  [  ]  Rehab  [  ]    Discussed with Cardiothoracic Team at AM rounds.

## 2023-03-23 NOTE — PATIENT PROFILE ADULT - FALL HARM RISK - HARM RISK INTERVENTIONS

## 2023-03-23 NOTE — PROGRESS NOTE ADULT - ATTENDING COMMENTS
Agree with assessment and plan as above by Dr. Cash. Reviewed all pertinent labs, glucose values, and imaging studies. Modifications made as indicated above. Decrease basal insulin to 30 units based on fasting hypoglycemia on 35 units given yesterday. Monitor on Admelog 6 units with meals, Will adjust further as needed. Can resume insulin pump as outpatient.    Faustino Yao D.O  [phone]

## 2023-03-24 LAB
ANION GAP SERPL CALC-SCNC: 10 MMOL/L — SIGNIFICANT CHANGE UP (ref 5–17)
BUN SERPL-MCNC: 9 MG/DL — SIGNIFICANT CHANGE UP (ref 7–23)
CALCIUM SERPL-MCNC: 8.4 MG/DL — SIGNIFICANT CHANGE UP (ref 8.4–10.5)
CHLORIDE SERPL-SCNC: 103 MMOL/L — SIGNIFICANT CHANGE UP (ref 96–108)
CO2 SERPL-SCNC: 26 MMOL/L — SIGNIFICANT CHANGE UP (ref 22–31)
CREAT SERPL-MCNC: 0.67 MG/DL — SIGNIFICANT CHANGE UP (ref 0.5–1.3)
EGFR: 98 ML/MIN/1.73M2 — SIGNIFICANT CHANGE UP
GLUCOSE BLDC GLUCOMTR-MCNC: 127 MG/DL — HIGH (ref 70–99)
GLUCOSE BLDC GLUCOMTR-MCNC: 138 MG/DL — HIGH (ref 70–99)
GLUCOSE BLDC GLUCOMTR-MCNC: 151 MG/DL — HIGH (ref 70–99)
GLUCOSE BLDC GLUCOMTR-MCNC: 170 MG/DL — HIGH (ref 70–99)
GLUCOSE SERPL-MCNC: 119 MG/DL — HIGH (ref 70–99)
HCT VFR BLD CALC: 29.9 % — LOW (ref 39–50)
HGB BLD-MCNC: 9.8 G/DL — LOW (ref 13–17)
MCHC RBC-ENTMCNC: 28.9 PG — SIGNIFICANT CHANGE UP (ref 27–34)
MCHC RBC-ENTMCNC: 32.8 GM/DL — SIGNIFICANT CHANGE UP (ref 32–36)
MCV RBC AUTO: 88.2 FL — SIGNIFICANT CHANGE UP (ref 80–100)
NRBC # BLD: 0 /100 WBCS — SIGNIFICANT CHANGE UP (ref 0–0)
PLATELET # BLD AUTO: 295 K/UL — SIGNIFICANT CHANGE UP (ref 150–400)
POTASSIUM SERPL-MCNC: 3.5 MMOL/L — SIGNIFICANT CHANGE UP (ref 3.5–5.3)
POTASSIUM SERPL-SCNC: 3.5 MMOL/L — SIGNIFICANT CHANGE UP (ref 3.5–5.3)
RBC # BLD: 3.39 M/UL — LOW (ref 4.2–5.8)
RBC # FLD: 14.9 % — HIGH (ref 10.3–14.5)
SODIUM SERPL-SCNC: 139 MMOL/L — SIGNIFICANT CHANGE UP (ref 135–145)
WBC # BLD: 6.49 K/UL — SIGNIFICANT CHANGE UP (ref 3.8–10.5)
WBC # FLD AUTO: 6.49 K/UL — SIGNIFICANT CHANGE UP (ref 3.8–10.5)

## 2023-03-24 PROCEDURE — 99232 SBSQ HOSP IP/OBS MODERATE 35: CPT

## 2023-03-24 RX ORDER — POLYETHYLENE GLYCOL 3350 17 G/17G
17 POWDER, FOR SOLUTION ORAL DAILY
Refills: 0 | Status: DISCONTINUED | OUTPATIENT
Start: 2023-03-24 | End: 2023-03-27

## 2023-03-24 RX ORDER — SENNA PLUS 8.6 MG/1
2 TABLET ORAL AT BEDTIME
Refills: 0 | Status: DISCONTINUED | OUTPATIENT
Start: 2023-03-24 | End: 2023-03-27

## 2023-03-24 RX ORDER — POTASSIUM CHLORIDE 20 MEQ
20 PACKET (EA) ORAL ONCE
Refills: 0 | Status: COMPLETED | OUTPATIENT
Start: 2023-03-24 | End: 2023-03-24

## 2023-03-24 RX ORDER — METOPROLOL TARTRATE 50 MG
25 TABLET ORAL ONCE
Refills: 0 | Status: COMPLETED | OUTPATIENT
Start: 2023-03-24 | End: 2023-03-24

## 2023-03-24 RX ADMIN — ISOSORBIDE MONONITRATE 30 MILLIGRAM(S): 60 TABLET, EXTENDED RELEASE ORAL at 11:04

## 2023-03-24 RX ADMIN — MONTELUKAST 10 MILLIGRAM(S): 4 TABLET, CHEWABLE ORAL at 11:04

## 2023-03-24 RX ADMIN — Medication 6 UNIT(S): at 08:21

## 2023-03-24 RX ADMIN — Medication 20 MILLIEQUIVALENT(S): at 13:46

## 2023-03-24 RX ADMIN — SODIUM CHLORIDE 3 MILLILITER(S): 9 INJECTION INTRAMUSCULAR; INTRAVENOUS; SUBCUTANEOUS at 21:31

## 2023-03-24 RX ADMIN — Medication 25 MILLIGRAM(S): at 05:35

## 2023-03-24 RX ADMIN — Medication 6 UNIT(S): at 17:22

## 2023-03-24 RX ADMIN — INSULIN GLARGINE 30 UNIT(S): 100 INJECTION, SOLUTION SUBCUTANEOUS at 12:05

## 2023-03-24 RX ADMIN — SENNA PLUS 2 TABLET(S): 8.6 TABLET ORAL at 21:23

## 2023-03-24 RX ADMIN — Medication 25 MILLIGRAM(S): at 17:22

## 2023-03-24 RX ADMIN — HEPARIN SODIUM 5000 UNIT(S): 5000 INJECTION INTRAVENOUS; SUBCUTANEOUS at 05:34

## 2023-03-24 RX ADMIN — Medication 25 MILLIGRAM(S): at 11:03

## 2023-03-24 RX ADMIN — SODIUM CHLORIDE 3 MILLILITER(S): 9 INJECTION INTRAMUSCULAR; INTRAVENOUS; SUBCUTANEOUS at 13:38

## 2023-03-24 RX ADMIN — Medication 6 UNIT(S): at 12:05

## 2023-03-24 RX ADMIN — SODIUM CHLORIDE 3 MILLILITER(S): 9 INJECTION INTRAMUSCULAR; INTRAVENOUS; SUBCUTANEOUS at 05:30

## 2023-03-24 RX ADMIN — TAMSULOSIN HYDROCHLORIDE 0.4 MILLIGRAM(S): 0.4 CAPSULE ORAL at 21:24

## 2023-03-24 RX ADMIN — HEPARIN SODIUM 5000 UNIT(S): 5000 INJECTION INTRAVENOUS; SUBCUTANEOUS at 13:46

## 2023-03-24 RX ADMIN — Medication 25 MILLIGRAM(S): at 12:07

## 2023-03-24 RX ADMIN — Medication 81 MILLIGRAM(S): at 11:08

## 2023-03-24 RX ADMIN — DIPHENHYDRAMINE HYDROCHLORIDE AND LIDOCAINE HYDROCHLORIDE AND ALUMINUM HYDROXIDE AND MAGNESIUM HYDRO 15 MILLILITER(S): KIT at 11:04

## 2023-03-24 RX ADMIN — HEPARIN SODIUM 5000 UNIT(S): 5000 INJECTION INTRAVENOUS; SUBCUTANEOUS at 21:23

## 2023-03-24 RX ADMIN — Medication 1: at 12:05

## 2023-03-24 RX ADMIN — POLYETHYLENE GLYCOL 3350 17 GRAM(S): 17 POWDER, FOR SOLUTION ORAL at 21:24

## 2023-03-24 RX ADMIN — ATORVASTATIN CALCIUM 20 MILLIGRAM(S): 80 TABLET, FILM COATED ORAL at 21:23

## 2023-03-24 NOTE — PROGRESS NOTE ADULT - SUBJECTIVE AND OBJECTIVE BOX
C A R D I O L O G Y  **********************************     DATE OF SERVICE: 03-24-23    Patient planned for CABG next week. Brief PAT on telemetry but asymptomatic, no palpitations. denies chest pain or shortness of breath.   Review of symptoms otherwise negative.    MEDICATIONS:  aspirin enteric coated 81 milliGRAM(s) Oral daily  atorvastatin 20 milliGRAM(s) Oral at bedtime  FIRST- Mouthwash  BLM 15 milliLiter(s) Swish and Spit every 8 hours PRN  heparin   Injectable 5000 Unit(s) SubCutaneous every 8 hours  insulin glargine Injectable (LANTUS) 30 Unit(s) SubCutaneous <User Schedule>  insulin lispro (ADMELOG) corrective regimen sliding scale   SubCutaneous at bedtime  insulin lispro (ADMELOG) corrective regimen sliding scale   SubCutaneous three times a day before meals  insulin lispro Injectable (ADMELOG) 6 Unit(s) SubCutaneous three times a day before meals  isosorbide   mononitrate ER Tablet (IMDUR) 30 milliGRAM(s) Oral daily  meclizine 25 milliGRAM(s) Oral daily  metoprolol tartrate 25 milliGRAM(s) Oral two times a day  montelukast 10 milliGRAM(s) Oral daily  potassium chloride    Tablet ER 20 milliEquivalent(s) Oral once  sodium chloride 0.9% lock flush 3 milliLiter(s) IV Push every 8 hours  tamsulosin 0.4 milliGRAM(s) Oral at bedtime      LABS:                        9.8    6.49  )-----------( 295      ( 24 Mar 2023 05:00 )             29.9       Hemoglobin: 9.8 g/dL (03-24 @ 05:00)  Hemoglobin: 10.9 g/dL (03-23 @ 06:18)  Hemoglobin: 11.5 g/dL (03-22 @ 17:20)  Hemoglobin: 10.3 g/dL (03-22 @ 06:43)  Hemoglobin: 11.2 g/dL (03-21 @ 06:16)      03-24    139  |  103  |  9   ----------------------------<  119<H>  3.5   |  26  |  0.67    Ca    8.4      24 Mar 2023 04:59    TPro  6.3  /  Alb  3.4  /  TBili  0.4  /  DBili  x   /  AST  43<H>  /  ALT  44  /  AlkPhos  68  03-23    Creatinine Trend: 0.67<--, 0.61<--, 0.62<--, 0.54<--, 0.71<--, 0.70<--    COAGS:           PHYSICAL EXAM:  T(C): 36.9 (03-24-23 @ 10:45), Max: 37.3 (03-24-23 @ 04:27)  HR: 77 (03-24-23 @ 10:45) (58 - 90)  BP: 143/77 (03-24-23 @ 10:45) (117/69 - 155/77)  RR: 18 (03-24-23 @ 10:45) (18 - 18)  SpO2: 98% (03-24-23 @ 10:45) (94% - 98%)  Wt(kg): --    I&O's Summary    23 Mar 2023 07:01  -  24 Mar 2023 07:00  --------------------------------------------------------  IN: 1100 mL / OUT: 800 mL / NET: 300 mL    24 Mar 2023 07:01  -  24 Mar 2023 12:38  --------------------------------------------------------  IN: 360 mL / OUT: 0 mL / NET: 360 mL        Gen: NAD  HEENT:  (-)icterus (-)pallor  CV: N S1 S2 1/6 RONI (+)2 Pulses B/l  Resp:  Clear to auscultation B/L, normal effort  GI: (+) BS Soft, NT, ND  Lymph:  (-)Edema, (-)obvious lymphadenopathy  Skin: Warm to touch, Normal turgor  Psych: Appropriate mood and affect      TELEMETRY: SR 60-70s, brief 7 second PAT      < from: TTE with Doppler (w/Cont) (03.16.23 @ 13:36) >  CONCLUSIONS:  1. Normal mitral valve. Mildly calcified chordae tendinae.  Mild mitral regurgitation.  2. Calcified aortic valve was not well visualized but has  normal opening. No aortic stenosis. No aortic valve  regurgitation seen.  3. Normal left ventricular internal dimensions and wall  thicknesses.  4. Low-normal Left Ventricular Systolic Function,  (EF =  53% by biplane). No regional wall motion abnormalities.  Ultrasound LV opacification agent was used to enhance  endocardial definition.  5. Right ventricle not well visualized. Normal RVsystolic  function (RV tissue Doppler 11 cm/s).  6. RV systolic pressure is borderline normal at  33 mm Hg.  7. Normal tricuspid valve. Moderate tricuspid  regurgitation.  8. No pericardial effusion.    *** No previous Echo exam.    < end of copied text >    < from: Cardiac Catheterization (03.22.23 @ 09:42) >  Diagnostic Conclusions:     Large LAD with severe bifurcating/ostial and heavily calcifed disease   LCX with Bifurcating/ostial disease   RCA with Moderate Disease   Recommendations:     CABG evaluation      < end of copied text >      ASSESSMENT/PLAN: Patient is 72 year old male with pmh of Type I DM (50 years ago, on Insulin pump for last 15 years), HTN, Vertigo and BPH who was brought to ED due to Abdominal pain, Nausea and vomiting. Cardiology consulted for increased troponin.    #NSTEMI  - EKG non-ischemic  - TTE with low normal LV function and no wall motion abnormalities  - Continue ASA/Lipitor/Metoprolol  - s/p cath with multivessel CAD noted above  - Transferred to [facility] for CABG evaluation  - CTS follow up - pending CABG Thursday 3/30    #DKA/Type 1 DM  - Glycemic control per endocrinology    #HTN  - Continue Metoprolol and Imdur  - Losartan held preop per CTS    #PAT  - Asymptomatic, continue Metoprolol  - Keep K>4, Mg>2    Mariano Vernon PA-C  Pager: [phone]     C A R D I O L O G Y  **********************************     DATE OF SERVICE: 03-24-23    Patient planned for CABG next week. Brief PAT on telemetry but asymptomatic, no palpitations. denies chest pain or shortness of breath.   Review of symptoms otherwise negative.    MEDICATIONS:  aspirin enteric coated 81 milliGRAM(s) Oral daily  atorvastatin 20 milliGRAM(s) Oral at bedtime  FIRST- Mouthwash  BLM 15 milliLiter(s) Swish and Spit every 8 hours PRN  heparin   Injectable 5000 Unit(s) SubCutaneous every 8 hours  insulin glargine Injectable (LANTUS) 30 Unit(s) SubCutaneous <User Schedule>  insulin lispro (ADMELOG) corrective regimen sliding scale   SubCutaneous at bedtime  insulin lispro (ADMELOG) corrective regimen sliding scale   SubCutaneous three times a day before meals  insulin lispro Injectable (ADMELOG) 6 Unit(s) SubCutaneous three times a day before meals  isosorbide   mononitrate ER Tablet (IMDUR) 30 milliGRAM(s) Oral daily  meclizine 25 milliGRAM(s) Oral daily  metoprolol tartrate 25 milliGRAM(s) Oral two times a day  montelukast 10 milliGRAM(s) Oral daily  potassium chloride    Tablet ER 20 milliEquivalent(s) Oral once  sodium chloride 0.9% lock flush 3 milliLiter(s) IV Push every 8 hours  tamsulosin 0.4 milliGRAM(s) Oral at bedtime      LABS:                        9.8    6.49  )-----------( 295      ( 24 Mar 2023 05:00 )             29.9       Hemoglobin: 9.8 g/dL (03-24 @ 05:00)  Hemoglobin: 10.9 g/dL (03-23 @ 06:18)  Hemoglobin: 11.5 g/dL (03-22 @ 17:20)  Hemoglobin: 10.3 g/dL (03-22 @ 06:43)  Hemoglobin: 11.2 g/dL (03-21 @ 06:16)      03-24    139  |  103  |  9   ----------------------------<  119<H>  3.5   |  26  |  0.67    Ca    8.4      24 Mar 2023 04:59    TPro  6.3  /  Alb  3.4  /  TBili  0.4  /  DBili  x   /  AST  43<H>  /  ALT  44  /  AlkPhos  68  03-23    Creatinine Trend: 0.67<--, 0.61<--, 0.62<--, 0.54<--, 0.71<--, 0.70<--    COAGS:           PHYSICAL EXAM:  T(C): 36.9 (03-24-23 @ 10:45), Max: 37.3 (03-24-23 @ 04:27)  HR: 77 (03-24-23 @ 10:45) (58 - 90)  BP: 143/77 (03-24-23 @ 10:45) (117/69 - 155/77)  RR: 18 (03-24-23 @ 10:45) (18 - 18)  SpO2: 98% (03-24-23 @ 10:45) (94% - 98%)  Wt(kg): --    I&O's Summary    23 Mar 2023 07:01  -  24 Mar 2023 07:00  --------------------------------------------------------  IN: 1100 mL / OUT: 800 mL / NET: 300 mL    24 Mar 2023 07:01  -  24 Mar 2023 12:38  --------------------------------------------------------  IN: 360 mL / OUT: 0 mL / NET: 360 mL        Gen: NAD  HEENT:  (-)icterus (-)pallor  CV: N S1 S2 1/6 RONI (+)2 Pulses B/l  Resp:  Clear to auscultation B/L, normal effort  GI: (+) BS Soft, NT, ND  Lymph:  (-)Edema, (-)obvious lymphadenopathy  Skin: Warm to touch, Normal turgor  Psych: Appropriate mood and affect      TELEMETRY: SR 60-70s, brief 7 second PAT      < from: TTE with Doppler (w/Cont) (03.16.23 @ 13:36) >  CONCLUSIONS:  1. Normal mitral valve. Mildly calcified chordae tendinae.  Mild mitral regurgitation.  2. Calcified aortic valve was not well visualized but has  normal opening. No aortic stenosis. No aortic valve  regurgitation seen.  3. Normal left ventricular internal dimensions and wall  thicknesses.  4. Low-normal Left Ventricular Systolic Function,  (EF =  53% by biplane). No regional wall motion abnormalities.  Ultrasound LV opacification agent was used to enhance  endocardial definition.  5. Right ventricle not well visualized. Normal RVsystolic  function (RV tissue Doppler 11 cm/s).  6. RV systolic pressure is borderline normal at  33 mm Hg.  7. Normal tricuspid valve. Moderate tricuspid  regurgitation.  8. No pericardial effusion.    *** No previous Echo exam.    < end of copied text >    < from: Cardiac Catheterization (03.22.23 @ 09:42) >  Diagnostic Conclusions:     Large LAD with severe bifurcating/ostial and heavily calcifed disease   LCX with Bifurcating/ostial disease   RCA with Moderate Disease   Recommendations:     CABG evaluation      < end of copied text >      ASSESSMENT/PLAN: Patient is 72 year old male with pmh of Type I DM (50 years ago, on Insulin pump for last 15 years), HTN, Vertigo and BPH who was brought to ED due to Abdominal pain, Nausea and vomiting. Cardiology consulted for increased troponin.    #NSTEMI  - EKG non-ischemic  - TTE with low normal LV function and no wall motion abnormalities  - Continue ASA/Lipitor/Metoprolol  - s/p cath with multivessel CAD noted above  - Transferred to [facility] for CABG evaluation  - CTS follow up - pending CABG     #DKA/Type 1 DM  - Glycemic control per endocrinology    #HTN  - Continue Metoprolol and Imdur  - Losartan held preop per CTS    #PAT  - Asymptomatic, continue Metoprolol  - Keep K>4, Mg>2    Mariano Vernon PA-C  Pager: [phone]

## 2023-03-24 NOTE — PROGRESS NOTE ADULT - PROBLEM SELECTOR PLAN 1
Continue with ASA 81mg daily   Continue with Statin  Inc Lop 25 BID   CABG 3/27/23  Started on HSQ for DVT ppx

## 2023-03-24 NOTE — PROGRESS NOTE ADULT - ASSESSMENT
Patient is 72 years old male with past medical history of Type I DM (50 years ago, on Insulin pump for last 15 years), Vertigo and BPH who was brought to ED due to Abdominal pain, Nausea and vomiting transferred from [facility] to [facility] for work up and management of NSTEMI

## 2023-03-24 NOTE — PROGRESS NOTE ADULT - SUBJECTIVE AND OBJECTIVE BOX
VITAL SIGNS    Telemetry:  SR 60-90  Vital Signs Last 24 Hrs  T(C): 36.7 (23 @ 12:43), Max: 37.3 (23 @ 04:27)  T(F): 98.1 (23 @ 12:43), Max: 99.1 (23 @ 04:27)  HR: 76 (23 @ 12:43) (58 - 78)  BP: 113/59 (23 @ 12:43) (113/59 - 155/77)  RR: 18 (23 @ 12:43) (18 - 18)  SpO2: 96% (23 @ 12:43) (95% - 98%)             @ 07:01  -   @ 07:00  --------------------------------------------------------  IN: 1100 mL / OUT: 800 mL / NET: 300 mL     @ 07:01  -   @ 15:56  --------------------------------------------------------  IN: 720 mL / OUT: 300 mL / NET: 420 mL       Daily     Daily Weight in k (24 Mar 2023 08:36)  Admit Wt: Drug Dosing Weight  Height (cm): 188 (23 Mar 2023 10:08)  Weight (kg): 89.721 (23 Mar 2023 10:08)  BMI (kg/m2): 25.4 (23 Mar 2023 10:08)  BSA (m2): 2.16 (23 Mar 2023 10:08)      CAPILLARY BLOOD GLUCOSE      POCT Blood Glucose.: 170 mg/dL (24 Mar 2023 11:09)  POCT Blood Glucose.: 127 mg/dL (24 Mar 2023 07:31)  POCT Blood Glucose.: 109 mg/dL (23 Mar 2023 21:11)  POCT Blood Glucose.: 131 mg/dL (23 Mar 2023 16:24)          MEDICATIONS  aspirin enteric coated 81 milliGRAM(s) Oral daily  atorvastatin 20 milliGRAM(s) Oral at bedtime  FIRST- Mouthwash  BLM 15 milliLiter(s) Swish and Spit every 8 hours PRN  heparin   Injectable 5000 Unit(s) SubCutaneous every 8 hours  insulin glargine Injectable (LANTUS) 30 Unit(s) SubCutaneous <User Schedule>  insulin lispro (ADMELOG) corrective regimen sliding scale   SubCutaneous at bedtime  insulin lispro (ADMELOG) corrective regimen sliding scale   SubCutaneous three times a day before meals  insulin lispro Injectable (ADMELOG) 6 Unit(s) SubCutaneous three times a day before meals  isosorbide   mononitrate ER Tablet (IMDUR) 30 milliGRAM(s) Oral daily  meclizine 25 milliGRAM(s) Oral daily  metoprolol tartrate 25 milliGRAM(s) Oral two times a day  montelukast 10 milliGRAM(s) Oral daily  sodium chloride 0.9% lock flush 3 milliLiter(s) IV Push every 8 hours  tamsulosin 0.4 milliGRAM(s) Oral at bedtime      >>> <<<  PHYSICAL EXAM  Subjective: NAD  Neurology: alert and oriented x 3, nonfocal, no gross deficits  CV : s1s2  Lungs: CTA b/l  Abdomen: soft, NT,ND, (+ )BM  :  voiding  Extremities: -c/c/e      LABS      139  |  103  |  9   ----------------------------<  119<H>  3.5   |  26  |  0.67    Ca    8.4      24 Mar 2023 04:59    TPro  6.3  /  Alb  3.4  /  TBili  0.4  /  DBili  x   /  AST  43<H>  /  ALT  44  /  AlkPhos  68                                   9.8    6.49  )-----------( 295      ( 24 Mar 2023 05:00 )             29.9          PT/INR - ( 22 Mar 2023 17:20 )   PT: 14.2 sec;   INR: 1.23 ratio         PTT - ( 22 Mar 2023 17:20 )  PTT:26.4 sec       PAST MEDICAL & SURGICAL HISTORY:  Diabetes      Hypertension      BPH (benign prostatic hyperplasia)      S/P cataract surgery  right eye      Steel plate in right arm

## 2023-03-24 NOTE — PROGRESS NOTE ADULT - ASSESSMENT
Patient seen and examined, agree with above assessment and plan as transcribed above.    - for CABG now tentatively monday    Alec Mitchell MD, [facility]  BEEPER [phone]

## 2023-03-24 NOTE — PROGRESS NOTE ADULT - ASSESSMENT
72 years old male with PMHX of HTN, Type I DM (50 years ago, on Insulin pump for last 15 years), Vertigo and BPH who was brought to ED due to Abdominal pain, Nausea and vomiting. Patient also found to have elevated troponin. Patient transferred over from [facility] showing   TVD CAD.      3/22 VSS; Patient seen at bedside, resting comfortably on room air. In NAD, no SOB, or CP at this time.   3/23 /64 -171/81. OR Thursday 3/30.  Radial mapping.  Pt transferred to Dr Sherwood.  Carotids neg.  EF 53%  3/24 Scheduled for cabg this Monday Endocrinology recommendations appreciated.

## 2023-03-24 NOTE — PROGRESS NOTE ADULT - SUBJECTIVE AND OBJECTIVE BOX
Diabetes  Follow up note    Interval History/ROS: Pt eating meals.  No nausea, vomiting.      MEDICATIONS  (STANDING):  aspirin enteric coated 81 milliGRAM(s) Oral daily  atorvastatin 20 milliGRAM(s) Oral at bedtime  heparin   Injectable 5000 Unit(s) SubCutaneous every 8 hours  insulin glargine Injectable (LANTUS) 30 Unit(s) SubCutaneous <User Schedule>  insulin lispro (ADMELOG) corrective regimen sliding scale   SubCutaneous at bedtime  insulin lispro (ADMELOG) corrective regimen sliding scale   SubCutaneous three times a day before meals  insulin lispro Injectable (ADMELOG) 6 Unit(s) SubCutaneous three times a day before meals  isosorbide   mononitrate ER Tablet (IMDUR) 30 milliGRAM(s) Oral daily  meclizine 25 milliGRAM(s) Oral daily  metoprolol tartrate 25 milliGRAM(s) Oral two times a day  montelukast 10 milliGRAM(s) Oral daily  sodium chloride 0.9% lock flush 3 milliLiter(s) IV Push every 8 hours  tamsulosin 0.4 milliGRAM(s) Oral at bedtime    MEDICATIONS  (PRN):  FIRST- Mouthwash  BLM 15 milliLiter(s) Swish and Spit every 8 hours PRN Mouth Care      Allergies    Neosporin (Rash)    Intolerances          PHYSICAL EXAM:  VITALS: T(C): 36.7 (03-24-23 @ 12:43)  T(F): 98.1 (03-24-23 @ 12:43), Max: 99.1 (03-24-23 @ 04:27)  HR: 76 (03-24-23 @ 12:43) (58 - 78)  BP: 113/59 (03-24-23 @ 12:43) (113/59 - 155/77)  RR:  (18 - 18)  SpO2:  (95% - 98%)  GENERAL: Lyhing in bed in NAD,   EYES: No proptosis,  HEENT:  Atraumatic, Normocephalic,   RESPIRATORY: Clear to auscultation bilaterally  CARDIOVASCULAR: Regular rhythm; No murmurs; no peripheral edema  GI: Soft, nontender, non distended, normal bowel sounds        POCT Blood Glucose.: 170 mg/dL (03-24-23 @ 11:09)  POCT Blood Glucose.: 127 mg/dL (03-24-23 @ 07:31)  POCT Blood Glucose.: 109 mg/dL (03-23-23 @ 21:11)  POCT Blood Glucose.: 131 mg/dL (03-23-23 @ 16:24)  POCT Blood Glucose.: 87 mg/dL (03-23-23 @ 13:28)  POCT Blood Glucose.: 106 mg/dL (03-23-23 @ 11:38)  POCT Blood Glucose.: 117 mg/dL (03-23-23 @ 07:59)  POCT Blood Glucose.: 67 mg/dL (03-23-23 @ 07:34)  POCT Blood Glucose.: 119 mg/dL (03-22-23 @ 21:43)  POCT Blood Glucose.: 139 mg/dL (03-22-23 @ 16:52)  POCT Blood Glucose.: 278 mg/dL (03-22-23 @ 12:44)  POCT Blood Glucose.: 214 mg/dL (03-22-23 @ 08:53)  POCT Blood Glucose.: 208 mg/dL (03-22-23 @ 07:33)  POCT Blood Glucose.: 251 mg/dL (03-21-23 @ 21:14)  POCT Blood Glucose.: 189 mg/dL (03-21-23 @ 16:53)        03-24    139  |  103  |  9   ----------------------------<  119<H>  3.5   |  26  |  0.67    eGFR: 98    Ca    8.4      03-24  Mg     1.8     03-22  Phos  3.2     03-22    TPro  6.3  /  Alb  3.4  /  TBili  0.4  /  DBili  x   /  AST  43<H>  /  ALT  44  /  AlkPhos  68  03-23        A1C with Estimated Average Glucose Result: 8.2 % (03-22-23 @ 17:20)  A1C with Estimated Average Glucose Result: 7.7 % (03-16-23 @ 03:38)

## 2023-03-24 NOTE — PROGRESS NOTE ADULT - SUBJECTIVE AND OBJECTIVE BOX
Patient is a 72y old  Male who presents with a chief complaint of CAD (24 Mar 2023 12:37)      DATE OF SERVICE: 23 @ 13:43    SUBJECTIVE / OVERNIGHT EVENTS: overnight events noted    ROS:  Resp: No cough no sputum production  CVS: No chest pain no palpitations no orthopnea  GI: no N/V/D  : no dysuria, no hematuria  "I feel fine'       MEDICATIONS  (STANDING):  aspirin enteric coated 81 milliGRAM(s) Oral daily  atorvastatin 20 milliGRAM(s) Oral at bedtime  heparin   Injectable 5000 Unit(s) SubCutaneous every 8 hours  insulin glargine Injectable (LANTUS) 30 Unit(s) SubCutaneous <User Schedule>  insulin lispro (ADMELOG) corrective regimen sliding scale   SubCutaneous at bedtime  insulin lispro (ADMELOG) corrective regimen sliding scale   SubCutaneous three times a day before meals  insulin lispro Injectable (ADMELOG) 6 Unit(s) SubCutaneous three times a day before meals  isosorbide   mononitrate ER Tablet (IMDUR) 30 milliGRAM(s) Oral daily  meclizine 25 milliGRAM(s) Oral daily  metoprolol tartrate 25 milliGRAM(s) Oral two times a day  montelukast 10 milliGRAM(s) Oral daily  potassium chloride    Tablet ER 20 milliEquivalent(s) Oral once  sodium chloride 0.9% lock flush 3 milliLiter(s) IV Push every 8 hours  tamsulosin 0.4 milliGRAM(s) Oral at bedtime    MEDICATIONS  (PRN):  FIRST- Mouthwash  BLM 15 milliLiter(s) Swish and Spit every 8 hours PRN Mouth Care        CAPILLARY BLOOD GLUCOSE      POCT Blood Glucose.: 170 mg/dL (24 Mar 2023 11:09)  POCT Blood Glucose.: 127 mg/dL (24 Mar 2023 07:31)  POCT Blood Glucose.: 109 mg/dL (23 Mar 2023 21:11)  POCT Blood Glucose.: 131 mg/dL (23 Mar 2023 16:24)    I&O's Summary    23 Mar 2023 07:01  -  24 Mar 2023 07:00  --------------------------------------------------------  IN: 1100 mL / OUT: 800 mL / NET: 300 mL    24 Mar 2023 07:01  -  24 Mar 2023 13:43  --------------------------------------------------------  IN: 720 mL / OUT: 300 mL / NET: 420 mL        Vital Signs Last 24 Hrs  T(C): 36.7 (24 Mar 2023 12:43), Max: 37.3 (24 Mar 2023 04:27)  T(F): 98.1 (24 Mar 2023 12:43), Max: 99.1 (24 Mar 2023 04:27)  HR: 76 (24 Mar 2023 12:43) (58 - 78)  BP: 113/59 (24 Mar 2023 12:43) (113/59 - 155/77)  BP(mean): 99 (24 Mar 2023 10:45) (99 - 99)  RR: 18 (24 Mar 2023 12:43) (18 - 18)  SpO2: 96% (24 Mar 2023 12:43) (95% - 98%)      PHYSICAL EXAM:  EYES: EOMI, PERRLA  NECK: Supple, No JVD  CHEST/LUNG: clear  HEART: S1 S2; no murmurs   ABDOMEN: Soft, Nontender  EXTREMITIES:  trace no  NEUROLOGY: AO x 3 non-focal  SKIN: No rashes or lesions    LABS:                        9.8    6.49  )-----------( 295      ( 24 Mar 2023 05:00 )             29.9     03-24    139  |  103  |  9   ----------------------------<  119<H>  3.5   |  26  |  0.67    Ca    8.4      24 Mar 2023 04:59    TPro  6.3  /  Alb  3.4  /  TBili  0.4  /  DBili  x   /  AST  43<H>  /  ALT  44  /  AlkPhos  68  03-23    PT/INR - ( 22 Mar 2023 17:20 )   PT: 14.2 sec;   INR: 1.23 ratio         PTT - ( 22 Mar 2023 17:20 )  PTT:26.4 sec      Urinalysis Basic - ( 22 Mar 2023 17:18 )    Color: Light Yellow / Appearance: Clear / S.024 / pH: x  Gluc: x / Ketone: Small  / Bili: Negative / Urobili: Negative   Blood: x / Protein: Trace / Nitrite: Negative   Leuk Esterase: Negative / RBC: x / WBC x   Sq Epi: x / Non Sq Epi: x / Bacteria: x          All consultant(s) notes reviewed and care discussed with other providers        Contact Number, Dr Kelly [phone]

## 2023-03-24 NOTE — PROGRESS NOTE ADULT - ASSESSMENT
72 year old man with  T1DM on insulin pump, admitted to OSH in DKA found to have triple vessel disease on [facility] transferred to [facility] for evaluation for CABG.       T1DM with hyperglycemia  - HbA1c: 8.2  - Home Regimen: T slim novolog insulin pump   basal  12a 0.148  9a 0.75  5p 0.758  10p 0.712  ISF  12a 1:50  5p 1:57  10p 1:50  ICR  12a 1:18  9a 1:17  5p 1:!8  target 100  action time 5hr  - Endocrinologist: Dr. Ngo  - Pt with reasonable glucose control today,   - Continue lantus 30 units - Do not hold    - Continue Admelog 6 units pre-meal. HOLD IF NPO.  - Use low dose Admelog correction scales  Discharge plan:  - Discharge medications: plan to resume pump post discharge  Follow up with dr Ngo    HTN  - Home regimen: losartan 100mg daily  on hold per CTS perop.  Continue metoprolol    HLD  - Continue atorvastatin 20mg, consider increasing dose    Sarahi Mejia MD  pager  or via Teams on 3/24/23  Other times:  Diabetes team: [phone] business hours  [phone] night/weekend  or [email or web address]

## 2023-03-25 LAB
ANION GAP SERPL CALC-SCNC: 9 MMOL/L — SIGNIFICANT CHANGE UP (ref 5–17)
BUN SERPL-MCNC: 10 MG/DL — SIGNIFICANT CHANGE UP (ref 7–23)
CALCIUM SERPL-MCNC: 8.8 MG/DL — SIGNIFICANT CHANGE UP (ref 8.4–10.5)
CHLORIDE SERPL-SCNC: 103 MMOL/L — SIGNIFICANT CHANGE UP (ref 96–108)
CO2 SERPL-SCNC: 26 MMOL/L — SIGNIFICANT CHANGE UP (ref 22–31)
CREAT SERPL-MCNC: 0.59 MG/DL — SIGNIFICANT CHANGE UP (ref 0.5–1.3)
EGFR: 102 ML/MIN/1.73M2 — SIGNIFICANT CHANGE UP
GLUCOSE BLDC GLUCOMTR-MCNC: 163 MG/DL — HIGH (ref 70–99)
GLUCOSE BLDC GLUCOMTR-MCNC: 248 MG/DL — HIGH (ref 70–99)
GLUCOSE BLDC GLUCOMTR-MCNC: 255 MG/DL — HIGH (ref 70–99)
GLUCOSE BLDC GLUCOMTR-MCNC: 256 MG/DL — HIGH (ref 70–99)
GLUCOSE BLDC GLUCOMTR-MCNC: 379 MG/DL — HIGH (ref 70–99)
GLUCOSE SERPL-MCNC: 114 MG/DL — HIGH (ref 70–99)
HCT VFR BLD CALC: 30.8 % — LOW (ref 39–50)
HGB BLD-MCNC: 10 G/DL — LOW (ref 13–17)
MCHC RBC-ENTMCNC: 28.8 PG — SIGNIFICANT CHANGE UP (ref 27–34)
MCHC RBC-ENTMCNC: 32.5 GM/DL — SIGNIFICANT CHANGE UP (ref 32–36)
MCV RBC AUTO: 88.8 FL — SIGNIFICANT CHANGE UP (ref 80–100)
NRBC # BLD: 0 /100 WBCS — SIGNIFICANT CHANGE UP (ref 0–0)
PLATELET # BLD AUTO: 324 K/UL — SIGNIFICANT CHANGE UP (ref 150–400)
POTASSIUM SERPL-MCNC: 3.7 MMOL/L — SIGNIFICANT CHANGE UP (ref 3.5–5.3)
POTASSIUM SERPL-SCNC: 3.7 MMOL/L — SIGNIFICANT CHANGE UP (ref 3.5–5.3)
RBC # BLD: 3.47 M/UL — LOW (ref 4.2–5.8)
RBC # FLD: 14.8 % — HIGH (ref 10.3–14.5)
SODIUM SERPL-SCNC: 138 MMOL/L — SIGNIFICANT CHANGE UP (ref 135–145)
WBC # BLD: 6.07 K/UL — SIGNIFICANT CHANGE UP (ref 3.8–10.5)
WBC # FLD AUTO: 6.07 K/UL — SIGNIFICANT CHANGE UP (ref 3.8–10.5)

## 2023-03-25 PROCEDURE — 99232 SBSQ HOSP IP/OBS MODERATE 35: CPT

## 2023-03-25 RX ADMIN — SODIUM CHLORIDE 3 MILLILITER(S): 9 INJECTION INTRAMUSCULAR; INTRAVENOUS; SUBCUTANEOUS at 05:21

## 2023-03-25 RX ADMIN — SODIUM CHLORIDE 3 MILLILITER(S): 9 INJECTION INTRAMUSCULAR; INTRAVENOUS; SUBCUTANEOUS at 21:42

## 2023-03-25 RX ADMIN — TAMSULOSIN HYDROCHLORIDE 0.4 MILLIGRAM(S): 0.4 CAPSULE ORAL at 22:00

## 2023-03-25 RX ADMIN — SODIUM CHLORIDE 3 MILLILITER(S): 9 INJECTION INTRAMUSCULAR; INTRAVENOUS; SUBCUTANEOUS at 15:19

## 2023-03-25 RX ADMIN — Medication 1: at 21:59

## 2023-03-25 RX ADMIN — Medication 6 UNIT(S): at 17:41

## 2023-03-25 RX ADMIN — Medication 3: at 17:41

## 2023-03-25 RX ADMIN — Medication 81 MILLIGRAM(S): at 13:04

## 2023-03-25 RX ADMIN — HEPARIN SODIUM 5000 UNIT(S): 5000 INJECTION INTRAVENOUS; SUBCUTANEOUS at 21:58

## 2023-03-25 RX ADMIN — INSULIN GLARGINE 30 UNIT(S): 100 INJECTION, SOLUTION SUBCUTANEOUS at 13:04

## 2023-03-25 RX ADMIN — Medication 6 UNIT(S): at 07:50

## 2023-03-25 RX ADMIN — Medication 2: at 13:01

## 2023-03-25 RX ADMIN — HEPARIN SODIUM 5000 UNIT(S): 5000 INJECTION INTRAVENOUS; SUBCUTANEOUS at 13:04

## 2023-03-25 RX ADMIN — HEPARIN SODIUM 5000 UNIT(S): 5000 INJECTION INTRAVENOUS; SUBCUTANEOUS at 05:21

## 2023-03-25 RX ADMIN — MONTELUKAST 10 MILLIGRAM(S): 4 TABLET, CHEWABLE ORAL at 13:05

## 2023-03-25 RX ADMIN — ISOSORBIDE MONONITRATE 30 MILLIGRAM(S): 60 TABLET, EXTENDED RELEASE ORAL at 13:05

## 2023-03-25 RX ADMIN — Medication 2: at 07:50

## 2023-03-25 RX ADMIN — ATORVASTATIN CALCIUM 20 MILLIGRAM(S): 80 TABLET, FILM COATED ORAL at 21:58

## 2023-03-25 RX ADMIN — Medication 25 MILLIGRAM(S): at 05:21

## 2023-03-25 RX ADMIN — Medication 6 UNIT(S): at 13:02

## 2023-03-25 RX ADMIN — Medication 25 MILLIGRAM(S): at 17:52

## 2023-03-25 RX ADMIN — Medication 25 MILLIGRAM(S): at 15:19

## 2023-03-25 NOTE — PROGRESS NOTE ADULT - SUBJECTIVE AND OBJECTIVE BOX
C A R D I O L O G Y  **********************************     DATE OF SERVICE: 03-25-23     pt seen and examined, no complaints, ROS - .        aspirin enteric coated 81 milliGRAM(s) Oral daily  atorvastatin 20 milliGRAM(s) Oral at bedtime  FIRST- Mouthwash  BLM 15 milliLiter(s) Swish and Spit every 8 hours PRN  heparin   Injectable 5000 Unit(s) SubCutaneous every 8 hours  insulin glargine Injectable (LANTUS) 30 Unit(s) SubCutaneous <User Schedule>  insulin lispro (ADMELOG) corrective regimen sliding scale   SubCutaneous at bedtime  insulin lispro (ADMELOG) corrective regimen sliding scale   SubCutaneous three times a day before meals  insulin lispro Injectable (ADMELOG) 6 Unit(s) SubCutaneous three times a day before meals  isosorbide   mononitrate ER Tablet (IMDUR) 30 milliGRAM(s) Oral daily  meclizine 25 milliGRAM(s) Oral daily  metoprolol tartrate 25 milliGRAM(s) Oral two times a day  montelukast 10 milliGRAM(s) Oral daily  polyethylene glycol 3350 17 Gram(s) Oral daily  senna 2 Tablet(s) Oral at bedtime  sodium chloride 0.9% lock flush 3 milliLiter(s) IV Push every 8 hours  tamsulosin 0.4 milliGRAM(s) Oral at bedtime                            10.0   6.07  )-----------( 324      ( 25 Mar 2023 05:00 )             30.8       Hemoglobin: 10.0 g/dL (03-25 @ 05:00)  Hemoglobin: 9.8 g/dL (03-24 @ 05:00)  Hemoglobin: 10.9 g/dL (03-23 @ 06:18)  Hemoglobin: 11.5 g/dL (03-22 @ 17:20)  Hemoglobin: 10.3 g/dL (03-22 @ 06:43)      03-25    138  |  103  |  10  ----------------------------<  114<H>  3.7   |  26  |  0.59    Ca    8.8      25 Mar 2023 05:00      Creatinine Trend: 0.59<--, 0.67<--, 0.61<--, 0.62<--, 0.54<--, 0.71<--    COAGS:           T(C): 37.1 (03-24-23 @ 19:40), Max: 37.1 (03-24-23 @ 19:40)  HR: 82 (03-24-23 @ 19:40) (76 - 82)  BP: 150/73 (03-24-23 @ 19:40) (113/59 - 150/73)  RR: 18 (03-24-23 @ 19:40) (18 - 18)  SpO2: 98% (03-24-23 @ 19:40) (96% - 98%)  Wt(kg): --    I&O's Summary    24 Mar 2023 07:01  -  25 Mar 2023 07:00  --------------------------------------------------------  IN: 960 mL / OUT: 300 mL / NET: 660 mL      Gen: NAD  HEENT:  (-)icterus (-)pallor  CV: N S1 S2 1/6 RONI (+)2 Pulses B/l  Resp:  Clear to auscultation B/L, normal effort  GI: (+) BS Soft, NT, ND  Lymph:  (-)Edema, (-)obvious lymphadenopathy  Skin: Warm to touch, Normal turgor  Psych: Appropriate mood and affect      TELEMETRY: SR 60-70s, brief 7 second PAT      < from: TTE with Doppler (w/Cont) (03.16.23 @ 13:36) >  CONCLUSIONS:  1. Normal mitral valve. Mildly calcified chordae tendinae.  Mild mitral regurgitation.  2. Calcified aortic valve was not well visualized but has  normal opening. No aortic stenosis. No aortic valve  regurgitation seen.  3. Normal left ventricular internal dimensions and wall  thicknesses.  4. Low-normal Left Ventricular Systolic Function,  (EF =  53% by biplane). No regional wall motion abnormalities.  Ultrasound LV opacification agent was used to enhance  endocardial definition.  5. Right ventricle not well visualized. Normal RVsystolic  function (RV tissue Doppler 11 cm/s).  6. RV systolic pressure is borderline normal at  33 mm Hg.  7. Normal tricuspid valve. Moderate tricuspid  regurgitation.  8. No pericardial effusion.    *** No previous Echo exam.    < end of copied text >    < from: Cardiac Catheterization (03.22.23 @ 09:42) >  Diagnostic Conclusions:     Large LAD with severe bifurcating/ostial and heavily calcifed disease   LCX with Bifurcating/ostial disease   RCA with Moderate Disease   Recommendations:     CABG evaluation      < end of copied text >      ASSESSMENT/PLAN: Patient is 72 year old male with pmh of Type I DM (50 years ago, on Insulin pump for last 15 years), HTN, Vertigo and BPH who was brought to ED due to Abdominal pain, Nausea and vomiting. Cardiology consulted for increased troponin.    #NSTEMI  - EKG non-ischemic  - TTE with low normal LV function and no wall motion abnormalities  - Continue ASA/Lipitor/Metoprolol  - s/p cath with multivessel CAD noted above  - Transferred to [facility] for CABG evaluation  - CTS follow up - pending CABG next week     #DKA/Type 1 DM  - Glycemic control per endocrinology    #HTN  - Continue Metoprolol and Imdur  - Losartan held preop per CTS    #PAT  - Asymptomatic, continue Metoprolol  - Keep K>4, Mg>2

## 2023-03-25 NOTE — PROGRESS NOTE ADULT - ASSESSMENT
Patient is 72 years old male with past medical history of Type I DM (50 years ago, on Insulin pump for last 15 years), Vertigo and BPH who was brought to ED due to Abdominal pain, Nausea and vomiting transferred from [facility] to [facility] for work up and management of NSTEMI

## 2023-03-25 NOTE — PROGRESS NOTE ADULT - ASSESSMENT
72 years old male with PMHX of HTN, Type I DM (50 years ago, on Insulin pump for last 15 years), Vertigo and BPH who was brought to ED due to Abdominal pain, Nausea and vomiting. Patient also found to have elevated troponin. Patient transferred over from [facility] s/p [facility] showing   TVD CAD.      3/22 VSS; Patient seen at bedside, resting comfortably on room air. In NAD, no SOB, or CP at this time.   3/23 /64 -171/81. OR Thursday 3/30.  Radial mapping.  Pt transferred to Dr Sherwood.  Carotids neg.  EF 53%  3/24 Scheduled for cabg this Monday Endocrinology recommendations appreciated.  3/25 reserve left arm for radial harvest

## 2023-03-25 NOTE — PROGRESS NOTE ADULT - ASSESSMENT
72 years old male with PMHX of HTN, Type I DM (50 years ago, on Insulin pump for last 15 years), Vertigo and BPH who was brought to ED due to Abdominal pain, Nausea and vomiting. Patient also found to have elevated troponin. Patient transferred over from [facility] s/p [facility] showing   TVD CAD.      3/22 VSS; Patient seen at bedside, resting comfortably on room air. In NAD, no SOB, or CP at this time.   3/23 /64 -171/81. OR Thursday 3/30.  Radial mapping.  Pt transferred to Dr Sherwood.  Carotids neg.  EF 53%  3/24 Scheduled for cabg this Monday Endocrinology recommendations appreciated.  3/25 reserve left arm for radial harvest  3/26 Analgesics for right TMJ. ENT consulted for right ear pain. CABG scheduled in am

## 2023-03-25 NOTE — PROGRESS NOTE ADULT - SUBJECTIVE AND OBJECTIVE BOX
VITAL SIGNS    Telemetry: SR 60-80   Vital Signs Last 24 Hrs  T(C): 36.5 (23 @ 12:43), Max: 37.1 (23 @ 19:40)  T(F): 97.7 (23 @ 12:43), Max: 98.7 (23 @ 19:40)  HR: 76 (23 @ 12:43) (76 - 82)  BP: 143/75 (23 @ 12:43) (143/75 - 150/73)  RR: 18 (23 @ 12:43) (18 - 18)  SpO2: 98% (23 @ 12:43) (98% - 98%)             @ 07:01  -   @ 07:00  --------------------------------------------------------  IN: 960 mL / OUT: 300 mL / NET: 660 mL     @ 07:01  -   @ 15:44  --------------------------------------------------------  IN: 720 mL / OUT: 650 mL / NET: 70 mL       Daily     Daily Weight in k (25 Mar 2023 10:19)  Admit Wt: Drug Dosing Weight  Height (cm): 188 (23 Mar 2023 10:08)  Weight (kg): 89.721 (23 Mar 2023 10:08)  BMI (kg/m2): 25.4 (23 Mar 2023 10:08)  BSA (m2): 2.16 (23 Mar 2023 10:08)      CAPILLARY BLOOD GLUCOSE      POCT Blood Glucose.: 248 mg/dL (25 Mar 2023 11:26)  POCT Blood Glucose.: 163 mg/dL (25 Mar 2023 07:33)  POCT Blood Glucose.: 151 mg/dL (24 Mar 2023 21:22)  POCT Blood Glucose.: 138 mg/dL (24 Mar 2023 17:03)          MEDICATIONS  aspirin enteric coated 81 milliGRAM(s) Oral daily  atorvastatin 20 milliGRAM(s) Oral at bedtime  FIRST- Mouthwash  BLM 15 milliLiter(s) Swish and Spit every 8 hours PRN  heparin   Injectable 5000 Unit(s) SubCutaneous every 8 hours  insulin glargine Injectable (LANTUS) 30 Unit(s) SubCutaneous <User Schedule>  insulin lispro (ADMELOG) corrective regimen sliding scale   SubCutaneous at bedtime  insulin lispro (ADMELOG) corrective regimen sliding scale   SubCutaneous three times a day before meals  insulin lispro Injectable (ADMELOG) 6 Unit(s) SubCutaneous three times a day before meals  isosorbide   mononitrate ER Tablet (IMDUR) 30 milliGRAM(s) Oral daily  meclizine 25 milliGRAM(s) Oral daily  metoprolol tartrate 25 milliGRAM(s) Oral two times a day  montelukast 10 milliGRAM(s) Oral daily  polyethylene glycol 3350 17 Gram(s) Oral daily  senna 2 Tablet(s) Oral at bedtime  sodium chloride 0.9% lock flush 3 milliLiter(s) IV Push every 8 hours  tamsulosin 0.4 milliGRAM(s) Oral at bedtime      >>> <<<  PHYSICAL EXAM  Subjective: NAD   right TMJ pain +click  Neurology: alert and oriented x 3, nonfocal, no gross deficits  CV : s1s2  Lungs: CTA b/l  Abdomen: soft, NT,ND, (+ )BM  :  voiding  Extremities: -c/c/e      LABS      138  |  103  |  10  ----------------------------<  114<H>  3.7   |  26  |  0.59    Ca    8.8      25 Mar 2023 05:00                                   10.0   6.07  )-----------( 324      ( 25 Mar 2023 05:00 )             30.8          < from: Xray Chest 1 View AP/PA (23 @ 16:49) >  INTERPRETATION:  Chest one view    HISTORY: Preop    COMPARISON STUDY: 3/17/2023    Frontal expiratory view of the chest shows the heart to be normal in   size. The lungs show less pulmonary congestion and there is no evidence   of pneumothorax nor pleural effusion.    IMPRESSION:  Decreased congestive changes.    < end of copied text >  < from: VA Duplex Carotid, Bilat (23 @ 16:12) >  TERPRETATION:  CLINICAL INFORMATION: Preop open heart surgery    COMPARISON: None available.    TECHNIQUE: Grayscale, color and spectral Doppler examination of both   carotid arteries was performed.    FINDINGS:    No elevated velocities or abnormal waveforms are encountered. No   significant plaque or stenosis.    Peak systolic velocities are as follows:    RIGHT:  PROX CCA = 142 cm/s  DIST CCA = 79 cm/s  PROX ICA = 85 cm/s  DIST ICA = 72 cm/s  ECA = 118 cm/s    LEFT:  PROX CCA = 188 cm/s  DIST CCA = 81 cm/s  PROX ICA = 92 cm/s  DIST ICA = 99 cm/s  ECA = 111 cm/s    Antegrade flow is noted within both vertebral arteries.    IMPRESSION: No significant hemodynamic stenosis of either carotid artery.    < end of copied text >  < from: TTE with Doppler (w/Cont) (23 @ 13:36) >  ------------------------------------------------------------------------  CONCLUSIONS:  1. Normal mitral valve. Mildly calcified chordae tendinae.  Mild mitral regurgitation.  2. Calcified aortic valve was not well visualized but has  normal opening. No aortic stenosis. No aortic valve  regurgitation seen.  3. Normal left ventricular internal dimensions and wall  thicknesses.  4. Low-normal Left Ventricular Systolic Function,  (EF =  53% by biplane). No regional wall motion abnormalities.  Ultrasound LV opacification agent was used to enhance  endocardial definition.  5. Right ventricle not well visualized. Normal RVsystolic  function (RV tissue Doppler 11 cm/s).  6. RV systolic pressure is borderline normal at  33 mm Hg.  7. Normal tricuspid valve. Moderate tricuspid  regurgitation.  8. No pericardial effusion.    < end of copied text >  < from: 12 Lead ECG (23 @ 14:40) >    Ventricular Rate 77 BPM    Atrial Rate 77 BPM    P-R Interval 150 ms    QRS Duration 112 ms    Q-T Interval 400 ms    QTC Calculation(Bazett) 452 ms    P Axis 51 degrees    R Axis -4 degrees    T Axis 21 degrees    Diagnosis Line Normal sinus rhythm  Normal ECG    < end of copied text >         PAST MEDICAL & SURGICAL HISTORY:  Diabetes      Hypertension      BPH (benign prostatic hyperplasia)      S/P cataract surgery  right eye      Steel plate in right arm

## 2023-03-25 NOTE — PROGRESS NOTE ADULT - SUBJECTIVE AND OBJECTIVE BOX
Patient is a 72y old  Male who presents with a chief complaint of CAD (25 Mar 2023 15:44)      DATE OF SERVICE: 03-25-23 @ 17:52    SUBJECTIVE / OVERNIGHT EVENTS: overnight events noted    ROS:  Resp: No cough no sputum production  CVS: No chest pain no palpitations no orthopnea  GI: no N/V/D  : no dysuria, no hematuria  Neuro: no weakness no paresthesias  Heme: No petechiae no easy bruising  Msk: No joint pain no swelling  Skin: No rash no itching        MEDICATIONS  (STANDING):  aspirin enteric coated 81 milliGRAM(s) Oral daily  atorvastatin 20 milliGRAM(s) Oral at bedtime  heparin   Injectable 5000 Unit(s) SubCutaneous every 8 hours  insulin glargine Injectable (LANTUS) 30 Unit(s) SubCutaneous <User Schedule>  insulin lispro (ADMELOG) corrective regimen sliding scale   SubCutaneous at bedtime  insulin lispro (ADMELOG) corrective regimen sliding scale   SubCutaneous three times a day before meals  insulin lispro Injectable (ADMELOG) 6 Unit(s) SubCutaneous three times a day before meals  isosorbide   mononitrate ER Tablet (IMDUR) 30 milliGRAM(s) Oral daily  meclizine 25 milliGRAM(s) Oral daily  metoprolol tartrate 25 milliGRAM(s) Oral two times a day  montelukast 10 milliGRAM(s) Oral daily  polyethylene glycol 3350 17 Gram(s) Oral daily  senna 2 Tablet(s) Oral at bedtime  sodium chloride 0.9% lock flush 3 milliLiter(s) IV Push every 8 hours  tamsulosin 0.4 milliGRAM(s) Oral at bedtime    MEDICATIONS  (PRN):  FIRST- Mouthwash  BLM 15 milliLiter(s) Swish and Spit every 8 hours PRN Mouth Care        CAPILLARY BLOOD GLUCOSE      POCT Blood Glucose.: 255 mg/dL (25 Mar 2023 16:48)  POCT Blood Glucose.: 248 mg/dL (25 Mar 2023 11:26)  POCT Blood Glucose.: 163 mg/dL (25 Mar 2023 07:33)  POCT Blood Glucose.: 151 mg/dL (24 Mar 2023 21:22)    I&O's Summary    24 Mar 2023 07:01  -  25 Mar 2023 07:00  --------------------------------------------------------  IN: 960 mL / OUT: 300 mL / NET: 660 mL    25 Mar 2023 07:01  -  25 Mar 2023 17:52  --------------------------------------------------------  IN: 720 mL / OUT: 650 mL / NET: 70 mL        Vital Signs Last 24 Hrs  T(C): 36.5 (25 Mar 2023 12:43), Max: 37.1 (24 Mar 2023 19:40)  T(F): 97.7 (25 Mar 2023 12:43), Max: 98.7 (24 Mar 2023 19:40)  HR: 76 (25 Mar 2023 12:43) (76 - 82)  BP: 143/75 (25 Mar 2023 12:43) (143/75 - 150/73)  BP(mean): --  RR: 18 (25 Mar 2023 12:43) (18 - 18)  SpO2: 98% (25 Mar 2023 12:43) (98% - 98%)      PHYSICAL EXAM:  EYES: EOMI, PERRLA  NECK: Supple, No JVD  CHEST/LUNG: clear  HEART: S1 S2; no murmurs   ABDOMEN: Soft, Nontender  EXTREMITIES:  trace no  NEUROLOGY: AO x 3 non-focal  SKIN: No rashes or lesions    LABS:                        10.0   6.07  )-----------( 324      ( 25 Mar 2023 05:00 )             30.8     03-25    138  |  103  |  10  ----------------------------<  114<H>  3.7   |  26  |  0.59    Ca    8.8      25 Mar 2023 05:00                  All consultant(s) notes reviewed and care discussed with other providers        Contact Number, Dr Kelly [phone]

## 2023-03-25 NOTE — PROGRESS NOTE ADULT - ASSESSMENT
Patient seen and examined, agree with above assessment and plan as transcribed above.    - for CABG monday    Alec Mitchell MD, [facility]  BEEPER [phone]

## 2023-03-25 NOTE — PROGRESS NOTE ADULT - SUBJECTIVE AND OBJECTIVE BOX
VITAL SIGNS    Telemetry: SR 60-80   Vital Signs Last 24 Hrs  T(C): 36.5 (23 @ 12:43), Max: 37.1 (23 @ 19:40)  T(F): 97.7 (23 @ 12:43), Max: 98.7 (23 @ 19:40)  HR: 76 (23 @ 12:43) (76 - 82)  BP: 143/75 (23 @ 12:43) (143/75 - 150/73)  RR: 18 (23 @ 12:43) (18 - 18)  SpO2: 98% (23 @ 12:43) (98% - 98%)             @ 07:01  -   @ 07:00  --------------------------------------------------------  IN: 960 mL / OUT: 300 mL / NET: 660 mL     @ 07:01  -   @ 15:44  --------------------------------------------------------  IN: 720 mL / OUT: 650 mL / NET: 70 mL       Daily     Daily Weight in k (25 Mar 2023 10:19)  Admit Wt: Drug Dosing Weight  Height (cm): 188 (23 Mar 2023 10:08)  Weight (kg): 89.721 (23 Mar 2023 10:08)  BMI (kg/m2): 25.4 (23 Mar 2023 10:08)  BSA (m2): 2.16 (23 Mar 2023 10:08)      CAPILLARY BLOOD GLUCOSE      POCT Blood Glucose.: 248 mg/dL (25 Mar 2023 11:26)  POCT Blood Glucose.: 163 mg/dL (25 Mar 2023 07:33)  POCT Blood Glucose.: 151 mg/dL (24 Mar 2023 21:22)  POCT Blood Glucose.: 138 mg/dL (24 Mar 2023 17:03)          MEDICATIONS  aspirin enteric coated 81 milliGRAM(s) Oral daily  atorvastatin 20 milliGRAM(s) Oral at bedtime  FIRST- Mouthwash  BLM 15 milliLiter(s) Swish and Spit every 8 hours PRN  heparin   Injectable 5000 Unit(s) SubCutaneous every 8 hours  insulin glargine Injectable (LANTUS) 30 Unit(s) SubCutaneous <User Schedule>  insulin lispro (ADMELOG) corrective regimen sliding scale   SubCutaneous at bedtime  insulin lispro (ADMELOG) corrective regimen sliding scale   SubCutaneous three times a day before meals  insulin lispro Injectable (ADMELOG) 6 Unit(s) SubCutaneous three times a day before meals  isosorbide   mononitrate ER Tablet (IMDUR) 30 milliGRAM(s) Oral daily  meclizine 25 milliGRAM(s) Oral daily  metoprolol tartrate 25 milliGRAM(s) Oral two times a day  montelukast 10 milliGRAM(s) Oral daily  polyethylene glycol 3350 17 Gram(s) Oral daily  senna 2 Tablet(s) Oral at bedtime  sodium chloride 0.9% lock flush 3 milliLiter(s) IV Push every 8 hours  tamsulosin 0.4 milliGRAM(s) Oral at bedtime      >>> <<<  PHYSICAL EXAM  Subjective: NAD  Neurology: alert and oriented x 3, nonfocal, no gross deficits  CV : s1s2  Lungs: CTA b/l  Abdomen: soft, NT,ND, (+ )BM  :  voiding  Extremities: -c/c/e      LABS      138  |  103  |  10  ----------------------------<  114<H>  3.7   |  26  |  0.59    Ca    8.8      25 Mar 2023 05:00                                   10.0   6.07  )-----------( 324      ( 25 Mar 2023 05:00 )             30.8                 PAST MEDICAL & SURGICAL HISTORY:  Diabetes      Hypertension      BPH (benign prostatic hyperplasia)      S/P cataract surgery  right eye      Steel plate in right arm

## 2023-03-26 ENCOUNTER — TRANSCRIPTION ENCOUNTER (OUTPATIENT)
Age: 73
End: 2023-03-26

## 2023-03-26 DIAGNOSIS — M26.629 ARTHRALGIA OF TEMPOROMANDIBULAR JOINT, UNSPECIFIED SIDE: ICD-10-CM

## 2023-03-26 PROBLEM — N40.0 BENIGN PROSTATIC HYPERPLASIA WITHOUT LOWER URINARY TRACT SYMPTOMS: Chronic | Status: ACTIVE | Noted: 2023-03-22

## 2023-03-26 LAB
ANION GAP SERPL CALC-SCNC: 11 MMOL/L — SIGNIFICANT CHANGE UP (ref 5–17)
BLD GP AB SCN SERPL QL: NEGATIVE — SIGNIFICANT CHANGE UP
BUN SERPL-MCNC: 11 MG/DL — SIGNIFICANT CHANGE UP (ref 7–23)
CALCIUM SERPL-MCNC: 8.7 MG/DL — SIGNIFICANT CHANGE UP (ref 8.4–10.5)
CHLORIDE SERPL-SCNC: 103 MMOL/L — SIGNIFICANT CHANGE UP (ref 96–108)
CO2 SERPL-SCNC: 25 MMOL/L — SIGNIFICANT CHANGE UP (ref 22–31)
CREAT SERPL-MCNC: 0.66 MG/DL — SIGNIFICANT CHANGE UP (ref 0.5–1.3)
EGFR: 98 ML/MIN/1.73M2 — SIGNIFICANT CHANGE UP
GLUCOSE BLDC GLUCOMTR-MCNC: 107 MG/DL — HIGH (ref 70–99)
GLUCOSE BLDC GLUCOMTR-MCNC: 118 MG/DL — HIGH (ref 70–99)
GLUCOSE BLDC GLUCOMTR-MCNC: 193 MG/DL — HIGH (ref 70–99)
GLUCOSE BLDC GLUCOMTR-MCNC: 56 MG/DL — LOW (ref 70–99)
GLUCOSE BLDC GLUCOMTR-MCNC: 94 MG/DL — SIGNIFICANT CHANGE UP (ref 70–99)
GLUCOSE BLDC GLUCOMTR-MCNC: 94 MG/DL — SIGNIFICANT CHANGE UP (ref 70–99)
GLUCOSE SERPL-MCNC: 106 MG/DL — HIGH (ref 70–99)
HCT VFR BLD CALC: 30.7 % — LOW (ref 39–50)
HGB BLD-MCNC: 9.8 G/DL — LOW (ref 13–17)
MCHC RBC-ENTMCNC: 29 PG — SIGNIFICANT CHANGE UP (ref 27–34)
MCHC RBC-ENTMCNC: 31.9 GM/DL — LOW (ref 32–36)
MCV RBC AUTO: 90.8 FL — SIGNIFICANT CHANGE UP (ref 80–100)
NRBC # BLD: 0 /100 WBCS — SIGNIFICANT CHANGE UP (ref 0–0)
PLATELET # BLD AUTO: 299 K/UL — SIGNIFICANT CHANGE UP (ref 150–400)
POTASSIUM SERPL-MCNC: 3.6 MMOL/L — SIGNIFICANT CHANGE UP (ref 3.5–5.3)
POTASSIUM SERPL-SCNC: 3.6 MMOL/L — SIGNIFICANT CHANGE UP (ref 3.5–5.3)
RBC # BLD: 3.38 M/UL — LOW (ref 4.2–5.8)
RBC # FLD: 14.8 % — HIGH (ref 10.3–14.5)
RH IG SCN BLD-IMP: POSITIVE — SIGNIFICANT CHANGE UP
SARS-COV-2 RNA SPEC QL NAA+PROBE: SIGNIFICANT CHANGE UP
SODIUM SERPL-SCNC: 139 MMOL/L — SIGNIFICANT CHANGE UP (ref 135–145)
WBC # BLD: 5.54 K/UL — SIGNIFICANT CHANGE UP (ref 3.8–10.5)
WBC # FLD AUTO: 5.54 K/UL — SIGNIFICANT CHANGE UP (ref 3.8–10.5)

## 2023-03-26 PROCEDURE — 99232 SBSQ HOSP IP/OBS MODERATE 35: CPT

## 2023-03-26 RX ORDER — CHLORHEXIDINE GLUCONATE 213 G/1000ML
1 SOLUTION TOPICAL
Refills: 0 | Status: COMPLETED | OUTPATIENT
Start: 2023-03-26 | End: 2023-03-27

## 2023-03-26 RX ORDER — ACETAMINOPHEN 500 MG
1000 TABLET ORAL ONCE
Refills: 0 | Status: COMPLETED | OUTPATIENT
Start: 2023-03-26 | End: 2023-03-27

## 2023-03-26 RX ORDER — CHLORHEXIDINE GLUCONATE 213 G/1000ML
15 SOLUTION TOPICAL
Refills: 0 | Status: COMPLETED | OUTPATIENT
Start: 2023-03-26 | End: 2023-03-27

## 2023-03-26 RX ORDER — INSULIN GLARGINE 100 [IU]/ML
24 INJECTION, SOLUTION SUBCUTANEOUS
Refills: 0 | Status: DISCONTINUED | OUTPATIENT
Start: 2023-03-26 | End: 2023-03-27

## 2023-03-26 RX ORDER — ASCORBIC ACID 60 MG
2000 TABLET,CHEWABLE ORAL ONCE
Refills: 0 | Status: COMPLETED | OUTPATIENT
Start: 2023-03-26 | End: 2023-03-26

## 2023-03-26 RX ORDER — ACETAMINOPHEN 500 MG
1000 TABLET ORAL ONCE
Refills: 0 | Status: COMPLETED | OUTPATIENT
Start: 2023-03-26 | End: 2023-03-26

## 2023-03-26 RX ORDER — CEFUROXIME AXETIL 250 MG
1500 TABLET ORAL ONCE
Refills: 0 | Status: DISCONTINUED | OUTPATIENT
Start: 2023-03-26 | End: 2023-03-27

## 2023-03-26 RX ORDER — GABAPENTIN 400 MG/1
300 CAPSULE ORAL ONCE
Refills: 0 | Status: COMPLETED | OUTPATIENT
Start: 2023-03-26 | End: 2023-03-27

## 2023-03-26 RX ADMIN — Medication 25 MILLIGRAM(S): at 12:34

## 2023-03-26 RX ADMIN — Medication 25 MILLIGRAM(S): at 17:13

## 2023-03-26 RX ADMIN — ATORVASTATIN CALCIUM 20 MILLIGRAM(S): 80 TABLET, FILM COATED ORAL at 22:09

## 2023-03-26 RX ADMIN — Medication 81 MILLIGRAM(S): at 12:34

## 2023-03-26 RX ADMIN — Medication 1000 MILLIGRAM(S): at 12:31

## 2023-03-26 RX ADMIN — Medication 1: at 16:31

## 2023-03-26 RX ADMIN — ISOSORBIDE MONONITRATE 30 MILLIGRAM(S): 60 TABLET, EXTENDED RELEASE ORAL at 12:34

## 2023-03-26 RX ADMIN — Medication 6 UNIT(S): at 16:31

## 2023-03-26 RX ADMIN — CHLORHEXIDINE GLUCONATE 15 MILLILITER(S): 213 SOLUTION TOPICAL at 17:13

## 2023-03-26 RX ADMIN — HEPARIN SODIUM 5000 UNIT(S): 5000 INJECTION INTRAVENOUS; SUBCUTANEOUS at 22:03

## 2023-03-26 RX ADMIN — DIPHENHYDRAMINE HYDROCHLORIDE AND LIDOCAINE HYDROCHLORIDE AND ALUMINUM HYDROXIDE AND MAGNESIUM HYDRO 15 MILLILITER(S): KIT at 22:04

## 2023-03-26 RX ADMIN — HEPARIN SODIUM 5000 UNIT(S): 5000 INJECTION INTRAVENOUS; SUBCUTANEOUS at 06:31

## 2023-03-26 RX ADMIN — Medication 400 MILLIGRAM(S): at 12:01

## 2023-03-26 RX ADMIN — INSULIN GLARGINE 24 UNIT(S): 100 INJECTION, SOLUTION SUBCUTANEOUS at 16:31

## 2023-03-26 RX ADMIN — Medication 6 UNIT(S): at 11:53

## 2023-03-26 RX ADMIN — SODIUM CHLORIDE 3 MILLILITER(S): 9 INJECTION INTRAMUSCULAR; INTRAVENOUS; SUBCUTANEOUS at 06:24

## 2023-03-26 RX ADMIN — CHLORHEXIDINE GLUCONATE 1 APPLICATION(S): 213 SOLUTION TOPICAL at 22:00

## 2023-03-26 RX ADMIN — Medication 2000 MILLIGRAM(S): at 22:02

## 2023-03-26 RX ADMIN — Medication 25 MILLIGRAM(S): at 06:32

## 2023-03-26 RX ADMIN — SODIUM CHLORIDE 3 MILLILITER(S): 9 INJECTION INTRAMUSCULAR; INTRAVENOUS; SUBCUTANEOUS at 21:06

## 2023-03-26 RX ADMIN — HEPARIN SODIUM 5000 UNIT(S): 5000 INJECTION INTRAVENOUS; SUBCUTANEOUS at 13:06

## 2023-03-26 RX ADMIN — TAMSULOSIN HYDROCHLORIDE 0.4 MILLIGRAM(S): 0.4 CAPSULE ORAL at 22:02

## 2023-03-26 RX ADMIN — SODIUM CHLORIDE 3 MILLILITER(S): 9 INJECTION INTRAMUSCULAR; INTRAVENOUS; SUBCUTANEOUS at 13:06

## 2023-03-26 RX ADMIN — POLYETHYLENE GLYCOL 3350 17 GRAM(S): 17 POWDER, FOR SOLUTION ORAL at 12:35

## 2023-03-26 RX ADMIN — MONTELUKAST 10 MILLIGRAM(S): 4 TABLET, CHEWABLE ORAL at 12:34

## 2023-03-26 RX ADMIN — Medication 6 UNIT(S): at 07:45

## 2023-03-26 NOTE — PROGRESS NOTE ADULT - SUBJECTIVE AND OBJECTIVE BOX
C A R D I O L O G Y  **********************************     DATE OF SERVICE: 03-26-23      no chest pain or palpitation        acetaminophen   IVPB .. 1000 milliGRAM(s) IV Intermittent once  aspirin enteric coated 81 milliGRAM(s) Oral daily  atorvastatin 20 milliGRAM(s) Oral at bedtime  FIRST- Mouthwash  BLM 15 milliLiter(s) Swish and Spit every 8 hours PRN  heparin   Injectable 5000 Unit(s) SubCutaneous every 8 hours  insulin glargine Injectable (LANTUS) 24 Unit(s) SubCutaneous <User Schedule>  insulin lispro (ADMELOG) corrective regimen sliding scale   SubCutaneous at bedtime  insulin lispro (ADMELOG) corrective regimen sliding scale   SubCutaneous three times a day before meals  insulin lispro Injectable (ADMELOG) 6 Unit(s) SubCutaneous three times a day before meals  isosorbide   mononitrate ER Tablet (IMDUR) 30 milliGRAM(s) Oral daily  meclizine 25 milliGRAM(s) Oral daily  metoprolol tartrate 25 milliGRAM(s) Oral two times a day  montelukast 10 milliGRAM(s) Oral daily  polyethylene glycol 3350 17 Gram(s) Oral daily  senna 2 Tablet(s) Oral at bedtime  sodium chloride 0.9% lock flush 3 milliLiter(s) IV Push every 8 hours  tamsulosin 0.4 milliGRAM(s) Oral at bedtime                            9.8    5.54  )-----------( 299      ( 26 Mar 2023 06:20 )             30.7       Hemoglobin: 9.8 g/dL (03-26 @ 06:20)  Hemoglobin: 10.0 g/dL (03-25 @ 05:00)  Hemoglobin: 9.8 g/dL (03-24 @ 05:00)  Hemoglobin: 10.9 g/dL (03-23 @ 06:18)  Hemoglobin: 11.5 g/dL (03-22 @ 17:20)      03-26    139  |  103  |  11  ----------------------------<  106<H>  3.6   |  25  |  0.66    Ca    8.7      26 Mar 2023 06:20      Creatinine Trend: 0.66<--, 0.59<--, 0.67<--, 0.61<--, 0.62<--, 0.54<--    COAGS:           T(C): 36.8 (03-26-23 @ 06:28), Max: 37 (03-25-23 @ 19:58)  HR: 72 (03-26-23 @ 06:28) (72 - 77)  BP: 120/63 (03-26-23 @ 06:28) (120/63 - 143/75)  RR: 18 (03-26-23 @ 06:28) (18 - 18)  SpO2: 96% (03-26-23 @ 06:28) (95% - 98%)  Wt(kg): --    I&O's Summary    25 Mar 2023 07:01  -  26 Mar 2023 07:00  --------------------------------------------------------  IN: 720 mL / OUT: 900 mL / NET: -180 mL    26 Mar 2023 07:01  -  26 Mar 2023 10:08  --------------------------------------------------------  IN: 360 mL / OUT: 400 mL / NET: -40 mL      Gen: NAD  HEENT:  (-)icterus (-)pallor  CV: N S1 S2 1/6 RONI (+)2 Pulses B/l  Resp:  Clear to auscultation B/L, normal effort  GI: (+) BS Soft, NT, ND  Lymph:  (-)Edema, (-)obvious lymphadenopathy  Skin: Warm to touch, Normal turgor  Psych: Appropriate mood and affect      TELEMETRY: SR 60-70s, brief 7 second PAT      < from: TTE with Doppler (w/Cont) (03.16.23 @ 13:36) >  CONCLUSIONS:  1. Normal mitral valve. Mildly calcified chordae tendinae.  Mild mitral regurgitation.  2. Calcified aortic valve was not well visualized but has  normal opening. No aortic stenosis. No aortic valve  regurgitation seen.  3. Normal left ventricular internal dimensions and wall  thicknesses.  4. Low-normal Left Ventricular Systolic Function,  (EF =  53% by biplane). No regional wall motion abnormalities.  Ultrasound LV opacification agent was used to enhance  endocardial definition.  5. Right ventricle not well visualized. Normal RVsystolic  function (RV tissue Doppler 11 cm/s).  6. RV systolic pressure is borderline normal at  33 mm Hg.  7. Normal tricuspid valve. Moderate tricuspid  regurgitation.  8. No pericardial effusion.    *** No previous Echo exam.    < end of copied text >    < from: Cardiac Catheterization (03.22.23 @ 09:42) >  Diagnostic Conclusions:     Large LAD with severe bifurcating/ostial and heavily calcifed disease   LCX with Bifurcating/ostial disease   RCA with Moderate Disease   Recommendations:     CABG evaluation      < end of copied text >      ASSESSMENT/PLAN: Patient is 72 year old male with pmh of Type I DM (50 years ago, on Insulin pump for last 15 years), HTN, Vertigo and BPH who was brought to ED due to Abdominal pain, Nausea and vomiting. Cardiology consulted for increased troponin.    #NSTEMI  - EKG non-ischemic  - TTE with low normal LV function and no wall motion abnormalities  - Continue ASA/Lipitor/Metoprolol  - s/p cath with multivessel CAD noted above  - Transferred to [facility] for CABG evaluation  - CTS - surgery tomorrow     #DKA/Type 1 DM  - Glycemic control per endocrinology    #HTN  - Continue Metoprolol and Imdur  - Losartan held preop per CTS    #PAT  - Asymptomatic, continue Metoprolol  - Keep K>4, Mg>2

## 2023-03-26 NOTE — PROVIDER CONTACT NOTE (HYPOGLYCEMIA EVENT) - NS PROVIDER CONTACT BACKGROUND-HYPO
Age: 72y    Gender: Male    POCT Blood Glucose:  193 mg/dL (03-26-23 @ 16:20)  118 mg/dL (03-26-23 @ 11:47)  94 mg/dL (03-26-23 @ 11:32)  56 mg/dL (03-26-23 @ 11:15)  94 mg/dL (03-26-23 @ 07:47)  256 mg/dL (03-25-23 @ 21:40)      eMAR:atorvastatin   20 milliGRAM(s) Oral (03-25-23 @ 21:58)    insulin glargine Injectable (LANTUS)   24 Unit(s) SubCutaneous (03-26-23 @ 16:31)    insulin lispro (ADMELOG) corrective regimen sliding scale   1  SubCutaneous (03-25-23 @ 21:59)    insulin lispro (ADMELOG) corrective regimen sliding scale   1  SubCutaneous (03-26-23 @ 16:31)    insulin lispro Injectable (ADMELOG)   6 Unit(s) SubCutaneous (03-26-23 @ 16:31)   6 Unit(s) SubCutaneous (03-26-23 @ 11:53)   6 Unit(s) SubCutaneous (03-26-23 @ 07:45)

## 2023-03-26 NOTE — PROGRESS NOTE ADULT - ASSESSMENT
72 year old man with  T1DM on insulin pump, admitted to OSH in DKA found to have triple vessel disease on [facility] transferred to [facility] for evaluation for CABG.       T1DM with hyperglycemia  - HbA1c: 8.2  - Home Regimen: T slim novolog insulin pump   basal  12a 0.148  9a 0.75  5p 0.758  10p 0.712  ISF  12a 1:50  5p 1:57  10p 1:50  ICR  12a 1:18  9a 1:17  5p 1:!8  target 100  action time 5hr  - Endocrinologist: Dr. Ngo  - Hypoglycemia at lunch after insulin to carb mismatch (received lower carb breakfast), previously with prandial hyperglycemia ;  endocrine to help transition from insulin gtt postop   - Plan for CABG Monday 3/27  - while NPOpMN  reduce Lantus to 24 units at 1500 today - DO NOT HOLD PT with T1DM AT RISK OF DKA !    - Continue Admelog 6 units pre-meal. HOLD IF NPO. If pt has lower carb meal reduce admelog by 50% x1  - Use low dose Admelog correction scales  -RD consult for tray checks   Discharge plan:  - Discharge medications: plan to resume pump post discharge  Follow up with dr Ngo    HTN  - Home regimen: losartan 100mg daily  on hold per CTS perop.  Continue metoprolol    HLD  - Continue atorvastatin 20mg, consider increasing dose      discussed with patient and RN;  primary team SRIDHAR NP CTSx via teams  Contact via Microsoft Teams during business hours  To reach covering provider access AMION via sunrise tools  For Urgent matters/after-hours/weekends/holidays please page endocrine fellow on call   For nonurgent matters please email [email or web address]    Please note that this patient may be followed by different provider tomorrow.  Notify endocrine 24 hours prior to discharge for final recommendations    greater than 50% of the encounter was spent counseling and/or coordination of care.  30 minutes spent on total encounter; The necessity of the time spent during the encounter on this date of service was due to development of plan of care/coordination of care/glycemic control through review of labs, blood glucose values and vital signs.

## 2023-03-26 NOTE — CONSULT NOTE ADULT - ASSESSMENT
72 years old male with PMHX of HTN, Type I DM (50 years ago, on Insulin pump for last 15 years), Vertigo, tinnitus, and BPH who was brought to ED due to Abdominal pain, Nausea and vomiting. Patient also found to have elevated troponin. Pending CABG tomorrow. Now with right ear pain which started yesterday and has now progressed to right jaw pain. On exam, b/l ears normal, +crepitus and TTP of right TMJ noted.

## 2023-03-26 NOTE — CONSULT NOTE ADULT - PROBLEM SELECTOR RECOMMENDATION 9
- Soft diet   - Warm compress to right TMJ 4x per day   - Recommend NSAIDs for swelling and pain relief   - Patient should follow up in ENT office as an outpatient. May see Dr. Philip, Dr. Mejia, Dr. Soares, Dr. Sanchez. Call [phone].   - No further ENT intervention at this time  - Call with questions or concerns

## 2023-03-26 NOTE — PROGRESS NOTE ADULT - SUBJECTIVE AND OBJECTIVE BOX
VITAL SIGNS    Telemetry: SR 60-80   Vital Signs Last 24 Hrs  T(C): 36.5 (23 @ 12:43), Max: 37.1 (23 @ 19:40)  T(F): 97.7 (23 @ 12:43), Max: 98.7 (23 @ 19:40)  HR: 76 (23 @ 12:43) (76 - 82)  BP: 143/75 (23 @ 12:43) (143/75 - 150/73)  RR: 18 (23 @ 12:43) (18 - 18)  SpO2: 98% (23 @ 12:43) (98% - 98%)             @ 07:01  -   @ 07:00  --------------------------------------------------------  IN: 960 mL / OUT: 300 mL / NET: 660 mL     @ 07:01  -   @ 15:44  --------------------------------------------------------  IN: 720 mL / OUT: 650 mL / NET: 70 mL       Daily     Daily Weight in k (25 Mar 2023 10:19)  Admit Wt: Drug Dosing Weight  Height (cm): 188 (23 Mar 2023 10:08)  Weight (kg): 89.721 (23 Mar 2023 10:08)  BMI (kg/m2): 25.4 (23 Mar 2023 10:08)  BSA (m2): 2.16 (23 Mar 2023 10:08)      CAPILLARY BLOOD GLUCOSE      POCT Blood Glucose.: 248 mg/dL (25 Mar 2023 11:26)  POCT Blood Glucose.: 163 mg/dL (25 Mar 2023 07:33)  POCT Blood Glucose.: 151 mg/dL (24 Mar 2023 21:22)  POCT Blood Glucose.: 138 mg/dL (24 Mar 2023 17:03)          MEDICATIONS  aspirin enteric coated 81 milliGRAM(s) Oral daily  atorvastatin 20 milliGRAM(s) Oral at bedtime  FIRST- Mouthwash  BLM 15 milliLiter(s) Swish and Spit every 8 hours PRN  heparin   Injectable 5000 Unit(s) SubCutaneous every 8 hours  insulin glargine Injectable (LANTUS) 30 Unit(s) SubCutaneous <User Schedule>  insulin lispro (ADMELOG) corrective regimen sliding scale   SubCutaneous at bedtime  insulin lispro (ADMELOG) corrective regimen sliding scale   SubCutaneous three times a day before meals  insulin lispro Injectable (ADMELOG) 6 Unit(s) SubCutaneous three times a day before meals  isosorbide   mononitrate ER Tablet (IMDUR) 30 milliGRAM(s) Oral daily  meclizine 25 milliGRAM(s) Oral daily  metoprolol tartrate 25 milliGRAM(s) Oral two times a day  montelukast 10 milliGRAM(s) Oral daily  polyethylene glycol 3350 17 Gram(s) Oral daily  senna 2 Tablet(s) Oral at bedtime  sodium chloride 0.9% lock flush 3 milliLiter(s) IV Push every 8 hours  tamsulosin 0.4 milliGRAM(s) Oral at bedtime      >>> <<<  PHYSICAL EXAM  Subjective: NAD   right TMJ pain +click  Neurology: alert and oriented x 3, nonfocal, no gross deficits  CV : s1s2  Lungs: CTA b/l  Abdomen: soft, NT,ND, (+ )BM  :  voiding  Extremities: -c/c/e      LABS      138  |  103  |  10  ----------------------------<  114<H>  3.7   |  26  |  0.59    Ca    8.8      25 Mar 2023 05:00                                   10.0   6.07  )-----------( 324      ( 25 Mar 2023 05:00 )             30.8                 PAST MEDICAL & SURGICAL HISTORY:  Diabetes      Hypertension      BPH (benign prostatic hyperplasia)      S/P cataract surgery  right eye      Steel plate in right arm

## 2023-03-26 NOTE — PROGRESS NOTE ADULT - ASSESSMENT
72 years old male with PMHX of HTN, Type I DM (50 years ago, on Insulin pump for last 15 years), Vertigo and BPH who was brought to ED due to Abdominal pain, Nausea and vomiting. Patient also found to have elevated troponin. Patient transferred over from [facility] showing   TVD CAD.      3/22 VSS; Patient seen at bedside, resting comfortably on room air. In NAD, no SOB, or CP at this time.   3/23 /64 -171/81. OR Thursday 3/30.  Radial mapping.  Pt transferred to Dr Sherwood.  Carotids neg.  EF 53%  3/24 Scheduled for cabg this Monday Endocrinology recommendations appreciated.  3/25 reserve left arm for radial harvest

## 2023-03-26 NOTE — PROGRESS NOTE ADULT - PROBLEM SELECTOR PLAN 3
continue statin Depth In Mm: 0.25 Location #2: cheeks and chin Detail Level: Zone Post-Care Instructions: After the procedure, take precautions agains sun exposure. Do not apply sunscreen for 12 hours after the procedure. Do not apply make-up for 12 hours after the procedure. Avoid alcohol based toners for 10-14 days. After 2-3 days patients can return to their regular skin regimen. Stem cell applied during treatment Location #1: fore head and under eyes Price (Use Numbers Only, No Special Characters Or $): 400 Treatment Number (Optional): 2 Consent: Written consent obtained, risks reviewed including but not limited to pain, scarring, infection and incomplete improvement.  Patient understands the procedure is cosmetic in nature and will require out of pocket payment. Location #3: nose Depth In Mm: 1.5

## 2023-03-26 NOTE — PROGRESS NOTE ADULT - SUBJECTIVE AND OBJECTIVE BOX
seen earlier today     Chief Complaint: Type 1 Diabetes Mellitus     INTERVAL HX: tolerating po, hypo just prior to todays visit> pt reports noted shaky feeling and had BG checked was 56, pt endorses he did not receive bread on breakfast tray as feels may have been too little carbs (only had farina and eggs) contributing to hypoglycemia, had 2 juices with repeat BG 94, now eating & Repeat BG > 100 recommended giving lunch admelog dose. Patient made aware if notes lower carb meal can notify RN to add carb or request lower dose of insulin if needed .   reports has all pump supplies would like to start pump after CABG , discussed with pt regarding stress hyperglycemia s/p CABG and may have higher insulin requirements over next few days will restart pump when safe/insulin requirements stabilize.  pt verbalized understanding   pt c/o worsening tinnitus today> relayed to primary team      Review of Systems:  General: As above  Cardiovascular: No chest pain  Respiratory: No SOB  GI: No nausea, vomiting  Endocrine: shaky with BG 56 , resolved after juice     Allergies    Neosporin (Rash)    Intolerances      MEDICATIONS  (STANDING):  aspirin enteric coated 81 milliGRAM(s) Oral daily  atorvastatin 20 milliGRAM(s) Oral at bedtime  heparin   Injectable 5000 Unit(s) SubCutaneous every 8 hours  insulin glargine Injectable (LANTUS) 24 Unit(s) SubCutaneous <User Schedule>  insulin lispro (ADMELOG) corrective regimen sliding scale   SubCutaneous at bedtime  insulin lispro (ADMELOG) corrective regimen sliding scale   SubCutaneous three times a day before meals  insulin lispro Injectable (ADMELOG) 6 Unit(s) SubCutaneous three times a day before meals  isosorbide   mononitrate ER Tablet (IMDUR) 30 milliGRAM(s) Oral daily  meclizine 25 milliGRAM(s) Oral daily  metoprolol tartrate 25 milliGRAM(s) Oral two times a day  montelukast 10 milliGRAM(s) Oral daily  polyethylene glycol 3350 17 Gram(s) Oral daily  senna 2 Tablet(s) Oral at bedtime  sodium chloride 0.9% lock flush 3 milliLiter(s) IV Push every 8 hours  tamsulosin 0.4 milliGRAM(s) Oral at bedtime      atorvastatin   20 milliGRAM(s) Oral (03-25-23 @ 21:58)    insulin glargine Injectable (LANTUS)   30 Unit(s) SubCutaneous (03-25-23 @ 13:04)    insulin lispro (ADMELOG) corrective regimen sliding scale   1  SubCutaneous (03-25-23 @ 21:59)    insulin lispro (ADMELOG) corrective regimen sliding scale   3  SubCutaneous (03-25-23 @ 17:41)   2  SubCutaneous (03-25-23 @ 13:01)    insulin lispro Injectable (ADMELOG)   6 Unit(s) SubCutaneous (03-26-23 @ 11:53)   6 Unit(s) SubCutaneous (03-26-23 @ 07:45)   6 Unit(s) SubCutaneous (03-25-23 @ 17:41)   6 Unit(s) SubCutaneous (03-25-23 @ 13:02)        PHYSICAL EXAM:  VITALS: T(C): 36.8 (03-26-23 @ 06:28)  T(F): 98.2 (03-26-23 @ 06:28), Max: 98.6 (03-25-23 @ 19:58)  HR: 72 (03-26-23 @ 06:28) (72 - 77)  BP: 120/63 (03-26-23 @ 06:28) (120/63 - 143/75)  RR:  (18 - 18)  SpO2:  (95% - 98%)  Wt(kg): --  GENERAL: male sitting in chair in NAD  Respiratory: Respirations unlabored   Extremities: Warm, no edema  NEURO: Alert , appropriate     LABS:  POCT Blood Glucose.: 118 mg/dL (03-26-23 @ 11:47)  POCT Blood Glucose.: 94 mg/dL (03-26-23 @ 11:32)  POCT Blood Glucose.: 56 mg/dL (03-26-23 @ 11:15)  POCT Blood Glucose.: 94 mg/dL (03-26-23 @ 07:47)  POCT Blood Glucose.: 256 mg/dL (03-25-23 @ 21:40)  POCT Blood Glucose.: 255 mg/dL (03-25-23 @ 16:48)  POCT Blood Glucose.: 248 mg/dL (03-25-23 @ 11:26)  POCT Blood Glucose.: 163 mg/dL (03-25-23 @ 07:33)  POCT Blood Glucose.: 151 mg/dL (03-24-23 @ 21:22)  POCT Blood Glucose.: 138 mg/dL (03-24-23 @ 17:03)  POCT Blood Glucose.: 170 mg/dL (03-24-23 @ 11:09)  POCT Blood Glucose.: 127 mg/dL (03-24-23 @ 07:31)  POCT Blood Glucose.: 109 mg/dL (03-23-23 @ 21:11)  POCT Blood Glucose.: 131 mg/dL (03-23-23 @ 16:24)  POCT Blood Glucose.: 87 mg/dL (03-23-23 @ 13:28)                          9.8    5.54  )-----------( 299      ( 26 Mar 2023 06:20 )             30.7     03-26    139  |  103  |  11  ----------------------------<  106<H>  3.6   |  25  |  0.66    Ca    8.7      26 Mar 2023 06:20      eGFR: 98 mL/min/1.73m2 (26 Mar 2023 06:20)      Thyroid Function Tests:  03-22 @ 17:20 TSH 1.82 FreeT4 -- T3 76 Anti TPO -- Anti Thyroglobulin Ab -- TSI --      A1C with Estimated Average Glucose Result: 8.2 % (03-22-23 @ 17:20)  A1C with Estimated Average Glucose Result: 7.7 % (03-16-23 @ 03:38)    Estimated Average Glucose: 189 mg/dL (03-22-23 @ 17:20)  Estimated Average Glucose: 174 mg/dL (03-16-23 @ 03:38)        Diet, Consistent Carbohydrate Renal/No Snacks (03-22-23 @ 15:31) [Active]

## 2023-03-26 NOTE — PROGRESS NOTE ADULT - PROVIDER SPECIALTY LIST ADULT
Cardiology Pt remains on 1:1 for unpredictable bx.  Pt has been out for meals and groups. Pacing the hallway at times.  No prns given.  Pt has been loose and disorganized at times, however calm and more contained.   Pt appears preoccupied and labile.  Will cont to assess.

## 2023-03-26 NOTE — PROGRESS NOTE ADULT - SUBJECTIVE AND OBJECTIVE BOX
Patient is a 72y old  Male who presents with a chief complaint of CAD (26 Mar 2023 12:34)      DATE OF SERVICE: 03-26-23 @ 14:56    SUBJECTIVE / OVERNIGHT EVENTS: overnight events noted    ROS:  Resp: No cough no sputum production  CVS: No chest pain no palpitations no orthopnea  GI: no N/V/D  : no dysuria, no hematuria  Neuro: no weakness no paresthesias  Heme: No petechiae no easy bruising  Msk: No joint pain no swelling  Skin: No rash no itching        MEDICATIONS  (STANDING):  aspirin enteric coated 81 milliGRAM(s) Oral daily  atorvastatin 20 milliGRAM(s) Oral at bedtime  heparin   Injectable 5000 Unit(s) SubCutaneous every 8 hours  insulin glargine Injectable (LANTUS) 24 Unit(s) SubCutaneous <User Schedule>  insulin lispro (ADMELOG) corrective regimen sliding scale   SubCutaneous at bedtime  insulin lispro (ADMELOG) corrective regimen sliding scale   SubCutaneous three times a day before meals  insulin lispro Injectable (ADMELOG) 6 Unit(s) SubCutaneous three times a day before meals  isosorbide   mononitrate ER Tablet (IMDUR) 30 milliGRAM(s) Oral daily  meclizine 25 milliGRAM(s) Oral daily  metoprolol tartrate 25 milliGRAM(s) Oral two times a day  montelukast 10 milliGRAM(s) Oral daily  polyethylene glycol 3350 17 Gram(s) Oral daily  senna 2 Tablet(s) Oral at bedtime  sodium chloride 0.9% lock flush 3 milliLiter(s) IV Push every 8 hours  tamsulosin 0.4 milliGRAM(s) Oral at bedtime    MEDICATIONS  (PRN):  FIRST- Mouthwash  BLM 15 milliLiter(s) Swish and Spit every 8 hours PRN Mouth Care        CAPILLARY BLOOD GLUCOSE      POCT Blood Glucose.: 118 mg/dL (26 Mar 2023 11:47)  POCT Blood Glucose.: 94 mg/dL (26 Mar 2023 11:32)  POCT Blood Glucose.: 56 mg/dL (26 Mar 2023 11:15)  POCT Blood Glucose.: 94 mg/dL (26 Mar 2023 07:47)  POCT Blood Glucose.: 256 mg/dL (25 Mar 2023 21:40)  POCT Blood Glucose.: 255 mg/dL (25 Mar 2023 16:48)    I&O's Summary    25 Mar 2023 07:01  -  26 Mar 2023 07:00  --------------------------------------------------------  IN: 720 mL / OUT: 900 mL / NET: -180 mL    26 Mar 2023 07:01  -  26 Mar 2023 14:56  --------------------------------------------------------  IN: 720 mL / OUT: 700 mL / NET: 20 mL        Vital Signs Last 24 Hrs  T(C): 36.7 (26 Mar 2023 13:19), Max: 37 (25 Mar 2023 19:58)  T(F): 98.1 (26 Mar 2023 13:19), Max: 98.6 (25 Mar 2023 19:58)  HR: 81 (26 Mar 2023 13:19) (72 - 81)  BP: 126/68 (26 Mar 2023 13:19) (120/63 - 126/68)  BP(mean): --  RR: 18 (26 Mar 2023 13:19) (18 - 18)  SpO2: 99% (26 Mar 2023 13:19) (95% - 99%)    PHYSICAL EXAM:  EYES: EOMI, PERRLA  NECK: Supple, No JVD  CHEST/LUNG: clear  HEART: S1 S2; no murmurs   ABDOMEN: Soft, Nontender  EXTREMITIES:  trace no  NEUROLOGY: AO x 3 non-focal  SKIN: No rashes or lesions    LABS:                        9.8    5.54  )-----------( 299      ( 26 Mar 2023 06:20 )             30.7     03-26    139  |  103  |  11  ----------------------------<  106<H>  3.6   |  25  |  0.66    Ca    8.7      26 Mar 2023 06:20                  All consultant(s) notes reviewed and care discussed with other providers        Contact Number, Dr Kelly [phone]

## 2023-03-26 NOTE — PROGRESS NOTE ADULT - ASSESSMENT
Patient is 72 years old male with past medical history of Type I DM (50 years ago, on Insulin pump for last 15 years), Vertigo and BPH who was brought to ED due to Abdominal pain, Nausea and vomiting transferred from [facility] to [facility] for work up and management of NSTEMI

## 2023-03-27 ENCOUNTER — TRANSCRIPTION ENCOUNTER (OUTPATIENT)
Age: 73
End: 2023-03-27

## 2023-03-27 ENCOUNTER — APPOINTMENT (OUTPATIENT)
Dept: CARDIOTHORACIC SURGERY | Facility: HOSPITAL | Age: 73
End: 2023-03-27

## 2023-03-27 LAB
ALBUMIN SERPL ELPH-MCNC: 2.5 G/DL — LOW (ref 3.3–5)
ALP SERPL-CCNC: 45 U/L — SIGNIFICANT CHANGE UP (ref 40–120)
ALT FLD-CCNC: 32 U/L — SIGNIFICANT CHANGE UP (ref 10–45)
ANION GAP SERPL CALC-SCNC: 13 MMOL/L — SIGNIFICANT CHANGE UP (ref 5–17)
APTT BLD: 30.6 SEC — SIGNIFICANT CHANGE UP (ref 27.5–35.5)
AST SERPL-CCNC: 38 U/L — SIGNIFICANT CHANGE UP (ref 10–40)
BASE EXCESS BLDV CALC-SCNC: -1.8 MMOL/L — SIGNIFICANT CHANGE UP (ref -2–3)
BASE EXCESS BLDV CALC-SCNC: 0.2 MMOL/L — SIGNIFICANT CHANGE UP (ref -2–3)
BASE EXCESS BLDV CALC-SCNC: 1.6 MMOL/L — SIGNIFICANT CHANGE UP (ref -2–3)
BASE EXCESS BLDV CALC-SCNC: 2.2 MMOL/L — SIGNIFICANT CHANGE UP (ref -2–3)
BASE EXCESS BLDV CALC-SCNC: 4.9 MMOL/L — HIGH (ref -2–3)
BASOPHILS # BLD AUTO: 0 K/UL — SIGNIFICANT CHANGE UP (ref 0–0.2)
BASOPHILS NFR BLD AUTO: 0 % — SIGNIFICANT CHANGE UP (ref 0–2)
BILIRUB SERPL-MCNC: 0.6 MG/DL — SIGNIFICANT CHANGE UP (ref 0.2–1.2)
BLOOD GAS VENOUS - CREATININE: SIGNIFICANT CHANGE UP MG/DL (ref 0.5–1.3)
BUN SERPL-MCNC: 8 MG/DL — SIGNIFICANT CHANGE UP (ref 7–23)
CA-I SERPL-SCNC: 1 MMOL/L — LOW (ref 1.15–1.33)
CA-I SERPL-SCNC: 1.01 MMOL/L — LOW (ref 1.15–1.33)
CA-I SERPL-SCNC: 1.01 MMOL/L — LOW (ref 1.15–1.33)
CA-I SERPL-SCNC: 1.03 MMOL/L — LOW (ref 1.15–1.33)
CA-I SERPL-SCNC: 1.07 MMOL/L — LOW (ref 1.15–1.33)
CALCIUM SERPL-MCNC: 7.2 MG/DL — LOW (ref 8.4–10.5)
CHLORIDE BLDV-SCNC: 105 MMOL/L — SIGNIFICANT CHANGE UP (ref 96–108)
CHLORIDE BLDV-SCNC: 106 MMOL/L — SIGNIFICANT CHANGE UP (ref 96–108)
CHLORIDE BLDV-SCNC: 107 MMOL/L — SIGNIFICANT CHANGE UP (ref 96–108)
CHLORIDE SERPL-SCNC: 105 MMOL/L — SIGNIFICANT CHANGE UP (ref 96–108)
CK MB BLD-MCNC: 9.3 % — HIGH (ref 0–3.5)
CK MB CFR SERPL CALC: 21.5 NG/ML — HIGH (ref 0–6.7)
CK SERPL-CCNC: 231 U/L — HIGH (ref 30–200)
CO2 BLDV-SCNC: 25 MMOL/L — SIGNIFICANT CHANGE UP (ref 22–26)
CO2 BLDV-SCNC: 27 MMOL/L — HIGH (ref 22–26)
CO2 BLDV-SCNC: 28 MMOL/L — HIGH (ref 22–26)
CO2 BLDV-SCNC: 29 MMOL/L — HIGH (ref 22–26)
CO2 BLDV-SCNC: 30 MMOL/L — HIGH (ref 22–26)
CO2 SERPL-SCNC: 22 MMOL/L — SIGNIFICANT CHANGE UP (ref 22–31)
CREAT SERPL-MCNC: 0.45 MG/DL — LOW (ref 0.5–1.3)
EGFR: 110 ML/MIN/1.73M2 — SIGNIFICANT CHANGE UP
ELLIPTOCYTES BLD QL SMEAR: SLIGHT — SIGNIFICANT CHANGE UP
EOSINOPHIL # BLD AUTO: 0 K/UL — SIGNIFICANT CHANGE UP (ref 0–0.5)
EOSINOPHIL NFR BLD AUTO: 0 % — SIGNIFICANT CHANGE UP (ref 0–6)
FIBRINOGEN PPP-MCNC: 210 MG/DL — SIGNIFICANT CHANGE UP (ref 200–445)
GAS PNL BLDA: SIGNIFICANT CHANGE UP
GAS PNL BLDV: 137 MMOL/L — SIGNIFICANT CHANGE UP (ref 136–145)
GAS PNL BLDV: 138 MMOL/L — SIGNIFICANT CHANGE UP (ref 136–145)
GAS PNL BLDV: 139 MMOL/L — SIGNIFICANT CHANGE UP (ref 136–145)
GAS PNL BLDV: SIGNIFICANT CHANGE UP
GLUCOSE BLDC GLUCOMTR-MCNC: 106 MG/DL — HIGH (ref 70–99)
GLUCOSE BLDC GLUCOMTR-MCNC: 114 MG/DL — HIGH (ref 70–99)
GLUCOSE BLDC GLUCOMTR-MCNC: 116 MG/DL — HIGH (ref 70–99)
GLUCOSE BLDC GLUCOMTR-MCNC: 128 MG/DL — HIGH (ref 70–99)
GLUCOSE BLDC GLUCOMTR-MCNC: 141 MG/DL — HIGH (ref 70–99)
GLUCOSE BLDC GLUCOMTR-MCNC: 154 MG/DL — HIGH (ref 70–99)
GLUCOSE BLDC GLUCOMTR-MCNC: 172 MG/DL — HIGH (ref 70–99)
GLUCOSE BLDC GLUCOMTR-MCNC: 188 MG/DL — HIGH (ref 70–99)
GLUCOSE BLDV-MCNC: 101 MG/DL — HIGH (ref 70–99)
GLUCOSE BLDV-MCNC: 109 MG/DL — HIGH (ref 70–99)
GLUCOSE BLDV-MCNC: 115 MG/DL — HIGH (ref 70–99)
GLUCOSE BLDV-MCNC: 122 MG/DL — HIGH (ref 70–99)
GLUCOSE BLDV-MCNC: 133 MG/DL — HIGH (ref 70–99)
GLUCOSE SERPL-MCNC: 152 MG/DL — HIGH (ref 70–99)
HCO3 BLDV-SCNC: 24 MMOL/L — SIGNIFICANT CHANGE UP (ref 22–29)
HCO3 BLDV-SCNC: 25 MMOL/L — SIGNIFICANT CHANGE UP (ref 22–29)
HCO3 BLDV-SCNC: 26 MMOL/L — SIGNIFICANT CHANGE UP (ref 22–29)
HCO3 BLDV-SCNC: 28 MMOL/L — SIGNIFICANT CHANGE UP (ref 22–29)
HCO3 BLDV-SCNC: 29 MMOL/L — SIGNIFICANT CHANGE UP (ref 22–29)
HCT VFR BLD CALC: 28.8 % — LOW (ref 39–50)
HCT VFR BLDA CALC: 21 % — CRITICAL LOW (ref 39–51)
HCT VFR BLDA CALC: 23 % — LOW (ref 39–51)
HCT VFR BLDA CALC: 24 % — LOW (ref 39–51)
HGB BLD CALC-MCNC: 7.1 G/DL — LOW (ref 12.6–17.4)
HGB BLD CALC-MCNC: 7.8 G/DL — LOW (ref 12.6–17.4)
HGB BLD CALC-MCNC: 7.9 G/DL — LOW (ref 12.6–17.4)
HGB BLD CALC-MCNC: 8 G/DL — LOW (ref 12.6–17.4)
HGB BLD CALC-MCNC: 8 G/DL — LOW (ref 12.6–17.4)
HGB BLD-MCNC: 9.6 G/DL — LOW (ref 13–17)
INR BLD: 1.51 RATIO — HIGH (ref 0.88–1.16)
LACTATE BLDV-MCNC: 0.6 MMOL/L — SIGNIFICANT CHANGE UP (ref 0.5–2)
LACTATE BLDV-MCNC: 0.7 MMOL/L — SIGNIFICANT CHANGE UP (ref 0.5–2)
LACTATE BLDV-MCNC: 0.8 MMOL/L — SIGNIFICANT CHANGE UP (ref 0.5–2)
LG PLATELETS BLD QL AUTO: SLIGHT — SIGNIFICANT CHANGE UP
LYMPHOCYTES # BLD AUTO: 1.07 K/UL — SIGNIFICANT CHANGE UP (ref 1–3.3)
LYMPHOCYTES # BLD AUTO: 5 % — LOW (ref 13–44)
MAGNESIUM SERPL-MCNC: 1.9 MG/DL — SIGNIFICANT CHANGE UP (ref 1.6–2.6)
MANUAL SMEAR VERIFICATION: SIGNIFICANT CHANGE UP
MCHC RBC-ENTMCNC: 30 PG — SIGNIFICANT CHANGE UP (ref 27–34)
MCHC RBC-ENTMCNC: 33.3 GM/DL — SIGNIFICANT CHANGE UP (ref 32–36)
MCV RBC AUTO: 90 FL — SIGNIFICANT CHANGE UP (ref 80–100)
MONOCYTES # BLD AUTO: 0.43 K/UL — SIGNIFICANT CHANGE UP (ref 0–0.9)
MONOCYTES NFR BLD AUTO: 2 % — SIGNIFICANT CHANGE UP (ref 2–14)
NEUTROPHILS # BLD AUTO: 19.84 K/UL — HIGH (ref 1.8–7.4)
NEUTROPHILS NFR BLD AUTO: 91 % — HIGH (ref 43–77)
NEUTS BAND # BLD: 2 % — SIGNIFICANT CHANGE UP (ref 0–8)
NRBC # BLD: 0 /100 — SIGNIFICANT CHANGE UP (ref 0–0)
OVALOCYTES BLD QL SMEAR: SLIGHT — SIGNIFICANT CHANGE UP
PCO2 BLDV: 38 MMHG — LOW (ref 42–55)
PCO2 BLDV: 41 MMHG — LOW (ref 42–55)
PCO2 BLDV: 43 MMHG — SIGNIFICANT CHANGE UP (ref 42–55)
PCO2 BLDV: 43 MMHG — SIGNIFICANT CHANGE UP (ref 42–55)
PCO2 BLDV: 50 MMHG — SIGNIFICANT CHANGE UP (ref 42–55)
PH BLDV: 7.35 — SIGNIFICANT CHANGE UP (ref 7.32–7.43)
PH BLDV: 7.35 — SIGNIFICANT CHANGE UP (ref 7.32–7.43)
PH BLDV: 7.38 — SIGNIFICANT CHANGE UP (ref 7.32–7.43)
PH BLDV: 7.45 — HIGH (ref 7.32–7.43)
PH BLDV: 7.46 — HIGH (ref 7.32–7.43)
PHOSPHATE SERPL-MCNC: 3.2 MG/DL — SIGNIFICANT CHANGE UP (ref 2.5–4.5)
PLAT MORPH BLD: NORMAL — SIGNIFICANT CHANGE UP
PLATELET # BLD AUTO: 191 K/UL — SIGNIFICANT CHANGE UP (ref 150–400)
PO2 BLDV: 116 MMHG — HIGH (ref 25–45)
PO2 BLDV: 70 MMHG — HIGH (ref 25–45)
PO2 BLDV: 72 MMHG — HIGH (ref 25–45)
PO2 BLDV: 75 MMHG — HIGH (ref 25–45)
PO2 BLDV: 94 MMHG — HIGH (ref 25–45)
POIKILOCYTOSIS BLD QL AUTO: SLIGHT — SIGNIFICANT CHANGE UP
POTASSIUM BLDV-SCNC: 3.8 MMOL/L — SIGNIFICANT CHANGE UP (ref 3.5–5.1)
POTASSIUM BLDV-SCNC: 3.9 MMOL/L — SIGNIFICANT CHANGE UP (ref 3.5–5.1)
POTASSIUM BLDV-SCNC: 4 MMOL/L — SIGNIFICANT CHANGE UP (ref 3.5–5.1)
POTASSIUM BLDV-SCNC: 4.1 MMOL/L — SIGNIFICANT CHANGE UP (ref 3.5–5.1)
POTASSIUM BLDV-SCNC: 4.3 MMOL/L — SIGNIFICANT CHANGE UP (ref 3.5–5.1)
POTASSIUM SERPL-MCNC: 4.4 MMOL/L — SIGNIFICANT CHANGE UP (ref 3.5–5.3)
POTASSIUM SERPL-SCNC: 4.4 MMOL/L — SIGNIFICANT CHANGE UP (ref 3.5–5.3)
PROT SERPL-MCNC: 4.3 G/DL — LOW (ref 6–8.3)
PROTHROM AB SERPL-ACNC: 17.6 SEC — HIGH (ref 10.5–13.4)
RBC # BLD: 3.2 M/UL — LOW (ref 4.2–5.8)
RBC # FLD: 14.5 % — SIGNIFICANT CHANGE UP (ref 10.3–14.5)
RBC BLD AUTO: ABNORMAL
SAO2 % BLDV: 96.2 % — HIGH (ref 67–88)
SAO2 % BLDV: 97 % — HIGH (ref 67–88)
SAO2 % BLDV: 97.7 % — HIGH (ref 67–88)
SAO2 % BLDV: 98.3 % — HIGH (ref 67–88)
SAO2 % BLDV: 98.6 % — HIGH (ref 67–88)
SODIUM SERPL-SCNC: 140 MMOL/L — SIGNIFICANT CHANGE UP (ref 135–145)
TROPONIN T, HIGH SENSITIVITY RESULT: 437 NG/L — HIGH (ref 0–51)
WBC # BLD: 21.33 K/UL — HIGH (ref 3.8–10.5)
WBC # FLD AUTO: 21.33 K/UL — HIGH (ref 3.8–10.5)

## 2023-03-27 PROCEDURE — 33533 CABG ARTERIAL SINGLE: CPT

## 2023-03-27 PROCEDURE — 93010 ELECTROCARDIOGRAM REPORT: CPT

## 2023-03-27 PROCEDURE — 33518 CABG ARTERY-VEIN TWO: CPT

## 2023-03-27 PROCEDURE — 99291 CRITICAL CARE FIRST HOUR: CPT

## 2023-03-27 PROCEDURE — 71045 X-RAY EXAM CHEST 1 VIEW: CPT | Mod: 26

## 2023-03-27 PROCEDURE — 33508 ENDOSCOPIC VEIN HARVEST: CPT | Mod: 59

## 2023-03-27 DEVICE — LIGATING CLIPS WECK HORIZON SMALL-WIDE (RED) 24: Type: IMPLANTABLE DEVICE | Status: FUNCTIONAL

## 2023-03-27 DEVICE — SURGIFOAM PAD 8CM X 12.5CM X 10MM (100): Type: IMPLANTABLE DEVICE | Status: FUNCTIONAL

## 2023-03-27 DEVICE — OCCLUDER INTERNAL VESSEL FLO-RESTER 1 X 12MM: Type: IMPLANTABLE DEVICE | Status: FUNCTIONAL

## 2023-03-27 DEVICE — CANNULA ANTEGRADE CARDIOPLEGIA 14 GA STRL: Type: IMPLANTABLE DEVICE | Status: FUNCTIONAL

## 2023-03-27 DEVICE — CANNULA VESSEL 3MM BLUNT TIP CLEAR 1-WAY VALVE: Type: IMPLANTABLE DEVICE | Status: FUNCTIONAL

## 2023-03-27 DEVICE — CHEST DRAIN THORACIC ARGYLE PVC 28FR RIGHT ANGLE: Type: IMPLANTABLE DEVICE | Status: FUNCTIONAL

## 2023-03-27 DEVICE — KIT CVC 2LUM MAC 9FR CHG: Type: IMPLANTABLE DEVICE | Status: FUNCTIONAL

## 2023-03-27 DEVICE — LIGATING CLIPS WECK HORIZON LARGE (ORANGE) 6: Type: IMPLANTABLE DEVICE | Status: FUNCTIONAL

## 2023-03-27 DEVICE — LIGATING CLIPS WECK HORIZON MEDIUM (BLUE) 24: Type: IMPLANTABLE DEVICE | Status: FUNCTIONAL

## 2023-03-27 DEVICE — CANNULA AORTIC PERFUSION EZ GLIDE STRAIGHT 21FR X 35CM NON-VENTED: Type: IMPLANTABLE DEVICE | Status: FUNCTIONAL

## 2023-03-27 DEVICE — CHEST DRAIN THORACIC ARGYLE PVC 32FR STRAIGHT: Type: IMPLANTABLE DEVICE | Status: FUNCTIONAL

## 2023-03-27 DEVICE — PACING WIRE WHITE M-24 BAREWIRE 37MM X 89MM: Type: IMPLANTABLE DEVICE | Status: FUNCTIONAL

## 2023-03-27 DEVICE — KIT A-LINE 1LUM 20G X 12CM SAFE KIT: Type: IMPLANTABLE DEVICE | Status: FUNCTIONAL

## 2023-03-27 DEVICE — CANNULA VENOUS RETURN DUAL WITH OBTURATOR 34 TO 46FR X 37.5C: Type: IMPLANTABLE DEVICE | Status: FUNCTIONAL

## 2023-03-27 RX ORDER — ACETAMINOPHEN 500 MG
650 TABLET ORAL EVERY 6 HOURS
Refills: 0 | Status: COMPLETED | OUTPATIENT
Start: 2023-03-30 | End: 2024-02-26

## 2023-03-27 RX ORDER — OXYCODONE HYDROCHLORIDE 5 MG/1
5 TABLET ORAL EVERY 4 HOURS
Refills: 0 | Status: DISCONTINUED | OUTPATIENT
Start: 2023-03-27 | End: 2023-04-03

## 2023-03-27 RX ORDER — POLYETHYLENE GLYCOL 3350 17 G/17G
17 POWDER, FOR SOLUTION ORAL DAILY
Refills: 0 | Status: DISCONTINUED | OUTPATIENT
Start: 2023-03-28 | End: 2023-04-03

## 2023-03-27 RX ORDER — INSULIN HUMAN 100 [IU]/ML
3 INJECTION, SOLUTION SUBCUTANEOUS
Qty: 100 | Refills: 0 | Status: DISCONTINUED | OUTPATIENT
Start: 2023-03-27 | End: 2023-03-30

## 2023-03-27 RX ORDER — ASCORBIC ACID 60 MG
500 TABLET,CHEWABLE ORAL
Refills: 0 | Status: COMPLETED | OUTPATIENT
Start: 2023-03-27 | End: 2023-04-01

## 2023-03-27 RX ORDER — SODIUM CHLORIDE 9 MG/ML
1000 INJECTION, SOLUTION INTRAVENOUS
Refills: 0 | Status: DISCONTINUED | OUTPATIENT
Start: 2023-03-27 | End: 2023-03-29

## 2023-03-27 RX ORDER — ALBUMIN HUMAN 25 %
250 VIAL (ML) INTRAVENOUS ONCE
Refills: 0 | Status: COMPLETED | OUTPATIENT
Start: 2023-03-27 | End: 2023-03-27

## 2023-03-27 RX ORDER — CHLORHEXIDINE GLUCONATE 213 G/1000ML
15 SOLUTION TOPICAL EVERY 12 HOURS
Refills: 0 | Status: DISCONTINUED | OUTPATIENT
Start: 2023-03-27 | End: 2023-03-27

## 2023-03-27 RX ORDER — ASPIRIN/CALCIUM CARB/MAGNESIUM 324 MG
81 TABLET ORAL DAILY
Refills: 0 | Status: DISCONTINUED | OUTPATIENT
Start: 2023-03-27 | End: 2023-04-03

## 2023-03-27 RX ORDER — HYDROMORPHONE HYDROCHLORIDE 2 MG/ML
0.5 INJECTION INTRAMUSCULAR; INTRAVENOUS; SUBCUTANEOUS ONCE
Refills: 0 | Status: DISCONTINUED | OUTPATIENT
Start: 2023-03-27 | End: 2023-03-27

## 2023-03-27 RX ORDER — POTASSIUM CHLORIDE 20 MEQ
10 PACKET (EA) ORAL
Refills: 0 | Status: DISCONTINUED | OUTPATIENT
Start: 2023-03-27 | End: 2023-03-28

## 2023-03-27 RX ORDER — SENNA PLUS 8.6 MG/1
2 TABLET ORAL AT BEDTIME
Refills: 0 | Status: DISCONTINUED | OUTPATIENT
Start: 2023-03-28 | End: 2023-04-03

## 2023-03-27 RX ORDER — ACETAMINOPHEN 500 MG
650 TABLET ORAL EVERY 6 HOURS
Refills: 0 | Status: COMPLETED | OUTPATIENT
Start: 2023-03-27 | End: 2023-03-30

## 2023-03-27 RX ORDER — NOREPINEPHRINE BITARTRATE/D5W 8 MG/250ML
0.05 PLASTIC BAG, INJECTION (ML) INTRAVENOUS
Qty: 8 | Refills: 0 | Status: DISCONTINUED | OUTPATIENT
Start: 2023-03-27 | End: 2023-03-28

## 2023-03-27 RX ORDER — CEFUROXIME AXETIL 250 MG
1500 TABLET ORAL EVERY 8 HOURS
Refills: 0 | Status: COMPLETED | OUTPATIENT
Start: 2023-03-27 | End: 2023-03-29

## 2023-03-27 RX ORDER — DEXMEDETOMIDINE HYDROCHLORIDE IN 0.9% SODIUM CHLORIDE 4 UG/ML
0.5 INJECTION INTRAVENOUS
Qty: 200 | Refills: 0 | Status: DISCONTINUED | OUTPATIENT
Start: 2023-03-27 | End: 2023-03-28

## 2023-03-27 RX ORDER — GABAPENTIN 400 MG/1
100 CAPSULE ORAL EVERY 8 HOURS
Refills: 0 | Status: COMPLETED | OUTPATIENT
Start: 2023-03-27 | End: 2023-04-01

## 2023-03-27 RX ORDER — SODIUM CHLORIDE 9 MG/ML
500 INJECTION, SOLUTION INTRAVENOUS ONCE
Refills: 0 | Status: COMPLETED | OUTPATIENT
Start: 2023-03-27 | End: 2023-03-27

## 2023-03-27 RX ORDER — AMIODARONE HYDROCHLORIDE 400 MG/1
400 TABLET ORAL
Refills: 0 | Status: DISCONTINUED | OUTPATIENT
Start: 2023-03-27 | End: 2023-03-29

## 2023-03-27 RX ORDER — CHLORHEXIDINE GLUCONATE 213 G/1000ML
1 SOLUTION TOPICAL DAILY
Refills: 0 | Status: DISCONTINUED | OUTPATIENT
Start: 2023-03-27 | End: 2023-04-03

## 2023-03-27 RX ORDER — DEXTROSE 50 % IN WATER 50 %
50 SYRINGE (ML) INTRAVENOUS
Refills: 0 | Status: DISCONTINUED | OUTPATIENT
Start: 2023-03-27 | End: 2023-03-28

## 2023-03-27 RX ORDER — MAGNESIUM SULFATE 500 MG/ML
2 VIAL (ML) INJECTION ONCE
Refills: 0 | Status: COMPLETED | OUTPATIENT
Start: 2023-03-27 | End: 2023-03-27

## 2023-03-27 RX ORDER — HYDROMORPHONE HYDROCHLORIDE 2 MG/ML
0.5 INJECTION INTRAMUSCULAR; INTRAVENOUS; SUBCUTANEOUS EVERY 6 HOURS
Refills: 0 | Status: DISCONTINUED | OUTPATIENT
Start: 2023-03-27 | End: 2023-03-28

## 2023-03-27 RX ORDER — SODIUM CHLORIDE 9 MG/ML
1000 INJECTION INTRAMUSCULAR; INTRAVENOUS; SUBCUTANEOUS
Refills: 0 | Status: DISCONTINUED | OUTPATIENT
Start: 2023-03-27 | End: 2023-03-29

## 2023-03-27 RX ORDER — OXYCODONE HYDROCHLORIDE 5 MG/1
10 TABLET ORAL EVERY 4 HOURS
Refills: 0 | Status: DISCONTINUED | OUTPATIENT
Start: 2023-03-27 | End: 2023-03-31

## 2023-03-27 RX ORDER — CALCIUM GLUCONATE 100 MG/ML
2 VIAL (ML) INTRAVENOUS ONCE
Refills: 0 | Status: COMPLETED | OUTPATIENT
Start: 2023-03-27 | End: 2023-03-27

## 2023-03-27 RX ORDER — ASPIRIN/CALCIUM CARB/MAGNESIUM 324 MG
300 TABLET ORAL ONCE
Refills: 0 | Status: DISCONTINUED | OUTPATIENT
Start: 2023-03-27 | End: 2023-03-28

## 2023-03-27 RX ORDER — VASOPRESSIN 20 [USP'U]/ML
0.02 INJECTION INTRAVENOUS
Qty: 40 | Refills: 0 | Status: DISCONTINUED | OUTPATIENT
Start: 2023-03-27 | End: 2023-03-28

## 2023-03-27 RX ORDER — DEXTROSE 50 % IN WATER 50 %
25 SYRINGE (ML) INTRAVENOUS
Refills: 0 | Status: DISCONTINUED | OUTPATIENT
Start: 2023-03-27 | End: 2023-03-28

## 2023-03-27 RX ORDER — PANTOPRAZOLE SODIUM 20 MG/1
40 TABLET, DELAYED RELEASE ORAL DAILY
Refills: 0 | Status: DISCONTINUED | OUTPATIENT
Start: 2023-03-27 | End: 2023-03-28

## 2023-03-27 RX ADMIN — CHLORHEXIDINE GLUCONATE 15 MILLILITER(S): 213 SOLUTION TOPICAL at 06:25

## 2023-03-27 RX ADMIN — Medication 125 MILLILITER(S): at 19:49

## 2023-03-27 RX ADMIN — HYDROMORPHONE HYDROCHLORIDE 0.5 MILLIGRAM(S): 2 INJECTION INTRAMUSCULAR; INTRAVENOUS; SUBCUTANEOUS at 16:18

## 2023-03-27 RX ADMIN — Medication 650 MILLIGRAM(S): at 23:35

## 2023-03-27 RX ADMIN — SODIUM CHLORIDE 3000 MILLILITER(S): 9 INJECTION, SOLUTION INTRAVENOUS at 18:56

## 2023-03-27 RX ADMIN — GABAPENTIN 300 MILLIGRAM(S): 400 CAPSULE ORAL at 06:25

## 2023-03-27 RX ADMIN — Medication 650 MILLIGRAM(S): at 23:04

## 2023-03-27 RX ADMIN — SODIUM CHLORIDE 3000 MILLILITER(S): 9 INJECTION, SOLUTION INTRAVENOUS at 16:00

## 2023-03-27 RX ADMIN — CHLORHEXIDINE GLUCONATE 15 MILLILITER(S): 213 SOLUTION TOPICAL at 17:53

## 2023-03-27 RX ADMIN — SODIUM CHLORIDE 3 MILLILITER(S): 9 INJECTION INTRAMUSCULAR; INTRAVENOUS; SUBCUTANEOUS at 06:28

## 2023-03-27 RX ADMIN — HYDROMORPHONE HYDROCHLORIDE 0.5 MILLIGRAM(S): 2 INJECTION INTRAMUSCULAR; INTRAVENOUS; SUBCUTANEOUS at 23:53

## 2023-03-27 RX ADMIN — Medication 200 GRAM(S): at 19:49

## 2023-03-27 RX ADMIN — SODIUM CHLORIDE 3000 MILLILITER(S): 9 INJECTION, SOLUTION INTRAVENOUS at 15:00

## 2023-03-27 RX ADMIN — Medication 25 GRAM(S): at 17:53

## 2023-03-27 RX ADMIN — HYDROMORPHONE HYDROCHLORIDE 0.5 MILLIGRAM(S): 2 INJECTION INTRAMUSCULAR; INTRAVENOUS; SUBCUTANEOUS at 19:36

## 2023-03-27 RX ADMIN — Medication 125 MILLILITER(S): at 19:10

## 2023-03-27 RX ADMIN — Medication 25 MILLIGRAM(S): at 06:26

## 2023-03-27 RX ADMIN — Medication 100 MILLIGRAM(S): at 19:49

## 2023-03-27 RX ADMIN — CHLORHEXIDINE GLUCONATE 1 APPLICATION(S): 213 SOLUTION TOPICAL at 06:15

## 2023-03-27 RX ADMIN — Medication 1000 MILLIGRAM(S): at 06:26

## 2023-03-27 RX ADMIN — Medication 1000 MILLIGRAM(S): at 06:30

## 2023-03-27 RX ADMIN — Medication 81 MILLIGRAM(S): at 23:07

## 2023-03-27 RX ADMIN — SODIUM CHLORIDE 3000 MILLILITER(S): 9 INJECTION, SOLUTION INTRAVENOUS at 14:45

## 2023-03-27 RX ADMIN — HYDROMORPHONE HYDROCHLORIDE 0.5 MILLIGRAM(S): 2 INJECTION INTRAMUSCULAR; INTRAVENOUS; SUBCUTANEOUS at 16:03

## 2023-03-27 RX ADMIN — Medication 8.41 MICROGRAM(S)/KG/MIN: at 19:50

## 2023-03-27 RX ADMIN — Medication 125 MILLILITER(S): at 18:56

## 2023-03-27 RX ADMIN — VASOPRESSIN 3 UNIT(S)/MIN: 20 INJECTION INTRAVENOUS at 23:52

## 2023-03-27 RX ADMIN — INSULIN HUMAN 3 UNIT(S)/HR: 100 INJECTION, SOLUTION SUBCUTANEOUS at 19:50

## 2023-03-27 RX ADMIN — HYDROMORPHONE HYDROCHLORIDE 0.5 MILLIGRAM(S): 2 INJECTION INTRAMUSCULAR; INTRAVENOUS; SUBCUTANEOUS at 19:21

## 2023-03-27 RX ADMIN — GABAPENTIN 100 MILLIGRAM(S): 400 CAPSULE ORAL at 22:57

## 2023-03-27 NOTE — AIRWAY REMOVAL NOTE  ADULT & PEDS - ARTIFICAL AIRWAY REMOVAL COMMENTS
Written order for extubation verified. The patient was identified by full name and birth date compared to the identification band. Present during the procedure was NARAYAN Matta

## 2023-03-27 NOTE — PROGRESS NOTE ADULT - SUBJECTIVE AND OBJECTIVE BOX
C A R D I O L O G Y  **********************************     DATE OF SERVICE: 03-27-23    Patient intubated/sedated. Unable to obtain ROS.    MEDICATIONS:  acetaminophen     Tablet .. 650 milliGRAM(s) Oral every 6 hours  aMIOdarone    Tablet 400 milliGRAM(s) Oral two times a day  ascorbic acid 500 milliGRAM(s) Oral two times a day  aspirin Suppository 300 milliGRAM(s) Rectal once  cefuroxime  IVPB 1500 milliGRAM(s) IV Intermittent every 8 hours  chlorhexidine 0.12% Liquid 15 milliLiter(s) Oral Mucosa every 12 hours  chlorhexidine 2% Cloths 1 Application(s) Topical daily  dexMEDEtomidine Infusion 0.5 MICROgram(s)/kG/Hr IV Continuous <Continuous>  dextrose 5%. 1000 milliLiter(s) IV Continuous <Continuous>  dextrose 50% Injectable 50 milliLiter(s) IV Push every 15 minutes  dextrose 50% Injectable 25 milliLiter(s) IV Push every 15 minutes  gabapentin 100 milliGRAM(s) Oral every 8 hours  HYDROmorphone  Injectable 0.5 milliGRAM(s) IV Push every 6 hours PRN  insulin regular Infusion 3 Unit(s)/Hr IV Continuous <Continuous>  lactated ringers Bolus 500 milliLiter(s) IV Bolus once  norepinephrine Infusion 0.05 MICROgram(s)/kG/Min IV Continuous <Continuous>  oxyCODONE    IR 5 milliGRAM(s) Oral every 4 hours PRN  oxyCODONE    IR 10 milliGRAM(s) Oral every 4 hours PRN  pantoprazole  Injectable 40 milliGRAM(s) IV Push daily  potassium chloride  10 mEq/50 mL IVPB 10 milliEquivalent(s) IV Intermittent every 1 hour  potassium chloride  10 mEq/50 mL IVPB 10 milliEquivalent(s) IV Intermittent every 1 hour  potassium chloride  10 mEq/50 mL IVPB 10 milliEquivalent(s) IV Intermittent every 1 hour  sodium chloride 0.9%. 1000 milliLiter(s) IV Continuous <Continuous>      LABS:                        9.6    21.33 )-----------( 191      ( 27 Mar 2023 15:51 )             28.8       Hemoglobin: 9.6 g/dL (03-27 @ 15:51)  Hemoglobin: 9.8 g/dL (03-26 @ 06:20)  Hemoglobin: 10.0 g/dL (03-25 @ 05:00)  Hemoglobin: 9.8 g/dL (03-24 @ 05:00)  Hemoglobin: 10.9 g/dL (03-23 @ 06:18)      03-27    140  |  105  |  8   ----------------------------<  152<H>  4.4   |  22  |  0.45<L>    Ca    7.2<L>      27 Mar 2023 15:51  Phos  3.2     03-27  Mg     1.9     03-27    TPro  4.3<L>  /  Alb  2.5<L>  /  TBili  0.6  /  DBili  x   /  AST  38  /  ALT  32  /  AlkPhos  45  03-27    Creatinine Trend: 0.45<--, 0.66<--, 0.59<--, 0.67<--, 0.61<--, 0.62<--    COAGS: PT/INR - ( 27 Mar 2023 15:51 )   PT: 17.6 sec;   INR: 1.51 ratio         PTT - ( 27 Mar 2023 15:51 )  PTT:30.6 sec    CARDIAC MARKERS ( 27 Mar 2023 15:51 )  x     / x     / 231 U/L / x     / 21.5 ng/mL        PHYSICAL EXAM:  T(C): 36.2 (03-27-23 @ 19:00), Max: 36.7 (03-26-23 @ 20:21)  HR: 72 (03-27-23 @ 19:30) (68 - 85)  BP: 135/75 (03-27-23 @ 09:24) (131/74 - 135/75)  RR: 14 (03-27-23 @ 19:30) (0 - 18)  SpO2: 100% (03-27-23 @ 19:30) (97% - 100%)  Wt(kg): --    I&O's Summary    26 Mar 2023 07:01  -  27 Mar 2023 07:00  --------------------------------------------------------  IN: 960 mL / OUT: 1000 mL / NET: -40 mL    27 Mar 2023 07:01  -  27 Mar 2023 20:14  --------------------------------------------------------  IN: 2498.3 mL / OUT: 840 mL / NET: 1658.3 mL          Gen: Intubated  HEENT:  (-)icterus (-)pallor  CV: N S1 S2 1/6 RONI (+)2 Pulses B/l  Resp:  Clear to auscultation B/L, normal effort  GI: (+) BS Soft, NT, ND  Lymph:  (-)Edema, (-)obvious lymphadenopathy  Skin: Warm to touch, Normal turgor  Psych: Unable to assess mood and affect      TELEMETRY: SR 60s     < from: TTE with Doppler (w/Cont) (03.16.23 @ 13:36) >  CONCLUSIONS:  1. Normal mitral valve. Mildly calcified chordae tendinae.  Mild mitral regurgitation.  2. Calcified aortic valve was not well visualized but has  normal opening. No aortic stenosis. No aortic valve  regurgitation seen.  3. Normal left ventricular internal dimensions and wall  thicknesses.  4. Low-normal Left Ventricular Systolic Function,  (EF =  53% by biplane). No regional wall motion abnormalities.  Ultrasound LV opacification agent was used to enhance  endocardial definition.  5. Right ventricle not well visualized. Normal RVsystolic  function (RV tissue Doppler 11 cm/s).  6. RV systolic pressure is borderline normal at  33 mm Hg.  7. Normal tricuspid valve. Moderate tricuspid  regurgitation.  8. No pericardial effusion.    *** No previous Echo exam.    < end of copied text >    < from: Cardiac Catheterization (03.22.23 @ 09:42) >  Diagnostic Conclusions:     Large LAD with severe bifurcating/ostial and heavily calcifed disease   LCX with Bifurcating/ostial disease   RCA with Moderate Disease   Recommendations:     CABG evaluation      < end of copied text >      ASSESSMENT/PLAN: Patient is 72 year old male with pmh of Type I DM (50 years ago, on Insulin pump for last 15 years), HTN, Vertigo and BPH who was brought to ED due to Abdominal pain, Nausea and vomiting. Cardiology consulted for increased troponin.    #NSTEMI  - TTE with low normal LV function and no wall motion abnormalities  - s/p cath with multivessel CAD noted above  - Transferred to [facility] for CABG evaluation  - Now s/p CABGx3 on 3/27  - Continue ASA, restart statin as tolerated  - Wean pressors as tolerated, Metoprolol on hold  - Supportive care/vent management per CTU    #DKA/Type 1 DM  - Glycemic control per endocrinology    #HTN  - BP meds on hold post op requiring pressors    Mariano Vernon PA-C  Pager: [phone]

## 2023-03-27 NOTE — PROGRESS NOTE ADULT - SUBJECTIVE AND OBJECTIVE BOX
Patient seen and examined at the bedside.    Remained critically ill on continuous ICU monitoring.    OBJECTIVE:  Vital Signs Last 24 Hrs  T(C): 35.9 (27 Mar 2023 16:00), Max: 36.7 (26 Mar 2023 20:21)  T(F): 96.6 (27 Mar 2023 16:00), Max: 98.1 (27 Mar 2023 05:23)  HR: 71 (27 Mar 2023 17:26) (68 - 85)  BP: 135/75 (27 Mar 2023 09:24) (131/74 - 135/75)  BP(mean): 95 (27 Mar 2023 09:24) (95 - 95)  RR: 0 (27 Mar 2023 16:45) (0 - 18)  SpO2: 100% (27 Mar 2023 17:26) (97% - 100%)    Parameters below as of 27 Mar 2023 16:00  Patient On (Oxygen Delivery Method): ventilator    O2 Concentration (%): 50    Physical Exam:   General: Intubated   Neurology: Sedated   Eyes: bilateral pupils equal and reactive   ENT/Neck: Neck supple, trachea midline, No JVD   Respiratory: Clear bilaterally   CV: S1S2, no murmurs        [x] Sternal dressing, [x] Mediastinal CT x2, [x] L Pleural CT        [x] Sinus rhythm [x] Temporary pacing - box off   Abdominal: Soft, NT, ND +BS   Extremities: 1-2+ pedal edema noted, + peripheral pulses   Skin: No Rashes, Hematoma, Ecchymosis         ============================I/O===========================   I&O's Detail    26 Mar 2023 07:01  -  27 Mar 2023 07:00  --------------------------------------------------------  IN:    Oral Fluid: 960 mL  Total IN: 960 mL    OUT:    Voided (mL): 1000 mL  Total OUT: 1000 mL    Total NET: -40 mL      27 Mar 2023 07:01  -  27 Mar 2023 17:38  --------------------------------------------------------  IN:    Dexmedetomidine: 38.2 mL    Insulin: 6 mL    Lactated Ringers Bolus: 1500 mL    Norepinephrine: 28.7 mL    sodium chloride 0.9%: 30 mL  Total IN: 1602.9 mL    OUT:    Chest Tube (mL): 90 mL    Chest Tube (mL): 70 mL    Chest Tube (mL): 25 mL    Indwelling Catheter - Urethral (mL): 195 mL  Total OUT: 380 mL    Total NET: 1222.9 mL  ============================ LABS =========================                        9.6    21.33 )-----------( 191      ( 27 Mar 2023 15:51 )             28.8     03-27    140  |  105  |  8   ----------------------------<  152<H>  4.4   |  22  |  0.45<L>    Ca    7.2<L>      27 Mar 2023 15:51  Phos  3.2     03-27  Mg     1.9     03-27    TPro  4.3<L>  /  Alb  2.5<L>  /  TBili  0.6  /  DBili  x   /  AST  38  /  ALT  32  /  AlkPhos  45  03-27    LIVER FUNCTIONS - ( 27 Mar 2023 15:51 )  Alb: 2.5 g/dL / Pro: 4.3 g/dL / ALK PHOS: 45 U/L / ALT: 32 U/L / AST: 38 U/L / GGT: x           PT/INR - ( 27 Mar 2023 15:51 )   PT: 17.6 sec;   INR: 1.51 ratio         PTT - ( 27 Mar 2023 15:51 )  PTT:30.6 sec  ABG - ( 27 Mar 2023 15:02 )  pH, Arterial: 7.35  pH, Blood: x     /  pCO2: 42    /  pO2: 173   / HCO3: 23    / Base Excess: -2.3  /  SaO2: 99.1      ======================Micro/Rad/Cardio=================  Culture: Reviewed   CXR: Reviewed  Echo: Reviewed  ======================================================  PAST MEDICAL & SURGICAL HISTORY:  Diabetes    Hypertension    BPH (benign prostatic hyperplasia)    S/P cataract surgery  right eye    Steel plate in right arm  ======================================================  Assessment:  72 years old male with PMHX of HTN, Type I DM (50 years ago, on Insulin pump for last 15 years), Vertigo and BPH who was brought to ED due to Abdominal pain, Nausea and vomiting.    CAD s/p CABG 03/27/23   Hemodynamic instability   Hypovolemia  Encounter ventilator management   ======================================================  Plan:   ***Neuro***  [x] Hx of CVA   [x] Sedated with [x] Precedex   Post operative neuro assessment   Continue pain management with Tylenol, gabapentin, oxy and Dilaudid.     ***Cardiovascular***  Invasive hemodynamic monitoring, assess perfusion indices   SR / CVP 5/ MAP 95/ Hct 28.8%/ Lactate 1.3  [x] Levophed- 0.05mcgs   Reassessment of hemodynamics post resuscitation   Amiodarone for rate control   Monitor chest tube outputs   [x] ASA  Serial EKG and cardiac enzymes     ***Pulmonary***  Post op vent management   Titration of FiO2 and PEEP, follow SpO2, CXR, blood gasses     Mode: AC/ CMV (Assist Control/ Continuous Mandatory Ventilation)  RR (machine): 12  TV (machine): 500  FiO2: 50  PEEP: 5  ITime: 1  MAP: 6  PIP: 16            ***GI***  [x] NPO  [x] Protonix     ***Renal***  Continue to monitor I/Os, BUN/Creatinine.   Replete lytes PRN  Santana present     ***ID***  Perioperative coverage with Cefuroxime.     ***Endocrine***  [x] DM: HbA1c 8.2%                - [x] Insulin gtt              - Need tight glycemic control to prevent wound infection.      Patient requires continuous monitoring with:  bedside rhythm monitoring, arterial line, pulse oximetry, ventilator management and monitoring; intermittent blood gas analysis.  Care plan discussed with ICU care team.  patient remain critical; required more than usual post op care; I have spent 50 minutes providing non routine post op care, revaluated multiple times during the day .     Care Plan discussed with the ICU care team. Patient remained critical, at risk for life threatening decompensation.     By signing my name below, I, Asia Harper, attest that this documentation has been prepared under the direction and in the presence of Larissa Bernal MD.  Electronically signed: Deidre Pérez, 03-27-23 @ 17:38    I, Dolores Dias, personally performed the services described in this documentation. all medical record entries made by the scribe were at my direction and in my presence. I have reviewed the chart and agree that the record reflects my personal performance and is accurate and complete  Electronically signed: Larissa Bernal MD. Patient seen and examined at the bedside.    Remained critically ill on continuous ICU monitoring.    OBJECTIVE:  Vital Signs Last 24 Hrs  T(C): 35.9 (27 Mar 2023 16:00), Max: 36.7 (26 Mar 2023 20:21)  T(F): 96.6 (27 Mar 2023 16:00), Max: 98.1 (27 Mar 2023 05:23)  HR: 71 (27 Mar 2023 17:26) (68 - 85)  BP: 135/75 (27 Mar 2023 09:24) (131/74 - 135/75)  BP(mean): 95 (27 Mar 2023 09:24) (95 - 95)  RR: 0 (27 Mar 2023 16:45) (0 - 18)  SpO2: 100% (27 Mar 2023 17:26) (97% - 100%)    Parameters below as of 27 Mar 2023 16:00  Patient On (Oxygen Delivery Method): ventilator    O2 Concentration (%): 50    Physical Exam:   General: Intubated   Neurology: Sedated   Eyes: bilateral pupils equal and reactive   ENT/Neck: Neck supple, trachea midline, No JVD   Respiratory: Clear bilaterally   CV: S1S2, no murmurs        [x] Sternal dressing, [x] Mediastinal CT x2, [x] L Pleural CT        [x] Sinus rhythm [x] Temporary pacing - box off   Abdominal: Soft, NT, ND +BS   Extremities: 1-2+ pedal edema noted, + peripheral pulses   Skin: No Rashes, Hematoma, Ecchymosis         ============================I/O===========================   I&O's Detail    26 Mar 2023 07:01  -  27 Mar 2023 07:00  --------------------------------------------------------  IN:    Oral Fluid: 960 mL  Total IN: 960 mL    OUT:    Voided (mL): 1000 mL  Total OUT: 1000 mL    Total NET: -40 mL      27 Mar 2023 07:01  -  27 Mar 2023 17:38  --------------------------------------------------------  IN:    Dexmedetomidine: 38.2 mL    Insulin: 6 mL    Lactated Ringers Bolus: 1500 mL    Norepinephrine: 28.7 mL    sodium chloride 0.9%: 30 mL  Total IN: 1602.9 mL    OUT:    Chest Tube (mL): 90 mL    Chest Tube (mL): 70 mL    Chest Tube (mL): 25 mL    Indwelling Catheter - Urethral (mL): 195 mL  Total OUT: 380 mL    Total NET: 1222.9 mL  ============================ LABS =========================                        9.6    21.33 )-----------( 191      ( 27 Mar 2023 15:51 )             28.8     03-27    140  |  105  |  8   ----------------------------<  152<H>  4.4   |  22  |  0.45<L>    Ca    7.2<L>      27 Mar 2023 15:51  Phos  3.2     03-27  Mg     1.9     03-27    TPro  4.3<L>  /  Alb  2.5<L>  /  TBili  0.6  /  DBili  x   /  AST  38  /  ALT  32  /  AlkPhos  45  03-27    LIVER FUNCTIONS - ( 27 Mar 2023 15:51 )  Alb: 2.5 g/dL / Pro: 4.3 g/dL / ALK PHOS: 45 U/L / ALT: 32 U/L / AST: 38 U/L / GGT: x           PT/INR - ( 27 Mar 2023 15:51 )   PT: 17.6 sec;   INR: 1.51 ratio         PTT - ( 27 Mar 2023 15:51 )  PTT:30.6 sec  ABG - ( 27 Mar 2023 15:02 )  pH, Arterial: 7.35  pH, Blood: x     /  pCO2: 42    /  pO2: 173   / HCO3: 23    / Base Excess: -2.3  /  SaO2: 99.1      ======================Micro/Rad/Cardio=================  Culture: Reviewed   CXR: Reviewed  Echo: Reviewed  ======================================================  PAST MEDICAL & SURGICAL HISTORY:  Diabetes  Hypertension  BPH (benign prostatic hyperplasia)    S/P cataract surgery right eye  Steel plate in right arm  ======================================================  Assessment:  72 years old male with PMHX of HTN, Type I DM (50 years ago, on Insulin pump for last 15 years), Vertigo and BPH who was brought to ED due to Abdominal pain, Nausea and vomiting.    CAD s/p CABG 03/27/23   Hemodynamic instability   hyperglycemia  Diabetes mellitus type1  Hypovolemia  Encounter weaning ventilator management   ======================================================  Plan:   ***Neuro***  [x] Sedated with [x] Precedex   Post operative neuro assessment   Continue pain management with Tylenol, gabapentin, oxy and Dilaudid.     ***Cardiovascular***  Invasive hemodynamic monitoring, assess perfusion indices   SR / CVP 5/ MAP 95/ Hct 28.8%/ Lactate 1.3  [x] Levophed- 0.05mcgs titrate for mean BP above 60  Reassessment of hemodynamics post resuscitation   Amiodarone for rate control   Monitor chest tube outputs   [x] ASA  Serial EKG and cardiac enzymes     ***Pulmonary***  Post op vent management   Titration of FiO2 and PEEP, follow SpO2, CXR, blood gasses     Mode: AC/ CMV (Assist Control/ Continuous Mandatory Ventilation)  RR (machine): 12  TV (machine): 500  FiO2: 50  PEEP: 5  wean to extubate            ***GI***  [x] NPO  [x] Protonix     ***Renal***  Continue to monitor I/Os, BUN/Creatinine.   Replete lytes PRN  Santana present     ***ID***  Perioperative coverage with Cefuroxime.     ***Endocrine***  [x] DM: HbA1c 8.2%                - [x] Insulin gtt              - Need tight glycemic control to prevent wound infection.      Patient requires continuous monitoring with:  bedside rhythm monitoring, arterial line, pulse oximetry, ventilator management and monitoring; intermittent blood gas analysis.  Care plan discussed with ICU care team.  patient remain critical; required more than usual post op care; I have spent 50 minutes providing non routine post op care, revaluated multiple times during the day .     Care Plan discussed with the ICU care team. Patient remained critical, at risk for life threatening decompensation.     By signing my name below, I, Asia Harper, attest that this documentation has been prepared under the direction and in the presence of Larissa Bernal MD.  Electronically signed: Deidre Pérez, 03-27-23 @ 17:38    I, Dolores Dias, personally performed the services described in this documentation. all medical record entries made by the scribe were at my direction and in my presence. I have reviewed the chart and agree that the record reflects my personal performance and is accurate and complete  Electronically signed: Larissa Bernal MD.

## 2023-03-27 NOTE — PRE-ANESTHESIA EVALUATION ADULT - NSANTHAIRWAYFT_ENT_ALL_CORE
Good mouth opening, 3 FB TM distance, full neck ROM Good mouth opening-pt able to open mouth widely without difficulty, 3 FB TM distance, full neck ROM

## 2023-03-27 NOTE — PRE-ANESTHESIA EVALUATION ADULT - NSANTHPMHFT_GEN_ALL_CORE
72 years old male with PMHX of HTN, Type I DM (50 years ago, on Insulin pump for last 15 years), Vertigo and BPH who was brought to ED due to Abdominal pain, Nausea and vomiting. Patient also found to have elevated troponin. Patient transferred over from [facility] s/p [facility] showing   TVD CAD.  endorses right TMJ clicking when he opens his mouth. however denies pain in jaw or ever having jaw dislocation.  N/V resolved.  ambulates with cane due to metal implant in his foot  Insulin pump has been off.

## 2023-03-27 NOTE — BRIEF OPERATIVE NOTE - OPERATION/FINDINGS
Aortic XClamp: 115    |    Total CPB: 142  Procedure:  LIMA-LAD | SVG-OM | SVG-OM    Right greater saphenous vein harvested endoscopically by FELIPE PEREIRA

## 2023-03-27 NOTE — PRE-ANESTHESIA EVALUATION ADULT - NSANTHOSAYNRD_GEN_A_CORE
No. PATT screening performed.  STOP BANG Legend: 0-2 = LOW Risk; 3-4 = INTERMEDIATE Risk; 5-8 = HIGH Risk

## 2023-03-28 LAB
ALBUMIN SERPL ELPH-MCNC: 3.2 G/DL — LOW (ref 3.3–5)
ALP SERPL-CCNC: 43 U/L — SIGNIFICANT CHANGE UP (ref 40–120)
ALT FLD-CCNC: 36 U/L — SIGNIFICANT CHANGE UP (ref 10–45)
ANION GAP SERPL CALC-SCNC: 8 MMOL/L — SIGNIFICANT CHANGE UP (ref 5–17)
APTT BLD: 40.3 SEC — HIGH (ref 27.5–35.5)
AST SERPL-CCNC: 51 U/L — HIGH (ref 10–40)
BASE EXCESS BLDV CALC-SCNC: 0.9 MMOL/L — SIGNIFICANT CHANGE UP (ref -2–3)
BASOPHILS # BLD AUTO: 0.02 K/UL — SIGNIFICANT CHANGE UP (ref 0–0.2)
BASOPHILS NFR BLD AUTO: 0.2 % — SIGNIFICANT CHANGE UP (ref 0–2)
BILIRUB SERPL-MCNC: 0.6 MG/DL — SIGNIFICANT CHANGE UP (ref 0.2–1.2)
BUN SERPL-MCNC: 9 MG/DL — SIGNIFICANT CHANGE UP (ref 7–23)
CA-I SERPL-SCNC: 1.18 MMOL/L — SIGNIFICANT CHANGE UP (ref 1.15–1.33)
CALCIUM SERPL-MCNC: 8 MG/DL — LOW (ref 8.4–10.5)
CHLORIDE BLDV-SCNC: 108 MMOL/L — SIGNIFICANT CHANGE UP (ref 96–108)
CHLORIDE SERPL-SCNC: 106 MMOL/L — SIGNIFICANT CHANGE UP (ref 96–108)
CO2 BLDV-SCNC: 28 MMOL/L — HIGH (ref 22–26)
CO2 SERPL-SCNC: 26 MMOL/L — SIGNIFICANT CHANGE UP (ref 22–31)
CREAT SERPL-MCNC: 0.48 MG/DL — LOW (ref 0.5–1.3)
EGFR: 108 ML/MIN/1.73M2 — SIGNIFICANT CHANGE UP
EOSINOPHIL # BLD AUTO: 0.01 K/UL — SIGNIFICANT CHANGE UP (ref 0–0.5)
EOSINOPHIL NFR BLD AUTO: 0.1 % — SIGNIFICANT CHANGE UP (ref 0–6)
FIBRINOGEN PPP-MCNC: 254 MG/DL — SIGNIFICANT CHANGE UP (ref 200–445)
GAS PNL BLDA: SIGNIFICANT CHANGE UP
GAS PNL BLDV: 136 MMOL/L — SIGNIFICANT CHANGE UP (ref 136–145)
GAS PNL BLDV: SIGNIFICANT CHANGE UP
GAS PNL BLDV: SIGNIFICANT CHANGE UP
GLUCOSE BLDC GLUCOMTR-MCNC: 101 MG/DL — HIGH (ref 70–99)
GLUCOSE BLDC GLUCOMTR-MCNC: 102 MG/DL — HIGH (ref 70–99)
GLUCOSE BLDC GLUCOMTR-MCNC: 108 MG/DL — HIGH (ref 70–99)
GLUCOSE BLDC GLUCOMTR-MCNC: 108 MG/DL — HIGH (ref 70–99)
GLUCOSE BLDC GLUCOMTR-MCNC: 129 MG/DL — HIGH (ref 70–99)
GLUCOSE BLDC GLUCOMTR-MCNC: 136 MG/DL — HIGH (ref 70–99)
GLUCOSE BLDC GLUCOMTR-MCNC: 144 MG/DL — HIGH (ref 70–99)
GLUCOSE BLDC GLUCOMTR-MCNC: 155 MG/DL — HIGH (ref 70–99)
GLUCOSE BLDC GLUCOMTR-MCNC: 156 MG/DL — HIGH (ref 70–99)
GLUCOSE BLDC GLUCOMTR-MCNC: 158 MG/DL — HIGH (ref 70–99)
GLUCOSE BLDC GLUCOMTR-MCNC: 159 MG/DL — HIGH (ref 70–99)
GLUCOSE BLDC GLUCOMTR-MCNC: 163 MG/DL — HIGH (ref 70–99)
GLUCOSE BLDC GLUCOMTR-MCNC: 165 MG/DL — HIGH (ref 70–99)
GLUCOSE BLDC GLUCOMTR-MCNC: 169 MG/DL — HIGH (ref 70–99)
GLUCOSE BLDC GLUCOMTR-MCNC: 178 MG/DL — HIGH (ref 70–99)
GLUCOSE BLDC GLUCOMTR-MCNC: 182 MG/DL — HIGH (ref 70–99)
GLUCOSE BLDC GLUCOMTR-MCNC: 183 MG/DL — HIGH (ref 70–99)
GLUCOSE BLDC GLUCOMTR-MCNC: 192 MG/DL — HIGH (ref 70–99)
GLUCOSE BLDC GLUCOMTR-MCNC: 208 MG/DL — HIGH (ref 70–99)
GLUCOSE BLDC GLUCOMTR-MCNC: 247 MG/DL — HIGH (ref 70–99)
GLUCOSE BLDC GLUCOMTR-MCNC: 91 MG/DL — SIGNIFICANT CHANGE UP (ref 70–99)
GLUCOSE BLDC GLUCOMTR-MCNC: 93 MG/DL — SIGNIFICANT CHANGE UP (ref 70–99)
GLUCOSE BLDC GLUCOMTR-MCNC: 96 MG/DL — SIGNIFICANT CHANGE UP (ref 70–99)
GLUCOSE BLDV-MCNC: 88 MG/DL — SIGNIFICANT CHANGE UP (ref 70–99)
GLUCOSE SERPL-MCNC: 103 MG/DL — HIGH (ref 70–99)
HCO3 BLDV-SCNC: 27 MMOL/L — SIGNIFICANT CHANGE UP (ref 22–29)
HCT VFR BLD CALC: 26.3 % — LOW (ref 39–50)
HCT VFR BLDA CALC: 26 % — LOW (ref 39–51)
HGB BLD CALC-MCNC: 8.6 G/DL — LOW (ref 12.6–17.4)
HGB BLD-MCNC: 8.6 G/DL — LOW (ref 13–17)
HOROWITZ INDEX BLDV+IHG-RTO: 44 — SIGNIFICANT CHANGE UP
IMM GRANULOCYTES NFR BLD AUTO: 0.7 % — SIGNIFICANT CHANGE UP (ref 0–0.9)
INR BLD: 1.43 RATIO — HIGH (ref 0.88–1.16)
LACTATE BLDV-MCNC: 0.8 MMOL/L — SIGNIFICANT CHANGE UP (ref 0.5–2)
LYMPHOCYTES # BLD AUTO: 0.49 K/UL — LOW (ref 1–3.3)
LYMPHOCYTES # BLD AUTO: 4.6 % — LOW (ref 13–44)
MAGNESIUM SERPL-MCNC: 2.3 MG/DL — SIGNIFICANT CHANGE UP (ref 1.6–2.6)
MCHC RBC-ENTMCNC: 29.3 PG — SIGNIFICANT CHANGE UP (ref 27–34)
MCHC RBC-ENTMCNC: 32.7 GM/DL — SIGNIFICANT CHANGE UP (ref 32–36)
MCV RBC AUTO: 89.5 FL — SIGNIFICANT CHANGE UP (ref 80–100)
MONOCYTES # BLD AUTO: 0.66 K/UL — SIGNIFICANT CHANGE UP (ref 0–0.9)
MONOCYTES NFR BLD AUTO: 6.2 % — SIGNIFICANT CHANGE UP (ref 2–14)
NEUTROPHILS # BLD AUTO: 9.44 K/UL — HIGH (ref 1.8–7.4)
NEUTROPHILS NFR BLD AUTO: 88.2 % — HIGH (ref 43–77)
NRBC # BLD: 0 /100 WBCS — SIGNIFICANT CHANGE UP (ref 0–0)
PCO2 BLDV: 50 MMHG — SIGNIFICANT CHANGE UP (ref 42–55)
PH BLDV: 7.34 — SIGNIFICANT CHANGE UP (ref 7.32–7.43)
PHOSPHATE SERPL-MCNC: 2.5 MG/DL — SIGNIFICANT CHANGE UP (ref 2.5–4.5)
PLATELET # BLD AUTO: 182 K/UL — SIGNIFICANT CHANGE UP (ref 150–400)
PO2 BLDV: 37 MMHG — SIGNIFICANT CHANGE UP (ref 25–45)
POTASSIUM BLDV-SCNC: 4.3 MMOL/L — SIGNIFICANT CHANGE UP (ref 3.5–5.1)
POTASSIUM SERPL-MCNC: 4.6 MMOL/L — SIGNIFICANT CHANGE UP (ref 3.5–5.3)
POTASSIUM SERPL-SCNC: 4.6 MMOL/L — SIGNIFICANT CHANGE UP (ref 3.5–5.3)
PROT SERPL-MCNC: 5.1 G/DL — LOW (ref 6–8.3)
PROTHROM AB SERPL-ACNC: 16.7 SEC — HIGH (ref 10.5–13.4)
RBC # BLD: 2.94 M/UL — LOW (ref 4.2–5.8)
RBC # FLD: 14.7 % — HIGH (ref 10.3–14.5)
SAO2 % BLDV: 64.6 % — LOW (ref 67–88)
SODIUM SERPL-SCNC: 140 MMOL/L — SIGNIFICANT CHANGE UP (ref 135–145)
WBC # BLD: 10.7 K/UL — HIGH (ref 3.8–10.5)
WBC # FLD AUTO: 10.7 K/UL — HIGH (ref 3.8–10.5)

## 2023-03-28 PROCEDURE — 99291 CRITICAL CARE FIRST HOUR: CPT | Mod: 24

## 2023-03-28 PROCEDURE — 71045 X-RAY EXAM CHEST 1 VIEW: CPT | Mod: 26

## 2023-03-28 RX ORDER — FUROSEMIDE 40 MG
20 TABLET ORAL ONCE
Refills: 0 | Status: DISCONTINUED | OUTPATIENT
Start: 2023-03-28 | End: 2023-03-28

## 2023-03-28 RX ORDER — ENOXAPARIN SODIUM 100 MG/ML
40 INJECTION SUBCUTANEOUS ONCE
Refills: 0 | Status: COMPLETED | OUTPATIENT
Start: 2023-03-28 | End: 2023-03-28

## 2023-03-28 RX ORDER — INSULIN LISPRO 100/ML
6 VIAL (ML) SUBCUTANEOUS
Refills: 0 | Status: DISCONTINUED | OUTPATIENT
Start: 2023-03-28 | End: 2023-03-29

## 2023-03-28 RX ORDER — FUROSEMIDE 40 MG
10 TABLET ORAL ONCE
Refills: 0 | Status: COMPLETED | OUTPATIENT
Start: 2023-03-28 | End: 2023-03-28

## 2023-03-28 RX ORDER — MONTELUKAST 4 MG/1
10 TABLET, CHEWABLE ORAL ONCE
Refills: 0 | Status: COMPLETED | OUTPATIENT
Start: 2023-03-28 | End: 2023-03-28

## 2023-03-28 RX ORDER — DEXTROSE 50 % IN WATER 50 %
15 SYRINGE (ML) INTRAVENOUS ONCE
Refills: 0 | Status: DISCONTINUED | OUTPATIENT
Start: 2023-03-28 | End: 2023-03-28

## 2023-03-28 RX ORDER — SODIUM CHLORIDE 9 MG/ML
1000 INJECTION, SOLUTION INTRAVENOUS
Refills: 0 | Status: DISCONTINUED | OUTPATIENT
Start: 2023-03-28 | End: 2023-03-28

## 2023-03-28 RX ORDER — TAMSULOSIN HYDROCHLORIDE 0.4 MG/1
0.4 CAPSULE ORAL AT BEDTIME
Refills: 0 | Status: DISCONTINUED | OUTPATIENT
Start: 2023-03-28 | End: 2023-04-03

## 2023-03-28 RX ORDER — ALBUMIN HUMAN 25 %
250 VIAL (ML) INTRAVENOUS ONCE
Refills: 0 | Status: COMPLETED | OUTPATIENT
Start: 2023-03-28 | End: 2023-03-28

## 2023-03-28 RX ORDER — PANTOPRAZOLE SODIUM 20 MG/1
40 TABLET, DELAYED RELEASE ORAL
Refills: 0 | Status: DISCONTINUED | OUTPATIENT
Start: 2023-03-28 | End: 2023-04-03

## 2023-03-28 RX ORDER — ATORVASTATIN CALCIUM 80 MG/1
80 TABLET, FILM COATED ORAL AT BEDTIME
Refills: 0 | Status: DISCONTINUED | OUTPATIENT
Start: 2023-03-28 | End: 2023-04-03

## 2023-03-28 RX ORDER — ENOXAPARIN SODIUM 100 MG/ML
40 INJECTION SUBCUTANEOUS EVERY 24 HOURS
Refills: 0 | Status: DISCONTINUED | OUTPATIENT
Start: 2023-03-29 | End: 2023-04-01

## 2023-03-28 RX ORDER — CALCIUM GLUCONATE 100 MG/ML
2 VIAL (ML) INTRAVENOUS ONCE
Refills: 0 | Status: COMPLETED | OUTPATIENT
Start: 2023-03-28 | End: 2023-03-28

## 2023-03-28 RX ORDER — INSULIN GLARGINE 100 [IU]/ML
20 INJECTION, SOLUTION SUBCUTANEOUS AT BEDTIME
Refills: 0 | Status: DISCONTINUED | OUTPATIENT
Start: 2023-03-28 | End: 2023-03-29

## 2023-03-28 RX ORDER — GLUCAGON INJECTION, SOLUTION 0.5 MG/.1ML
1 INJECTION, SOLUTION SUBCUTANEOUS ONCE
Refills: 0 | Status: DISCONTINUED | OUTPATIENT
Start: 2023-03-28 | End: 2023-03-30

## 2023-03-28 RX ORDER — CLOPIDOGREL BISULFATE 75 MG/1
75 TABLET, FILM COATED ORAL DAILY
Refills: 0 | Status: DISCONTINUED | OUTPATIENT
Start: 2023-03-28 | End: 2023-03-31

## 2023-03-28 RX ORDER — FUROSEMIDE 40 MG
20 TABLET ORAL ONCE
Refills: 0 | Status: COMPLETED | OUTPATIENT
Start: 2023-03-28 | End: 2023-03-28

## 2023-03-28 RX ORDER — INSULIN LISPRO 100/ML
VIAL (ML) SUBCUTANEOUS
Refills: 0 | Status: DISCONTINUED | OUTPATIENT
Start: 2023-03-28 | End: 2023-03-29

## 2023-03-28 RX ORDER — INSULIN LISPRO 100/ML
VIAL (ML) SUBCUTANEOUS AT BEDTIME
Refills: 0 | Status: DISCONTINUED | OUTPATIENT
Start: 2023-03-28 | End: 2023-03-29

## 2023-03-28 RX ORDER — METOPROLOL TARTRATE 50 MG
12.5 TABLET ORAL EVERY 12 HOURS
Refills: 0 | Status: DISCONTINUED | OUTPATIENT
Start: 2023-03-28 | End: 2023-03-29

## 2023-03-28 RX ADMIN — Medication 650 MILLIGRAM(S): at 18:34

## 2023-03-28 RX ADMIN — Medication 650 MILLIGRAM(S): at 13:04

## 2023-03-28 RX ADMIN — GABAPENTIN 100 MILLIGRAM(S): 400 CAPSULE ORAL at 21:13

## 2023-03-28 RX ADMIN — Medication 650 MILLIGRAM(S): at 05:37

## 2023-03-28 RX ADMIN — SODIUM CHLORIDE 20 MILLILITER(S): 9 INJECTION, SOLUTION INTRAVENOUS at 12:32

## 2023-03-28 RX ADMIN — ENOXAPARIN SODIUM 40 MILLIGRAM(S): 100 INJECTION SUBCUTANEOUS at 12:34

## 2023-03-28 RX ADMIN — Medication 125 MILLILITER(S): at 00:54

## 2023-03-28 RX ADMIN — CHLORHEXIDINE GLUCONATE 1 APPLICATION(S): 213 SOLUTION TOPICAL at 12:29

## 2023-03-28 RX ADMIN — SODIUM CHLORIDE 20 MILLILITER(S): 9 INJECTION, SOLUTION INTRAVENOUS at 05:05

## 2023-03-28 RX ADMIN — Medication 100 MILLIGRAM(S): at 12:32

## 2023-03-28 RX ADMIN — Medication 200 GRAM(S): at 01:17

## 2023-03-28 RX ADMIN — INSULIN HUMAN 3 UNIT(S)/HR: 100 INJECTION, SOLUTION SUBCUTANEOUS at 12:33

## 2023-03-28 RX ADMIN — Medication 8.41 MICROGRAM(S)/KG/MIN: at 12:33

## 2023-03-28 RX ADMIN — HYDROMORPHONE HYDROCHLORIDE 0.5 MILLIGRAM(S): 2 INJECTION INTRAMUSCULAR; INTRAVENOUS; SUBCUTANEOUS at 06:21

## 2023-03-28 RX ADMIN — Medication 8.41 MICROGRAM(S)/KG/MIN: at 05:06

## 2023-03-28 RX ADMIN — Medication 10 MILLIGRAM(S): at 14:17

## 2023-03-28 RX ADMIN — Medication 125 MILLILITER(S): at 12:31

## 2023-03-28 RX ADMIN — Medication 100 MILLIGRAM(S): at 20:24

## 2023-03-28 RX ADMIN — MONTELUKAST 10 MILLIGRAM(S): 4 TABLET, CHEWABLE ORAL at 12:34

## 2023-03-28 RX ADMIN — Medication 12.5 MILLIGRAM(S): at 18:30

## 2023-03-28 RX ADMIN — ATORVASTATIN CALCIUM 80 MILLIGRAM(S): 80 TABLET, FILM COATED ORAL at 21:14

## 2023-03-28 RX ADMIN — GABAPENTIN 100 MILLIGRAM(S): 400 CAPSULE ORAL at 13:31

## 2023-03-28 RX ADMIN — SENNA PLUS 2 TABLET(S): 8.6 TABLET ORAL at 21:13

## 2023-03-28 RX ADMIN — Medication 650 MILLIGRAM(S): at 05:07

## 2023-03-28 RX ADMIN — Medication 100 MILLIGRAM(S): at 05:07

## 2023-03-28 RX ADMIN — HYDROMORPHONE HYDROCHLORIDE 0.5 MILLIGRAM(S): 2 INJECTION INTRAMUSCULAR; INTRAVENOUS; SUBCUTANEOUS at 06:36

## 2023-03-28 RX ADMIN — AMIODARONE HYDROCHLORIDE 400 MILLIGRAM(S): 400 TABLET ORAL at 17:45

## 2023-03-28 RX ADMIN — INSULIN HUMAN 3 UNIT(S)/HR: 100 INJECTION, SOLUTION SUBCUTANEOUS at 05:06

## 2023-03-28 RX ADMIN — Medication 81 MILLIGRAM(S): at 12:34

## 2023-03-28 RX ADMIN — TAMSULOSIN HYDROCHLORIDE 0.4 MILLIGRAM(S): 0.4 CAPSULE ORAL at 21:13

## 2023-03-28 RX ADMIN — POLYETHYLENE GLYCOL 3350 17 GRAM(S): 17 POWDER, FOR SOLUTION ORAL at 12:35

## 2023-03-28 RX ADMIN — Medication 650 MILLIGRAM(S): at 12:34

## 2023-03-28 RX ADMIN — VASOPRESSIN 3 UNIT(S)/MIN: 20 INJECTION INTRAVENOUS at 05:06

## 2023-03-28 RX ADMIN — VASOPRESSIN 3 UNIT(S)/MIN: 20 INJECTION INTRAVENOUS at 12:33

## 2023-03-28 RX ADMIN — Medication 500 MILLIGRAM(S): at 05:07

## 2023-03-28 RX ADMIN — INSULIN GLARGINE 20 UNIT(S): 100 INJECTION, SOLUTION SUBCUTANEOUS at 22:06

## 2023-03-28 RX ADMIN — Medication 650 MILLIGRAM(S): at 17:46

## 2023-03-28 RX ADMIN — AMIODARONE HYDROCHLORIDE 400 MILLIGRAM(S): 400 TABLET ORAL at 05:07

## 2023-03-28 RX ADMIN — Medication 20 MILLIGRAM(S): at 21:14

## 2023-03-28 RX ADMIN — CLOPIDOGREL BISULFATE 75 MILLIGRAM(S): 75 TABLET, FILM COATED ORAL at 17:45

## 2023-03-28 RX ADMIN — GABAPENTIN 100 MILLIGRAM(S): 400 CAPSULE ORAL at 05:07

## 2023-03-28 RX ADMIN — HYDROMORPHONE HYDROCHLORIDE 0.5 MILLIGRAM(S): 2 INJECTION INTRAMUSCULAR; INTRAVENOUS; SUBCUTANEOUS at 00:08

## 2023-03-28 RX ADMIN — Medication 500 MILLIGRAM(S): at 17:45

## 2023-03-28 NOTE — DIETITIAN INITIAL EVALUATION ADULT - OTHER CALCULATIONS
estimated energy & protein needs based on dosing wt of 197 pounds with consideration for MSI. Defer fluid needs to team.

## 2023-03-28 NOTE — DIETITIAN INITIAL EVALUATION ADULT - PHYSCIAL ASSESSMENT
Dosing wt: 197 pounds (3/22/23). Daily weights as follows: 3/23: 197 lbs-standing, 3/24: 198.4 pounds, 3/25: 198.4 pounds, 3/26: 195.1 pounds, 3/28: 197 lbs-bed scale. Pt reports UBW from 3/18/23 of 200 pounds indicative of a 3 lb/1.5% weight loss x 10 days (not clinically significant). May be related to fluid shifts. Will continue to monitor trends as able.    IBW: 190 pounds.

## 2023-03-28 NOTE — PHYSICAL THERAPY INITIAL EVALUATION ADULT - ACTIVE RANGE OF MOTION EXAMINATION, REHAB EVAL
R ankle has slight ROM deficits 2/2 to ankle fracture in 2019/no Active ROM deficits were identified

## 2023-03-28 NOTE — DIETITIAN INITIAL EVALUATION ADULT - ADD RECOMMEND
- Monitor diet, PO intake, GI status, weight, labs, skin integrity  - Continue with Vit C daily for wound healing; consider adding multivitamin daily as well pending no existing contraindications  - Honor pt food preferences to promote adequate oral intake while in-house  - RD remains available to provide ongoing nutrition education PRN

## 2023-03-28 NOTE — DIETITIAN INITIAL EVALUATION ADULT - DIET TYPE
- Continue current Regular + Consistent carbohydrate diet; consider removing DASH restriction and ordering Low sodium for additional protein options

## 2023-03-28 NOTE — CHART NOTE - NSCHARTNOTEFT_GEN_A_CORE
Pt in CTU on insulin drip requiring 0.5 to 2 units of insulin/hr at night and up to 4 units during the day while eating. Off pressors.   Noted CTU team transitioning to sq insulin tonight.    POCT Blood Glucose.: 165 mg/dL (03-28-23 @ 19:08)  POCT Blood Glucose.: 192 mg/dL (03-28-23 @ 18:22)  POCT Blood Glucose.: 158 mg/dL (03-28-23 @ 17:12)  POCT Blood Glucose.: 129 mg/dL (03-28-23 @ 16:12)  POCT Blood Glucose.: 155 mg/dL (03-28-23 @ 15:10)  POCT Blood Glucose.: 169 mg/dL (03-28-23 @ 14:02)  POCT Blood Glucose.: 178 mg/dL (03-28-23 @ 12:55)  POCT Blood Glucose.: 182 mg/dL (03-28-23 @ 12:06)  POCT Blood Glucose.: 208 mg/dL (03-28-23 @ 11:14)  POCT Blood Glucose.: 247 mg/dL (03-28-23 @ 10:13)  POCT Blood Glucose.: 183 mg/dL (03-28-23 @ 08:48)  POCT Blood Glucose.: 144 mg/dL (03-28-23 @ 07:49)  POCT Blood Glucose.: 159 mg/dL (03-28-23 @ 06:54)  POCT Blood Glucose.: 156 mg/dL (03-28-23 @ 06:10)  POCT Blood Glucose.: 108 mg/dL (03-28-23 @ 04:58)  POCT Blood Glucose.: 101 mg/dL (03-28-23 @ 04:03)  POCT Blood Glucose.: 96 mg/dL (03-28-23 @ 02:58)  POCT Blood Glucose.: 93 mg/dL (03-28-23 @ 02:08)  POCT Blood Glucose.: 91 mg/dL (03-28-23 @ 01:04)  POCT Blood Glucose.: 106 mg/dL (03-27-23 @ 22:52)  POCT Blood Glucose.: 116 mg/dL (03-27-23 @ 22:04)  POCT Blood Glucose.: 114 mg/dL (03-27-23 @ 21:00)  POCT Blood Glucose.: 128 mg/dL (03-27-23 @ 20:02)  POCT Blood Glucose.: 141 mg/dL (03-27-23 @ 18:47)  POCT Blood Glucose.: 154 mg/dL (03-27-23 @ 17:50)  POCT Blood Glucose.: 188 mg/dL (03-27-23 @ 16:09)  POCT Blood Glucose.: 172 mg/dL (03-27-23 @ 15:27)  POCT Blood Glucose.: 107 mg/dL (03-26-23 @ 21:33)      MEDICATIONS   atorvastatin 80 milliGRAM(s) Oral at bedtime  cefuroxime  IVPB 1500 milliGRAM(s) IV Intermittent every 8 hours  insulin glargine Injectable (LANTUS) 20 Unit(s) SubCutaneous at bedtime  insulin lispro (ADMELOG) corrective regimen sliding scale   SubCutaneous three times a day before meals  insulin lispro (ADMELOG) corrective regimen sliding scale   SubCutaneous at bedtime  insulin lispro Injectable (ADMELOG) 6 Unit(s) SubCutaneous three times a day before meals  insulin regular Infusion 3 Unit(s)/Hr (3 mL/Hr) IV Continuous <Continuous>      PLAN:  Test BG q1h while on insulin drip then ac/hs and 2am once on sq insulin therapy  Agree with Lantus 20 units tonight. Stop insulin drip 2 hours after 1st dose of Lantus is given  Start Admelog 6 units ac meals . If not eating at least 50% of meal consider 50% of dose  Admelog moderate correction scale ac and hs for lingering hyperglycemia  Once BG at gola  can lower correction scale to low dose  Will f/u for further insulin adjustments

## 2023-03-28 NOTE — PROGRESS NOTE ADULT - SUBJECTIVE AND OBJECTIVE BOX
C A R D I O L O G Y  **********************************     DATE OF SERVICE: 03-28-23    Patient extubated and weaned off pressors. Chest tubes in place. Has mild dyspnea and chest tightness otherwise no distress on nasal cannula. Review of symptoms otherwise negative.    MEDICATIONS:  acetaminophen     Tablet .. 650 milliGRAM(s) Oral every 6 hours  albumin human  5% IVPB 250 milliLiter(s) IV Intermittent once  aMIOdarone    Tablet 400 milliGRAM(s) Oral two times a day  ascorbic acid 500 milliGRAM(s) Oral two times a day  aspirin enteric coated 81 milliGRAM(s) Oral daily  atorvastatin 80 milliGRAM(s) Oral at bedtime  cefuroxime  IVPB 1500 milliGRAM(s) IV Intermittent every 8 hours  chlorhexidine 2% Cloths 1 Application(s) Topical daily  dextrose 5%. 1000 milliLiter(s) IV Continuous <Continuous>  dextrose 50% Injectable 50 milliLiter(s) IV Push every 15 minutes  dextrose 50% Injectable 25 milliLiter(s) IV Push every 15 minutes  enoxaparin Injectable 40 milliGRAM(s) SubCutaneous once  gabapentin 100 milliGRAM(s) Oral every 8 hours  HYDROmorphone  Injectable 0.5 milliGRAM(s) IV Push every 6 hours PRN  insulin regular Infusion 3 Unit(s)/Hr IV Continuous <Continuous>  montelukast 10 milliGRAM(s) Oral once  norepinephrine Infusion 0.05 MICROgram(s)/kG/Min IV Continuous <Continuous>  oxyCODONE    IR 5 milliGRAM(s) Oral every 4 hours PRN  oxyCODONE    IR 10 milliGRAM(s) Oral every 4 hours PRN  pantoprazole    Tablet 40 milliGRAM(s) Oral before breakfast  polyethylene glycol 3350 17 Gram(s) Oral daily  potassium chloride  10 mEq/50 mL IVPB 10 milliEquivalent(s) IV Intermittent every 1 hour  potassium chloride  10 mEq/50 mL IVPB 10 milliEquivalent(s) IV Intermittent every 1 hour  potassium chloride  10 mEq/50 mL IVPB 10 milliEquivalent(s) IV Intermittent every 1 hour  senna 2 Tablet(s) Oral at bedtime  sodium chloride 0.9%. 1000 milliLiter(s) IV Continuous <Continuous>  tamsulosin 0.4 milliGRAM(s) Oral at bedtime  vasopressin Infusion 0.02 Unit(s)/Min IV Continuous <Continuous>      LABS:                        8.6    10.70 )-----------( 182      ( 28 Mar 2023 00:23 )             26.3       Hemoglobin: 8.6 g/dL (03-28 @ 00:23)  Hemoglobin: 9.6 g/dL (03-27 @ 15:51)  Hemoglobin: 9.8 g/dL (03-26 @ 06:20)  Hemoglobin: 10.0 g/dL (03-25 @ 05:00)  Hemoglobin: 9.8 g/dL (03-24 @ 05:00)      03-28    140  |  106  |  9   ----------------------------<  103<H>  4.6   |  26  |  0.48<L>    Ca    8.0<L>      28 Mar 2023 00:22  Phos  2.5     03-28  Mg     2.3     03-28    TPro  5.1<L>  /  Alb  3.2<L>  /  TBili  0.6  /  DBili  x   /  AST  51<H>  /  ALT  36  /  AlkPhos  43  03-28    Creatinine Trend: 0.48<--, 0.45<--, 0.66<--, 0.59<--, 0.67<--, 0.61<--    COAGS: PT/INR - ( 28 Mar 2023 00:22 )   PT: 16.7 sec;   INR: 1.43 ratio         PTT - ( 28 Mar 2023 00:22 )  PTT:40.3 sec    CARDIAC MARKERS ( 27 Mar 2023 15:51 )  x     / x     / 231 U/L / x     / 21.5 ng/mL        PHYSICAL EXAM:  T(C): 37.8 (03-28-23 @ 12:00), Max: 37.8 (03-28-23 @ 08:00)  HR: 91 (03-28-23 @ 12:00) (65 - 104)  BP: 111/56 (03-28-23 @ 12:00) (111/56 - 133/71)  RR: 29 (03-28-23 @ 12:00) (0 - 32)  SpO2: 96% (03-28-23 @ 12:00) (93% - 100%)  Wt(kg): --    I&O's Summary    27 Mar 2023 07:01  -  28 Mar 2023 07:00  --------------------------------------------------------  IN: 3471.8 mL / OUT: 2015 mL / NET: 1456.8 mL    28 Mar 2023 07:01  -  28 Mar 2023 12:29  --------------------------------------------------------  IN: 2970 mL / OUT: 265 mL / NET: 2705 mL        Gen: NAD  HEENT:  (-)icterus (-)pallor  CV: N S1 S2 1/6 RONI (+)2 Pulses B/l  Resp:  Clear to auscultation B/L, normal effort  GI: (+) BS Soft, NT, ND  Lymph:  (-)Edema, (-)obvious lymphadenopathy  Skin: Warm to touch, Normal turgor  Psych: Appropriate mood and affect      TELEMETRY: SR 90s     < from: TTE with Doppler (w/Cont) (03.16.23 @ 13:36) >  CONCLUSIONS:  1. Normal mitral valve. Mildly calcified chordae tendinae.  Mild mitral regurgitation.  2. Calcified aortic valve was not well visualized but has  normal opening. No aortic stenosis. No aortic valve  regurgitation seen.  3. Normal left ventricular internal dimensions and wall  thicknesses.  4. Low-normal Left Ventricular Systolic Function,  (EF =  53% by biplane). No regional wall motion abnormalities.  Ultrasound LV opacification agent was used to enhance  endocardial definition.  5. Right ventricle not well visualized. Normal RVsystolic  function (RV tissue Doppler 11 cm/s).  6. RV systolic pressure is borderline normal at  33 mm Hg.  7. Normal tricuspid valve. Moderate tricuspid  regurgitation.  8. No pericardial effusion.    *** No previous Echo exam.    < end of copied text >    < from: Cardiac Catheterization (03.22.23 @ 09:42) >  Diagnostic Conclusions:     Large LAD with severe bifurcating/ostial and heavily calcifed disease   LCX with Bifurcating/ostial disease   RCA with Moderate Disease   Recommendations:     CABG evaluation      < end of copied text >      ASSESSMENT/PLAN: Patient is 72 year old male with pmh of Type I DM (50 years ago, on Insulin pump for last 15 years), HTN, Vertigo and BPH who was brought to ED due to Abdominal pain, Nausea and vomiting. Cardiology consulted for increased troponin.    #NSTEMI  - TTE with low normal LV function and no wall motion abnormalities  - s/p cath with multivessel CAD noted above  - Transferred to [facility] for CABG evaluation  - Now s/p CABGx3 on 3/27  - Continue ASA/Lipitor  - Extubated and weaned off pressors today, Metoprolol on hold  - Supportive care per CTU    #DKA/Type 1 DM  - Glycemic control per endocrinology    #HTN  - BP meds on hold post op requiring pressors    Mariano Vernon PA-C  Pager: [phone]

## 2023-03-28 NOTE — DIETITIAN INITIAL EVALUATION ADULT - PERSON TAUGHT/METHOD
1. RD discussed continued importance of adequate intake with focus on protein foods first at meals; consuming main entree first and then sides for adequate calorie and protein provision  2. RD provided education on T1DM nutrition therapy. Provided education reinforcement on foods containing carbohydrates, foods containing protein. Discussed the importance of a balanced meal to maintain blood glucose. Encouraged vegetable consumption./verbal instruction/patient instructed

## 2023-03-28 NOTE — DIETITIAN INITIAL EVALUATION ADULT - NSFNSGIASSESSMENTFT_GEN_A_CORE
Pt denies GI distress. Last BM: 3/26 per flow sheets.  - Bowel regimen with senna, miralax, dulcolax

## 2023-03-28 NOTE — PROGRESS NOTE ADULT - ASSESSMENT
Agree with above assessment and plan as outlined above.    - extubated  - cont supportive care per CTICU    Alec Mitchell MD, Located within [facility]  BEEPER [phone]

## 2023-03-28 NOTE — PHYSICAL THERAPY INITIAL EVALUATION ADULT - ADDITIONAL COMMENTS
Pt states he lives with his wife in a condo apt with 4 steps to enter with rail and pt uses a cane for gait instability 2/2 to fractured ankle 2019. Pt was functionally independent with use of cane prior to admission.

## 2023-03-28 NOTE — DIETITIAN INITIAL EVALUATION ADULT - NS FNS DIET ORDER
Diet, Regular:   Consistent Carbohydrate {No Snacks} (CSTCHO)  DASH/TLC {Sodium & Cholesterol Restricted} (DASH) (03-28-23 @ 07:08)

## 2023-03-28 NOTE — DIETITIAN INITIAL EVALUATION ADULT - OTHER INFO
- Type 1 DM x 50 years - reports on Dexcom insulin pump PTA for glycemic control; HbA1c: 8.2% (3/22/23), 7.7% (3/16/23) indicating fair control for age  - insulin gtt currently ordered for glycemic control; BG range 3/28 so far:  mg/dL  - status post CABG 3/27  - Vit C ordered  - Remains on pressor support with levophed and vasopressin

## 2023-03-28 NOTE — PHYSICAL THERAPY INITIAL EVALUATION ADULT - PERTINENT HX OF CURRENT PROBLEM, REHAB EVAL
72 years old male with PMHX of HTN, Type I DM (50 years ago, on Insulin pump for last 15 years), Vertigo and BPH who was brought to ED due to Abdominal pain, Nausea and vomiting. Patient also found to have elevated troponin. Patient transferred over from [facility] s/[facility] showing TVD CAD.  CAD s/p CABG POD#1.

## 2023-03-28 NOTE — DIETITIAN INITIAL EVALUATION ADULT - REASON INDICATOR FOR ASSESSMENT
Addended by: JOANNE LU on: 4/10/2018 03:54 PM     Modules accepted: Orders     Nutrition Consult for "Nutrition Support Team/Type 1 Diabetic"  Source: patient, electronic medical record, RN

## 2023-03-28 NOTE — DIETITIAN INITIAL EVALUATION ADULT - REASON FOR ADMISSION
"72 years old male with PMHX of HTN, Type I DM (50 years ago, on Insulin pump for last 15 years), Vertigo and BPH who was brought to ED due to Abdominal pain, Nausea and vomiting."

## 2023-03-28 NOTE — DIETITIAN INITIAL EVALUATION ADULT - PERTINENT MEDS FT
MEDICATIONS  (STANDING):  acetaminophen     Tablet .. 650 milliGRAM(s) Oral every 6 hours  albumin human  5% IVPB 250 milliLiter(s) IV Intermittent once  aMIOdarone    Tablet 400 milliGRAM(s) Oral two times a day  ascorbic acid 500 milliGRAM(s) Oral two times a day  aspirin enteric coated 81 milliGRAM(s) Oral daily  atorvastatin 80 milliGRAM(s) Oral at bedtime  cefuroxime  IVPB 1500 milliGRAM(s) IV Intermittent every 8 hours  chlorhexidine 2% Cloths 1 Application(s) Topical daily  dextrose 5%. 1000 milliLiter(s) (20 mL/Hr) IV Continuous <Continuous>  dextrose 50% Injectable 50 milliLiter(s) IV Push every 15 minutes  dextrose 50% Injectable 25 milliLiter(s) IV Push every 15 minutes  enoxaparin Injectable 40 milliGRAM(s) SubCutaneous once  gabapentin 100 milliGRAM(s) Oral every 8 hours  insulin regular Infusion 3 Unit(s)/Hr (3 mL/Hr) IV Continuous <Continuous>  montelukast 10 milliGRAM(s) Oral once  norepinephrine Infusion 0.05 MICROgram(s)/kG/Min (8.41 mL/Hr) IV Continuous <Continuous>  pantoprazole    Tablet 40 milliGRAM(s) Oral before breakfast  polyethylene glycol 3350 17 Gram(s) Oral daily  potassium chloride  10 mEq/50 mL IVPB 10 milliEquivalent(s) IV Intermittent every 1 hour  potassium chloride  10 mEq/50 mL IVPB 10 milliEquivalent(s) IV Intermittent every 1 hour  potassium chloride  10 mEq/50 mL IVPB 10 milliEquivalent(s) IV Intermittent every 1 hour  senna 2 Tablet(s) Oral at bedtime  sodium chloride 0.9%. 1000 milliLiter(s) (10 mL/Hr) IV Continuous <Continuous>  tamsulosin 0.4 milliGRAM(s) Oral at bedtime  vasopressin Infusion 0.02 Unit(s)/Min (3 mL/Hr) IV Continuous <Continuous>    MEDICATIONS  (PRN):  HYDROmorphone  Injectable 0.5 milliGRAM(s) IV Push every 6 hours PRN Breakthrough Pain  oxyCODONE    IR 5 milliGRAM(s) Oral every 4 hours PRN Moderate Pain (4 - 6)  oxyCODONE    IR 10 milliGRAM(s) Oral every 4 hours PRN Severe Pain (7 - 10)

## 2023-03-28 NOTE — DIETITIAN INITIAL EVALUATION ADULT - PERTINENT LABORATORY DATA
03-28    140  |  106  |  9   ----------------------------<  103<H>  4.6   |  26  |  0.48<L>    Ca    8.0<L>      28 Mar 2023 00:22  Phos  2.5     03-28  Mg     2.3     03-28    TPro  5.1<L>  /  Alb  3.2<L>  /  TBili  0.6  /  DBili  x   /  AST  51<H>  /  ALT  36  /  AlkPhos  43  03-28  POCT Blood Glucose.: 247 mg/dL (03-28-23 @ 10:13)  A1C with Estimated Average Glucose Result: 8.2 % (03-22-23 @ 17:20)  A1C with Estimated Average Glucose Result: 7.7 % (03-16-23 @ 03:38)

## 2023-03-28 NOTE — DIETITIAN INITIAL EVALUATION ADULT - ORAL INTAKE PTA/DIET HISTORY
Pt reports previously intact appetite until 3/18 when he was having n/v episode secondary to insulin pump malfunction (found to be in DKA)  and brought to OSH () ED then transferred to [facility] and ultimately [facility] for CABG evaluation. Pt reports previously at home consuming 3 meals/day that his spouse would prepare and good PO intake. Confirms no known food allergies.

## 2023-03-28 NOTE — DIETITIAN INITIAL EVALUATION ADULT - ENERGY INTAKE
- Pt with adequate PO intake on tele floor x ~1 week prior to CABG surgery.  - Noted PO diet advanced this morning; pt reports eating a little this morning Adequate (%)

## 2023-03-28 NOTE — PHYSICAL THERAPY INITIAL EVALUATION ADULT - PLANNED THERAPY INTERVENTIONS, PT EVAL
GOAL: Pt will be able to independently asc/desc 5 steps with 1 HR within 2 weeks./bed mobility training/gait training/transfer training

## 2023-03-28 NOTE — PROGRESS NOTE ADULT - SUBJECTIVE AND OBJECTIVE BOX
Patient seen and examined at the bedside.    Remained critically ill on continuous ICU monitoring.    OBJECTIVE:  Vital Signs Last 24 Hrs  T(C): 37.8 (28 Mar 2023 08:00), Max: 37.8 (28 Mar 2023 08:00)  T(F): 100 (28 Mar 2023 08:00), Max: 100 (28 Mar 2023 08:00)  HR: 84 (28 Mar 2023 11:00) (65 - 104)  BP: 116/66 (28 Mar 2023 11:00) (116/66 - 133/71)  BP(mean): 75 (28 Mar 2023 11:00) (75 - 98)  RR: 11 (28 Mar 2023 11:00) (0 - 32)  SpO2: 100% (28 Mar 2023 11:00) (93% - 100%)    Parameters below as of 28 Mar 2023 08:00  Patient On (Oxygen Delivery Method): nasal cannula    O2 Concentration (%): 40      Physical Exam:   General: HFNC  Neurology: nonfocal  Eyes: bilateral pupils equal and reactive   ENT/Neck: Neck supple, trachea midline, No JVD   Respiratory: Clear bilaterally   CV: S1S2, no murmurs        [x] Sternal dressing, [x] Mediastinal CT x2, [x] L Pleural CT        [x] Sinus rhythm [x] Temporary pacing - box off   Abdominal: Soft, NT, ND +BS   Extremities: 1-2+ pedal edema noted, + peripheral pulses   Skin: No Rashes, Hematoma, Ecchymosis             Assessment:  72 years old male with PMHX of HTN, Type I DM (50 years ago, on Insulin pump for last 15 years), Vertigo and BPH who was brought to ED due to Abdominal pain, Nausea and vomiting.    CAD s/p CABG 03/27/23   Hemodynamic instability   hyperglycemia  Diabetes mellitus type1  Hypovolemia  Respiratory insufficiency         Plan:   ***Neuro***  [x] Nonfocal   Post operative neuro assessment   Continue pain management with Tylenol, gabapentin, oxy and Dilaudid.     ***Cardiovascular***  Invasive hemodynamic monitoring, assess perfusion indices   SR / CVP 4/ MAP 63/ Hct 26.3%/ Lactate 1.1  [x] Levophed- 0.05mcgs titrate for mean BP above 60  Start Beta blockers when weaned off pressors  Volume: [x] Albumin 250 ccs  Reassessment of hemodynamics post resuscitation   Amiodarone for Afib prophylaxis   Monitor chest tube outputs   [x] Started Lovenox for VTE prophylaxis   [x] ASA [x] Statin   Serial EKG and cardiac enzymes   Repeat gas, check H&H, if goes down give transfusion    ***Pulmonary***  [x] HFO2- 6L/40%  Encourage incentive spirometry, continue pulse ox monitoring, follow ABGs   c/w Bronchodilator    Mechanical Ventilation:  Mode: off              ***GI***  [x] Tolerating PO consistent carb diet.   [x] Protonix   Bowel regimen with Miralax and senna.  Started Flomax for Urinary retention    ***Renal***  Continue to monitor I/Os, BUN/Creatinine.   Replete lytes PRN  Santana present     ***ID***  Perioperative coverage with Cefuroxime.     ***Endocrine***  [x] DM1: HbA1c 8.2%                - [x] Insulin gtt --> [x] transition to ISS and Lantus;              - Need tight glycemic control to prevent wound infection.  Endo following for malfunctioning pump     Patient requires continuous monitoring with bedside rhythm monitoring, pulse oximetry monitoring, and continuous central venous and arterial pressure monitoring; and intermittent blood gas analysis. Care plan discussed with the ICU care team.   Patient remained critical, at risk for life threatening decompensation.    I have spent 30 minutes providing critical care management to this patient.    By signing my name below, I, Misael Mnotoya, attest that this documentation has been prepared under the direction and in the presence of KELLI Michele   Electronically signed: Deidre Easley, 03-28-23 @ 11:58    I, KELLI Michele, personally performed the services described in this documentation. all medical record entries made by the jatinderibmary jo were at my direction and in my presence. I have reviewed the chart and agree that the record reflects my personal performance and is accurate and complete  Electronically signed: KELLI Michele  Patient seen and examined at the bedside.    Remained critically ill on continuous ICU monitoring.    OBJECTIVE:  Vital Signs Last 24 Hrs  T(C): 37.8 (28 Mar 2023 08:00), Max: 37.8 (28 Mar 2023 08:00)  T(F): 100 (28 Mar 2023 08:00), Max: 100 (28 Mar 2023 08:00)  HR: 84 (28 Mar 2023 11:00) (65 - 104)  BP: 116/66 (28 Mar 2023 11:00) (116/66 - 133/71)  BP(mean): 75 (28 Mar 2023 11:00) (75 - 98)  RR: 11 (28 Mar 2023 11:00) (0 - 32)  SpO2: 100% (28 Mar 2023 11:00) (93% - 100%)    Parameters below as of 28 Mar 2023 08:00  Patient On (Oxygen Delivery Method): nasal cannula    O2 Concentration (%): 40      Physical Exam:   General: NC, resting comfortably in chair  Neurology: nonfocal  Eyes: bilateral pupils equal and reactive   ENT/Neck: Neck supple, trachea midline, No JVD   Respiratory: Clear bilaterally   CV: S1S2, no murmurs        [x] Sternal dressing, [x] Mediastinal CT x2, [x] L Pleural CT        [x] Sinus rhythm [x] Temporary pacing - box off   Abdominal: Soft, NT, ND +BS   Extremities: 1-2+ pedal edema noted, + peripheral pulses   Skin: No Rashes, Hematoma, Ecchymosis             Assessment:  72 years old male with PMHX of HTN, Type I DM (50 years ago, on Insulin pump for last 15 years), Vertigo and BPH who was brought to ED due to Abdominal pain, Nausea and vomiting.    CAD s/p CABG 03/27/23   Hemodynamic instability   hyperglycemia  Diabetes mellitus type1  Hypovolemia  Respiratory insufficiency         Plan:   ***Neuro***  [x] Nonfocal   Post operative neuro assessment   Continue pain management with Tylenol, gabapentin, oxy and Dilaudid.     ***Cardiovascular***  Invasive hemodynamic monitoring, assess perfusion indices   SR / CVP 4/ MAP 63/ Hct 26.3%/ Lactate 1.1  [x] Levophed- 0.05mcgs titrate for mean BP above 60  Start Beta blockers when weaned off pressors  Volume: [x] Albumin 250 ccs  Reassessment of hemodynamics post resuscitation   Amiodarone for Afib prophylaxis   Monitor chest tube outputs   [x] Started Lovenox for VTE prophylaxis   [x] ASA [x] Statin   Serial EKG and cardiac enzymes   Repeat gas, check H&H, if goes down give transfusion    ***Pulmonary***  NC  Encourage incentive spirometry, continue pulse ox monitoring, follow ABGs   c/w Bronchodilator    Mechanical Ventilation:  Mode: off              ***GI***  [x] Tolerating PO consistent carb diet.   [x] Protonix   Bowel regimen with Miralax and senna.  Started Flomax for Urinary retention    ***Renal***  Continue to monitor I/Os, BUN/Creatinine.   Replete lytes PRN  Santana present   possible lasix later with blood    ***ID***  Perioperative coverage with Cefuroxime.     ***Endocrine***  [x] DM1: HbA1c 8.2%                - [x] Insulin gtt --> [x] transition to ISS and Lantus;              - Need tight glycemic control to prevent wound infection.  Endo following for malfunctioning pump     Patient requires continuous monitoring with bedside rhythm monitoring, pulse oximetry monitoring, and continuous central venous and arterial pressure monitoring; and intermittent blood gas analysis. Care plan discussed with the ICU care team.   Patient remained critical, at risk for life threatening decompensation.    I have spent 30 minutes providing critical care management to this patient.    By signing my name below, I, Misael Montoya, attest that this documentation has been prepared under the direction and in the presence of KELLI Michele   Electronically signed: Deidre Easley, 03-28-23 @ 11:58    I, KELLI Michele, personally performed the services described in this documentation. all medical record entries made by the jatinderibmary jo were at my direction and in my presence. I have reviewed the chart and agree that the record reflects my personal performance and is accurate and complete  Electronically signed: KELLI Michele

## 2023-03-28 NOTE — PHYSICAL THERAPY INITIAL EVALUATION ADULT - GENERAL OBSERVATIONS, REHAB EVAL
Pt received seated in chair in NAD, +A-line, +R IJ, +CTs, +external PM, +O2 3L NC,+Santana, +ICU monitoring. Pt AOx4 pleasant and cooperative. Pt received seated in chair in NAD, +A-line, +R IJ, +CTsx3, +external PM, +O2 4L NC, +Santana, +ICU monitoring. Pt AOx4 pleasant and cooperative.

## 2023-03-29 DIAGNOSIS — E10.10 TYPE 1 DIABETES MELLITUS WITH KETOACIDOSIS WITHOUT COMA: ICD-10-CM

## 2023-03-29 LAB
ALBUMIN SERPL ELPH-MCNC: 3.4 G/DL — SIGNIFICANT CHANGE UP (ref 3.3–5)
ALP SERPL-CCNC: 93 U/L — SIGNIFICANT CHANGE UP (ref 40–120)
ALT FLD-CCNC: 51 U/L — HIGH (ref 10–45)
ANION GAP SERPL CALC-SCNC: 11 MMOL/L — SIGNIFICANT CHANGE UP (ref 5–17)
AST SERPL-CCNC: 75 U/L — HIGH (ref 10–40)
BASOPHILS # BLD AUTO: 0.03 K/UL — SIGNIFICANT CHANGE UP (ref 0–0.2)
BASOPHILS NFR BLD AUTO: 0.3 % — SIGNIFICANT CHANGE UP (ref 0–2)
BILIRUB SERPL-MCNC: 0.8 MG/DL — SIGNIFICANT CHANGE UP (ref 0.2–1.2)
BUN SERPL-MCNC: 12 MG/DL — SIGNIFICANT CHANGE UP (ref 7–23)
CALCIUM SERPL-MCNC: 8.2 MG/DL — LOW (ref 8.4–10.5)
CHLORIDE SERPL-SCNC: 102 MMOL/L — SIGNIFICANT CHANGE UP (ref 96–108)
CO2 SERPL-SCNC: 24 MMOL/L — SIGNIFICANT CHANGE UP (ref 22–31)
CREAT SERPL-MCNC: 0.71 MG/DL — SIGNIFICANT CHANGE UP (ref 0.5–1.3)
EGFR: 96 ML/MIN/1.73M2 — SIGNIFICANT CHANGE UP
EOSINOPHIL # BLD AUTO: 0.07 K/UL — SIGNIFICANT CHANGE UP (ref 0–0.5)
EOSINOPHIL NFR BLD AUTO: 0.6 % — SIGNIFICANT CHANGE UP (ref 0–6)
GAS PNL BLDA: SIGNIFICANT CHANGE UP
GLUCOSE BLDC GLUCOMTR-MCNC: 151 MG/DL — HIGH (ref 70–99)
GLUCOSE BLDC GLUCOMTR-MCNC: 153 MG/DL — HIGH (ref 70–99)
GLUCOSE BLDC GLUCOMTR-MCNC: 164 MG/DL — HIGH (ref 70–99)
GLUCOSE BLDC GLUCOMTR-MCNC: 185 MG/DL — HIGH (ref 70–99)
GLUCOSE BLDC GLUCOMTR-MCNC: 190 MG/DL — HIGH (ref 70–99)
GLUCOSE BLDC GLUCOMTR-MCNC: 213 MG/DL — HIGH (ref 70–99)
GLUCOSE BLDC GLUCOMTR-MCNC: 246 MG/DL — HIGH (ref 70–99)
GLUCOSE BLDC GLUCOMTR-MCNC: 249 MG/DL — HIGH (ref 70–99)
GLUCOSE BLDC GLUCOMTR-MCNC: 249 MG/DL — HIGH (ref 70–99)
GLUCOSE BLDC GLUCOMTR-MCNC: 292 MG/DL — HIGH (ref 70–99)
GLUCOSE BLDC GLUCOMTR-MCNC: 92 MG/DL — SIGNIFICANT CHANGE UP (ref 70–99)
GLUCOSE BLDC GLUCOMTR-MCNC: 95 MG/DL — SIGNIFICANT CHANGE UP (ref 70–99)
GLUCOSE SERPL-MCNC: 139 MG/DL — HIGH (ref 70–99)
HCT VFR BLD CALC: 26.8 % — LOW (ref 39–50)
HGB BLD-MCNC: 8.6 G/DL — LOW (ref 13–17)
IMM GRANULOCYTES NFR BLD AUTO: 0.5 % — SIGNIFICANT CHANGE UP (ref 0–0.9)
LYMPHOCYTES # BLD AUTO: 1.16 K/UL — SIGNIFICANT CHANGE UP (ref 1–3.3)
LYMPHOCYTES # BLD AUTO: 10.6 % — LOW (ref 13–44)
MAGNESIUM SERPL-MCNC: 1.9 MG/DL — SIGNIFICANT CHANGE UP (ref 1.6–2.6)
MCHC RBC-ENTMCNC: 29.1 PG — SIGNIFICANT CHANGE UP (ref 27–34)
MCHC RBC-ENTMCNC: 32.1 GM/DL — SIGNIFICANT CHANGE UP (ref 32–36)
MCV RBC AUTO: 90.5 FL — SIGNIFICANT CHANGE UP (ref 80–100)
MONOCYTES # BLD AUTO: 1.27 K/UL — HIGH (ref 0–0.9)
MONOCYTES NFR BLD AUTO: 11.6 % — SIGNIFICANT CHANGE UP (ref 2–14)
NEUTROPHILS # BLD AUTO: 8.32 K/UL — HIGH (ref 1.8–7.4)
NEUTROPHILS NFR BLD AUTO: 76.4 % — SIGNIFICANT CHANGE UP (ref 43–77)
NRBC # BLD: 0 /100 WBCS — SIGNIFICANT CHANGE UP (ref 0–0)
PHOSPHATE SERPL-MCNC: 2.3 MG/DL — LOW (ref 2.5–4.5)
PLATELET # BLD AUTO: 175 K/UL — SIGNIFICANT CHANGE UP (ref 150–400)
POTASSIUM SERPL-MCNC: 3.9 MMOL/L — SIGNIFICANT CHANGE UP (ref 3.5–5.3)
POTASSIUM SERPL-SCNC: 3.9 MMOL/L — SIGNIFICANT CHANGE UP (ref 3.5–5.3)
PROT SERPL-MCNC: 5.3 G/DL — LOW (ref 6–8.3)
RBC # BLD: 2.96 M/UL — LOW (ref 4.2–5.8)
RBC # FLD: 15.2 % — HIGH (ref 10.3–14.5)
SODIUM SERPL-SCNC: 137 MMOL/L — SIGNIFICANT CHANGE UP (ref 135–145)
WBC # BLD: 10.91 K/UL — HIGH (ref 3.8–10.5)
WBC # FLD AUTO: 10.91 K/UL — HIGH (ref 3.8–10.5)

## 2023-03-29 PROCEDURE — 71045 X-RAY EXAM CHEST 1 VIEW: CPT | Mod: 26,76

## 2023-03-29 PROCEDURE — 93010 ELECTROCARDIOGRAM REPORT: CPT

## 2023-03-29 PROCEDURE — 99232 SBSQ HOSP IP/OBS MODERATE 35: CPT

## 2023-03-29 PROCEDURE — 99291 CRITICAL CARE FIRST HOUR: CPT | Mod: 24

## 2023-03-29 RX ORDER — METOPROLOL TARTRATE 50 MG
2.5 TABLET ORAL ONCE
Refills: 0 | Status: COMPLETED | OUTPATIENT
Start: 2023-03-29 | End: 2023-03-29

## 2023-03-29 RX ORDER — AMIODARONE HYDROCHLORIDE 400 MG/1
400 TABLET ORAL EVERY 8 HOURS
Refills: 0 | Status: COMPLETED | OUTPATIENT
Start: 2023-03-29 | End: 2023-04-02

## 2023-03-29 RX ORDER — AMIODARONE HYDROCHLORIDE 400 MG/1
TABLET ORAL
Refills: 0 | Status: DISCONTINUED | OUTPATIENT
Start: 2023-04-02 | End: 2023-04-03

## 2023-03-29 RX ORDER — AMIODARONE HYDROCHLORIDE 400 MG/1
200 TABLET ORAL DAILY
Refills: 0 | Status: DISCONTINUED | OUTPATIENT
Start: 2023-04-02 | End: 2023-04-03

## 2023-03-29 RX ORDER — INSULIN LISPRO 100/ML
VIAL (ML) SUBCUTANEOUS
Refills: 0 | Status: DISCONTINUED | OUTPATIENT
Start: 2023-03-30 | End: 2023-03-31

## 2023-03-29 RX ORDER — INSULIN LISPRO 100/ML
8 VIAL (ML) SUBCUTANEOUS
Refills: 0 | Status: DISCONTINUED | OUTPATIENT
Start: 2023-03-30 | End: 2023-03-31

## 2023-03-29 RX ORDER — METOPROLOL TARTRATE 50 MG
25 TABLET ORAL
Refills: 0 | Status: DISCONTINUED | OUTPATIENT
Start: 2023-03-29 | End: 2023-03-30

## 2023-03-29 RX ORDER — AMIODARONE HYDROCHLORIDE 400 MG/1
150 TABLET ORAL ONCE
Refills: 0 | Status: COMPLETED | OUTPATIENT
Start: 2023-03-29 | End: 2023-03-29

## 2023-03-29 RX ORDER — MAGNESIUM SULFATE 500 MG/ML
2 VIAL (ML) INJECTION ONCE
Refills: 0 | Status: COMPLETED | OUTPATIENT
Start: 2023-03-29 | End: 2023-03-29

## 2023-03-29 RX ORDER — POTASSIUM CHLORIDE 20 MEQ
40 PACKET (EA) ORAL ONCE
Refills: 0 | Status: COMPLETED | OUTPATIENT
Start: 2023-03-29 | End: 2023-03-29

## 2023-03-29 RX ORDER — ALBUMIN HUMAN 25 %
250 VIAL (ML) INTRAVENOUS ONCE
Refills: 0 | Status: COMPLETED | OUTPATIENT
Start: 2023-03-29 | End: 2023-03-29

## 2023-03-29 RX ORDER — POTASSIUM CHLORIDE 20 MEQ
10 PACKET (EA) ORAL
Refills: 0 | Status: COMPLETED | OUTPATIENT
Start: 2023-03-29 | End: 2023-03-29

## 2023-03-29 RX ORDER — INSULIN GLARGINE 100 [IU]/ML
24 INJECTION, SOLUTION SUBCUTANEOUS AT BEDTIME
Refills: 0 | Status: DISCONTINUED | OUTPATIENT
Start: 2023-03-29 | End: 2023-03-30

## 2023-03-29 RX ORDER — FUROSEMIDE 40 MG
20 TABLET ORAL ONCE
Refills: 0 | Status: COMPLETED | OUTPATIENT
Start: 2023-03-29 | End: 2023-03-29

## 2023-03-29 RX ORDER — FUROSEMIDE 40 MG
40 TABLET ORAL DAILY
Refills: 0 | Status: COMPLETED | OUTPATIENT
Start: 2023-03-29 | End: 2023-03-31

## 2023-03-29 RX ADMIN — Medication 40 MILLIGRAM(S): at 11:05

## 2023-03-29 RX ADMIN — OXYCODONE HYDROCHLORIDE 5 MILLIGRAM(S): 5 TABLET ORAL at 04:58

## 2023-03-29 RX ADMIN — Medication 4: at 06:46

## 2023-03-29 RX ADMIN — INSULIN GLARGINE 24 UNIT(S): 100 INJECTION, SOLUTION SUBCUTANEOUS at 22:26

## 2023-03-29 RX ADMIN — AMIODARONE HYDROCHLORIDE 400 MILLIGRAM(S): 400 TABLET ORAL at 05:04

## 2023-03-29 RX ADMIN — Medication 40 MILLIEQUIVALENT(S): at 12:19

## 2023-03-29 RX ADMIN — Medication 25 GRAM(S): at 11:58

## 2023-03-29 RX ADMIN — OXYCODONE HYDROCHLORIDE 5 MILLIGRAM(S): 5 TABLET ORAL at 04:28

## 2023-03-29 RX ADMIN — Medication 20 MILLIGRAM(S): at 02:19

## 2023-03-29 RX ADMIN — Medication 650 MILLIGRAM(S): at 17:23

## 2023-03-29 RX ADMIN — Medication 4: at 11:03

## 2023-03-29 RX ADMIN — Medication 50 MILLIEQUIVALENT(S): at 01:36

## 2023-03-29 RX ADMIN — ENOXAPARIN SODIUM 40 MILLIGRAM(S): 100 INJECTION SUBCUTANEOUS at 11:05

## 2023-03-29 RX ADMIN — Medication 81 MILLIGRAM(S): at 11:04

## 2023-03-29 RX ADMIN — Medication 500 MILLIGRAM(S): at 05:04

## 2023-03-29 RX ADMIN — Medication 6 UNIT(S): at 06:47

## 2023-03-29 RX ADMIN — Medication 6 UNIT(S): at 11:04

## 2023-03-29 RX ADMIN — Medication 650 MILLIGRAM(S): at 01:01

## 2023-03-29 RX ADMIN — Medication 125 MILLILITER(S): at 12:20

## 2023-03-29 RX ADMIN — Medication 25 MILLIGRAM(S): at 05:04

## 2023-03-29 RX ADMIN — Medication 100 MILLIGRAM(S): at 03:57

## 2023-03-29 RX ADMIN — Medication 650 MILLIGRAM(S): at 05:34

## 2023-03-29 RX ADMIN — PANTOPRAZOLE SODIUM 40 MILLIGRAM(S): 20 TABLET, DELAYED RELEASE ORAL at 05:36

## 2023-03-29 RX ADMIN — SENNA PLUS 2 TABLET(S): 8.6 TABLET ORAL at 21:37

## 2023-03-29 RX ADMIN — CLOPIDOGREL BISULFATE 75 MILLIGRAM(S): 75 TABLET, FILM COATED ORAL at 11:05

## 2023-03-29 RX ADMIN — Medication 650 MILLIGRAM(S): at 11:05

## 2023-03-29 RX ADMIN — Medication 50 MILLIEQUIVALENT(S): at 03:32

## 2023-03-29 RX ADMIN — AMIODARONE HYDROCHLORIDE 200 MILLIGRAM(S): 400 TABLET ORAL at 12:22

## 2023-03-29 RX ADMIN — Medication 650 MILLIGRAM(S): at 18:00

## 2023-03-29 RX ADMIN — ATORVASTATIN CALCIUM 80 MILLIGRAM(S): 80 TABLET, FILM COATED ORAL at 21:39

## 2023-03-29 RX ADMIN — Medication 500 MILLIGRAM(S): at 17:23

## 2023-03-29 RX ADMIN — Medication 50 MILLIEQUIVALENT(S): at 02:03

## 2023-03-29 RX ADMIN — Medication 650 MILLIGRAM(S): at 00:31

## 2023-03-29 RX ADMIN — AMIODARONE HYDROCHLORIDE 400 MILLIGRAM(S): 400 TABLET ORAL at 16:43

## 2023-03-29 RX ADMIN — GABAPENTIN 100 MILLIGRAM(S): 400 CAPSULE ORAL at 05:04

## 2023-03-29 RX ADMIN — Medication 650 MILLIGRAM(S): at 11:25

## 2023-03-29 RX ADMIN — CHLORHEXIDINE GLUCONATE 1 APPLICATION(S): 213 SOLUTION TOPICAL at 11:26

## 2023-03-29 RX ADMIN — Medication 25 MILLIGRAM(S): at 17:23

## 2023-03-29 RX ADMIN — GABAPENTIN 100 MILLIGRAM(S): 400 CAPSULE ORAL at 14:08

## 2023-03-29 RX ADMIN — GABAPENTIN 100 MILLIGRAM(S): 400 CAPSULE ORAL at 21:38

## 2023-03-29 RX ADMIN — Medication 2.5 MILLIGRAM(S): at 11:58

## 2023-03-29 RX ADMIN — Medication 650 MILLIGRAM(S): at 05:04

## 2023-03-29 RX ADMIN — AMIODARONE HYDROCHLORIDE 400 MILLIGRAM(S): 400 TABLET ORAL at 21:38

## 2023-03-29 RX ADMIN — Medication 85 MILLIMOLE(S): at 03:31

## 2023-03-29 RX ADMIN — TAMSULOSIN HYDROCHLORIDE 0.4 MILLIGRAM(S): 0.4 CAPSULE ORAL at 21:38

## 2023-03-29 RX ADMIN — POLYETHYLENE GLYCOL 3350 17 GRAM(S): 17 POWDER, FOR SOLUTION ORAL at 11:04

## 2023-03-29 NOTE — PROGRESS NOTE ADULT - ASSESSMENT
71y/o M w/h/o uncontrolled T1DM (A1C 8.2%) on T Slim insulin pump PTA. No  known complications. Also h/o HTN, vertigo and BPH who was brought to OSH due to abdominal pain, nausea and vomiting found to be in DKA and transferred to [facility] after found to have triple vessel disease on [facility]. Now s/p CABG 3/27. Tolerating POs with BG >200s while on present insulin doses and having D5 overnight. Now off D5 with prandial hyperglycemia. Team restarting insulin drip back today. Will adjust insulin doses and switch back to SQ insulin tonight. BG levels 100 to 180s    Home Regimen: T slim novolog insulin pump   basal  12a 0.148  9a 0.75  5p 0.758  10p 0.712  ISF  12a 1:50  5p 1:57  10p 1:50  ICR  12a 1:18  9a 1:17  5p 1:!8  target 100  action time 5hr

## 2023-03-29 NOTE — PROGRESS NOTE ADULT - SUBJECTIVE AND OBJECTIVE BOX
DIABETES FOLLOW UP NOTE: Saw pt earlier today    Chief Complaint: Endocrine consult requested for management of T2DM    INTERVAL HX: Pt stable, reports tolerating POs with BG levels above goal off insulin drip. Noted pt was on D5 infusion until this am > Discontinued after breakfast with BG 200s. No hypoglycemia. Per ICU, will restart insulin drip for the rest of the day.       Review of Systems:  General: As above  Cardiovascular: No chest pain, palpitations  Respiratory: No SOB, no cough  GI: No nausea, vomiting, abdominal pain  Endocrine: No polyuria, polydipsia or S&Sx of hypoglycemia    Allergies    Neosporin (Rash)    Intolerances      MEDICATIONS:  atorvastatin 80 milliGRAM(s) Oral at bedtime  insulin glargine Injectable (LANTUS) 20 Unit(s) SubCutaneous at bedtime  insulin lispro (ADMELOG) corrective regimen sliding scale   SubCutaneous three times a day before meals  insulin lispro (ADMELOG) corrective regimen sliding scale   SubCutaneous at bedtime  insulin lispro Injectable (ADMELOG) 6 Unit(s) SubCutaneous three times a day before meals  insulin regular Infusion 3 Unit(s)/Hr (3 mL/Hr) IV Continuous <Continuous>      PHYSICAL EXAM:  VITALS: T(C): 37.4 (03-29-23 @ 12:00)  T(F): 99.3 (03-29-23 @ 12:00), Max: 100.4 (03-28-23 @ 20:00)  HR: 76 (03-29-23 @ 13:00) (73 - 145)  BP: 79/53 (03-29-23 @ 13:00) (77/52 - 130/60)  RR:  (1 - 29)  SpO2:  (94% - 100%)  Wt(kg): --  GENERAL: Male sitting in chair in NAD  Chest: Sternal incision covered with dressing D&I. CT x1 with ss out put  Abdomen: Soft, nontender, non distended  Extremities: Warm, R LE with ace bandage   NEURO: A&O X3. Encounter done in English and Taiwanese    LABS:  POCT Blood Glucose.: 249 mg/dL (03-29-23 @ 12:01)  POCT Blood Glucose.: 213 mg/dL (03-29-23 @ 11:03)  POCT Blood Glucose.: 249 mg/dL (03-29-23 @ 06:43)  POCT Blood Glucose.: 102 mg/dL (03-28-23 @ 23:02)  POCT Blood Glucose.: 108 mg/dL (03-28-23 @ 22:02)  POCT Blood Glucose.: 136 mg/dL (03-28-23 @ 20:53)  POCT Blood Glucose.: 163 mg/dL (03-28-23 @ 20:09)  POCT Blood Glucose.: 165 mg/dL (03-28-23 @ 19:08)  POCT Blood Glucose.: 192 mg/dL (03-28-23 @ 18:22)  POCT Blood Glucose.: 158 mg/dL (03-28-23 @ 17:12)  POCT Blood Glucose.: 129 mg/dL (03-28-23 @ 16:12)  POCT Blood Glucose.: 155 mg/dL (03-28-23 @ 15:10)  POCT Blood Glucose.: 169 mg/dL (03-28-23 @ 14:02)  POCT Blood Glucose.: 178 mg/dL (03-28-23 @ 12:55)  POCT Blood Glucose.: 182 mg/dL (03-28-23 @ 12:06)  POCT Blood Glucose.: 208 mg/dL (03-28-23 @ 11:14)  POCT Blood Glucose.: 247 mg/dL (03-28-23 @ 10:13)  POCT Blood Glucose.: 183 mg/dL (03-28-23 @ 08:48)  POCT Blood Glucose.: 144 mg/dL (03-28-23 @ 07:49)  POCT Blood Glucose.: 159 mg/dL (03-28-23 @ 06:54)  POCT Blood Glucose.: 156 mg/dL (03-28-23 @ 06:10)  POCT Blood Glucose.: 108 mg/dL (03-28-23 @ 04:58)  POCT Blood Glucose.: 101 mg/dL (03-28-23 @ 04:03)  POCT Blood Glucose.: 96 mg/dL (03-28-23 @ 02:58)  POCT Blood Glucose.: 93 mg/dL (03-28-23 @ 02:08)  POCT Blood Glucose.: 91 mg/dL (03-28-23 @ 01:04)  POCT Blood Glucose.: 106 mg/dL (03-27-23 @ 22:52)  POCT Blood Glucose.: 116 mg/dL (03-27-23 @ 22:04)  POCT Blood Glucose.: 114 mg/dL (03-27-23 @ 21:00)  POCT Blood Glucose.: 128 mg/dL (03-27-23 @ 20:02)  POCT Blood Glucose.: 141 mg/dL (03-27-23 @ 18:47)  POCT Blood Glucose.: 154 mg/dL (03-27-23 @ 17:50)  POCT Blood Glucose.: 188 mg/dL (03-27-23 @ 16:09)  POCT Blood Glucose.: 172 mg/dL (03-27-23 @ 15:27)  POCT Blood Glucose.: 107 mg/dL (03-26-23 @ 21:33)  POCT Blood Glucose.: 193 mg/dL (03-26-23 @ 16:20)                            8.6    10.91 )-----------( 175      ( 29 Mar 2023 00:38 )             26.8       03-29    137  |  102  |  12  ----------------------------<  139<H>  3.9   |  24  |  0.71    eGFR: 96    Ca    8.2<L>      03-29  Mg     1.9     03-29  Phos  2.3     03-29    TPro  5.3<L>  /  Alb  3.4  /  TBili  0.8  /  DBili  x   /  AST  75<H>  /  ALT  51<H>  /  AlkPhos  93  03-29      Thyroid Function Tests:  03-22 @ 17:20 TSH 1.82 FreeT4 -- T3 76 Anti TPO -- Anti Thyroglobulin Ab -- TSI --      A1C with Estimated Average Glucose Result: 8.2 % (03-22-23 @ 17:20)  A1C with Estimated Average Glucose Result: 7.7 % (03-16-23 @ 03:38)      Estimated Average Glucose: 189 mg/dL (03-22-23 @ 17:20)  Estimated Average Glucose: 174 mg/dL (03-16-23 @ 03:38)

## 2023-03-29 NOTE — PROGRESS NOTE ADULT - ASSESSMENT
CARDIOLOGY ATTENDING    Agree with above. Continue current medical therapy for NSTEMI/CAD s/p CABG. Supportive care per CTICU appreciated.

## 2023-03-29 NOTE — PROGRESS NOTE ADULT - SUBJECTIVE AND OBJECTIVE BOX
C A R D I O L O G Y  **********************************     DATE OF SERVICE: 03-29-23    Patient reports COURTNEY but comfortable at rest on room air now. denies chest pain or palpitations. Review of symptoms otherwise negative.    MEDICATIONS:  acetaminophen     Tablet .. 650 milliGRAM(s) Oral every 6 hours  aMIOdarone    Tablet 400 milliGRAM(s) Oral two times a day  ascorbic acid 500 milliGRAM(s) Oral two times a day  aspirin enteric coated 81 milliGRAM(s) Oral daily  atorvastatin 80 milliGRAM(s) Oral at bedtime  bisacodyl Suppository 10 milliGRAM(s) Rectal once  chlorhexidine 2% Cloths 1 Application(s) Topical daily  clopidogrel Tablet 75 milliGRAM(s) Oral daily  dextrose 5%. 1000 milliLiter(s) IV Continuous <Continuous>  enoxaparin Injectable 40 milliGRAM(s) SubCutaneous every 24 hours  furosemide    Tablet 40 milliGRAM(s) Oral daily  gabapentin 100 milliGRAM(s) Oral every 8 hours  glucagon  Injectable 1 milliGRAM(s) IntraMuscular once  HYDROmorphone  Injectable 0.5 milliGRAM(s) IV Push every 6 hours PRN  insulin glargine Injectable (LANTUS) 20 Unit(s) SubCutaneous at bedtime  insulin lispro (ADMELOG) corrective regimen sliding scale   SubCutaneous three times a day before meals  insulin lispro (ADMELOG) corrective regimen sliding scale   SubCutaneous at bedtime  insulin lispro Injectable (ADMELOG) 6 Unit(s) SubCutaneous three times a day before meals  insulin regular Infusion 3 Unit(s)/Hr IV Continuous <Continuous>  metoprolol tartrate 25 milliGRAM(s) Oral two times a day  oxyCODONE    IR 5 milliGRAM(s) Oral every 4 hours PRN  oxyCODONE    IR 10 milliGRAM(s) Oral every 4 hours PRN  pantoprazole    Tablet 40 milliGRAM(s) Oral before breakfast  polyethylene glycol 3350 17 Gram(s) Oral daily  senna 2 Tablet(s) Oral at bedtime  sodium chloride 0.9%. 1000 milliLiter(s) IV Continuous <Continuous>  tamsulosin 0.4 milliGRAM(s) Oral at bedtime      LABS:                        8.6    10.91 )-----------( 175      ( 29 Mar 2023 00:38 )             26.8       Hemoglobin: 8.6 g/dL (03-29 @ 00:38)  Hemoglobin: 8.6 g/dL (03-28 @ 00:23)  Hemoglobin: 9.6 g/dL (03-27 @ 15:51)  Hemoglobin: 9.8 g/dL (03-26 @ 06:20)  Hemoglobin: 10.0 g/dL (03-25 @ 05:00)      03-29    137  |  102  |  12  ----------------------------<  139<H>  3.9   |  24  |  0.71    Ca    8.2<L>      29 Mar 2023 00:38  Phos  2.3     03-29  Mg     1.9     03-29    TPro  5.3<L>  /  Alb  3.4  /  TBili  0.8  /  DBili  x   /  AST  75<H>  /  ALT  51<H>  /  AlkPhos  93  03-29    Creatinine Trend: 0.71<--, 0.48<--, 0.45<--, 0.66<--, 0.59<--, 0.67<--    COAGS:     CARDIAC MARKERS ( 27 Mar 2023 15:51 )  x     / x     / 231 U/L / x     / 21.5 ng/mL        PHYSICAL EXAM:  T(C): 37.5 (03-29-23 @ 08:00), Max: 38 (03-28-23 @ 20:00)  HR: 77 (03-29-23 @ 11:00) (73 - 92)  BP: 95/53 (03-29-23 @ 08:00) (95/53 - 130/60)  RR: 28 (03-29-23 @ 11:00) (1 - 29)  SpO2: 96% (03-29-23 @ 11:00) (94% - 100%)  Wt(kg): --    I&O's Summary    28 Mar 2023 07:01  -  29 Mar 2023 07:00  --------------------------------------------------------  IN: 2385.8 mL / OUT: 3180 mL / NET: -794.2 mL    29 Mar 2023 07:01  -  29 Mar 2023 13:35  --------------------------------------------------------  IN: 20 mL / OUT: 350 mL / NET: -330 mL        Gen: NAD  HEENT:  (-)icterus (-)pallor  CV: N S1 S2 1/6 RONI (+)2 Pulses B/l  Resp:  Clear to auscultation B/L, normal effort  GI: (+) BS Soft, NT, ND  Lymph:  (-)Edema, (-)obvious lymphadenopathy  Skin: Warm to touch, Normal turgor  Psych: Appropriate mood and affect      TELEMETRY: SR 70s    < from: TTE with Doppler (w/Cont) (03.16.23 @ 13:36) >  CONCLUSIONS:  1. Normal mitral valve. Mildly calcified chordae tendinae.  Mild mitral regurgitation.  2. Calcified aortic valve was not well visualized but has  normal opening. No aortic stenosis. No aortic valve  regurgitation seen.  3. Normal left ventricular internal dimensions and wall  thicknesses.  4. Low-normal Left Ventricular Systolic Function,  (EF =  53% by biplane). No regional wall motion abnormalities.  Ultrasound LV opacification agent was used to enhance  endocardial definition.  5. Right ventricle not well visualized. Normal RVsystolic  function (RV tissue Doppler 11 cm/s).  6. RV systolic pressure is borderline normal at  33 mm Hg.  7. Normal tricuspid valve. Moderate tricuspid  regurgitation.  8. No pericardial effusion.    *** No previous Echo exam.    < end of copied text >    < from: Cardiac Catheterization (03.22.23 @ 09:42) >  Diagnostic Conclusions:     Large LAD with severe bifurcating/ostial and heavily calcifed disease   LCX with Bifurcating/ostial disease   RCA with Moderate Disease   Recommendations:     CABG evaluation      < end of copied text >      ASSESSMENT/PLAN: Patient is 72 year old male with pmh of Type I DM (50 years ago, on Insulin pump for last 15 years), HTN, Vertigo and BPH who was brought to ED due to Abdominal pain, Nausea and vomiting. Cardiology consulted for increased troponin.    #NSTEMI  - TTE with low normal LV function and no wall motion abnormalities  - s/p cath with multivessel CAD noted above  - Transferred to [facility] for CABG evaluation  - Now s/p CABGx3 on 3/27  - Continue ASA/Lipitor  - Weaned off pressors, metoprolol restarted  - Diuresing with lasix  - Chest tube management per CTS  - Supportive care per CTU    #DKA/Type 1 DM  - Glycemic control per endocrinology    #HTN  - Continue metoprolol. Remainder of meds on hold post op.    Mariano Vernon PA-C  Pager: [phone]     C A R D I O L O G Y  **********************************     DATE OF SERVICE: 03-29-23    Patient reports COURTNEY but comfortable at rest on room air now. denies chest pain or palpitations. Review of symptoms otherwise negative.    MEDICATIONS:  acetaminophen     Tablet .. 650 milliGRAM(s) Oral every 6 hours  aMIOdarone    Tablet 400 milliGRAM(s) Oral two times a day  ascorbic acid 500 milliGRAM(s) Oral two times a day  aspirin enteric coated 81 milliGRAM(s) Oral daily  atorvastatin 80 milliGRAM(s) Oral at bedtime  bisacodyl Suppository 10 milliGRAM(s) Rectal once  chlorhexidine 2% Cloths 1 Application(s) Topical daily  clopidogrel Tablet 75 milliGRAM(s) Oral daily  dextrose 5%. 1000 milliLiter(s) IV Continuous <Continuous>  enoxaparin Injectable 40 milliGRAM(s) SubCutaneous every 24 hours  furosemide    Tablet 40 milliGRAM(s) Oral daily  gabapentin 100 milliGRAM(s) Oral every 8 hours  glucagon  Injectable 1 milliGRAM(s) IntraMuscular once  HYDROmorphone  Injectable 0.5 milliGRAM(s) IV Push every 6 hours PRN  insulin glargine Injectable (LANTUS) 20 Unit(s) SubCutaneous at bedtime  insulin lispro (ADMELOG) corrective regimen sliding scale   SubCutaneous three times a day before meals  insulin lispro (ADMELOG) corrective regimen sliding scale   SubCutaneous at bedtime  insulin lispro Injectable (ADMELOG) 6 Unit(s) SubCutaneous three times a day before meals  insulin regular Infusion 3 Unit(s)/Hr IV Continuous <Continuous>  metoprolol tartrate 25 milliGRAM(s) Oral two times a day  oxyCODONE    IR 5 milliGRAM(s) Oral every 4 hours PRN  oxyCODONE    IR 10 milliGRAM(s) Oral every 4 hours PRN  pantoprazole    Tablet 40 milliGRAM(s) Oral before breakfast  polyethylene glycol 3350 17 Gram(s) Oral daily  senna 2 Tablet(s) Oral at bedtime  sodium chloride 0.9%. 1000 milliLiter(s) IV Continuous <Continuous>  tamsulosin 0.4 milliGRAM(s) Oral at bedtime      LABS:                        8.6    10.91 )-----------( 175      ( 29 Mar 2023 00:38 )             26.8       Hemoglobin: 8.6 g/dL (03-29 @ 00:38)  Hemoglobin: 8.6 g/dL (03-28 @ 00:23)  Hemoglobin: 9.6 g/dL (03-27 @ 15:51)  Hemoglobin: 9.8 g/dL (03-26 @ 06:20)  Hemoglobin: 10.0 g/dL (03-25 @ 05:00)      03-29    137  |  102  |  12  ----------------------------<  139<H>  3.9   |  24  |  0.71    Ca    8.2<L>      29 Mar 2023 00:38  Phos  2.3     03-29  Mg     1.9     03-29    TPro  5.3<L>  /  Alb  3.4  /  TBili  0.8  /  DBili  x   /  AST  75<H>  /  ALT  51<H>  /  AlkPhos  93  03-29    Creatinine Trend: 0.71<--, 0.48<--, 0.45<--, 0.66<--, 0.59<--, 0.67<--    COAGS:     CARDIAC MARKERS ( 27 Mar 2023 15:51 )  x     / x     / 231 U/L / x     / 21.5 ng/mL        PHYSICAL EXAM:  T(C): 37.5 (03-29-23 @ 08:00), Max: 38 (03-28-23 @ 20:00)  HR: 77 (03-29-23 @ 11:00) (73 - 92)  BP: 95/53 (03-29-23 @ 08:00) (95/53 - 130/60)  RR: 28 (03-29-23 @ 11:00) (1 - 29)  SpO2: 96% (03-29-23 @ 11:00) (94% - 100%)  Wt(kg): --    I&O's Summary    28 Mar 2023 07:01  -  29 Mar 2023 07:00  --------------------------------------------------------  IN: 2385.8 mL / OUT: 3180 mL / NET: -794.2 mL    29 Mar 2023 07:01  -  29 Mar 2023 13:35  --------------------------------------------------------  IN: 20 mL / OUT: 350 mL / NET: -330 mL        Gen: NAD  HEENT:  (-)icterus (-)pallor  CV: N S1 S2 1/6 RONI (+)2 Pulses B/l  Resp:  Clear to auscultation B/L, normal effort  GI: (+) BS Soft, NT, ND  Lymph:  (-)Edema, (-)obvious lymphadenopathy  Skin: Warm to touch, Normal turgor  Psych: Appropriate mood and affect      TELEMETRY: SR 70s    < from: TTE with Doppler (w/Cont) (03.16.23 @ 13:36) >  CONCLUSIONS:  1. Normal mitral valve. Mildly calcified chordae tendinae.  Mild mitral regurgitation.  2. Calcified aortic valve was not well visualized but has  normal opening. No aortic stenosis. No aortic valve  regurgitation seen.  3. Normal left ventricular internal dimensions and wall  thicknesses.  4. Low-normal Left Ventricular Systolic Function,  (EF =  53% by biplane). No regional wall motion abnormalities.  Ultrasound LV opacification agent was used to enhance  endocardial definition.  5. Right ventricle not well visualized. Normal RVsystolic  function (RV tissue Doppler 11 cm/s).  6. RV systolic pressure is borderline normal at  33 mm Hg.  7. Normal tricuspid valve. Moderate tricuspid  regurgitation.  8. No pericardial effusion.    *** No previous Echo exam.    < end of copied text >    < from: Cardiac Catheterization (03.22.23 @ 09:42) >  Diagnostic Conclusions:     Large LAD with severe bifurcating/ostial and heavily calcifed disease   LCX with Bifurcating/ostial disease   RCA with Moderate Disease   Recommendations:     CABG evaluation      < end of copied text >      ASSESSMENT/PLAN: Patient is 72 year old male with pmh of Type I DM (50 years ago, on Insulin pump for last 15 years), HTN, Vertigo and BPH who was brought to ED due to Abdominal pain, Nausea and vomiting. Cardiology consulted for increased troponin.    #NSTEMI  - TTE with low normal LV function and no wall motion abnormalities  - s/p cath with multivessel CAD noted above  - Transferred to [facility] for CABG evaluation  - Now s/p CABGx3 on 3/27  - Continue ASA/Plavix/Lipitor  - Weaned off pressors, metoprolol restarted  - Diuresing with lasix  - Chest tube management per CTS  - Supportive care per CTU    #DKA/Type 1 DM  - Glycemic control per endocrinology    #HTN  - Continue metoprolol. Remainder of meds on hold post op.    Mariano Vernon PA-C  Pager: [phone]

## 2023-03-29 NOTE — PROGRESS NOTE ADULT - PROBLEM SELECTOR PLAN 1
-Test BG q1h while on insulin drip. Test BG ac and hs and 2am while on sq insulin and q6h if NPO.  - C/w insulin drip. Discontinue insulin drip 2 hours after insulin dose is  given tonight  - No need for D5 infusion while pt is eating  - Increase Lantus back to 24 units tonight. Do not hold basal insulin since pt has T1DM  - Increase Admelog to 8 units pre-meal. HOLD IF NPO. If pt has lower carb meal reduce admelog by 50% x1  - Restart moderate correction scales ac and hs and add 2am correction scale as well  Discharge plan:  - Will be determined according to BG/insulin needs/PO intake at time of discharge.  - Likely insulin pump. Might need adjustments to settings.  - Follow up with endocrinologist Dr Ngo  - Make sure pt has Rx for all DM supplies and insulin at home prior to discharge.  - Needs Optho f/u as out pt

## 2023-03-29 NOTE — PROGRESS NOTE ADULT - PROBLEM SELECTOR PLAN 3
LDL goal <55 due to DM and now CVD  C/w atorvastatin 80 milliGRAM(s) Oral at bedtime  Follow up levels as out pt

## 2023-03-30 DIAGNOSIS — Z95.1 PRESENCE OF AORTOCORONARY BYPASS GRAFT: ICD-10-CM

## 2023-03-30 DIAGNOSIS — I48.0 PAROXYSMAL ATRIAL FIBRILLATION: ICD-10-CM

## 2023-03-30 LAB
ALBUMIN SERPL ELPH-MCNC: 3.2 G/DL — LOW (ref 3.3–5)
ALP SERPL-CCNC: 101 U/L — SIGNIFICANT CHANGE UP (ref 40–120)
ALT FLD-CCNC: 41 U/L — SIGNIFICANT CHANGE UP (ref 10–45)
ANION GAP SERPL CALC-SCNC: 9 MMOL/L — SIGNIFICANT CHANGE UP (ref 5–17)
AST SERPL-CCNC: 38 U/L — SIGNIFICANT CHANGE UP (ref 10–40)
BILIRUB SERPL-MCNC: 0.7 MG/DL — SIGNIFICANT CHANGE UP (ref 0.2–1.2)
BLD GP AB SCN SERPL QL: NEGATIVE — SIGNIFICANT CHANGE UP
BUN SERPL-MCNC: 12 MG/DL — SIGNIFICANT CHANGE UP (ref 7–23)
BUN SERPL-MCNC: 14 MG/DL — SIGNIFICANT CHANGE UP (ref 7–23)
CALCIUM SERPL-MCNC: 8.3 MG/DL — LOW (ref 8.4–10.5)
CALCIUM SERPL-MCNC: 8.5 MG/DL — SIGNIFICANT CHANGE UP (ref 8.4–10.5)
CHLORIDE SERPL-SCNC: 103 MMOL/L — SIGNIFICANT CHANGE UP (ref 96–108)
CHLORIDE SERPL-SCNC: 99 MMOL/L — SIGNIFICANT CHANGE UP (ref 96–108)
CO2 SERPL-SCNC: 25 MMOL/L — SIGNIFICANT CHANGE UP (ref 22–31)
CO2 SERPL-SCNC: 25 MMOL/L — SIGNIFICANT CHANGE UP (ref 22–31)
CREAT SERPL-MCNC: 0.75 MG/DL — SIGNIFICANT CHANGE UP (ref 0.5–1.3)
CREAT SERPL-MCNC: 0.81 MG/DL — SIGNIFICANT CHANGE UP (ref 0.5–1.3)
EGFR: 93 ML/MIN/1.73M2 — SIGNIFICANT CHANGE UP
EGFR: 95 ML/MIN/1.73M2 — SIGNIFICANT CHANGE UP
GLUCOSE BLDC GLUCOMTR-MCNC: 148 MG/DL — HIGH (ref 70–99)
GLUCOSE BLDC GLUCOMTR-MCNC: 153 MG/DL — HIGH (ref 70–99)
GLUCOSE BLDC GLUCOMTR-MCNC: 168 MG/DL — HIGH (ref 70–99)
GLUCOSE BLDC GLUCOMTR-MCNC: 182 MG/DL — HIGH (ref 70–99)
GLUCOSE BLDC GLUCOMTR-MCNC: 186 MG/DL — HIGH (ref 70–99)
GLUCOSE SERPL-MCNC: 136 MG/DL — HIGH (ref 70–99)
GLUCOSE SERPL-MCNC: 172 MG/DL — HIGH (ref 70–99)
HCT VFR BLD CALC: 24.8 % — LOW (ref 39–50)
HGB BLD-MCNC: 8.1 G/DL — LOW (ref 13–17)
MAGNESIUM SERPL-MCNC: 2 MG/DL — SIGNIFICANT CHANGE UP (ref 1.6–2.6)
MAGNESIUM SERPL-MCNC: 2.2 MG/DL — SIGNIFICANT CHANGE UP (ref 1.6–2.6)
MCHC RBC-ENTMCNC: 29.3 PG — SIGNIFICANT CHANGE UP (ref 27–34)
MCHC RBC-ENTMCNC: 32.7 GM/DL — SIGNIFICANT CHANGE UP (ref 32–36)
MCV RBC AUTO: 89.9 FL — SIGNIFICANT CHANGE UP (ref 80–100)
NRBC # BLD: 0 /100 WBCS — SIGNIFICANT CHANGE UP (ref 0–0)
PHOSPHATE SERPL-MCNC: 1.8 MG/DL — LOW (ref 2.5–4.5)
PHOSPHATE SERPL-MCNC: 3 MG/DL — SIGNIFICANT CHANGE UP (ref 2.5–4.5)
PLATELET # BLD AUTO: 191 K/UL — SIGNIFICANT CHANGE UP (ref 150–400)
POTASSIUM SERPL-MCNC: 3.9 MMOL/L — SIGNIFICANT CHANGE UP (ref 3.5–5.3)
POTASSIUM SERPL-MCNC: 4.1 MMOL/L — SIGNIFICANT CHANGE UP (ref 3.5–5.3)
POTASSIUM SERPL-SCNC: 3.9 MMOL/L — SIGNIFICANT CHANGE UP (ref 3.5–5.3)
POTASSIUM SERPL-SCNC: 4.1 MMOL/L — SIGNIFICANT CHANGE UP (ref 3.5–5.3)
PROT SERPL-MCNC: 5.2 G/DL — LOW (ref 6–8.3)
RBC # BLD: 2.76 M/UL — LOW (ref 4.2–5.8)
RBC # FLD: 15.1 % — HIGH (ref 10.3–14.5)
RH IG SCN BLD-IMP: POSITIVE — SIGNIFICANT CHANGE UP
SODIUM SERPL-SCNC: 135 MMOL/L — SIGNIFICANT CHANGE UP (ref 135–145)
SODIUM SERPL-SCNC: 137 MMOL/L — SIGNIFICANT CHANGE UP (ref 135–145)
WBC # BLD: 9.39 K/UL — SIGNIFICANT CHANGE UP (ref 3.8–10.5)
WBC # FLD AUTO: 9.39 K/UL — SIGNIFICANT CHANGE UP (ref 3.8–10.5)

## 2023-03-30 PROCEDURE — 71045 X-RAY EXAM CHEST 1 VIEW: CPT | Mod: 26

## 2023-03-30 PROCEDURE — 99291 CRITICAL CARE FIRST HOUR: CPT | Mod: 24

## 2023-03-30 PROCEDURE — 99232 SBSQ HOSP IP/OBS MODERATE 35: CPT

## 2023-03-30 RX ORDER — MAGNESIUM SULFATE 500 MG/ML
1 VIAL (ML) INJECTION ONCE
Refills: 0 | Status: COMPLETED | OUTPATIENT
Start: 2023-03-30 | End: 2023-03-30

## 2023-03-30 RX ORDER — FUROSEMIDE 40 MG
40 TABLET ORAL ONCE
Refills: 0 | Status: COMPLETED | OUTPATIENT
Start: 2023-03-30 | End: 2023-03-30

## 2023-03-30 RX ORDER — METOPROLOL TARTRATE 50 MG
2.5 TABLET ORAL ONCE
Refills: 0 | Status: COMPLETED | OUTPATIENT
Start: 2023-03-30 | End: 2023-03-30

## 2023-03-30 RX ORDER — METOPROLOL TARTRATE 50 MG
25 TABLET ORAL ONCE
Refills: 0 | Status: COMPLETED | OUTPATIENT
Start: 2023-03-30 | End: 2023-03-30

## 2023-03-30 RX ORDER — METOPROLOL TARTRATE 50 MG
25 TABLET ORAL EVERY 8 HOURS
Refills: 0 | Status: DISCONTINUED | OUTPATIENT
Start: 2023-03-30 | End: 2023-03-31

## 2023-03-30 RX ORDER — POTASSIUM CHLORIDE 20 MEQ
20 PACKET (EA) ORAL ONCE
Refills: 0 | Status: COMPLETED | OUTPATIENT
Start: 2023-03-30 | End: 2023-03-30

## 2023-03-30 RX ORDER — MAGNESIUM SULFATE 500 MG/ML
2 VIAL (ML) INJECTION ONCE
Refills: 0 | Status: COMPLETED | OUTPATIENT
Start: 2023-03-30 | End: 2023-03-30

## 2023-03-30 RX ORDER — INSULIN GLARGINE 100 [IU]/ML
28 INJECTION, SOLUTION SUBCUTANEOUS AT BEDTIME
Refills: 0 | Status: DISCONTINUED | OUTPATIENT
Start: 2023-03-30 | End: 2023-03-31

## 2023-03-30 RX ADMIN — PANTOPRAZOLE SODIUM 40 MILLIGRAM(S): 20 TABLET, DELAYED RELEASE ORAL at 05:16

## 2023-03-30 RX ADMIN — Medication 100 GRAM(S): at 02:53

## 2023-03-30 RX ADMIN — Medication 8 UNIT(S): at 12:10

## 2023-03-30 RX ADMIN — CHLORHEXIDINE GLUCONATE 1 APPLICATION(S): 213 SOLUTION TOPICAL at 11:43

## 2023-03-30 RX ADMIN — Medication 25 MILLIGRAM(S): at 21:18

## 2023-03-30 RX ADMIN — Medication 25 GRAM(S): at 15:26

## 2023-03-30 RX ADMIN — Medication 500 MILLIGRAM(S): at 05:01

## 2023-03-30 RX ADMIN — CLOPIDOGREL BISULFATE 75 MILLIGRAM(S): 75 TABLET, FILM COATED ORAL at 11:44

## 2023-03-30 RX ADMIN — Medication 2.5 MILLIGRAM(S): at 05:57

## 2023-03-30 RX ADMIN — Medication 25 MILLIGRAM(S): at 12:35

## 2023-03-30 RX ADMIN — OXYCODONE HYDROCHLORIDE 5 MILLIGRAM(S): 5 TABLET ORAL at 02:54

## 2023-03-30 RX ADMIN — Medication 40 MILLIGRAM(S): at 05:01

## 2023-03-30 RX ADMIN — INSULIN GLARGINE 28 UNIT(S): 100 INJECTION, SOLUTION SUBCUTANEOUS at 21:17

## 2023-03-30 RX ADMIN — ATORVASTATIN CALCIUM 80 MILLIGRAM(S): 80 TABLET, FILM COATED ORAL at 21:17

## 2023-03-30 RX ADMIN — Medication 650 MILLIGRAM(S): at 05:31

## 2023-03-30 RX ADMIN — Medication 650 MILLIGRAM(S): at 05:01

## 2023-03-30 RX ADMIN — Medication 8 UNIT(S): at 17:11

## 2023-03-30 RX ADMIN — OXYCODONE HYDROCHLORIDE 5 MILLIGRAM(S): 5 TABLET ORAL at 02:24

## 2023-03-30 RX ADMIN — Medication 650 MILLIGRAM(S): at 12:36

## 2023-03-30 RX ADMIN — Medication 20 MILLIEQUIVALENT(S): at 06:05

## 2023-03-30 RX ADMIN — SENNA PLUS 2 TABLET(S): 8.6 TABLET ORAL at 21:16

## 2023-03-30 RX ADMIN — POLYETHYLENE GLYCOL 3350 17 GRAM(S): 17 POWDER, FOR SOLUTION ORAL at 11:42

## 2023-03-30 RX ADMIN — Medication 85 MILLIMOLE(S): at 05:02

## 2023-03-30 RX ADMIN — Medication 25 MILLIGRAM(S): at 05:01

## 2023-03-30 RX ADMIN — AMIODARONE HYDROCHLORIDE 400 MILLIGRAM(S): 400 TABLET ORAL at 21:17

## 2023-03-30 RX ADMIN — Medication 8 UNIT(S): at 06:49

## 2023-03-30 RX ADMIN — Medication 650 MILLIGRAM(S): at 00:14

## 2023-03-30 RX ADMIN — GABAPENTIN 100 MILLIGRAM(S): 400 CAPSULE ORAL at 12:36

## 2023-03-30 RX ADMIN — GABAPENTIN 100 MILLIGRAM(S): 400 CAPSULE ORAL at 21:17

## 2023-03-30 RX ADMIN — Medication 2: at 12:10

## 2023-03-30 RX ADMIN — Medication 81 MILLIGRAM(S): at 11:44

## 2023-03-30 RX ADMIN — Medication 40 MILLIGRAM(S): at 11:42

## 2023-03-30 RX ADMIN — AMIODARONE HYDROCHLORIDE 400 MILLIGRAM(S): 400 TABLET ORAL at 12:35

## 2023-03-30 RX ADMIN — TAMSULOSIN HYDROCHLORIDE 0.4 MILLIGRAM(S): 0.4 CAPSULE ORAL at 21:17

## 2023-03-30 RX ADMIN — Medication 2: at 17:42

## 2023-03-30 RX ADMIN — Medication 500 MILLIGRAM(S): at 17:11

## 2023-03-30 RX ADMIN — Medication 2: at 06:50

## 2023-03-30 RX ADMIN — GABAPENTIN 100 MILLIGRAM(S): 400 CAPSULE ORAL at 05:01

## 2023-03-30 RX ADMIN — AMIODARONE HYDROCHLORIDE 400 MILLIGRAM(S): 400 TABLET ORAL at 05:03

## 2023-03-30 RX ADMIN — ENOXAPARIN SODIUM 40 MILLIGRAM(S): 100 INJECTION SUBCUTANEOUS at 11:43

## 2023-03-30 RX ADMIN — Medication 650 MILLIGRAM(S): at 00:44

## 2023-03-30 NOTE — PROGRESS NOTE ADULT - ASSESSMENT
Patient seen and examined, agree with above assessment and plan as transcribed above.    - progressing well    Alec Mitchell MD, [facility]  BEEPER [phone]

## 2023-03-30 NOTE — PROGRESS NOTE ADULT - SUBJECTIVE AND OBJECTIVE BOX
C A R D I O L O G Y  **********************************     DATE OF SERVICE: 03-30-23    Patient transferred to [facility] down. SOB improving, comfortable on room air. denies chest pain. Review of symptoms otherwise negative.    MEDICATIONS:  acetaminophen     Tablet .. 650 milliGRAM(s) Oral every 6 hours PRN  aMIOdarone    Tablet   Oral   aMIOdarone    Tablet 400 milliGRAM(s) Oral every 8 hours  ascorbic acid 500 milliGRAM(s) Oral two times a day  aspirin enteric coated 81 milliGRAM(s) Oral daily  atorvastatin 80 milliGRAM(s) Oral at bedtime  bisacodyl Suppository 10 milliGRAM(s) Rectal once  chlorhexidine 2% Cloths 1 Application(s) Topical daily  clopidogrel Tablet 75 milliGRAM(s) Oral daily  enoxaparin Injectable 40 milliGRAM(s) SubCutaneous every 24 hours  furosemide    Tablet 40 milliGRAM(s) Oral daily  gabapentin 100 milliGRAM(s) Oral every 8 hours  insulin glargine Injectable (LANTUS) 24 Unit(s) SubCutaneous at bedtime  insulin lispro (ADMELOG) corrective regimen sliding scale   SubCutaneous <User Schedule>  insulin lispro (ADMELOG) corrective regimen sliding scale   SubCutaneous three times a day before meals  insulin lispro Injectable (ADMELOG) 8 Unit(s) SubCutaneous three times a day before meals  metoprolol tartrate 25 milliGRAM(s) Oral every 8 hours  oxyCODONE    IR 5 milliGRAM(s) Oral every 4 hours PRN  oxyCODONE    IR 10 milliGRAM(s) Oral every 4 hours PRN  pantoprazole    Tablet 40 milliGRAM(s) Oral before breakfast  polyethylene glycol 3350 17 Gram(s) Oral daily  senna 2 Tablet(s) Oral at bedtime  tamsulosin 0.4 milliGRAM(s) Oral at bedtime      LABS:                        8.1    9.39  )-----------( 191      ( 30 Mar 2023 00:10 )             24.8       Hemoglobin: 8.1 g/dL (03-30 @ 00:10)  Hemoglobin: 8.6 g/dL (03-29 @ 00:38)  Hemoglobin: 8.6 g/dL (03-28 @ 00:23)  Hemoglobin: 9.6 g/dL (03-27 @ 15:51)  Hemoglobin: 9.8 g/dL (03-26 @ 06:20)      03-30    135  |  103  |  14  ----------------------------<  136<H>  4.1   |  25  |  0.81    Ca    8.3<L>      30 Mar 2023 00:11  Phos  1.8     03-30  Mg     2.2     03-30    TPro  5.2<L>  /  Alb  3.2<L>  /  TBili  0.7  /  DBili  x   /  AST  38  /  ALT  41  /  AlkPhos  101  03-30    Creatinine Trend: 0.81<--, 0.71<--, 0.48<--, 0.45<--, 0.66<--, 0.59<--    COAGS:     CARDIAC MARKERS ( 27 Mar 2023 15:51 )  x     / x     / 231 U/L / x     / 21.5 ng/mL        PHYSICAL EXAM:  T(C): 36.5 (03-30-23 @ 11:54), Max: 37.5 (03-29-23 @ 16:00)  HR: 75 (03-30-23 @ 11:54) (66 - 80)  BP: 113/59 (03-30-23 @ 11:54) (90/55 - 132/65)  RR: 18 (03-30-23 @ 11:54) (10 - 28)  SpO2: 93% (03-30-23 @ 11:54) (92% - 98%)  Wt(kg): --    I&O's Summary    29 Mar 2023 07:01  -  30 Mar 2023 07:00  --------------------------------------------------------  IN: 1848.2 mL / OUT: 1360 mL / NET: 488.2 mL    30 Mar 2023 07:01  -  30 Mar 2023 14:27  --------------------------------------------------------  IN: 240 mL / OUT: 1350 mL / NET: -1110 mL        Gen: NAD  HEENT:  (-)icterus (-)pallor  CV: N S1 S2 1/6 RONI (+)2 Pulses B/l  Resp:  Clear to auscultation B/L, normal effort  GI: (+) BS Soft, NT, ND  Lymph:  (-)Edema, (-)obvious lymphadenopathy  Skin: Warm to touch, Normal turgor  Psych: Appropriate mood and affect      TELEMETRY: SR 70s, PAF    < from: TTE with Doppler (w/Cont) (03.16.23 @ 13:36) >  CONCLUSIONS:  1. Normal mitral valve. Mildly calcified chordae tendinae.  Mild mitral regurgitation.  2. Calcified aortic valve was not well visualized but has  normal opening. No aortic stenosis. No aortic valve  regurgitation seen.  3. Normal left ventricular internal dimensions and wall  thicknesses.  4. Low-normal Left Ventricular Systolic Function,  (EF =  53% by biplane). No regional wall motion abnormalities.  Ultrasound LV opacification agent was used to enhance  endocardial definition.  5. Right ventricle not well visualized. Normal RVsystolic  function (RV tissue Doppler 11 cm/s).  6. RV systolic pressure is borderline normal at  33 mm Hg.  7. Normal tricuspid valve. Moderate tricuspid  regurgitation.  8. No pericardial effusion.    *** No previous Echo exam.    < end of copied text >    < from: Cardiac Catheterization (03.22.23 @ 09:42) >  Diagnostic Conclusions:     Large LAD with severe bifurcating/ostial and heavily calcifed disease   LCX with Bifurcating/ostial disease   RCA with Moderate Disease   Recommendations:     CABG evaluation      < end of copied text >      ASSESSMENT/PLAN: Patient is 72 year old male with pmh of Type I DM (50 years ago, on Insulin pump for last 15 years), HTN, Vertigo and BPH who was brought to ED due to Abdominal pain, Nausea and vomiting. Cardiology consulted for increased troponin.    #NSTEMI  - TTE with low normal LV function and no wall motion abnormalities  - s/p cath with multivessel CAD noted above  - Transferred to [facility] for CABG evaluation  - Now s/p CABGx3 on 3/27  - Continue ASA/Plavix/Lipitor  - Weaned off pressors, continue metoprolol  - Diuresing with lasix  - CTS follow up    #DKA/Type 1 DM  - Glycemic control per endocrinology    #HTN  - Continue metoprolol. Remainder of meds on hold post op    #Post op PAF  - Continue metoprolol to prevent AF RVR  - Amio load started to maintain NSR  - EP consult with Dr. Del Castillo called    Mraiano Vernon PA-C  Pager: [phone]

## 2023-03-30 NOTE — PROGRESS NOTE ADULT - SUBJECTIVE AND OBJECTIVE BOX
Patient seen and examined at the bedside.    Remained critically ill on continuous ICU monitoring.    OBJECTIVE:  Vital Signs Last 24 Hrs  T(C): 37.1 (30 Mar 2023 04:00), Max: 37.5 (29 Mar 2023 16:00)  T(F): 98.8 (30 Mar 2023 04:00), Max: 99.5 (29 Mar 2023 16:00)  HR: 72 (30 Mar 2023 08:30) (66 - 145)  BP: 97/54 (30 Mar 2023 08:30) (77/52 - 132/63)  BP(mean): 72 (30 Mar 2023 08:30) (60 - 97)  RR: 21 (30 Mar 2023 08:30) (10 - 28)  SpO2: 95% (30 Mar 2023 08:30) (92% - 98%)    Parameters below as of 30 Mar 2023 04:00  Patient On (Oxygen Delivery Method): room air          Physical Exam:   General: NAD   Neurology: nonfocal   Eyes: bilateral pupils equal and reactive   ENT/Neck: Neck supple, trachea midline, No JVD   Respiratory: Clear bilaterally   CV: S1S2, no murmurs        [x] Sternal dressing, [x] Mediastinal CT, [x] Pleural CT, [x] EKATERINA drain        [x] Sinus rhythm, [x] Afib, [x] Temporary pacing, [x] PPM  Abdominal: Soft, NT, ND +BS   Extremities: 1-2+ pedal edema noted, + peripheral pulses   Skin: No Rashes, Hematoma, Ecchymosis                           Assessment:  72 years old male with PMHX of HTN, Type I DM (50 years ago, on Insulin pump for last 15 years), Vertigo and BPH who was brought to ED due to Abdominal pain, Nausea and vomiting.    CAD s/p CABG 03/27/23   Hemodynamic instability   hyperglycemia  Diabetes mellitus type1  Hypovolemia  Respiratory insufficiency       Today:  - Rapid A.fib episode in morning   - A. Line out this morning   - Fully dc'ed   - Follow up with endocrine regarding insulin pump       Plan:   ***Neuro***  [x] Nonfocal   Post operative neuro assessment   Continue pain management with Tylenol, gabapentin, and oxy.     ***Cardiovascular***  Invasive hemodynamic monitoring, assess perfusion indices   SR / MAP 73/ Hct 24.8%/ Lactate 1.0  Start Beta blockers when weaned off pressors  Volume: [x] Albumin 250 ccs  Reassessment of hemodynamics post resuscitation   Amiodarone for Afib prophylaxis   Monitor chest tube outputs -> d/c'ed   [x] Lovenox for VTE prophylaxis   [x] ASA [x] Statin [x] Plavix       ***Pulmonary***  NC - 2L, wean to rm air   Encourage incentive spirometry, continue pulse ox monitoring, follow ABGs               ***GI***  [x] Tolerating PO consistent carb diet.   [x] Protonix   Bowel regimen with Miralax and senna.  Started Flomax for Urinary retention    ***Renal***  Continue to monitor I/Os, BUN/Creatinine.   Replete lytes PRN  Santana present   Diuresis with Lasix     ***ID***  No active antibiotic coverage      ***Endocrine***  [x] DM1: HbA1c 8.2%                - [x] Insulin gtt --> [x] transition to ISS and Lantus; hyperglycemic, increased lantus/ pre meal dose per endo             - Need tight glycemic control to prevent wound infection.  Follow up with endocrine regarding insulin pump           Patient requires continuous monitoring with bedside rhythm monitoring, pulse oximetry monitoring, and continuous central venous and arterial pressure monitoring; and intermittent blood gas analysis. Care plan discussed with the ICU care team.   Patient remained critical, at risk for life threatening decompensation.    I have spent 30 minutes providing critical care management to this patient.    By signing my name below, I, Alfonso Pringle, attest that this documentation has been prepared under the direction and in the presence of Anita Alonzo NP   Electronically signed: Lucio Barnard, 03-30-23 @ 09:11    I, Anita Alonzo NP, personally performed the services described in this documentation. all medical record entries made by the lucio were at my direction and in my presence. I have reviewed the chart and agree that the record reflects my personal performance and is accurate and complete  Electronically signed: Anita Alonzo NP  Patient seen and examined at the bedside.    Remained critically ill on continuous ICU monitoring.    OBJECTIVE:  Vital Signs Last 24 Hrs  T(C): 37.1 (30 Mar 2023 04:00), Max: 37.5 (29 Mar 2023 16:00)  T(F): 98.8 (30 Mar 2023 04:00), Max: 99.5 (29 Mar 2023 16:00)  HR: 72 (30 Mar 2023 08:30) (66 - 145)  BP: 97/54 (30 Mar 2023 08:30) (77/52 - 132/63)  BP(mean): 72 (30 Mar 2023 08:30) (60 - 97)  RR: 21 (30 Mar 2023 08:30) (10 - 28)  SpO2: 95% (30 Mar 2023 08:30) (92% - 98%)    Parameters below as of 30 Mar 2023 04:00  Patient On (Oxygen Delivery Method): room air          Physical Exam:   General: NAD   Neurology: nonfocal   Eyes: bilateral pupils equal and reactive   ENT/Neck: Neck supple, trachea midline, No JVD   Respiratory: Clear bilaterally   CV: S1S2, no murmurs        [x] Sternal dressing, [x] Mediastinal CT, [x] Pleural CT, [x] EKATERINA drain        [x] Sinus rhythm, [x] Afib, [x] Temporary pacing, [x] PPM  Abdominal: Soft, NT, ND +BS   Extremities: 1-2+ pedal edema noted, + peripheral pulses   Skin: No Rashes, Hematoma, Ecchymosis                           Assessment:  72 years old male with PMHX of HTN, Type I DM (50 years ago, on Insulin pump for last 15 years), Vertigo and BPH who was brought to ED due to Abdominal pain, Nausea and vomiting.    CAD s/p CABG 03/27/23   Hemodynamic instability   hyperglycemia  Diabetes mellitus type1  Hypovolemia  Respiratory insufficiency       Today:  - Rapid A.fib episode in morning -> converted to NSR w/ IV lopressor  - de-intensify, pancho and trial to void this am  - Follow up with endocrine regarding transition to personal insulin pump       Plan:   ***Neuro***  [x] Nonfocal   Post operative neuro assessment   Continue pain management with Tylenol, gabapentin, and oxy.   mobilize as tolerated    ***Cardiovascular***  Invasive hemodynamic monitoring, assess perfusion indices   SR / MAP 73/ Hct 24.8%/ Lactate 1.0  lopressor 25mg BID   Reassessment of hemodynamics post lopressor  PO amio load for paroxsymal afib  [x] Lovenox for VTE prophylaxis   [x] ASA [x] Statin [x] Plavix       ***Pulmonary***  rm air   Encourage incentive spirometry, continue pulse ox monitoring, follow ABGs               ***GI***  [x] Tolerating PO consistent carb diet.   [x] Protonix   Bowel regimen with Miralax and senna.  Started Flomax for Urinary retention    ***Renal***  Continue to monitor I/Os, BUN/Creatinine.   Replete lytes PRN  Santana d/c-> trial to void  home flomax on  Diuresis with Lasix     ***ID***  No active antibiotic coverage      ***Endocrine***  [x] DM1: HbA1c 8.2%                - [x] Insulin gtt --> [x] transition to ISS and Lantus; hyperglycemic, increased lantus/ pre meal dose per endo             - Need tight glycemic control to prevent wound infection.  Follow up with endocrine regarding insulin pump           Patient requires continuous monitoring with bedside rhythm monitoring, pulse oximetry monitoring, and continuous central venous and arterial pressure monitoring; and intermittent blood gas analysis. Care plan discussed with the ICU care team.   Patient remained critical, at risk for life threatening decompensation.    I have spent 35 minutes providing critical care management to this patient.    By signing my name below, I, Alfonso Prnigle, attest that this documentation has been prepared under the direction and in the presence of Anita Alonzo NP   Electronically signed: Lucio Barnard, 03-30-23 @ 09:11    I, Anita Alonzo NP, personally performed the services described in this documentation. all medical record entries made by the lucio were at my direction and in my presence. I have reviewed the chart and agree that the record reflects my personal performance and is accurate and complete  Electronically signed: Anita Alonzo NP

## 2023-03-30 NOTE — PROGRESS NOTE ADULT - ASSESSMENT
72 years old male with PMHX of HTN, Type I DM (50 years ago, on Insulin pump for last 15 years), Vertigo and BPH who was brought to ED due to Abdominal pain, Nausea and vomiting. Patient also found to have elevated troponin. Patient transferred over from [facility] s/p [facility] showing   TVD CAD.      3/22 VSS; Patient seen at bedside, resting comfortably on room air. In NAD, no SOB, or CP at this time.   3/23 /64 -171/81. OR Thursday 3/30.  Radial mapping.  Pt transferred to Dr Sherwood.  Carotids neg.  EF 53%  3/24 Scheduled for cabg this Monday Endocrinology recommendations appreciated.  3/25 reserve left arm for radial harvest  3/27 s/p cabg x 3 limaLIMA-LAD | SVG-OM | SVG-OM  ef 55  Right greater saphenous vein harvested  Extubated d 0  Insulin infusion  Preop insulin pump post op labile glucose  PAF, amio load, lopressor.  Episodes of hypotesion, PW attached , pacer off  Diuresis  3/30 transferred to sdu

## 2023-03-30 NOTE — PROGRESS NOTE ADULT - SUBJECTIVE AND OBJECTIVE BOX
VITAL SIGNS    Telemetry:  nsr 70-80, paf to 140    Vital Signs Last 24 Hrs  T(C): 37.1 (23 @ 04:00), Max: 37.5 (23 @ 16:00)  T(F): 98.8 (23 @ 04:00), Max: 99.5 (23 @ 16:00)  HR: 69 (23 @ 11:00) (66 - 81)  BP: 107/56 (23 @ 11:00) (79/53 - 132/65)  RR: 19 (23 @ 11:00) (10 - 28)  SpO2: 95% (23 @ 11:00) (92% - 98%)                    @ 07:01  -   @ 07:00  --------------------------------------------------------  IN: 1848.2 mL / OUT: 1360 mL / NET: 488.2 mL     @ 07:01  -   @ 12:31  --------------------------------------------------------  IN: 0 mL / OUT: 400 mL / NET: -400 mL          Daily     Daily Weight in k (30 Mar 2023 00:00)            CAPILLARY BLOOD GLUCOSE  153 (30 Mar 2023 02:00)  164 (29 Mar 2023 21:00)  151 (29 Mar 2023 20:00)  95 (29 Mar 2023 19:00)  92 (29 Mar 2023 18:00)  185 (29 Mar 2023 17:00)  190 (29 Mar 2023 16:00)  246 (29 Mar 2023 15:00)  293 (29 Mar 2023 14:00)      POCT Blood Glucose.: 182 mg/dL (30 Mar 2023 11:56)  POCT Blood Glucose.: 186 mg/dL (30 Mar 2023 06:48)  POCT Blood Glucose.: 153 mg/dL (30 Mar 2023 01:50)  POCT Blood Glucose.: 153 mg/dL (29 Mar 2023 22:26)  POCT Blood Glucose.: 164 mg/dL (29 Mar 2023 21:01)  POCT Blood Glucose.: 151 mg/dL (29 Mar 2023 20:02)  POCT Blood Glucose.: 95 mg/dL (29 Mar 2023 18:42)  POCT Blood Glucose.: 92 mg/dL (29 Mar 2023 18:21)  POCT Blood Glucose.: 185 mg/dL (29 Mar 2023 16:49)  POCT Blood Glucose.: 190 mg/dL (29 Mar 2023 16:12)  POCT Blood Glucose.: 246 mg/dL (29 Mar 2023 15:11)  POCT Blood Glucose.: 292 mg/dL (29 Mar 2023 14:09)            Drains:      Pacing Wires        [ x ]   Settings:         off                       Isolated  [  ]    Coumadin    [ ] YES          [x  ]      NO                                   PHYSICAL EXAM        Neurology: alert and oriented x 3, nonfocal, no gross deficits  CV : s1 s2 RRR  Sternal Wound :  CDI , Stable  Lungs: cta  Abdomen: soft, nontender, nondistended, positive bowel sound, no bm                       :    voiding     Extremities:     trace edema   /  -   calve tenderness ,    R leg  incisions cdi          acetaminophen     Tablet .. 650 milliGRAM(s) Oral every 6 hours  acetaminophen     Tablet .. 650 milliGRAM(s) Oral every 6 hours PRN  aMIOdarone    Tablet   Oral   aMIOdarone    Tablet 400 milliGRAM(s) Oral every 8 hours  ascorbic acid 500 milliGRAM(s) Oral two times a day  aspirin enteric coated 81 milliGRAM(s) Oral daily  atorvastatin 80 milliGRAM(s) Oral at bedtime  bisacodyl Suppository 10 milliGRAM(s) Rectal once  chlorhexidine 2% Cloths 1 Application(s) Topical daily  clopidogrel Tablet 75 milliGRAM(s) Oral daily  enoxaparin Injectable 40 milliGRAM(s) SubCutaneous every 24 hours  furosemide    Tablet 40 milliGRAM(s) Oral daily  gabapentin 100 milliGRAM(s) Oral every 8 hours  insulin glargine Injectable (LANTUS) 24 Unit(s) SubCutaneous at bedtime  insulin lispro (ADMELOG) corrective regimen sliding scale   SubCutaneous three times a day before meals  insulin lispro (ADMELOG) corrective regimen sliding scale   SubCutaneous <User Schedule>  insulin lispro Injectable (ADMELOG) 8 Unit(s) SubCutaneous three times a day before meals  metoprolol tartrate 25 milliGRAM(s) Oral two times a day  metoprolol tartrate 25 milliGRAM(s) Oral once  metoprolol tartrate 25 milliGRAM(s) Oral every 8 hours  oxyCODONE    IR 5 milliGRAM(s) Oral every 4 hours PRN  oxyCODONE    IR 10 milliGRAM(s) Oral every 4 hours PRN  pantoprazole    Tablet 40 milliGRAM(s) Oral before breakfast  polyethylene glycol 3350 17 Gram(s) Oral daily  senna 2 Tablet(s) Oral at bedtime  tamsulosin 0.4 milliGRAM(s) Oral at bedtime                    Physical Therapy Rec:   Home  [  ]   Home w/ PT  [  ]  Rehab  [  ]  Discussed with Cardiothoracic Team at AM rounds.

## 2023-03-30 NOTE — PROGRESS NOTE ADULT - ASSESSMENT
71y/o M w/h/o uncontrolled T1DM (A1C 8.2%) on T Slim insulin pump PTA. No  known complications. Also h/o HTN, vertigo and BPH who was brought to OSH due to abdominal pain, nausea and vomiting found to be in DKA and transferred to [facility] after found to have triple vessel disease on [facility].   s/p CABG 3/27.      Home Regimen: T slim novolog insulin pump   basal  12a 0.148  9a 0.75  5p 0.758  10p 0.712  ISF  12a 1:50  5p 1:57  10p 1:50  ICR  12a 1:18  9a 1:17  5p 1:!8  target 100  action time 5hr      Uncontrolled type 1 diabetes mellitus with ketoacidosis.   ·  Plan:  - BG Goal 100-180mg/dl    -Test BG ac and hs and 2am while on sq insulin and q6h if NPO.  plan to restart insulin pump tomorrow once pt has supplies as pt omitting carbs at some meals- pt can bolus based on carb count for each meal ; pt to restart dexcom tonight-> cover with lead if undergoing any CXR   - Increase Lantus  to 28 units tonight. Do not hold basal insulin since pt has T1DM  - Monitor on Admelog to 8 units pre-meal. HOLD IF NPO. If pt has lower carb meal reduce admelog by 50% x1  - c/w moderate correction scales ac and hs and add 2am correction scale as well  Discharge plan:  - Will be determined according to BG/insulin needs/PO intake at time of discharge.  - Likely insulin pump. Might need adjustments to settings.  - Follow up with endocrinologist Dr Ngo  - Make sure pt has Rx for all DM supplies and insulin at home prior to discharge.  - Needs Optho f/u as out pt.     Problem/Plan - 2:  ·  Problem: HTN (hypertension).   ·  Plan: - Home regimen: losartan 100mg daily  - BG goal and management  per cardiology team.     Problem/Plan - 3:  ·  Problem: HLD (hyperlipidemia).   ·  Plan: LDL goal <55 due to DM and now CVD  C/w atorvastatin 80 milliGRAM(s) Oral at bedtime  Follow up levels as out pt.        discussed with patient and primary team  Keith and RN   Contact via [employer] Teams during business hours  To reach covering provider access AMION via [employer]  For Urgent matters/after-hours/weekends/holidays please page endocrine fellow on call   For nonurgent matters please email [email or web address]    Please note that this patient may be followed by different provider tomorrow.  Notify endocrine 24 hours prior to discharge for final recommendations    greater than 50% of the encounter was spent counseling and/or coordination of care.  30  minutes spent on total encounter; The necessity of the time spent during the encounter on this date of service was due to development of plan of care/coordination of care/glycemic control through review of labs, blood glucose values and vital signs.

## 2023-03-30 NOTE — PROGRESS NOTE ADULT - SUBJECTIVE AND OBJECTIVE BOX
seen earlier today     Chief Complaint: Diabetes Mellitus follow up    INTERVAL HX: pt was on and off insulin gtt yesterday for hyperglycemia, insulin gtt turned off last night and transitioned to basal bolus . FBG above goal   pt tx to SDU, discussed with pt plan to restart pump tomorrow when all supplies available, pt to restart dexcom tonight and notify radiology to cover with lead if needed. pt tolerating po reports calculating meals approx 68 carbs , hes omiting carbs if he sees over this amount, pt unsure of carb content in meals so he is estimating, provided pt with menu with carb counts on it .   noted dinner BG tightly controlled pt reports purposely not finishing meal to prevent BG from rising. pt would benefit from restarting pump to individualize each meals bolus based on carbs     Review of Systems:  General: As above  Cardiovascular: No chest pain  Respiratory: No SOB  GI: No nausea, vomiting  Endocrine: no  S&Sx of hypoglycemia    Allergies    Neosporin (Rash)    Intolerances      MEDICATIONS  (STANDING):  aMIOdarone    Tablet   Oral   aMIOdarone    Tablet 400 milliGRAM(s) Oral every 8 hours  ascorbic acid 500 milliGRAM(s) Oral two times a day  aspirin enteric coated 81 milliGRAM(s) Oral daily  atorvastatin 80 milliGRAM(s) Oral at bedtime  bisacodyl Suppository 10 milliGRAM(s) Rectal once  chlorhexidine 2% Cloths 1 Application(s) Topical daily  clopidogrel Tablet 75 milliGRAM(s) Oral daily  enoxaparin Injectable 40 milliGRAM(s) SubCutaneous every 24 hours  furosemide    Tablet 40 milliGRAM(s) Oral daily  gabapentin 100 milliGRAM(s) Oral every 8 hours  insulin glargine Injectable (LANTUS) 24 Unit(s) SubCutaneous at bedtime  insulin lispro (ADMELOG) corrective regimen sliding scale   SubCutaneous <User Schedule>  insulin lispro (ADMELOG) corrective regimen sliding scale   SubCutaneous three times a day before meals  insulin lispro Injectable (ADMELOG) 8 Unit(s) SubCutaneous three times a day before meals  magnesium sulfate  IVPB 2 Gram(s) IV Intermittent once  metoprolol tartrate 25 milliGRAM(s) Oral every 8 hours  pantoprazole    Tablet 40 milliGRAM(s) Oral before breakfast  polyethylene glycol 3350 17 Gram(s) Oral daily  senna 2 Tablet(s) Oral at bedtime  tamsulosin 0.4 milliGRAM(s) Oral at bedtime        atorvastatin   80 milliGRAM(s) Oral (03-29-23 @ 21:39)    insulin glargine Injectable (LANTUS)   24 Unit(s) SubCutaneous (03-29-23 @ 22:26)    insulin lispro (ADMELOG) corrective regimen sliding scale   2 Unit(s) SubCutaneous (03-30-23 @ 12:10)   2 Unit(s) SubCutaneous (03-30-23 @ 06:50)    insulin lispro Injectable (ADMELOG)   8 Unit(s) SubCutaneous (03-30-23 @ 12:10)   8 Unit(s) SubCutaneous (03-30-23 @ 06:49)        PHYSICAL EXAM:  VITALS: T(C): 36.5 (03-30-23 @ 11:54)  T(F): 97.7 (03-30-23 @ 11:54), Max: 99.5 (03-29-23 @ 16:00)  HR: 75 (03-30-23 @ 11:54) (66 - 80)  BP: 113/59 (03-30-23 @ 11:54) (90/55 - 132/65)  RR:  (10 - 28)  SpO2:  (92% - 98%)  Wt(kg): --  GENERAL: male sitting in chair in NAD, MSI CDI , right neck dressing cdi   Respiratory: Respirations unlabored   Extremities: Warm, RLE ace wrapped   NEURO: Alert , appropriate     LABS:  POCT Blood Glucose.: 182 mg/dL (03-30-23 @ 11:56)  POCT Blood Glucose.: 186 mg/dL (03-30-23 @ 06:48)  POCT Blood Glucose.: 153 mg/dL (03-30-23 @ 01:50)  POCT Blood Glucose.: 153 mg/dL (03-29-23 @ 22:26)  POCT Blood Glucose.: 164 mg/dL (03-29-23 @ 21:01)  POCT Blood Glucose.: 151 mg/dL (03-29-23 @ 20:02)  POCT Blood Glucose.: 95 mg/dL (03-29-23 @ 18:42)  POCT Blood Glucose.: 92 mg/dL (03-29-23 @ 18:21)  POCT Blood Glucose.: 185 mg/dL (03-29-23 @ 16:49)  POCT Blood Glucose.: 190 mg/dL (03-29-23 @ 16:12)  POCT Blood Glucose.: 246 mg/dL (03-29-23 @ 15:11)  POCT Blood Glucose.: 292 mg/dL (03-29-23 @ 14:09)  POCT Blood Glucose.: 249 mg/dL (03-29-23 @ 12:01)  POCT Blood Glucose.: 213 mg/dL (03-29-23 @ 11:03)  POCT Blood Glucose.: 249 mg/dL (03-29-23 @ 06:43)  POCT Blood Glucose.: 102 mg/dL (03-28-23 @ 23:02)  POCT Blood Glucose.: 108 mg/dL (03-28-23 @ 22:02)  POCT Blood Glucose.: 136 mg/dL (03-28-23 @ 20:53)  POCT Blood Glucose.: 163 mg/dL (03-28-23 @ 20:09)  POCT Blood Glucose.: 165 mg/dL (03-28-23 @ 19:08)  POCT Blood Glucose.: 192 mg/dL (03-28-23 @ 18:22)  POCT Blood Glucose.: 158 mg/dL (03-28-23 @ 17:12)  POCT Blood Glucose.: 129 mg/dL (03-28-23 @ 16:12)  POCT Blood Glucose.: 155 mg/dL (03-28-23 @ 15:10)  POCT Blood Glucose.: 169 mg/dL (03-28-23 @ 14:02)  POCT Blood Glucose.: 178 mg/dL (03-28-23 @ 12:55)  POCT Blood Glucose.: 182 mg/dL (03-28-23 @ 12:06)  POCT Blood Glucose.: 208 mg/dL (03-28-23 @ 11:14)  POCT Blood Glucose.: 247 mg/dL (03-28-23 @ 10:13)  POCT Blood Glucose.: 183 mg/dL (03-28-23 @ 08:48)  POCT Blood Glucose.: 144 mg/dL (03-28-23 @ 07:49)  POCT Blood Glucose.: 159 mg/dL (03-28-23 @ 06:54)  POCT Blood Glucose.: 156 mg/dL (03-28-23 @ 06:10)  POCT Blood Glucose.: 108 mg/dL (03-28-23 @ 04:58)  POCT Blood Glucose.: 101 mg/dL (03-28-23 @ 04:03)  POCT Blood Glucose.: 96 mg/dL (03-28-23 @ 02:58)  POCT Blood Glucose.: 93 mg/dL (03-28-23 @ 02:08)  POCT Blood Glucose.: 91 mg/dL (03-28-23 @ 01:04)  POCT Blood Glucose.: 106 mg/dL (03-27-23 @ 22:52)  POCT Blood Glucose.: 116 mg/dL (03-27-23 @ 22:04)  POCT Blood Glucose.: 114 mg/dL (03-27-23 @ 21:00)  POCT Blood Glucose.: 128 mg/dL (03-27-23 @ 20:02)  POCT Blood Glucose.: 141 mg/dL (03-27-23 @ 18:47)  POCT Blood Glucose.: 154 mg/dL (03-27-23 @ 17:50)  POCT Blood Glucose.: 188 mg/dL (03-27-23 @ 16:09)  POCT Blood Glucose.: 172 mg/dL (03-27-23 @ 15:27)                          8.1    9.39  )-----------( 191      ( 30 Mar 2023 00:10 )             24.8     03-30    135  |  103  |  14  ----------------------------<  136<H>  4.1   |  25  |  0.81    Ca    8.3<L>      30 Mar 2023 00:11  Phos  1.8     03-30  Mg     2.2     03-30    TPro  5.2<L>  /  Alb  3.2<L>  /  TBili  0.7  /  DBili  x   /  AST  38  /  ALT  41  /  AlkPhos  101  03-30    eGFR: 93 mL/min/1.73m2 (30 Mar 2023 00:11)      Thyroid Function Tests:  03-22 @ 17:20 TSH 1.82 FreeT4 -- T3 76 Anti TPO -- Anti Thyroglobulin Ab -- TSI --      A1C with Estimated Average Glucose Result: 8.2 % (03-22-23 @ 17:20)  A1C with Estimated Average Glucose Result: 7.7 % (03-16-23 @ 03:38)    Estimated Average Glucose: 189 mg/dL (03-22-23 @ 17:20)  Estimated Average Glucose: 174 mg/dL (03-16-23 @ 03:38)        Diet, Regular:   Consistent Carbohydrate No Snacks (CSTCHO)  DASH/TLC Sodium & Cholesterol Restricted (DASH) (03-28-23 @ 07:08) [Active]

## 2023-03-31 LAB
ALBUMIN SERPL ELPH-MCNC: 3.3 G/DL — SIGNIFICANT CHANGE UP (ref 3.3–5)
ALP SERPL-CCNC: 118 U/L — SIGNIFICANT CHANGE UP (ref 40–120)
ALT FLD-CCNC: 35 U/L — SIGNIFICANT CHANGE UP (ref 10–45)
AST SERPL-CCNC: 26 U/L — SIGNIFICANT CHANGE UP (ref 10–40)
BILIRUB SERPL-MCNC: 0.8 MG/DL — SIGNIFICANT CHANGE UP (ref 0.2–1.2)
BUN SERPL-MCNC: 12 MG/DL — SIGNIFICANT CHANGE UP (ref 7–23)
CALCIUM SERPL-MCNC: 8.6 MG/DL — SIGNIFICANT CHANGE UP (ref 8.4–10.5)
CHLORIDE SERPL-SCNC: 105 MMOL/L — SIGNIFICANT CHANGE UP (ref 96–108)
CO2 SERPL-SCNC: 27 MMOL/L — SIGNIFICANT CHANGE UP (ref 22–31)
CREAT SERPL-MCNC: 0.78 MG/DL — SIGNIFICANT CHANGE UP (ref 0.5–1.3)
EGFR: 94 ML/MIN/1.73M2 — SIGNIFICANT CHANGE UP
GLUCOSE BLDC GLUCOMTR-MCNC: 133 MG/DL — HIGH (ref 70–99)
GLUCOSE BLDC GLUCOMTR-MCNC: 135 MG/DL — HIGH (ref 70–99)
GLUCOSE BLDC GLUCOMTR-MCNC: 148 MG/DL — HIGH (ref 70–99)
GLUCOSE BLDC GLUCOMTR-MCNC: 150 MG/DL — HIGH (ref 70–99)
GLUCOSE BLDC GLUCOMTR-MCNC: 151 MG/DL — HIGH (ref 70–99)
GLUCOSE BLDC GLUCOMTR-MCNC: 180 MG/DL — HIGH (ref 70–99)
GLUCOSE SERPL-MCNC: 148 MG/DL — HIGH (ref 70–99)
HCT VFR BLD CALC: 27.8 % — LOW (ref 39–50)
HGB BLD-MCNC: 9 G/DL — LOW (ref 13–17)
MAGNESIUM SERPL-MCNC: 2.7 MG/DL — HIGH (ref 1.6–2.6)
MCHC RBC-ENTMCNC: 29.2 PG — SIGNIFICANT CHANGE UP (ref 27–34)
MCHC RBC-ENTMCNC: 32.4 GM/DL — SIGNIFICANT CHANGE UP (ref 32–36)
MCV RBC AUTO: 90.3 FL — SIGNIFICANT CHANGE UP (ref 80–100)
NRBC # BLD: 0 /100 WBCS — SIGNIFICANT CHANGE UP (ref 0–0)
PHOSPHATE SERPL-MCNC: 3.1 MG/DL — SIGNIFICANT CHANGE UP (ref 2.5–4.5)
PLATELET # BLD AUTO: 274 K/UL — SIGNIFICANT CHANGE UP (ref 150–400)
POTASSIUM SERPL-MCNC: 3.9 MMOL/L — SIGNIFICANT CHANGE UP (ref 3.5–5.3)
POTASSIUM SERPL-SCNC: 3.9 MMOL/L — SIGNIFICANT CHANGE UP (ref 3.5–5.3)
PROT SERPL-MCNC: 5.9 G/DL — LOW (ref 6–8.3)
RBC # BLD: 3.08 M/UL — LOW (ref 4.2–5.8)
RBC # FLD: 15 % — HIGH (ref 10.3–14.5)
SODIUM SERPL-SCNC: 131 MMOL/L — LOW (ref 135–145)
WBC # BLD: 7.56 K/UL — SIGNIFICANT CHANGE UP (ref 3.8–10.5)
WBC # FLD AUTO: 7.56 K/UL — SIGNIFICANT CHANGE UP (ref 3.8–10.5)

## 2023-03-31 PROCEDURE — 71045 X-RAY EXAM CHEST 1 VIEW: CPT | Mod: 26

## 2023-03-31 PROCEDURE — 99233 SBSQ HOSP IP/OBS HIGH 50: CPT

## 2023-03-31 RX ORDER — METOPROLOL TARTRATE 50 MG
50 TABLET ORAL
Refills: 0 | Status: DISCONTINUED | OUTPATIENT
Start: 2023-03-31 | End: 2023-04-03

## 2023-03-31 RX ORDER — INSULIN LISPRO 100/ML
1 VIAL (ML) SUBCUTANEOUS
Refills: 0 | Status: DISCONTINUED | OUTPATIENT
Start: 2023-03-31 | End: 2023-03-31

## 2023-03-31 RX ORDER — METOPROLOL TARTRATE 50 MG
25 TABLET ORAL ONCE
Refills: 0 | Status: COMPLETED | OUTPATIENT
Start: 2023-03-31 | End: 2023-03-31

## 2023-03-31 RX ORDER — INSULIN LISPRO 100/ML
8 VIAL (ML) SUBCUTANEOUS ONCE
Refills: 0 | Status: COMPLETED | OUTPATIENT
Start: 2023-03-31 | End: 2023-03-31

## 2023-03-31 RX ORDER — METOPROLOL TARTRATE 50 MG
2.5 TABLET ORAL ONCE
Refills: 0 | Status: COMPLETED | OUTPATIENT
Start: 2023-03-31 | End: 2023-03-31

## 2023-03-31 RX ORDER — MAGNESIUM SULFATE 500 MG/ML
2 VIAL (ML) INJECTION ONCE
Refills: 0 | Status: COMPLETED | OUTPATIENT
Start: 2023-03-31 | End: 2023-03-31

## 2023-03-31 RX ORDER — FUROSEMIDE 40 MG
40 TABLET ORAL ONCE
Refills: 0 | Status: COMPLETED | OUTPATIENT
Start: 2023-03-31 | End: 2023-03-31

## 2023-03-31 RX ORDER — INSULIN LISPRO 100/ML
1 VIAL (ML) SUBCUTANEOUS
Refills: 0 | Status: DISCONTINUED | OUTPATIENT
Start: 2023-03-31 | End: 2023-04-01

## 2023-03-31 RX ORDER — POTASSIUM CHLORIDE 20 MEQ
20 PACKET (EA) ORAL ONCE
Refills: 0 | Status: COMPLETED | OUTPATIENT
Start: 2023-03-31 | End: 2023-03-31

## 2023-03-31 RX ADMIN — ATORVASTATIN CALCIUM 80 MILLIGRAM(S): 80 TABLET, FILM COATED ORAL at 21:36

## 2023-03-31 RX ADMIN — Medication 25 MILLIGRAM(S): at 03:09

## 2023-03-31 RX ADMIN — Medication 40 MILLIGRAM(S): at 08:29

## 2023-03-31 RX ADMIN — Medication 8 UNIT(S): at 12:04

## 2023-03-31 RX ADMIN — ENOXAPARIN SODIUM 40 MILLIGRAM(S): 100 INJECTION SUBCUTANEOUS at 12:17

## 2023-03-31 RX ADMIN — Medication 25 MILLIGRAM(S): at 09:39

## 2023-03-31 RX ADMIN — Medication 25 GRAM(S): at 03:09

## 2023-03-31 RX ADMIN — GABAPENTIN 100 MILLIGRAM(S): 400 CAPSULE ORAL at 06:21

## 2023-03-31 RX ADMIN — GABAPENTIN 100 MILLIGRAM(S): 400 CAPSULE ORAL at 15:28

## 2023-03-31 RX ADMIN — POLYETHYLENE GLYCOL 3350 17 GRAM(S): 17 POWDER, FOR SOLUTION ORAL at 07:55

## 2023-03-31 RX ADMIN — OXYCODONE HYDROCHLORIDE 10 MILLIGRAM(S): 5 TABLET ORAL at 08:45

## 2023-03-31 RX ADMIN — TAMSULOSIN HYDROCHLORIDE 0.4 MILLIGRAM(S): 0.4 CAPSULE ORAL at 21:35

## 2023-03-31 RX ADMIN — Medication 20 MILLIEQUIVALENT(S): at 07:52

## 2023-03-31 RX ADMIN — Medication 81 MILLIGRAM(S): at 15:28

## 2023-03-31 RX ADMIN — AMIODARONE HYDROCHLORIDE 400 MILLIGRAM(S): 400 TABLET ORAL at 21:35

## 2023-03-31 RX ADMIN — Medication 8 UNIT(S): at 07:53

## 2023-03-31 RX ADMIN — Medication 50 MILLIGRAM(S): at 17:46

## 2023-03-31 RX ADMIN — OXYCODONE HYDROCHLORIDE 5 MILLIGRAM(S): 5 TABLET ORAL at 23:15

## 2023-03-31 RX ADMIN — Medication 500 MILLIGRAM(S): at 06:21

## 2023-03-31 RX ADMIN — AMIODARONE HYDROCHLORIDE 400 MILLIGRAM(S): 400 TABLET ORAL at 07:53

## 2023-03-31 RX ADMIN — Medication 2.5 MILLIGRAM(S): at 09:39

## 2023-03-31 RX ADMIN — CHLORHEXIDINE GLUCONATE 1 APPLICATION(S): 213 SOLUTION TOPICAL at 10:10

## 2023-03-31 RX ADMIN — AMIODARONE HYDROCHLORIDE 400 MILLIGRAM(S): 400 TABLET ORAL at 15:28

## 2023-03-31 RX ADMIN — OXYCODONE HYDROCHLORIDE 10 MILLIGRAM(S): 5 TABLET ORAL at 07:53

## 2023-03-31 RX ADMIN — SENNA PLUS 2 TABLET(S): 8.6 TABLET ORAL at 21:35

## 2023-03-31 RX ADMIN — Medication 500 MILLIGRAM(S): at 17:46

## 2023-03-31 RX ADMIN — PANTOPRAZOLE SODIUM 40 MILLIGRAM(S): 20 TABLET, DELAYED RELEASE ORAL at 06:22

## 2023-03-31 RX ADMIN — Medication 40 MILLIGRAM(S): at 06:21

## 2023-03-31 RX ADMIN — GABAPENTIN 100 MILLIGRAM(S): 400 CAPSULE ORAL at 21:36

## 2023-03-31 NOTE — PROGRESS NOTE ADULT - PROBLEM SELECTOR PLAN 2
amio load  Increase beta blocker as tolerated. RSR 70's w/ episodes of Pafib/ EP consulted with Dr. Del Castillo as per cardiology  continue amio load  increase lop 50 mg po bid  monitor and supplement electrolytes  pw d/c as per Dr. Herndon; eliquis 5 bid recommended as per EP  hold off on eliquis today since pw removal this am as per Dr. HERNDON; if continued pafib - likely start in am but eliquis 2.5 bid

## 2023-03-31 NOTE — PROGRESS NOTE ADULT - ASSESSMENT
71y/o M w/h/o uncontrolled T1DM (A1C 8.2%) on T Slim insulin pump PTA. No  known complications. Also h/o HTN, vertigo and BPH who was brought to OSH due to abdominal pain, nausea and vomiting found to be in DKA and transferred to [facility] after found to have triple vessel disease on [facility].   s/p CABG 3/27.      Home Regimen: T slimx2 novolog insulin pump   basal  12a 0.148  9a 0.75  5p 0.758  10p 0.712  ISF  12a 1:50  5p 1:57  10p 1:50  ICR  12a 1:18  9a 1:17  5p 1:!8  target 100  action time 5hr      Uncontrolled type 1 diabetes mellitus with ketoacidosis.   ·  Plan:  - BG Goal 100-180mg/dl    -Test BG ac and hs and 2am while on sq insulin and q6h if NPO.  - discontinue all subq insulin  - insulin pump replaced on 3/31 on control IQ  , next site change due 4/3  - dexcom restarted 3/31 , please cover with lead for any CXRs, dexcom would need to be removed if having any MRI procedure  - If Pump malfunctions please remove pump and give stat  Lantus  to 28 units/Admelog to 8 units TID AC/ moderate correction scales Discharge plan:  - While in patient noted pts home pump settings are much lower than inpt requirements with A1C Above goal, while admitted has used Basal insulin 20-28 units and admelog 6-8 units with meals; profile 2 added to pump with higher basal rates and intensified ICR monitoring pt on this profile while inpt  - Follow up with endocrinologist Dr Ngo within 2 weeks   - Make sure pt has Rx for all DM supplies and insulin at home prior to discharge.  - Needs Optho f/u as out pt.  -[facility] [phone]- spoke with triage RN - pt was having overnight hypos so 12mn setting was decreased over past 2 months, confirmed home settings and informed them of pts DKA and plan to change settings, pt  had recent telehealth appt on 2/23 ; pt should call for appt for close f/u with endocrinologist .Of note pt reported this tslim model is 3 yrs old- pt should check with MD if able to upgrade , it has a 4 yr warranty (called [employer] to confirm if within 4 yrs can get replacement if deemed malfunctioning)     Insulin pump in place   - Tslimx 2 & Dexcom replaced 3/31 next site change for pump : 4/3, next dexcom change: 4/9   - RN  to ensure insulin pump attestation on chart  - Document insulin boluses/carbs on flow sheet   - pt has backup insulin pump supplies, wife bringing additional dexcom sensor on next visit  3/31/23  Added Profile 2: Basal 0000 1.2u/hr,  ICR 1:9 , ISF 1:50 ,  Pt has profile 2 on with control IQ ON  Further pump settings TBD based on glycemic trends on new settings        HTN (hypertension).   ·  Plan: - Home regimen: losartan 100mg daily  - BG goal and management  per cardiology team.      HLD (hyperlipidemia).   ·  Plan: LDL goal <55 due to DM and now CVD  C/w atorvastatin 80 milliGRAM(s) Oral at bedtime  Follow up levels as out pt.        discussed with patient and primary team  Sana BURTON  and RN   Contact via Microsoft Teams during business hours  To reach covering provider access AMION via [facility]  For Urgent matters/after-hours/weekends/holidays please page endocrine fellow on call   For nonurgent matters please email [email or web address]    Please note that this patient may be followed by different provider tomorrow.  Notify endocrine 24 hours prior to discharge for final recommendations    greater than 50% of the encounter was spent counseling and/or coordination of care.  45  minutes spent on total encounter; The necessity of the time spent during the encounter on this date of service was due to development of plan of care/coordination of care/glycemic control through review of labs, blood glucose values and vital signs. required multiple bedside visits and pump adjustment           71y/o M w/h/o uncontrolled T1DM (A1C 8.2%) on T Slim insulin pump PTA. No  known complications. Also h/o HTN, vertigo and BPH who was brought to OSH due to abdominal pain, nausea and vomiting found to be in DKA and transferred to [facility] after found to have triple vessel disease on [facility].   s/p CABG 3/27.      Home Regimen: T slimx2 novolog insulin pump   basal  12a 0.148  9a 0.75  5p 0.758  10p 0.712  ISF  12a 1:50  5p 1:57  10p 1:50  ICR  12a 1:18  9a 1:17  5p 1:!8  target 100  action time 5hr      Uncontrolled type 1 diabetes mellitus with ketoacidosis.   ·  Plan:  - BG Goal 100-180mg/dl    -Test BG ac and hs and 2am while on sq insulin and q6h if NPO.  - discontinue all subq insulin  - insulin pump replaced on 3/31 on control IQ  , next site change due 4/3  - dexcom restarted 3/31 , please cover with lead for any CXRs, dexcom would need to be removed if having any MRI procedure  - If Pump malfunctions please remove pump and give stat  Lantus  to 28 units/Admelog to 8 units TID AC/ moderate correction scales Discharge plan:  - While in patient noted pts home pump settings are much lower than inpt requirements with A1C Above goal, while admitted has used Basal insulin 20-28 units and admelog 6-8 units with meals; profile 2 added to pump with higher basal rates and intensified ICR monitoring pt on this profile while inpt  - Follow up with endocrinologist Dr Ngo within 2 weeks   - Make sure pt has Rx for all DM supplies and insulin at home prior to discharge.  - Needs Optho f/u as out pt.  -[facility] [phone]- spoke with triage RN - pt was having overnight hypos so 12mn setting was decreased over past 2 months, confirmed home settings and informed them of pts DKA and plan to change settings, pt  had recent telehealth appt on 2/23 ; pt should call for appt for close f/u with endocrinologist .Of note pt reported this tslim model is 3 yrs old- pt should check with MD if able to upgrade , it has a 4 yr warranty (called [employer] to confirm if within 4 yrs can get replacement if deemed malfunctioning)   3/31 spoke with Dr Ngo , please ensure op endo Dr Ngo is informed of new pump settings prior to discharge  xx [phone], [phone], [id number]    Insulin pump in place   - Tslimx 2 & Dexcom replaced 3/31 next site change for pump : 4/3, next dexcom change: 4/9   - RN  to ensure insulin pump attestation on chart  - Document insulin boluses/carbs on flow sheet   - pt has backup insulin pump supplies, wife bringing additional dexcom sensor on next visit  3/31/23  Added Profile 2: Basal 0000 1.2u/hr,  ICR 1:9 , ISF 1:50 ,  Pt has profile 2 on with control IQ ON  Further pump settings TBD based on glycemic trends on new settings        HTN (hypertension).   ·  Plan: - Home regimen: losartan 100mg daily  - BG goal and management  per cardiology team.      HLD (hyperlipidemia).   ·  Plan: LDL goal <55 due to DM and now CVD  C/w atorvastatin 80 milliGRAM(s) Oral at bedtime  Follow up levels as out pt.        discussed with patient and primary team  Sana BURTON  and RN   Contact via Microsoft Teams during business hours  To reach covering provider access AMION via [employer]  For Urgent matters/after-hours/weekends/holidays please page endocrine fellow on call   For nonurgent matters please email [email or web address]    Please note that this patient may be followed by different provider tomorrow.  Notify endocrine 24 hours prior to discharge for final recommendations    greater than 50% of the encounter was spent counseling and/or coordination of care.  45  minutes spent on total encounter; The necessity of the time spent during the encounter on this date of service was due to development of plan of care/coordination of care/glycemic control through review of labs, blood glucose values and vital signs. required multiple bedside visits and pump adjustment

## 2023-03-31 NOTE — PROGRESS NOTE ADULT - ASSESSMENT
Patient is 72 years old male with past medical history of Type I DM (50 years ago, on Insulin pump for last 15 years), Vertigo and BPH who was brought to ED due to Abdominal pain, Nausea and vomiting transferred from [facility] to [facility] for work up and management of NSTEMI

## 2023-03-31 NOTE — PROGRESS NOTE ADULT - SUBJECTIVE AND OBJECTIVE BOX
Patient is a 72y old  Male who presents with a chief complaint of CAD (31 Mar 2023 11:19)      DATE OF SERVICE: 03-31-23 @ 15:28    SUBJECTIVE / OVERNIGHT EVENTS: overnight events noted  transferred out to stepdown     ROS:  Resp: No cough no sputum production  CVS: No chest pain no palpitations no orthopnea  GI: no N/V/D  "I feel much better"         MEDICATIONS  (STANDING):  aMIOdarone    Tablet   Oral   aMIOdarone    Tablet 400 milliGRAM(s) Oral every 8 hours  ascorbic acid 500 milliGRAM(s) Oral two times a day  aspirin enteric coated 81 milliGRAM(s) Oral daily  atorvastatin 80 milliGRAM(s) Oral at bedtime  bisacodyl Suppository 10 milliGRAM(s) Rectal once  chlorhexidine 2% Cloths 1 Application(s) Topical daily  enoxaparin Injectable 40 milliGRAM(s) SubCutaneous every 24 hours  gabapentin 100 milliGRAM(s) Oral every 8 hours  insulin lispro (ADMELOG) Pump 1 Each SubCutaneous Continuous Pump  metoprolol tartrate 50 milliGRAM(s) Oral two times a day  pantoprazole    Tablet 40 milliGRAM(s) Oral before breakfast  polyethylene glycol 3350 17 Gram(s) Oral daily  senna 2 Tablet(s) Oral at bedtime  tamsulosin 0.4 milliGRAM(s) Oral at bedtime    MEDICATIONS  (PRN):  acetaminophen     Tablet .. 650 milliGRAM(s) Oral every 6 hours PRN Mild Pain (1 - 3)  oxyCODONE    IR 5 milliGRAM(s) Oral every 4 hours PRN Moderate Pain (4 - 6)  oxyCODONE    IR 10 milliGRAM(s) Oral every 4 hours PRN Severe Pain (7 - 10)        CAPILLARY BLOOD GLUCOSE      POCT Blood Glucose.: 135 mg/dL (31 Mar 2023 14:36)  POCT Blood Glucose.: 133 mg/dL (31 Mar 2023 11:06)  POCT Blood Glucose.: 148 mg/dL (31 Mar 2023 07:14)  POCT Blood Glucose.: 151 mg/dL (31 Mar 2023 01:13)  POCT Blood Glucose.: 148 mg/dL (30 Mar 2023 20:39)  POCT Blood Glucose.: 168 mg/dL (30 Mar 2023 16:26)    I&O's Summary    30 Mar 2023 07:01  -  31 Mar 2023 07:00  --------------------------------------------------------  IN: 1060 mL / OUT: 2325 mL / NET: -1265 mL    31 Mar 2023 07:01  -  31 Mar 2023 15:28  --------------------------------------------------------  IN: 1080 mL / OUT: 2400 mL / NET: -1320 mL        Vital Signs Last 24 Hrs  T(C): 36.7 (31 Mar 2023 14:40), Max: 37.3 (31 Mar 2023 07:15)  T(F): 98 (31 Mar 2023 14:40), Max: 99.1 (31 Mar 2023 07:15)  HR: 80 (31 Mar 2023 14:40) (73 - 128)  BP: 93/56 (31 Mar 2023 14:40) (93/56 - 124/64)  BP(mean): 69 (31 Mar 2023 14:40) (68 - 89)  RR: 17 (31 Mar 2023 14:40) (16 - 20)  SpO2: 94% (31 Mar 2023 14:40) (92% - 97%)    PHYSICAL EXAM:  EYES: EOMI, PERRLA  NECK: Supple, No JVD  CHEST/LUNG: clear  HEART: S1 S2; no murmurs   ABDOMEN: Soft, Nontender  EXTREMITIES:  no edema  NEUROLOGY: AO x 3 non-focal  SKIN: No rashes or lesions    LABS:                        9.0    7.56  )-----------( 274      ( 31 Mar 2023 06:16 )             27.8     03-31    131<L>  |  105  |  12  ----------------------------<  148<H>  3.9   |  27  |  0.78    Ca    8.6      31 Mar 2023 06:15  Phos  3.1     03-31  Mg     2.7     03-31    TPro  5.9<L>  /  Alb  3.3  /  TBili  0.8  /  DBili  x   /  AST  26  /  ALT  35  /  AlkPhos  118  03-31                All consultant(s) notes reviewed and care discussed with other providers        Contact Number, Dr Kelly [phone]

## 2023-03-31 NOTE — OCCUPATIONAL THERAPY INITIAL EVALUATION ADULT - THERAPY FREQUENCY, OT EVAL
Airway patent, Nasal mucosa clear. Mouth with normal mucosa. Throat has no vesicles, no oropharyngeal exudates and uvula is midline.
1-2x/week

## 2023-03-31 NOTE — OCCUPATIONAL THERAPY INITIAL EVALUATION ADULT - ADDITIONAL COMMENTS
[place] +4 YOANA and one level inside; tub shower, owns a cane. PTA pt was (I) with ADLs, used a straight cane however states he has frequent falls at home.

## 2023-03-31 NOTE — PROGRESS NOTE ADULT - ASSESSMENT
Patient seen and examined, agree with above assessment and plan as transcribed above.    - EP luis appreciated     Alec Mitchell MD, [facility]  BEEPER [phone]

## 2023-03-31 NOTE — OCCUPATIONAL THERAPY INITIAL EVALUATION ADULT - PERTINENT HX OF CURRENT PROBLEM, REHAB EVAL
72 years old male with PMHX of HTN, Type I DM (50 years ago, on Insulin pump for last 15 years), Vertigo and BPH who was brought to ED due to Abdominal pain, Nausea and vomiting. Patient also found to have elevated troponin. Patient transferred over from [facility] s/p [facility] showing TVD CAD.  CAD s/p CABG POD#1
72 years old male with PMHX of HTN, Type I DM (50 years ago, on Insulin pump for last 15 years), Vertigo and BPH who was brought to ED due to Abdominal pain, Nausea and vomiting. Patient also found to have elevated troponin. Patient transferred over from [facility] s/p [facility] showing. s/p CAB 3/27

## 2023-03-31 NOTE — CONSULT NOTE ADULT - SUBJECTIVE AND OBJECTIVE BOX
EP Attending  HISTORY OF PRESENT ILLNESS: HPI:  72 years old male with PMHX of HTN, Type I DM (50 years ago, on Insulin pump for last 15 years), Vertigo and BPH who was brought to ED due to Abdominal pain, Nausea and vomiting. Patient also found to have elevated troponin. Patient transferred over from [facility] s/p [facility] showing TVD CAD.   (22 Mar 2023 18:16)    He is doing overall well after NSTEMI and getting CABG.  Post op, is experiencing paroxysmal AFib that is symptomatic with chest pressure, shortness of breath, palpitations and lightheadedness. He finds these symptoms distracting from his desires to eat and ambulate.  He has no prior arrhythmia diagnosis. [facility]  No prior fainting, palpitations or exertional dizziness. A 10 pt ROS is otherwise negative.  Has been on Apixaban before, not for arrhythmia but 'clot prevention after heel bone surgery'.    PAST MEDICAL & SURGICAL HISTORY:  Diabetes  Hypertension  BPH (benign prostatic hyperplasia)  S/P cataract surgery  right eye  Steel plate in right arm    MEDICATIONS  (STANDING):  aMIOdarone    Tablet   Oral   aMIOdarone    Tablet 400 milliGRAM(s) Oral every 8 hours  ascorbic acid 500 milliGRAM(s) Oral two times a day  aspirin enteric coated 81 milliGRAM(s) Oral daily  atorvastatin 80 milliGRAM(s) Oral at bedtime  bisacodyl Suppository 10 milliGRAM(s) Rectal once  chlorhexidine 2% Cloths 1 Application(s) Topical daily  enoxaparin Injectable 40 milliGRAM(s) SubCutaneous every 24 hours  gabapentin 100 milliGRAM(s) Oral every 8 hours  insulin glargine Injectable (LANTUS) 28 Unit(s) SubCutaneous at bedtime  insulin lispro (ADMELOG) corrective regimen sliding scale   SubCutaneous three times a day before meals  insulin lispro (ADMELOG) corrective regimen sliding scale   SubCutaneous <User Schedule>  insulin lispro Injectable (ADMELOG) 8 Unit(s) SubCutaneous three times a day before meals  metoprolol tartrate 50 milliGRAM(s) Oral two times a day  pantoprazole    Tablet 40 milliGRAM(s) Oral before breakfast  polyethylene glycol 3350 17 Gram(s) Oral daily  senna 2 Tablet(s) Oral at bedtime  tamsulosin 0.4 milliGRAM(s) Oral at bedtime    Allergies    Neosporin (Rash)    Intolerances    FAMILY HISTORY:  No pertinent family history in first degree relatives      Non-contributary for premature coronary disease or sudden cardiac death    SOCIAL HISTORY:    [x ] Non-smoker  [ ] Smoker  [ ] Alcohol      PHYSICAL EXAM:  T(C): 37.3 (03-31-23 @ 07:15), Max: 37.3 (03-31-23 @ 07:15)  HR: 86 (03-31-23 @ 09:34) (69 - 128)  BP: 95/56 (03-31-23 @ 09:34) (95/56 - 124/64)  RR: 16 (03-31-23 @ 09:34) (16 - 20)  SpO2: 96% (03-31-23 @ 09:34) (92% - 99%)  Wt(kg): --    General: Well nourished, no acute distress, alert and oriented x 3  Head: normocephalic, no trauma  Neck: no JVD, no bruit, supple, not enlarged  CV: sternotomy.  regular S1S2, no S3, regular rate,  no murmurs.    Lungs: clear BL, no rales or wheezes  Abdomen: bowel sounds +, soft, nontender, nondistended  Extremities: no clubbing, cyanosis or edema  Neuro: Moves all 4 extremities, sensation intact x 4 extremities  Skin: warm and moist, normal turgor  Psych: Mood and affect are appropriate for circumstances  MSK: normal range of motion and strength x4 extremities.    TELEMETRY: background of normal sinus rhythm. episodes of rapid AFib up to 140bpm, occasionally with LBBB QRS Abberancy.	    ECG: NSR, RBBB. 	  Echo:  < from: Intra-Operative Transesophageal Echo W or WO Ultrasound Enhancing Agent (03.27.23 @ 07:16) >  Post-Bypass:  No changes from baseline. S/p CABG x3.  LV function remains low-normal Function. EF ~50%; No new wall motion abnormalities. lateral wall remains mildly hypokinetic.  Normal RV function.  Min TR.  Min-mild PI.  min-Mild MR.  no AI.  No aortic dissection visualized.  All findings discussed with Surgeon. Probe removed atraumatically, no blood. The patient tolerated the procedure welland without complications. Permanent recorded images are stored in the medical record.  < end of copied text >      	  LABS:	 	                          9.0    7.56  )-----------( 274      ( 31 Mar 2023 06:16 )             27.8     03-31    131<L>  |  105  |  12  ----------------------------<  148<H>  3.9   |  27  |  0.78    Ca    8.6      31 Mar 2023 06:15  Phos  3.1     03-31  Mg     2.7     03-31    TPro  5.9<L>  /  Alb  3.3  /  TBili  0.8  /  DBili  x   /  AST  26  /  ALT  35  /  AlkPhos  118  03-31      ASSESSMENT/PLAN: Mr Major is a very pleasant 72y Male seen in CTS Stepdown Unit, recovering from CABG. He presented with a NSTEMI. He has history of HTN and diabetes, under care at the [facility].  EP called re: post op AFib, highly symptomatic.    Continue insulin infusion for diabetes mellitus.  Continue aspirin, atorvastatin and metoprolol for CAD.  Recovering well from CABG.  New/ Post-Op AFib, paroxysmal, symptomatic.  Amiodarone load 400mg TID x 12 doses then 200mg daily.    Recommend also starting Apixaban 5mg BID for stroke prevention, as his [id number] score is at least 4 (age, HTN, DM, MI).    Regular Holter monitoring in outpatient setting for AF surveillance.  Any decision to stop anticoagulation should be deferred to outpatient management.  At this time, no plan for cardioversion, as his AFib is paroxymsal.    Joshua Del Castillo M.D.  Cardiac Electrophysiology    office [phone]  pager [phone]
CC: R ear pain     HPI: 72 years old male with PMHX of HTN, Type I DM (50 years ago, on Insulin pump for last 15 years), Vertigo, tinnitus, and BPH who was brought to ED due to Abdominal pain, Nausea and vomiting. Patient also found to have elevated troponin. Patient transferred over from [facility] s/p [facility] showing   TVD CAD. Planned for CABG tomorrow. Now presenting with right ear pain. Pt states that he developed right ear pain yesterday while in the ER and is now progressing to right jaw pain. Pt denies n/v, dizziness, congestion, recent URI, otorrhea, hearing loss, hx of sx or trauma or recent travel.       PAST MEDICAL & SURGICAL HISTORY:  Diabetes      Hypertension      BPH (benign prostatic hyperplasia)      S/P cataract surgery  right eye      Steel plate in right arm        Allergies    Neosporin (Rash)    Intolerances      MEDICATIONS  (STANDING):  acetaminophen     Tablet .. 1000 milliGRAM(s) Oral once  ascorbic acid 2000 milliGRAM(s) Oral once  aspirin enteric coated 81 milliGRAM(s) Oral daily  atorvastatin 20 milliGRAM(s) Oral at bedtime  cefuroxime  IVPB 1500 milliGRAM(s) IV Intermittent once  chlorhexidine 0.12% Liquid 15 milliLiter(s) Swish and Spit two times a day  chlorhexidine 4% Liquid 1 Application(s) Topical two times a day  gabapentin 300 milliGRAM(s) Oral once  heparin   Injectable 5000 Unit(s) SubCutaneous every 8 hours  insulin glargine Injectable (LANTUS) 24 Unit(s) SubCutaneous <User Schedule>  insulin lispro (ADMELOG) corrective regimen sliding scale   SubCutaneous at bedtime  insulin lispro (ADMELOG) corrective regimen sliding scale   SubCutaneous three times a day before meals  insulin lispro Injectable (ADMELOG) 6 Unit(s) SubCutaneous three times a day before meals  isosorbide   mononitrate ER Tablet (IMDUR) 30 milliGRAM(s) Oral daily  meclizine 25 milliGRAM(s) Oral daily  metoprolol tartrate 25 milliGRAM(s) Oral two times a day  montelukast 10 milliGRAM(s) Oral daily  polyethylene glycol 3350 17 Gram(s) Oral daily  senna 2 Tablet(s) Oral at bedtime  sodium chloride 0.9% lock flush 3 milliLiter(s) IV Push every 8 hours  tamsulosin 0.4 milliGRAM(s) Oral at bedtime    MEDICATIONS  (PRN):  FIRST- Mouthwash  BLM 15 milliLiter(s) Swish and Spit every 8 hours PRN Mouth Care      FAMILY HISTORY:  No pertinent family history in first degree relatives. No pertinent family history of: N/A.     Social History:  · Substance use	No  · Social History (marital status, living situation, occupation, and sexual history)	Denies smoking, ETOH use, No drug use  Ambulates with Cane   Lives with Wife     Tobacco Screening:  · Core Measure Site	No  · Has the patient used tobacco in the past 30 days?	No      ROS:   ENT: all negative except as noted in HPI   CV: denies palpitations  Pulm: denies SOB, cough, hemoptysis  GI: denies change in appetite, indigestion, n/v  : denies pertinent urinary symptoms, urgency  Neuro: denies numbness/tingling, loss of sensation  Psych: denies anxiety  MS: denies muscle weakness, instability  Heme: denies easy bruising or bleeding  Endo: denies heat/cold intolerance, excessive sweating  Vascular: denies LE edema    Vital Signs Last 24 Hrs  T(C): 36.7 (26 Mar 2023 13:19), Max: 37 (25 Mar 2023 19:58)  T(F): 98.1 (26 Mar 2023 13:19), Max: 98.6 (25 Mar 2023 19:58)  HR: 81 (26 Mar 2023 13:19) (72 - 81)  BP: 126/68 (26 Mar 2023 13:19) (120/63 - 126/68)  BP(mean): --  RR: 18 (26 Mar 2023 13:19) (18 - 18)  SpO2: 99% (26 Mar 2023 13:19) (95% - 99%)    Parameters below as of 26 Mar 2023 13:19  Patient On (Oxygen Delivery Method): room air                              9.8    5.54  )-----------( 299      ( 26 Mar 2023 06:20 )             30.7    03-26    139  |  103  |  11  ----------------------------<  106<H>  3.6   |  25  |  0.66    Ca    8.7      26 Mar 2023 06:20         PHYSICAL EXAM:  Gen: NAD  Skin: No rashes, bruises, or lesions  Head: Normocephalic, Atraumatic  Face: no edema, erythema, or fluctuance. Parotid glands soft without mass  Eyes: no scleral injection  Ears: Right - ear canal clear, TM intact without effusion or erythema. No evidence of any fluid drainage. No mastoid tenderness, erythema, or ear bulging            Left - ear canal clear, TM intact without effusion or erythema. No evidence of any fluid drainage. No mastoid tenderness, erythema, or ear bulging  Nose: Nares bilaterally patent, no discharge  Mouth: +Crepitus and +TTP of right TMJ. No Stridor / Drooling / Trismus.  Mucosa moist, tongue/uvula midline, oropharynx clear  Neck: Flat, supple, no lymphadenopathy, trachea midline, no masses  Lymphatic: No lymphadenopathy  Resp: breathing easily, no stridor  CV: no peripheral edema/cyanosis  GI: nondistended   Peripheral vascular: no JVD or edema  Neuro: facial nerve intact, no facial droop

## 2023-03-31 NOTE — PROGRESS NOTE ADULT - ASSESSMENT
72 years old male with PMHX of HTN, Type I DM (50 years ago, on Insulin pump for last 15 years), Vertigo and BPH who was brought to ED due to Abdominal pain, Nausea and vomiting. Patient also found to have elevated troponin. Patient transferred over from [facility] s/p [facility] showing   TVD CAD.      3/22 VSS; Patient seen at bedside, resting comfortably on room air. In NAD, no SOB, or CP at this time.   3/23 /64 -171/81. OR Thursday 3/30.  Radial mapping.  Pt transferred to Dr Sherwood.  Carotids neg.  EF 53%  3/24 Scheduled for cabg this Monday Endocrinology recommendations appreciated.  3/25 reserve left arm for radial harvest  3/27 s/p cabg x 3 limaLIMA-LAD | SVG-OM | SVG-OM  ef 55  Right greater saphenous vein harvested  Extubated d 0  Insulin infusion  Preop insulin pump post op labile glucose  PAF, amio load, lopressor.  Episodes of hypotesion, PW attached , pacer off  Diuresis  3/30 transferred to sdu   72 years old male with PMHX of HTN, Type I DM (50 years ago, on Insulin pump for last 15 years), Vertigo and BPH who was brought to ED due to Abdominal pain, Nausea and vomiting. Patient also found to have elevated troponin. Patient transferred over from [facility] s/p [facility] showing   TVD CAD.      3/22 VSS; Patient seen at bedside, resting comfortably on room air. In NAD, no SOB, or CP at this time.   3/23 /64 -171/81. OR Thursday 3/30.  Radial mapping.  Pt transferred to Dr Herndon.  Carotids neg.  EF 53%  3/24 Scheduled for cabg this Monday Endocrinology recommendations appreciated.  3/25 reserve left arm for radial harvest  3/27 s/p cabg x 3 limaLIMA-LAD | SVG-OM | SVG-OM  ef 55  Right greater saphenous vein harvested  Extubated d 0  Insulin infusion  Preop insulin pump post op labile glucose  PAF, amio load, lopressor.  Episodes of hypotesion, PW attached , pacer off  Diuresis  3/30 transferred to sdu  3/31 VSS: RSR 70's w/ episodes of Pafib/ EP consulted with Dr. Del Castillo as per cardiology; continue amio load; increase lop 50 mg po bid; monitor and supplement electrolytes; pw d/c as per Dr. Herndon; eliquis 5 bid recommended as per EP; d/w Dr. Herndon  hold off on eliquis today since pw removal this am as per Dr. HERNDON; if continued pafib - likely start in am but eliquis 2.5 bid   insulin pump resumed today as per pt and HAYDEN darling; prn diuretics as per    72 years old male with PMHX of HTN, Type I DM (50 years ago, on Insulin pump for last 15 years), Vertigo and BPH who was brought to ED due to Abdominal pain, Nausea and vomiting. Patient also found to have elevated troponin. Patient transferred over from [facility] s/p [facility] showing   TVD CAD.      3/22 VSS; Patient seen at bedside, resting comfortably on room air. In NAD, no SOB, or CP at this time.   3/23 /64 -171/81. OR Thursday 3/30.  Radial mapping.  Pt transferred to Dr Herndon.  Carotids neg.  EF 53%  3/24 Scheduled for cabg this Monday Endocrinology recommendations appreciated.  3/25 reserve left arm for radial harvest  3/27 s/p cabg x 3 limaLIMA-LAD | SVG-OM | SVG-OM  ef 55  Right greater saphenous vein harvested  Extubated d 0  Insulin infusion  Preop insulin pump post op labile glucose  PAF, amio load, lopressor.  Episodes of hypotesion, PW attached , pacer off  Diuresis  3/30 transferred to sdu  3/31 VSS: RSR 70's w/ episodes of Pafib/ EP consulted with Dr. Del Castillo as per cardiology; continue amio load; increase lop 50 mg po bid; monitor and supplement electrolytes; pw d/c as per Dr. Herndon; eliquis 5 bid recommended as per EP; d/w Dr. Herndon  hold off on eliquis today since pw removal this am as per Dr. HERNDON; if continued pafib - likely start in am but eliquis 2.5 bid   insulin pump resumed today as per pt and HAYDEN darling; prn diuretics as per Dr. Herndon- lasix 40 mg iv given this am  continue to monitor in sdu today  Discharge planning- home when stable

## 2023-03-31 NOTE — PROGRESS NOTE ADULT - SUBJECTIVE AND OBJECTIVE BOX
C A R D I O L O G Y  **********************************     DATE OF SERVICE: 03-31-23    Patient reports COURTNEY requiring nasal cannula. denies chest pain or palpitations.   Review of symptoms otherwise negative.    MEDICATIONS:  acetaminophen     Tablet .. 650 milliGRAM(s) Oral every 6 hours PRN  aMIOdarone    Tablet   Oral   aMIOdarone    Tablet 400 milliGRAM(s) Oral every 8 hours  ascorbic acid 500 milliGRAM(s) Oral two times a day  aspirin enteric coated 81 milliGRAM(s) Oral daily  atorvastatin 80 milliGRAM(s) Oral at bedtime  bisacodyl Suppository 10 milliGRAM(s) Rectal once  chlorhexidine 2% Cloths 1 Application(s) Topical daily  enoxaparin Injectable 40 milliGRAM(s) SubCutaneous every 24 hours  gabapentin 100 milliGRAM(s) Oral every 8 hours  metoprolol tartrate 50 milliGRAM(s) Oral two times a day  oxyCODONE    IR 5 milliGRAM(s) Oral every 4 hours PRN  oxyCODONE    IR 10 milliGRAM(s) Oral every 4 hours PRN  pantoprazole    Tablet 40 milliGRAM(s) Oral before breakfast  polyethylene glycol 3350 17 Gram(s) Oral daily  senna 2 Tablet(s) Oral at bedtime  tamsulosin 0.4 milliGRAM(s) Oral at bedtime      LABS:                        9.0    7.56  )-----------( 274      ( 31 Mar 2023 06:16 )             27.8       Hemoglobin: 9.0 g/dL (03-31 @ 06:16)  Hemoglobin: 8.1 g/dL (03-30 @ 00:10)  Hemoglobin: 8.6 g/dL (03-29 @ 00:38)  Hemoglobin: 8.6 g/dL (03-28 @ 00:23)  Hemoglobin: 9.6 g/dL (03-27 @ 15:51)      03-31    131<L>  |  105  |  12  ----------------------------<  148<H>  3.9   |  27  |  0.78    Ca    8.6      31 Mar 2023 06:15  Phos  3.1     03-31  Mg     2.7     03-31    TPro  5.9<L>  /  Alb  3.3  /  TBili  0.8  /  DBili  x   /  AST  26  /  ALT  35  /  AlkPhos  118  03-31    Creatinine Trend: 0.78<--, 0.75<--, 0.81<--, 0.71<--, 0.48<--, 0.45<--    COAGS:           PHYSICAL EXAM:  T(C): 37.3 (03-31-23 @ 07:15), Max: 37.3 (03-31-23 @ 07:15)  HR: 86 (03-31-23 @ 09:34) (73 - 128)  BP: 95/56 (03-31-23 @ 09:34) (95/56 - 124/64)  RR: 16 (03-31-23 @ 09:34) (16 - 20)  SpO2: 96% (03-31-23 @ 09:34) (92% - 99%)  Wt(kg): --    I&O's Summary    30 Mar 2023 07:01  -  31 Mar 2023 07:00  --------------------------------------------------------  IN: 1060 mL / OUT: 2325 mL / NET: -1265 mL    31 Mar 2023 07:01  -  31 Mar 2023 11:19  --------------------------------------------------------  IN: 480 mL / OUT: 1250 mL / NET: -770 mL        Gen: NAD  HEENT:  (-)icterus (-)pallor  CV: N S1 S2 1/6 RONI (+)2 Pulses B/l  Resp:  Clear to auscultation B/L, normal effort  GI: (+) BS Soft, NT, ND  Lymph:  (-)Edema, (-)obvious lymphadenopathy  Skin: Warm to touch, Normal turgor  Psych: Appropriate mood and affect      TELEMETRY: SR 70s, PAF    < from: TTE with Doppler (w/Cont) (03.16.23 @ 13:36) >  CONCLUSIONS:  1. Normal mitral valve. Mildly calcified chordae tendinae.  Mild mitral regurgitation.  2. Calcified aortic valve was not well visualized but has  normal opening. No aortic stenosis. No aortic valve  regurgitation seen.  3. Normal left ventricular internal dimensions and wall  thicknesses.  4. Low-normal Left Ventricular Systolic Function,  (EF =  53% by biplane). No regional wall motion abnormalities.  Ultrasound LV opacification agent was used to enhance  endocardial definition.  5. Right ventricle not well visualized. Normal RVsystolic  function (RV tissue Doppler 11 cm/s).  6. RV systolic pressure is borderline normal at  33 mm Hg.  7. Normal tricuspid valve. Moderate tricuspid  regurgitation.  8. No pericardial effusion.    *** No previous Echo exam.    < end of copied text >    < from: Cardiac Catheterization (03.22.23 @ 09:42) >  Diagnostic Conclusions:     Large LAD with severe bifurcating/ostial and heavily calcifed disease   LCX with Bifurcating/ostial disease   RCA with Moderate Disease   Recommendations:     CABG evaluation      < end of copied text >      ASSESSMENT/PLAN: Patient is 72 year old male with pmh of Type I DM (50 years ago, on Insulin pump for last 15 years), HTN, Vertigo and BPH who was brought to ED due to Abdominal pain, Nausea and vomiting. Cardiology consulted for increased troponin.    #NSTEMI  - TTE with low normal LV function and no wall motion abnormalities  - s/p cath with multivessel CAD noted above  - Transferred to [facility] for CABG evaluation  - Now s/p CABGx3 on 3/27  - Continue ASA/Plavix/Lipitor  - Weaned off pressors, continue metoprolol  - Diuretics appear to be on hold for hypotension and hyponatremia - f/u CXR results  - CTS follow up    #DKA/Type 1 DM  - Glycemic control per endocrinology    #HTN  - Continue metoprolol. Remainder of meds on hold post op    #Post op PAF  - Continue metoprolol to prevent AF RVR  - Amio load started to maintain NSR  - EP consult appreciated - recommend starting AC with Eliquis when ok with CTS    - Patient to follow up in our [place] office with Dr. Wahl on 4/12 at 12:15 PM ([address], [address], [place] [address] - Office #[phone])    Mariano Vernon PA-C  Pager: [phone]

## 2023-03-31 NOTE — PROGRESS NOTE ADULT - SUBJECTIVE AND OBJECTIVE BOX
VITAL SIGNS    Telemetry:    Vital Signs Last 24 Hrs  T(C): 36.1 (03-31-23 @ 03:00), Max: 36.7 (03-30-23 @ 15:12)  T(F): 97 (03-31-23 @ 03:00), Max: 98.1 (03-30-23 @ 23:13)  HR: 73 (03-31-23 @ 03:00) (66 - 124)  BP: 114/71 (03-31-23 @ 03:00) (90/55 - 132/65)  RR: 19 (03-31-23 @ 03:00) (16 - 23)  SpO2: 95% (03-31-23 @ 03:00) (93% - 99%)            03-30 @ 07:01  -  03-31 @ 07:00  --------------------------------------------------------  IN: 1060 mL / OUT: 2325 mL / NET: -1265 mL       Daily     Daily   Admit Wt: Drug Dosing Weight  Height (cm): 188 (27 Mar 2023 09:24)  Weight (kg): 89.7 (27 Mar 2023 09:24)  BMI (kg/m2): 25.4 (27 Mar 2023 09:24)  BSA (m2): 2.16 (27 Mar 2023 09:24)    Bilirubin Total, Serum: 0.8 mg/dL (03-31 @ 06:15)    CAPILLARY BLOOD GLUCOSE      POCT Blood Glucose.: 151 mg/dL (31 Mar 2023 01:13)  POCT Blood Glucose.: 148 mg/dL (30 Mar 2023 20:39)  POCT Blood Glucose.: 168 mg/dL (30 Mar 2023 16:26)  POCT Blood Glucose.: 182 mg/dL (30 Mar 2023 11:56)          LABS:    03-30 @ 07:01  -  03-31 @ 07:00  --------------------------------------------------------  IN: 1060 mL / OUT: 2325 mL / NET: -1265 mL    cret                        9.0    7.56  )-----------( x        ( 31 Mar 2023 06:16 )             27.8     03-31    131<L>  |  105  |  12  ----------------------------<  148<H>  3.9   |  27  |  0.78    Ca    8.6      31 Mar 2023 06:15  Phos  3.1     03-31  Mg     2.7     03-31    TPro  5.9<L>  /  Alb  3.3  /  TBili  0.8  /  DBili  x   /  AST  26  /  ALT  35  /  AlkPhos  118  03-31        acetaminophen     Tablet .. 650 milliGRAM(s) Oral every 6 hours PRN  aMIOdarone    Tablet   Oral   aMIOdarone    Tablet 400 milliGRAM(s) Oral every 8 hours  ascorbic acid 500 milliGRAM(s) Oral two times a day  aspirin enteric coated 81 milliGRAM(s) Oral daily  atorvastatin 80 milliGRAM(s) Oral at bedtime  bisacodyl Suppository 10 milliGRAM(s) Rectal once  chlorhexidine 2% Cloths 1 Application(s) Topical daily  clopidogrel Tablet 75 milliGRAM(s) Oral daily  enoxaparin Injectable 40 milliGRAM(s) SubCutaneous every 24 hours  gabapentin 100 milliGRAM(s) Oral every 8 hours  insulin glargine Injectable (LANTUS) 28 Unit(s) SubCutaneous at bedtime  insulin lispro (ADMELOG) corrective regimen sliding scale   SubCutaneous three times a day before meals  insulin lispro (ADMELOG) corrective regimen sliding scale   SubCutaneous <User Schedule>  insulin lispro Injectable (ADMELOG) 8 Unit(s) SubCutaneous three times a day before meals  metoprolol tartrate 25 milliGRAM(s) Oral every 8 hours  oxyCODONE    IR 5 milliGRAM(s) Oral every 4 hours PRN  oxyCODONE    IR 10 milliGRAM(s) Oral every 4 hours PRN  pantoprazole    Tablet 40 milliGRAM(s) Oral before breakfast  polyethylene glycol 3350 17 Gram(s) Oral daily  senna 2 Tablet(s) Oral at bedtime  tamsulosin 0.4 milliGRAM(s) Oral at bedtime      PHYSICAL EXAM    Subjective: "Hi.   Neurology: alert and oriented x 3, nonfocal, no gross deficits  CV : tele:  RSR  Sternal Wound :  CDI with dressing , Stable  Lungs: clear. RR easy, unlabored   Abdomen: soft, nontender, nondistended, positive bowel sounds, bowel movement   Neg N/V/D   :  pt voiding without difficulty   Extremities:   STOUT; edema, neg calf tenderness.   PPP bilaterally      PW:  Chest tubes:                 VITAL SIGNS    Telemetry:  RSR 70-80 W/ episodes of pafib 130   Vital Signs Last 24 Hrs  T(C): 36.1 (03-31-23 @ 03:00), Max: 36.7 (03-30-23 @ 15:12)  T(F): 97 (03-31-23 @ 03:00), Max: 98.1 (03-30-23 @ 23:13)  HR: 73 (03-31-23 @ 03:00) (66 - 124)  BP: 114/71 (03-31-23 @ 03:00) (90/55 - 132/65)  RR: 19 (03-31-23 @ 03:00) (16 - 23)  SpO2: 95% (03-31-23 @ 03:00) (93% - 99%)            03-30 @ 07:01  -  03-31 @ 07:00  --------------------------------------------------------  IN: 1060 mL / OUT: 2325 mL / NET: -1265 mL       Daily     Daily   Admit Wt: Drug Dosing Weight  Height (cm): 188 (27 Mar 2023 09:24)  Weight (kg): 89.7 (27 Mar 2023 09:24)  BMI (kg/m2): 25.4 (27 Mar 2023 09:24)  BSA (m2): 2.16 (27 Mar 2023 09:24)    Bilirubin Total, Serum: 0.8 mg/dL (03-31 @ 06:15)    CAPILLARY BLOOD GLUCOSE      POCT Blood Glucose.: 151 mg/dL (31 Mar 2023 01:13)  POCT Blood Glucose.: 148 mg/dL (30 Mar 2023 20:39)  POCT Blood Glucose.: 168 mg/dL (30 Mar 2023 16:26)  POCT Blood Glucose.: 182 mg/dL (30 Mar 2023 11:56)          LABS:    03-30 @ 07:01  -  03-31 @ 07:00  --------------------------------------------------------  IN: 1060 mL / OUT: 2325 mL / NET: -1265 mL    cret                        9.0    7.56  )-----------( x        ( 31 Mar 2023 06:16 )             27.8     03-31    131<L>  |  105  |  12  ----------------------------<  148<H>  3.9   |  27  |  0.78    Ca    8.6      31 Mar 2023 06:15  Phos  3.1     03-31  Mg     2.7     03-31    TPro  5.9<L>  /  Alb  3.3  /  TBili  0.8  /  DBili  x   /  AST  26  /  ALT  35  /  AlkPhos  118  03-31        acetaminophen     Tablet .. 650 milliGRAM(s) Oral every 6 hours PRN  aMIOdarone    Tablet   Oral   aMIOdarone    Tablet 400 milliGRAM(s) Oral every 8 hours  ascorbic acid 500 milliGRAM(s) Oral two times a day  aspirin enteric coated 81 milliGRAM(s) Oral daily  atorvastatin 80 milliGRAM(s) Oral at bedtime  bisacodyl Suppository 10 milliGRAM(s) Rectal once  chlorhexidine 2% Cloths 1 Application(s) Topical daily  clopidogrel Tablet 75 milliGRAM(s) Oral daily  enoxaparin Injectable 40 milliGRAM(s) SubCutaneous every 24 hours  gabapentin 100 milliGRAM(s) Oral every 8 hours  insulin glargine Injectable (LANTUS) 28 Unit(s) SubCutaneous at bedtime  insulin lispro (ADMELOG) corrective regimen sliding scale   SubCutaneous three times a day before meals  insulin lispro (ADMELOG) corrective regimen sliding scale   SubCutaneous <User Schedule>  insulin lispro Injectable (ADMELOG) 8 Unit(s) SubCutaneous three times a day before meals  metoprolol tartrate 25 milliGRAM(s) Oral every 8 hours  oxyCODONE    IR 5 milliGRAM(s) Oral every 4 hours PRN  oxyCODONE    IR 10 milliGRAM(s) Oral every 4 hours PRN  pantoprazole    Tablet 40 milliGRAM(s) Oral before breakfast  polyethylene glycol 3350 17 Gram(s) Oral daily  senna 2 Tablet(s) Oral at bedtime  tamsulosin 0.4 milliGRAM(s) Oral at bedtime      PHYSICAL EXAM    Subjective: "I feel ok."  Neurology: alert and oriented x 3, nonfocal, no gross deficits  CV : tele:  RSR 70-80 W/ episodes of pafib 130  Sternal Wound :  CDI with dressing , Stable  Lungs: clear. RR easy, unlabored   Abdomen: soft, nontender, nondistended, positive bowel sounds, + bowel movement   Neg N/V/D   :  pt voiding without difficulty   Extremities:   STOUT; trace LE edema, neg calf tenderness.   PPP bilaterally; rt svg site cdi w/ ace wrap      PW: d/c this am   Chest tubes: none

## 2023-03-31 NOTE — PROGRESS NOTE ADULT - PROBLEM SELECTOR PLAN 1
Asa, Statin, B-blocker, Chest PT,  Incentive spirometry, wound care and assessment.  Ambulate continue postop care  continue asa/ statin  increase bb lop 50 mg po bid for HR control  continue amio load  d/c pw   prn diuretics as per Dr. Sherwood  puljodie toilet  pain management  bowel regimen  increase activity as tolerated  continue to monitor in SDU today  discharge planning- home when stable

## 2023-03-31 NOTE — PROGRESS NOTE ADULT - SUBJECTIVE AND OBJECTIVE BOX
seen earlier today     Chief Complaint: Diabetes Mellitus follow up    INTERVAL HX: did not start dexcom overnight as pt wanted to wait for todays visit,  pt tolerating po , BG mostly at goal , ep consulted for afib no plan for cardioversion, restarted insulin pump today independently in prescence of endocrine provider . pt reports he has had this model of tslim for 3 yrs   wife at bedside upon return visit in afternoon - reports he is sometimes forgetful, pt A&OX3 answering questions appropriately and reports he does not forget to bolus for meals , educated pt on importance of informing RN when he boluses/how much/carb counts . pt was provided with CHO menu yesterday by writer       Review of Systems:  General: As above  Cardiovascular: No chest pain  Respiratory: No SOB  GI: No nausea, vomiting  Endocrine: no  S&Sx of hypoglycemia    Allergies    Neosporin (Rash)    Intolerances      MEDICATIONS  (STANDING):  aMIOdarone    Tablet   Oral   aMIOdarone    Tablet 400 milliGRAM(s) Oral every 8 hours  ascorbic acid 500 milliGRAM(s) Oral two times a day  aspirin enteric coated 81 milliGRAM(s) Oral daily  atorvastatin 80 milliGRAM(s) Oral at bedtime  bisacodyl Suppository 10 milliGRAM(s) Rectal once  chlorhexidine 2% Cloths 1 Application(s) Topical daily  enoxaparin Injectable 40 milliGRAM(s) SubCutaneous every 24 hours  gabapentin 100 milliGRAM(s) Oral every 8 hours  insulin lispro (ADMELOG) Pump 1 Each SubCutaneous Continuous Pump  metoprolol tartrate 50 milliGRAM(s) Oral two times a day  pantoprazole    Tablet 40 milliGRAM(s) Oral before breakfast  polyethylene glycol 3350 17 Gram(s) Oral daily  senna 2 Tablet(s) Oral at bedtime  tamsulosin 0.4 milliGRAM(s) Oral at bedtime      atorvastatin   80 milliGRAM(s) Oral (03-30-23 @ 21:17)    insulin glargine Injectable (LANTUS)   28 Unit(s) SubCutaneous (03-30-23 @ 21:17)    insulin lispro (ADMELOG) corrective regimen sliding scale   2  SubCutaneous (03-30-23 @ 17:42)    insulin lispro Injectable (ADMELOG)   8 Unit(s) SubCutaneous (03-31-23 @ 07:53)   8 Unit(s) SubCutaneous (03-30-23 @ 17:11)    insulin lispro Injectable (ADMELOG).   8 Unit(s) SubCutaneous (03-31-23 @ 12:04)        PHYSICAL EXAM:  VITALS: T(C): 36.7 (03-31-23 @ 14:40)  T(F): 98 (03-31-23 @ 14:40), Max: 99.1 (03-31-23 @ 07:15)  HR: 80 (03-31-23 @ 14:40) (73 - 128)  BP: 93/56 (03-31-23 @ 14:40) (93/56 - 124/64)  RR:  (16 - 20)  SpO2:  (92% - 97%)  Wt(kg): --  GENERAL: male sitting in chair in NAD, MSI CDI   Diabetes: LLQ abdomen tslim infusion set CDI (placed 3/31), dexcom cgm RLQ Abdomen CDI (Placed 3/31)  Respiratory: Respirations unlabored   Extremities: Warm, no edema  NEURO: Alert , appropriate     LABS:  POCT Blood Glucose.: 135 mg/dL (03-31-23 @ 14:36)  POCT Blood Glucose.: 133 mg/dL (03-31-23 @ 11:06)  POCT Blood Glucose.: 148 mg/dL (03-31-23 @ 07:14)  POCT Blood Glucose.: 151 mg/dL (03-31-23 @ 01:13)  POCT Blood Glucose.: 148 mg/dL (03-30-23 @ 20:39)  POCT Blood Glucose.: 168 mg/dL (03-30-23 @ 16:26)  POCT Blood Glucose.: 182 mg/dL (03-30-23 @ 11:56)  POCT Blood Glucose.: 186 mg/dL (03-30-23 @ 06:48)  POCT Blood Glucose.: 153 mg/dL (03-30-23 @ 01:50)  POCT Blood Glucose.: 153 mg/dL (03-29-23 @ 22:26)  POCT Blood Glucose.: 164 mg/dL (03-29-23 @ 21:01)  POCT Blood Glucose.: 151 mg/dL (03-29-23 @ 20:02)  POCT Blood Glucose.: 95 mg/dL (03-29-23 @ 18:42)  POCT Blood Glucose.: 92 mg/dL (03-29-23 @ 18:21)  POCT Blood Glucose.: 185 mg/dL (03-29-23 @ 16:49)  POCT Blood Glucose.: 190 mg/dL (03-29-23 @ 16:12)  POCT Blood Glucose.: 246 mg/dL (03-29-23 @ 15:11)  POCT Blood Glucose.: 292 mg/dL (03-29-23 @ 14:09)  POCT Blood Glucose.: 249 mg/dL (03-29-23 @ 12:01)  POCT Blood Glucose.: 213 mg/dL (03-29-23 @ 11:03)  POCT Blood Glucose.: 249 mg/dL (03-29-23 @ 06:43)  POCT Blood Glucose.: 102 mg/dL (03-28-23 @ 23:02)  POCT Blood Glucose.: 108 mg/dL (03-28-23 @ 22:02)  POCT Blood Glucose.: 136 mg/dL (03-28-23 @ 20:53)  POCT Blood Glucose.: 163 mg/dL (03-28-23 @ 20:09)  POCT Blood Glucose.: 165 mg/dL (03-28-23 @ 19:08)  POCT Blood Glucose.: 192 mg/dL (03-28-23 @ 18:22)  POCT Blood Glucose.: 158 mg/dL (03-28-23 @ 17:12)  POCT Blood Glucose.: 129 mg/dL (03-28-23 @ 16:12)                          9.0    7.56  )-----------( 274      ( 31 Mar 2023 06:16 )             27.8     03-31    131<L>  |  105  |  12  ----------------------------<  148<H>  3.9   |  27  |  0.78    Ca    8.6      31 Mar 2023 06:15  Phos  3.1     03-31  Mg     2.7     03-31    TPro  5.9<L>  /  Alb  3.3  /  TBili  0.8  /  DBili  x   /  AST  26  /  ALT  35  /  AlkPhos  118  03-31    eGFR: 94 mL/min/1.73m2 (31 Mar 2023 06:15)  eGFR: 95 mL/min/1.73m2 (30 Mar 2023 16:13)      Thyroid Function Tests:  03-22 @ 17:20 TSH 1.82 FreeT4 -- T3 76 Anti TPO -- Anti Thyroglobulin Ab -- TSI --      A1C with Estimated Average Glucose Result: 8.2 % (03-22-23 @ 17:20)  A1C with Estimated Average Glucose Result: 7.7 % (03-16-23 @ 03:38)    Estimated Average Glucose: 189 mg/dL (03-22-23 @ 17:20)  Estimated Average Glucose: 174 mg/dL (03-16-23 @ 03:38)        Diet, Regular:   Consistent Carbohydrate No Snacks (CSTCHO)  DASH/TLC Sodium & Cholesterol Restricted (DASH) (03-28-23 @ 07:08) [Active]

## 2023-04-01 LAB
ALBUMIN SERPL ELPH-MCNC: 3.1 G/DL — LOW (ref 3.3–5)
ALP SERPL-CCNC: 104 U/L — SIGNIFICANT CHANGE UP (ref 40–120)
ALT FLD-CCNC: 33 U/L — SIGNIFICANT CHANGE UP (ref 10–45)
ANION GAP SERPL CALC-SCNC: 12 MMOL/L — SIGNIFICANT CHANGE UP (ref 5–17)
AST SERPL-CCNC: 25 U/L — SIGNIFICANT CHANGE UP (ref 10–40)
BILIRUB SERPL-MCNC: 0.8 MG/DL — SIGNIFICANT CHANGE UP (ref 0.2–1.2)
BUN SERPL-MCNC: 15 MG/DL — SIGNIFICANT CHANGE UP (ref 7–23)
CALCIUM SERPL-MCNC: 8.6 MG/DL — SIGNIFICANT CHANGE UP (ref 8.4–10.5)
CHLORIDE SERPL-SCNC: 101 MMOL/L — SIGNIFICANT CHANGE UP (ref 96–108)
CO2 SERPL-SCNC: 26 MMOL/L — SIGNIFICANT CHANGE UP (ref 22–31)
CREAT SERPL-MCNC: 0.82 MG/DL — SIGNIFICANT CHANGE UP (ref 0.5–1.3)
EGFR: 92 ML/MIN/1.73M2 — SIGNIFICANT CHANGE UP
GLUCOSE BLDC GLUCOMTR-MCNC: 111 MG/DL — HIGH (ref 70–99)
GLUCOSE BLDC GLUCOMTR-MCNC: 132 MG/DL — HIGH (ref 70–99)
GLUCOSE BLDC GLUCOMTR-MCNC: 132 MG/DL — HIGH (ref 70–99)
GLUCOSE BLDC GLUCOMTR-MCNC: 143 MG/DL — HIGH (ref 70–99)
GLUCOSE BLDC GLUCOMTR-MCNC: 151 MG/DL — HIGH (ref 70–99)
GLUCOSE BLDC GLUCOMTR-MCNC: 198 MG/DL — HIGH (ref 70–99)
GLUCOSE BLDC GLUCOMTR-MCNC: 73 MG/DL — SIGNIFICANT CHANGE UP (ref 70–99)
GLUCOSE SERPL-MCNC: 92 MG/DL — SIGNIFICANT CHANGE UP (ref 70–99)
HCT VFR BLD CALC: 26.1 % — LOW (ref 39–50)
HGB BLD-MCNC: 8.6 G/DL — LOW (ref 13–17)
MAGNESIUM SERPL-MCNC: 2.5 MG/DL — SIGNIFICANT CHANGE UP (ref 1.6–2.6)
MCHC RBC-ENTMCNC: 30 PG — SIGNIFICANT CHANGE UP (ref 27–34)
MCHC RBC-ENTMCNC: 33 GM/DL — SIGNIFICANT CHANGE UP (ref 32–36)
MCV RBC AUTO: 90.9 FL — SIGNIFICANT CHANGE UP (ref 80–100)
NRBC # BLD: 0 /100 WBCS — SIGNIFICANT CHANGE UP (ref 0–0)
PHOSPHATE SERPL-MCNC: 4.2 MG/DL — SIGNIFICANT CHANGE UP (ref 2.5–4.5)
PLATELET # BLD AUTO: 289 K/UL — SIGNIFICANT CHANGE UP (ref 150–400)
POTASSIUM SERPL-MCNC: 4.3 MMOL/L — SIGNIFICANT CHANGE UP (ref 3.5–5.3)
POTASSIUM SERPL-SCNC: 4.3 MMOL/L — SIGNIFICANT CHANGE UP (ref 3.5–5.3)
PROT SERPL-MCNC: 5.8 G/DL — LOW (ref 6–8.3)
RBC # BLD: 2.87 M/UL — LOW (ref 4.2–5.8)
RBC # FLD: 14.9 % — HIGH (ref 10.3–14.5)
SODIUM SERPL-SCNC: 139 MMOL/L — SIGNIFICANT CHANGE UP (ref 135–145)
WBC # BLD: 6.62 K/UL — SIGNIFICANT CHANGE UP (ref 3.8–10.5)
WBC # FLD AUTO: 6.62 K/UL — SIGNIFICANT CHANGE UP (ref 3.8–10.5)

## 2023-04-01 PROCEDURE — 99233 SBSQ HOSP IP/OBS HIGH 50: CPT

## 2023-04-01 RX ORDER — FUROSEMIDE 40 MG
40 TABLET ORAL ONCE
Refills: 0 | Status: DISCONTINUED | OUTPATIENT
Start: 2023-04-01 | End: 2023-04-03

## 2023-04-01 RX ORDER — APIXABAN 2.5 MG/1
2.5 TABLET, FILM COATED ORAL EVERY 12 HOURS
Refills: 0 | Status: DISCONTINUED | OUTPATIENT
Start: 2023-04-01 | End: 2023-04-03

## 2023-04-01 RX ORDER — ALPRAZOLAM 0.25 MG
0.25 TABLET ORAL ONCE
Refills: 0 | Status: DISCONTINUED | OUTPATIENT
Start: 2023-04-01 | End: 2023-04-01

## 2023-04-01 RX ORDER — FUROSEMIDE 40 MG
40 TABLET ORAL ONCE
Refills: 0 | Status: COMPLETED | OUTPATIENT
Start: 2023-04-01 | End: 2023-04-01

## 2023-04-01 RX ORDER — ALPRAZOLAM 0.25 MG
0.25 TABLET ORAL DAILY
Refills: 0 | Status: DISCONTINUED | OUTPATIENT
Start: 2023-04-01 | End: 2023-04-03

## 2023-04-01 RX ORDER — INSULIN LISPRO 100/ML
1 VIAL (ML) SUBCUTANEOUS
Refills: 0 | Status: DISCONTINUED | OUTPATIENT
Start: 2023-04-01 | End: 2023-04-03

## 2023-04-01 RX ORDER — MAGNESIUM SULFATE 500 MG/ML
2 VIAL (ML) INJECTION ONCE
Refills: 0 | Status: COMPLETED | OUTPATIENT
Start: 2023-04-01 | End: 2023-04-01

## 2023-04-01 RX ORDER — ACETAMINOPHEN 500 MG
650 TABLET ORAL EVERY 6 HOURS
Refills: 0 | Status: DISCONTINUED | OUTPATIENT
Start: 2023-04-01 | End: 2023-04-03

## 2023-04-01 RX ADMIN — Medication 81 MILLIGRAM(S): at 12:29

## 2023-04-01 RX ADMIN — Medication 650 MILLIGRAM(S): at 10:30

## 2023-04-01 RX ADMIN — Medication 40 MILLIGRAM(S): at 17:11

## 2023-04-01 RX ADMIN — SENNA PLUS 2 TABLET(S): 8.6 TABLET ORAL at 21:52

## 2023-04-01 RX ADMIN — GABAPENTIN 100 MILLIGRAM(S): 400 CAPSULE ORAL at 13:56

## 2023-04-01 RX ADMIN — APIXABAN 2.5 MILLIGRAM(S): 2.5 TABLET, FILM COATED ORAL at 17:12

## 2023-04-01 RX ADMIN — Medication 650 MILLIGRAM(S): at 09:30

## 2023-04-01 RX ADMIN — Medication 50 MILLIGRAM(S): at 17:12

## 2023-04-01 RX ADMIN — TAMSULOSIN HYDROCHLORIDE 0.4 MILLIGRAM(S): 0.4 CAPSULE ORAL at 21:51

## 2023-04-01 RX ADMIN — Medication 50 GRAM(S): at 00:00

## 2023-04-01 RX ADMIN — Medication 500 MILLIGRAM(S): at 05:20

## 2023-04-01 RX ADMIN — AMIODARONE HYDROCHLORIDE 400 MILLIGRAM(S): 400 TABLET ORAL at 21:51

## 2023-04-01 RX ADMIN — GABAPENTIN 100 MILLIGRAM(S): 400 CAPSULE ORAL at 05:21

## 2023-04-01 RX ADMIN — PANTOPRAZOLE SODIUM 40 MILLIGRAM(S): 20 TABLET, DELAYED RELEASE ORAL at 06:42

## 2023-04-01 RX ADMIN — OXYCODONE HYDROCHLORIDE 5 MILLIGRAM(S): 5 TABLET ORAL at 00:00

## 2023-04-01 RX ADMIN — POLYETHYLENE GLYCOL 3350 17 GRAM(S): 17 POWDER, FOR SOLUTION ORAL at 12:28

## 2023-04-01 RX ADMIN — AMIODARONE HYDROCHLORIDE 400 MILLIGRAM(S): 400 TABLET ORAL at 05:20

## 2023-04-01 RX ADMIN — ATORVASTATIN CALCIUM 80 MILLIGRAM(S): 80 TABLET, FILM COATED ORAL at 21:51

## 2023-04-01 RX ADMIN — ENOXAPARIN SODIUM 40 MILLIGRAM(S): 100 INJECTION SUBCUTANEOUS at 12:28

## 2023-04-01 RX ADMIN — CHLORHEXIDINE GLUCONATE 1 APPLICATION(S): 213 SOLUTION TOPICAL at 14:43

## 2023-04-01 RX ADMIN — Medication 50 MILLIGRAM(S): at 05:20

## 2023-04-01 RX ADMIN — Medication 5 MILLIGRAM(S): at 17:12

## 2023-04-01 RX ADMIN — AMIODARONE HYDROCHLORIDE 400 MILLIGRAM(S): 400 TABLET ORAL at 13:56

## 2023-04-01 NOTE — PROGRESS NOTE ADULT - PROBLEM SELECTOR PLAN 3
Endo following  insulin pump resumed today as per Endo and pt  dm diet  accuchecks as per patient Endo following  Continue on insulin pump reccs Endo and pt  dm diet  accuchecks as per patient

## 2023-04-01 NOTE — PROGRESS NOTE ADULT - PROBLEM SELECTOR PLAN 2
RSR 70's w/ episodes of Pafib/ EP consulted with Dr. Del Castillo as per cardiology  continue amio load  increase lop 50 mg po bid  monitor and supplement electrolytes  pw d/c as per Dr. Herndon; eliquis 5 bid recommended as per EP  hold off on eliquis today since pw removal this am as per Dr. HERNDON; if continued pafib - likely start in am but eliquis 2.5 bid

## 2023-04-01 NOTE — PROGRESS NOTE ADULT - SUBJECTIVE AND OBJECTIVE BOX
seen earlier today     Chief Complaint: Diabetes Mellitus follow up    INTERVAL HX: reports "feeling weighed down" today, ate approx 50% of breakfast feels he will eat more at lunch. BG at goal since starting pump, however on dexcom review BG 70s at 2:30 am ->pt did not notify RN & self  corrected with juice with rebound hyperglycemia to 200s on dexcom review-> control IQ on and FBG at goal, dexcom reading at time of visit 107, discussed with pt rise/fall in BG may be attributing to feeling unwell today-> discussed with pt to notify RN to correlate any lows /tightly controlled BG with a POC - > 2am FS was 111 and patient did not have symptoms  of low Blood sugar , no hypoglycemia. However per previous discussed with pts endo pt was having lows over night so we adjusted pump for tightly controlled BG between 12-3 am       Review of Systems:  General: As above  Cardiovascular: No chest pain  Respiratory: No SOB  GI: No nausea, vomiting  Endocrine: no  S&Sx of hypoglycemia    Allergies    Neosporin (Rash)    Intolerances      MEDICATIONS  (STANDING):  aMIOdarone    Tablet   Oral   aMIOdarone    Tablet 400 milliGRAM(s) Oral every 8 hours  aspirin enteric coated 81 milliGRAM(s) Oral daily  atorvastatin 80 milliGRAM(s) Oral at bedtime  bisacodyl Suppository 10 milliGRAM(s) Rectal once  chlorhexidine 2% Cloths 1 Application(s) Topical daily  enoxaparin Injectable 40 milliGRAM(s) SubCutaneous every 24 hours  gabapentin 100 milliGRAM(s) Oral every 8 hours  insulin lispro (ADMELOG) Pump 1 Each SubCutaneous Continuous Pump  metoprolol tartrate 50 milliGRAM(s) Oral two times a day  pantoprazole    Tablet 40 milliGRAM(s) Oral before breakfast  polyethylene glycol 3350 17 Gram(s) Oral daily  senna 2 Tablet(s) Oral at bedtime  tamsulosin 0.4 milliGRAM(s) Oral at bedtime        atorvastatin   80 milliGRAM(s) Oral (03-31-23 @ 21:36)    insulin lispro Injectable (ADMELOG).   8 Unit(s) SubCutaneous (03-31-23 @ 12:04)        PHYSICAL EXAM:  VITALS: T(C): 37.2 (04-01-23 @ 07:05)  T(F): 99 (04-01-23 @ 07:05), Max: 99.5 (03-31-23 @ 19:32)  HR: 68 (04-01-23 @ 07:05) (67 - 109)  BP: 137/63 (04-01-23 @ 07:05) (93/56 - 137/63)  RR:  (16 - 20)  SpO2:  (91% - 97%)  Wt(kg): --  GENERAL: male sitting in chair  in NAD  Diabetes: LLQ abdomen tslim infusion set CDI (placed 3/31), dexcom cgm RLQ Abdomen CDI (Placed 3/31)  Respiratory: Respirations unlabored   Extremities: Warm, no edema  NEURO: Alert , appropriate     LABS:  POCT Blood Glucose.: 132 mg/dL (04-01-23 @ 09:18)  POCT Blood Glucose.: 143 mg/dL (04-01-23 @ 07:31)  POCT Blood Glucose.: 111 mg/dL (04-01-23 @ 02:17)  POCT Blood Glucose.: 150 mg/dL (03-31-23 @ 20:45)  POCT Blood Glucose.: 180 mg/dL (03-31-23 @ 16:40)  POCT Blood Glucose.: 135 mg/dL (03-31-23 @ 14:36)  POCT Blood Glucose.: 133 mg/dL (03-31-23 @ 11:06)  POCT Blood Glucose.: 148 mg/dL (03-31-23 @ 07:14)  POCT Blood Glucose.: 151 mg/dL (03-31-23 @ 01:13)  POCT Blood Glucose.: 148 mg/dL (03-30-23 @ 20:39)  POCT Blood Glucose.: 168 mg/dL (03-30-23 @ 16:26)  POCT Blood Glucose.: 182 mg/dL (03-30-23 @ 11:56)  POCT Blood Glucose.: 186 mg/dL (03-30-23 @ 06:48)  POCT Blood Glucose.: 153 mg/dL (03-30-23 @ 01:50)  POCT Blood Glucose.: 153 mg/dL (03-29-23 @ 22:26)  POCT Blood Glucose.: 164 mg/dL (03-29-23 @ 21:01)  POCT Blood Glucose.: 151 mg/dL (03-29-23 @ 20:02)  POCT Blood Glucose.: 95 mg/dL (03-29-23 @ 18:42)  POCT Blood Glucose.: 92 mg/dL (03-29-23 @ 18:21)  POCT Blood Glucose.: 185 mg/dL (03-29-23 @ 16:49)  POCT Blood Glucose.: 190 mg/dL (03-29-23 @ 16:12)  POCT Blood Glucose.: 246 mg/dL (03-29-23 @ 15:11)  POCT Blood Glucose.: 292 mg/dL (03-29-23 @ 14:09)  POCT Blood Glucose.: 249 mg/dL (03-29-23 @ 12:01)                          8.6    6.62  )-----------( 289      ( 01 Apr 2023 05:14 )             26.1     04-01    139  |  101  |  15  ----------------------------<  92  4.3   |  26  |  0.82    Ca    8.6      01 Apr 2023 05:13  Phos  4.2     04-01  Mg     2.5     04-01    TPro  5.8<L>  /  Alb  3.1<L>  /  TBili  0.8  /  DBili  x   /  AST  25  /  ALT  33  /  AlkPhos  104  04-01    eGFR: 92 mL/min/1.73m2 (01 Apr 2023 05:13)      Thyroid Function Tests:  03-22 @ 17:20 TSH 1.82 FreeT4 -- T3 76 Anti TPO -- Anti Thyroglobulin Ab -- TSI --      A1C with Estimated Average Glucose Result: 8.2 % (03-22-23 @ 17:20)  A1C with Estimated Average Glucose Result: 7.7 % (03-16-23 @ 03:38)    Estimated Average Glucose: 189 mg/dL (03-22-23 @ 17:20)  Estimated Average Glucose: 174 mg/dL (03-16-23 @ 03:38)        Diet, Regular:   Consistent Carbohydrate No Snacks (CSTCHO)  DASH/TLC Sodium & Cholesterol Restricted (DASH) (03-28-23 @ 07:08) [Active]

## 2023-04-01 NOTE — PROGRESS NOTE ADULT - ASSESSMENT
EP ATTENDING    Agree with above. Recommend short term amiodarone/anticoagulation.        Hanh Brantley M.D., [facility]  Cardiac Electrophysiology  [facility]  [address] [place] [address], [address] [place] [address]  [email or web address] [facility]    office [phone]  pager [phone]

## 2023-04-01 NOTE — PROGRESS NOTE ADULT - SUBJECTIVE AND OBJECTIVE BOX
C A R D I O L O G Y  **********************************     DATE OF SERVICE: 04-01-23    Patient denies chest pain or shortness of breath.   Review of symptoms otherwise negative.    MEDICATIONS:  acetaminophen     Tablet .. 650 milliGRAM(s) Oral every 6 hours PRN  aMIOdarone    Tablet   Oral   aMIOdarone    Tablet 400 milliGRAM(s) Oral every 8 hours  aspirin enteric coated 81 milliGRAM(s) Oral daily  atorvastatin 80 milliGRAM(s) Oral at bedtime  bisacodyl Suppository 10 milliGRAM(s) Rectal once  chlorhexidine 2% Cloths 1 Application(s) Topical daily  enoxaparin Injectable 40 milliGRAM(s) SubCutaneous every 24 hours  gabapentin 100 milliGRAM(s) Oral every 8 hours  insulin lispro (ADMELOG) Pump 1 Each SubCutaneous Continuous Pump  metoprolol tartrate 50 milliGRAM(s) Oral two times a day  oxyCODONE    IR 5 milliGRAM(s) Oral every 4 hours PRN  oxyCODONE    IR 10 milliGRAM(s) Oral every 4 hours PRN  pantoprazole    Tablet 40 milliGRAM(s) Oral before breakfast  polyethylene glycol 3350 17 Gram(s) Oral daily  senna 2 Tablet(s) Oral at bedtime  tamsulosin 0.4 milliGRAM(s) Oral at bedtime      LABS:                        8.6    6.62  )-----------( 289      ( 01 Apr 2023 05:14 )             26.1       Hemoglobin: 8.6 g/dL (04-01 @ 05:14)  Hemoglobin: 9.0 g/dL (03-31 @ 06:16)  Hemoglobin: 8.1 g/dL (03-30 @ 00:10)  Hemoglobin: 8.6 g/dL (03-29 @ 00:38)  Hemoglobin: 8.6 g/dL (03-28 @ 00:23)      04-01    139  |  101  |  15  ----------------------------<  92  4.3   |  26  |  0.82    Ca    8.6      01 Apr 2023 05:13  Phos  4.2     04-01  Mg     2.5     04-01    TPro  5.8<L>  /  Alb  3.1<L>  /  TBili  0.8  /  DBili  x   /  AST  25  /  ALT  33  /  AlkPhos  104  04-01    Creatinine Trend: 0.82<--, 0.78<--, 0.75<--, 0.81<--, 0.71<--, 0.48<--    COAGS:           PHYSICAL EXAM:  T(C): 37.2 (04-01-23 @ 11:16), Max: 37.5 (03-31-23 @ 19:32)  HR: 74 (04-01-23 @ 11:16) (67 - 109)  BP: 105/56 (04-01-23 @ 11:16) (93/56 - 137/63)  RR: 18 (04-01-23 @ 11:16) (16 - 20)  SpO2: 95% (04-01-23 @ 11:16) (91% - 97%)  Wt(kg): --    I&O's Summary    31 Mar 2023 07:01  -  01 Apr 2023 07:00  --------------------------------------------------------  IN: 1320 mL / OUT: 2625 mL / NET: -1305 mL    01 Apr 2023 07:01  -  01 Apr 2023 11:50  --------------------------------------------------------  IN: 240 mL / OUT: 200 mL / NET: 40 mL          Gen: NAD  HEENT:  (-)icterus (-)pallor  CV: N S1 S2 1/6 RONI (+)2 Pulses B/l  Resp:  Clear to auscultation B/L, normal effort  GI: (+) BS Soft, NT, ND  Lymph:  (-)Edema, (-)obvious lymphadenopathy  Skin: Warm to touch, Normal turgor  Psych: Appropriate mood and affect      TELEMETRY: SR 70s    < from: TTE with Doppler (w/Cont) (03.16.23 @ 13:36) >  CONCLUSIONS:  1. Normal mitral valve. Mildly calcified chordae tendinae.  Mild mitral regurgitation.  2. Calcified aortic valve was not well visualized but has  normal opening. No aortic stenosis. No aortic valve  regurgitation seen.  3. Normal left ventricular internal dimensions and wall  thicknesses.  4. Low-normal Left Ventricular Systolic Function,  (EF =  53% by biplane). No regional wall motion abnormalities.  Ultrasound LV opacification agent was used to enhance  endocardial definition.  5. Right ventricle not well visualized. Normal RVsystolic  function (RV tissue Doppler 11 cm/s).  6. RV systolic pressure is borderline normal at  33 mm Hg.  7. Normal tricuspid valve. Moderate tricuspid  regurgitation.  8. No pericardial effusion.    *** No previous Echo exam.    < end of copied text >    < from: Cardiac Catheterization (03.22.23 @ 09:42) >  Diagnostic Conclusions:     Large LAD with severe bifurcating/ostial and heavily calcifed disease   LCX with Bifurcating/ostial disease   RCA with Moderate Disease   Recommendations:     CABG evaluation      < end of copied text >      ASSESSMENT/PLAN: Patient is 72 year old male with pmh of Type I DM (50 years ago, on Insulin pump for last 15 years), HTN, Vertigo and BPH who was brought to ED due to Abdominal pain, Nausea and vomiting. Cardiology consulted for increased troponin.    #NSTEMI  - TTE with low normal LV function and no wall motion abnormalities  - s/p cath with multivessel CAD noted above  - Transferred to [facility] for CABG evaluation  - Now s/p CABGx3 on 3/27  - Continue ASALipitor  - Weaned off pressors, continue metoprolol  - Diuretics appear to be on hold for hypotension and hyponatremia - f/u CXR results  - CTS follow up    #DKA/Type 1 DM  - Glycemic control per endocrinology    #HTN  - Continue metoprolol. Remainder of meds on hold post op    #Post op PAF  - Continue metoprolol to prevent AF RVR  - Amio load started to maintain NSR  - EP consult appreciated - recommend starting AC with Eliquis when ok with CTS      - Patient to follow up in our [place] office with Dr. Wahl on 4/12 at 12:15 PM ([address], [address], [place] [address] - Office #[phone])    Nacho Nix MD  Pager: [phone]

## 2023-04-01 NOTE — PROVIDER CONTACT NOTE (OTHER) - ASSESSMENT
Pt aymptomatic, A&OX4, VSS (see flowsheet), NSR on tele, no c/o CP or SOB, K 3.5
Pt is clammy and appears to be anxious on assessment. Pt states he feels overwhelmed with treating himself with in the insulin pump. Pt states there is an error in insulin pump programing with Endo NP at bedside. Pt states he is unsure of bolus amount stated on insulin pump. Pt refuses to manage blood glucose independently.
Pt ambulating in tellez and converted to Afib up to 150s, also had multiple runs of VT. Pt c/o SOB denies palpitations, dizziness, N/V.

## 2023-04-01 NOTE — PROGRESS NOTE ADULT - ASSESSMENT
73y/o M w/h/o uncontrolled T1DM (A1C 8.2%) on T Slim insulin pump PTA. No  known complications. Also h/o HTN, vertigo and BPH who was brought to OSH due to abdominal pain, nausea and vomiting found to be in DKA and transferred to [facility] after found to have triple vessel disease on [facility].   s/p CABG 3/27.      Home Regimen: T slimx2 novolog insulin pump   basal  12a 0.148  9a 0.75  5p 0.758  10p 0.712  ISF  12a 1:50  5p 1:57  10p 1:50  ICR  12a 1:18  9a 1:17  5p 1:!8  target 100  action time 5hr      Uncontrolled type 1 diabetes mellitus with ketoacidosis.   ·  Plan:  - BG Goal 100-180mg/dl    -Test BG ac and hs and 2am while on sq insulin and q6h if NPO.  - insulin pump replaced on 3/31 on control IQ  , next site change due 4/3  - dexcom restarted 3/31 , please cover with lead for any CXRs, dexcom would need to be removed if having any MRI procedure  - If Pump malfunctions please remove pump and give stat  Lantus  to 28 units/Admelog to 8 units TID AC/ moderate correction scales Discharge plan:  - While in patient noted pts home pump settings are much lower than inpt requirements with A1C Above goal, while admitted has used Basal insulin 20-28 units and admelog 6-8 units with meals; profile 2 added to pump with higher basal rates and intensified ICR monitoring pt on this profile while inpt  - Follow up with endocrinologist Dr Ngo within 2 weeks   - Make sure pt has Rx for all DM supplies and insulin at home prior to discharge.  - Needs Optho f/u as out pt.  -[facility] [phone]- spoke with triage RN - pt was having overnight hypos so 12mn setting was decreased over past 2 months, confirmed home settings and informed them of pts DKA and plan to change settings, pt  had recent telehealth appt on 2/23 ; pt should call for appt for close f/u with endocrinologist .Of note pt reported this tslim model is 3 yrs old- pt should check with MD if able to upgrade , it has a 4 yr warranty (called [employer] to confirm if within 4 yrs can get replacement if deemed malfunctioning)   3/31 spoke with Dr Ngo , please ensure op endo Dr Ngo is informed of new pump settings prior to discharge  xx [id number], xx [id number], [id number]    Insulin pump in place   - Tslimx 2 & Dexcom replaced 3/31 next site change for pump : 4/3 (monday), next dexcom change: 4/9   - RN  to ensure insulin pump attestation on chart  - Document insulin boluses/carbs on flow sheet   - pt has backup insulin pump supplies, wife bringing additional dexcom sensor on next visit  3/31/23  Added Profile 2: Basal 0000 1.2u/hr,  ICR 1:9 , ISF 1:50 ,  4/1: BG tightly controlled 12a-3a on dexcom, pt self corrected with rebound hyperglycemia, adjusted profile 2 to prevent o/n lows as below   Basal 0000 0.9u/hr,  ICR 1:9 , ISF 1:50 ,   Basal 0300 1.2u/hr,  ICR 1:9 , ISF 1:50 ,  Pt has profile 2 on with control IQ ON  Further pump settings TBD based on glycemic trends on new settings        HTN (hypertension).   ·  Plan: - Home regimen: losartan 100mg daily  - BG goal and management  per cardiology team.      HLD (hyperlipidemia).   ·  Plan: LDL goal <55 due to DM and now CVD  C/w atorvastatin 80 milliGRAM(s) Oral at bedtime  Follow up levels as out pt.        discussed with patient and primary team  Sana BURTON  and RN   Contact via Microsoft Teams during business hours  To reach covering provider access LYNDON via [employer]  For Urgent matters/after-hours/weekends/holidays please page endocrine fellow on call   For nonurgent matters please email [email or web address]    Please note that this patient may be followed by different provider tomorrow.  Notify endocrine 24 hours prior to discharge for final recommendations    greater than 50% of the encounter was spent counseling and/or coordination of care.  36   minutes spent on total encounter; The necessity of the time spent during the encounter on this date of service was due to development of plan of care/coordination of care/glycemic control through review of labs, blood glucose values and vital signs. required multiple bedside visits and pump adjustment           71y/o M w/h/o uncontrolled T1DM (A1C 8.2%) on T Slim insulin pump PTA. No  known complications. Also h/o HTN, vertigo and BPH who was brought to OSH due to abdominal pain, nausea and vomiting found to be in DKA and transferred to [facility] after found to have triple vessel disease on [facility].   s/p CABG 3/27.      Home Regimen: T slimx2 novolog insulin pump   basal  12a 0.148  9a 0.75  5p 0.758  10p 0.712  ISF  12a 1:50  5p 1:57  10p 1:50  ICR  12a 1:18  9a 1:17  5p 1:!8  target 100  action time 5hr      Uncontrolled type 1 diabetes mellitus with ketoacidosis.   ·  Plan:  - BG Goal 100-180mg/dl    -Test BG ac and hs and 2am while on sq insulin and q6h if NPO.  - insulin pump replaced on 3/31 on control IQ  , next site change due 4/3  - dexcom restarted 3/31 , please cover with lead for any CXRs, dexcom would need to be removed if having any MRI procedure  - If Pump malfunctions please remove pump and give stat  Lantus  to 28 units/Admelog to 8 units TID AC/ moderate correction scales Discharge plan:  - While in patient noted pts home pump settings are much lower than inpt requirements with A1C Above goal, while admitted has used Basal insulin 20-28 units and admelog 6-8 units with meals; profile 2 added to pump with higher basal rates and intensified ICR monitoring pt on this profile while inpt  - Follow up with endocrinologist Dr Ngo within 2 weeks   - Make sure pt has Rx for all DM supplies and insulin at home prior to discharge.  - Needs Optho f/u as out pt.  -[facility] [phone]- spoke with triage RN - pt was having overnight hypos so 12mn setting was decreased over past 2 months, confirmed home settings and informed them of pts DKA and plan to change settings, pt  had recent telehealth appt on 2/23 ; pt should call for appt for close f/u with endocrinologist .Of note pt reported this tslim model is 3 yrs old- pt should check with MD if able to upgrade , it has a 4 yr warranty (called [employer] to confirm if within 4 yrs can get replacement if deemed malfunctioning)   3/31 spoke with Dr Ngo , please ensure op endo Dr Ngo is informed of new pump settings prior to discharge  xx 6452, xx [id number], [id number]    Insulin pump in place   - Tslimx 2 & Dexcom replaced 3/31 next site change for pump : 4/3 (monday), next dexcom change: 4/9   - RN  to ensure insulin pump attestation on chart  - Document insulin boluses/carbs on flow sheet   - pt has backup insulin pump supplies, wife bringing additional dexcom sensor on next visit  3/31/23  Added Profile 2: Basal 0000 1.2u/hr,  ICR 1:9 , ISF 1:50 ,  4/1: BG tightly controlled 12a-3a on dexcom, pt self corrected with rebound hyperglycemia, adjusted profile 2 to prevent o/n lows as below   Basal 0000 0.9u/hr,  ICR 1:9 , ISF 1:50 ,   Basal 0300 1.2u/hr,  ICR 1:9 , ISF 1:50 ,  Pt has profile 2 on with control IQ ON  Further pump settings TBD based on glycemic trends on new settings        HTN (hypertension).   ·  Plan: - Home regimen: losartan 100mg daily  - BG goal and management  per cardiology team.      HLD (hyperlipidemia).   ·  Plan: LDL goal <55 due to DM and now CVD  C/w atorvastatin 80 milliGRAM(s) Oral at bedtime  Follow up levels as out pt.        discussed with patient and primary team  Tita BURTON  and RN   Contact via Microsoft Teams during business hours  To reach covering provider access AMION via [employer]  For Urgent matters/after-hours/weekends/holidays please page endocrine fellow on call   For nonurgent matters please email [email or web address]    Please note that this patient may be followed by different provider tomorrow.  Notify endocrine 24 hours prior to discharge for final recommendations    greater than 50% of the encounter was spent counseling and/or coordination of care.  36   minutes spent on total encounter; The necessity of the time spent during the encounter on this date of service was due to development of plan of care/coordination of care/glycemic control through review of labs, blood glucose values and vital signs. required multiple bedside visits and pump adjustment           71y/o M w/h/o uncontrolled T1DM (A1C 8.2%) on T Slim insulin pump PTA. No  known complications. Also h/o HTN, vertigo and BPH who was brought to OSH due to abdominal pain, nausea and vomiting found to be in DKA and transferred to [facility] after found to have triple vessel disease on [facility].   s/p CABG 3/27.      Home Regimen: T slimx2 novolog insulin pump   basal  12a 0.148  9a 0.75  5p 0.758  10p 0.712  ISF  12a 1:50  5p 1:57  10p 1:50  ICR  12a 1:18  9a 1:17  5p 1:!8  target 100  action time 5hr      Uncontrolled type 1 diabetes mellitus with ketoacidosis.   ·  Plan:  - BG Goal 100-180mg/dl    -Test BG ac and hs and 2am while on sq insulin and q6h if NPO.  - insulin pump replaced on 3/31 on control IQ  , next site change due 4/3  - dexcom restarted 3/31 , please cover with lead for any CXRs, dexcom would need to be removed if having any MRI procedure  - If Pump malfunctions please remove pump and give stat  Lantus  to 28 units/Admelog to 8 units TID AC/ moderate correction scales Discharge plan:  - While in patient noted pts home pump settings are much lower than inpt requirements with A1C Above goal, while admitted has used Basal insulin 20-28 units and admelog 6-8 units with meals; profile 2 added to pump with higher basal rates and intensified ICR monitoring pt on this profile while inpt  - Follow up with endocrinologist Dr Ngo within 2 weeks   - Make sure pt has Rx for all DM supplies and insulin at home prior to discharge.  - Needs Optho f/u as out pt.  -[facility]31/[facility] [phone]- spoke with triage RN - pt was having overnight hypos so 12mn setting was decreased over past 2 months, confirmed home settings and informed them of pts DKA and plan to change settings, pt  had recent telehealth appt on 2/23 ; pt should call for appt for close f/u with endocrinologist .Of note pt reported this tslim model is 3 yrs old- pt should check with MD if able to upgrade , it has a 4 yr warranty (called [employer] to confirm if within 4 yrs can get replacement if deemed malfunctioning)   3/31 spoke with Dr Ngo , please ensure op endo Dr Ngo is informed of new pump settings prior to discharge  xx [id number], xx [phone], [id number]    Insulin pump in place   - Tslimx 2 & Dexcom replaced 3/31 next site change for pump : 4/3 (monday), next dexcom change: 4/9   - RN  to ensure insulin pump attestation on chart  - Document insulin boluses/carbs on flow sheet   - pt has backup insulin pump supplies, wife bringing additional dexcom sensor on next visit  3/31/23  Added Profile 2: Basal 0000 1.2u/hr,  ICR 1:9 , ISF 1:50 ,  4/1: BG tightly controlled 12a-3a on dexcom, pt self corrected with rebound hyperglycemia, adjusted profile 2 to prevent o/n lows as below   Basal 0000 0.9u/hr,  ICR 1:9 , ISF 1:50 ,   Basal 0300 1.2u/hr,  ICR 1:9 , ISF 1:50 ,  Pt has profile 2 on with control IQ ON  Further pump settings TBD based on glycemic trends on new settings        HTN (hypertension).   ·  Plan: - Home regimen: losartan 100mg daily  - BG goal and management  per cardiology team.      HLD (hyperlipidemia).   ·  Plan: LDL goal <55 due to DM and now CVD  C/w atorvastatin 80 milliGRAM(s) Oral at bedtime  Follow up levels as out pt.        discussed with patient and primary team  Tita BURTON  and RN   Contact via Microsoft Teams during business hours  To reach covering provider access AMION via [employer]  For Urgent matters/after-hours/weekends/holidays please page endocrine fellow on call   For nonurgent matters please email [email or web address]    Please note that this patient may be followed by different provider tomorrow.  Notify endocrine 24 hours prior to discharge for final recommendations    greater than 50% of the encounter was spent counseling and/or coordination of care.  36   minutes spent on total encounter; The necessity of the time spent during the encounter on this date of service was due to development of plan of care/coordination of care/glycemic control through review of labs, blood glucose values and vital signs. required multiple bedside visits and pump adjustment          addendum-  called by rn around lunch that patient didnt feel comfortable bolusing 8 units for lunch since BG was in 70s, had recommeneded if pt eating pt should bolus once BG >100 as his BG was 73 after insulin to carb mismatch (pt denies any hypoglycemia symptoms).   Revisted pt at bedside, pump reviewed again. Pt has CHO menu at bedside for carb counts.  Pt ate lunch and did not bolus for lunch, expect rebound hyperglycemia at dinner , BG now 132 .  Upon reviewing pump noted sensor for dexcom has stopped- pt accidently pressed stop  sensor , has no back up sensors, reinput the sensor number incase pump will accept it ([id number])- will know by 4pm today if sensor is working or not, wife unable to visit today and will bring 2 sensors tomorrow .  pt endorses much anxiety about bolusing 8 units with meals, reviewed with pt preop /post op he was requring 6-8 units of admelog sq with meals and lantus 24-28 units, reviewed with pt his home settings were likely under dosed likely related to why he had DKA upon presentation to [facility], offered support and encouragement , reinforce education daily, offered pt if he is very uncomfortable with pump could switch to basal bolus but to optimize settings for discharge we need to continue titrating settings or would need to go back to insulin pens upon discharge . Counseled on need for tight glycemic control to prevent post op infection.  pt endorses he agrees to continue pump aware pump settings continue to be adjusted as needed by our team, verbalized understanding of education.  Discussed with team , xanax per team.     -If dexcom sensor is functioning continue control IQ  -If dexcom sensor is not functioning can remove dexcom cgm (do not discard transmitter)  pt will bolus at dinner based on  FS & CHO count;  If BG >200 at bedtime and at 2 am pt should provide correction bolus (calculated by pump)   Pt to bolus for breakfast based on   FS & CHO count     discussed with RN and patient  73y/o M w/h/o uncontrolled T1DM (A1C 8.2%) on T Slim insulin pump PTA. No  known complications. Also h/o HTN, vertigo and BPH who was brought to OSH due to abdominal pain, nausea and vomiting found to be in DKA and transferred to [facility] after found to have triple vessel disease on [place].   s/p CABG 3/27.      Home Regimen: T slimx2 novolog insulin pump   basal  12a 0.148  9a 0.75  5p 0.758  10p 0.712  ISF  12a 1:50  5p 1:57  10p 1:50  ICR  12a 1:18  9a 1:17  5p 1:!8  target 100  action time 5hr      Uncontrolled type 1 diabetes mellitus with ketoacidosis.   ·  Plan:  - BG Goal 100-180mg/dl    -Test BG ac and hs and 2am while on sq insulin and q6h if NPO.  - insulin pump replaced on 3/31 on control IQ  , next site change due 4/3  - dexcom restarted 3/31 , please cover with lead for any CXRs, dexcom would need to be removed if having any MRI procedure  - If Pump malfunctions please remove pump and give stat  Lantus  to 28 units/Admelog to 8 units TID AC/ moderate correction scales Discharge plan:  - While in patient noted pts home pump settings are much lower than inpt requirements with A1C Above goal, while admitted has used Basal insulin 20-28 units and admelog 6-8 units with meals; profile 2 added to pump with higher basal rates and intensified ICR monitoring pt on this profile while inpt  - Follow up with endocrinologist Dr Ngo within 2 weeks   - Make sure pt has Rx for all DM supplies and insulin at home prior to discharge.  - Needs Optho f/u as out pt.  -[facility] [phone]- spoke with triage RN - pt was having overnight hypos so 12mn setting was decreased over past 2 months, confirmed home settings and informed them of pts DKA and plan to change settings, pt  had recent telehealth appt on 2/23 ; pt should call for appt for close f/u with endocrinologist .Of note pt reported this tslim model is 3 yrs old- pt should check with MD if able to upgrade , it has a 4 yr warranty (called [employer] to confirm if within 4 yrs can get replacement if deemed malfunctioning)   3/31 spoke with Dr Ngo , please ensure op endo Dr Ngo is informed of new pump settings prior to discharge  xx 6408, xx [id number], [id number]    Insulin pump in place   - Tslimx 2 & Dexcom replaced 3/31 next site change for pump : 4/3 (monday), next dexcom change: 4/9   - RN  to ensure insulin pump attestation on chart  - Document insulin boluses/carbs on flow sheet   - pt has backup insulin pump supplies, wife bringing additional dexcom sensor on next visit  3/31/23  Added Profile 2: Basal 0000 1.2u/hr,  ICR 1:9 , ISF 1:50 ,  4/1: BG tightly controlled 12a-3a on dexcom, pt self corrected with rebound hyperglycemia, adjusted profile 2 to prevent o/n lows as below   Basal 0000 0.9u/hr,  ICR 1:9 , ISF 1:50 ,   Basal 0300 1.2u/hr,  ICR 1:9 , ISF 1:50 ,  Pt has profile 2 on with control IQ ON  Further pump settings TBD based on glycemic trends on new settings        HTN (hypertension).   ·  Plan: - Home regimen: losartan 100mg daily  - BG goal and management  per cardiology team.      HLD (hyperlipidemia).   ·  Plan: LDL goal <55 due to DM and now CVD  C/w atorvastatin 80 milliGRAM(s) Oral at bedtime  Follow up levels as out pt.        discussed with patient and primary team  Tita BURTON  and RN   Contact via [employer] Teams during business hours  To reach covering provider access AMION via [employer]  For Urgent matters/after-hours/weekends/holidays please page endocrine fellow on call   For nonurgent matters please email [email or web address]    Please note that this patient may be followed by different provider tomorrow.  Notify endocrine 24 hours prior to discharge for final recommendations    greater than 50% of the encounter was spent counseling and/or coordination of care.  36   minutes spent on total encounter; The necessity of the time spent during the encounter on this date of service was due to development of plan of care/coordination of care/glycemic control through review of labs, blood glucose values and vital signs. required multiple bedside visits and pump adjustment          addendum-  called by rn around lunch that patient didnt feel comfortable bolusing 8 units for lunch since BG was in 70s, had recommeneded if pt eating pt should bolus once BG >100 as his BG was 73 after insulin to carb mismatch (pt denies any hypoglycemia symptoms).   Revisted pt at bedside, pump reviewed again. Pt has CHO menu at bedside for carb counts.  Pt ate lunch and did not bolus for lunch, expect rebound hyperglycemia at dinner , BG now 132 .  Upon reviewing pump noted sensor for dexcom has stopped- pt accidently pressed stop  sensor , has no back up sensors, reinput the sensor number incase pump will accept it ([id number])- will know by 4pm today if sensor is working or not, wife unable to visit today and will bring 2 sensors tomorrow .  pt endorses much anxiety about bolusing 8 units with meals, reviewed with pt preop /post op he was requring 6-8 units of admelog sq with meals and lantus 24-28 units, reviewed with pt his home settings were likely under dosed likely related to why he had DKA upon presentation to [facility], offered support and encouragement , reinforce education daily, offered pt if he is very uncomfortable with pump could switch to basal bolus but to optimize settings for discharge we need to continue titrating settings or would need to go back to insulin pens upon discharge . Counseled on need for tight glycemic control to prevent post op infection.  pt endorses he agrees to continue pump aware pump settings continue to be adjusted as needed by our team, verbalized understanding of education.  Discussed with team , xanax per team.     -If dexcom sensor is functioning continue control IQ  -If dexcom sensor is not functioning can remove dexcom cgm (do not discard transmitter)  pt will bolus at dinner based on  FS & CHO count;  If BG >200 at bedtime and at 2 am pt should provide correction bolus (calculated by pump)   if   BG <100 set temp basal 80% x12 hours  Pt to bolus for breakfast based on   FS & CHO count     discussed with RN and patient     addendum 1630 revisted pt at bedside -  reports he only ate turkey sandwhich at lunch but did not bolus as BG was low at lunch( as described earlier)   dexcom is not functioning, provided written  instructions for patient   If BG >200 at bedtime and at 2 am pt should provide correction bolus (calculated by pump)   if  BG <100 set temp basal 80% x12 hours  endocrine to follow closely , signed out to endocrine fellow

## 2023-04-01 NOTE — PROGRESS NOTE ADULT - PROBLEM SELECTOR PLAN 1
continue postop care  continue asa/ statin  increase bb lop 50 mg po bid for HR control  continue amio load  d/c pw   prn diuretics as per Dr. Sherwood  puljodie toilet  pain management  bowel regimen  increase activity as tolerated  continue to monitor in SDU today  discharge planning- home when stable

## 2023-04-01 NOTE — PROGRESS NOTE ADULT - SUBJECTIVE AND OBJECTIVE BOX
Patient is a 72y old  Male who presents with a chief complaint of CAD (01 Apr 2023 11:49)      DATE OF SERVICE: 04-01-23 @ 14:21    SUBJECTIVE / OVERNIGHT EVENTS: overnight events noted    ROS:  Resp: No cough no sputum production  CVS: No chest pain no palpitations no orthopnea  GI: no N/V/D  : no dysuria, no hematuria      MEDICATIONS  (STANDING):  ALPRAZolam 0.25 milliGRAM(s) Oral once  aMIOdarone    Tablet   Oral   aMIOdarone    Tablet 400 milliGRAM(s) Oral every 8 hours  aspirin enteric coated 81 milliGRAM(s) Oral daily  atorvastatin 80 milliGRAM(s) Oral at bedtime  bisacodyl Suppository 10 milliGRAM(s) Rectal once  chlorhexidine 2% Cloths 1 Application(s) Topical daily  enoxaparin Injectable 40 milliGRAM(s) SubCutaneous every 24 hours  insulin lispro (ADMELOG) Pump 1 Each SubCutaneous Continuous Pump  metoprolol tartrate 50 milliGRAM(s) Oral two times a day  pantoprazole    Tablet 40 milliGRAM(s) Oral before breakfast  polyethylene glycol 3350 17 Gram(s) Oral daily  senna 2 Tablet(s) Oral at bedtime  tamsulosin 0.4 milliGRAM(s) Oral at bedtime    MEDICATIONS  (PRN):  acetaminophen     Tablet .. 650 milliGRAM(s) Oral every 6 hours PRN Mild Pain (1 - 3)  oxyCODONE    IR 5 milliGRAM(s) Oral every 4 hours PRN Moderate Pain (4 - 6)  oxyCODONE    IR 10 milliGRAM(s) Oral every 4 hours PRN Severe Pain (7 - 10)        CAPILLARY BLOOD GLUCOSE      POCT Blood Glucose.: 132 mg/dL (01 Apr 2023 12:53)  POCT Blood Glucose.: 73 mg/dL (01 Apr 2023 11:26)  POCT Blood Glucose.: 132 mg/dL (01 Apr 2023 09:18)  POCT Blood Glucose.: 143 mg/dL (01 Apr 2023 07:31)  POCT Blood Glucose.: 111 mg/dL (01 Apr 2023 02:17)  POCT Blood Glucose.: 150 mg/dL (31 Mar 2023 20:45)  POCT Blood Glucose.: 180 mg/dL (31 Mar 2023 16:40)  POCT Blood Glucose.: 135 mg/dL (31 Mar 2023 14:36)    I&O's Summary    31 Mar 2023 07:01  -  01 Apr 2023 07:00  --------------------------------------------------------  IN: 1320 mL / OUT: 2625 mL / NET: -1305 mL    01 Apr 2023 07:01  -  01 Apr 2023 14:21  --------------------------------------------------------  IN: 650 mL / OUT: 700 mL / NET: -50 mL        Vital Signs Last 24 Hrs  T(C): 37.2 (01 Apr 2023 11:16), Max: 37.5 (31 Mar 2023 19:32)  T(F): 98.9 (01 Apr 2023 11:16), Max: 99.5 (31 Mar 2023 19:32)  HR: 73 (01 Apr 2023 14:04) (67 - 109)  BP: 108/57 (01 Apr 2023 14:04) (93/56 - 137/63)  BP(mean): 79 (01 Apr 2023 14:04) (68 - 90)  RR: 17 (01 Apr 2023 14:04) (16 - 20)  SpO2: 95% (01 Apr 2023 14:04) (91% - 97%)    PHYSICAL EXAM:  EYES: EOMI, PERRLA  NECK: Supple, No JVD  CHEST/LUNG: clear  HEART: S1 S2; no murmurs   ABDOMEN: Soft, Nontender  EXTREMITIES:  no edema  NEUROLOGY: AO x 3 non-focal  SKIN: No rashes or lesions    LABS:                        8.6    6.62  )-----------( 289      ( 01 Apr 2023 05:14 )             26.1     04-01    139  |  101  |  15  ----------------------------<  92  4.3   |  26  |  0.82    Ca    8.6      01 Apr 2023 05:13  Phos  4.2     04-01  Mg     2.5     04-01    TPro  5.8<L>  /  Alb  3.1<L>  /  TBili  0.8  /  DBili  x   /  AST  25  /  ALT  33  /  AlkPhos  104  04-01                All consultant(s) notes reviewed and care discussed with other providers        Contact Number, Dr Kelly [phone]

## 2023-04-01 NOTE — PROGRESS NOTE ADULT - ASSESSMENT
Patient seen and examined, agree with above assessment and plan as transcribed above.    - EP luis appreciated     Alec Mitchell MD, [facility]  BEEPER [phone]

## 2023-04-01 NOTE — PROGRESS NOTE ADULT - PROBLEM SELECTOR PLAN 1
continue postop care  continue asa/ statin  increase bb lop 50 mg po bid for HR control  continue amio load  d/c pw   prn diuretics as per Dr. Sherwodo  puljodie toilet  pain management  bowel regimen  increase activity as tolerated  continue to monitor in SDU today  discharge planning- home when stable continue postop care  continue asa/ statin  increase bb lop 50 mg po bid for HR control  continue amio load  prn diuretics as per Dr. Sherwood  puljodie toilet  pain management  bowel regimen  increase activity as tolerated  continue to monitor in SDU today  discharge planning- home when stable

## 2023-04-01 NOTE — PROGRESS NOTE ADULT - SUBJECTIVE AND OBJECTIVE BOX
pt seen and examined, no complaints, ROS - .     acetaminophen     Tablet .. 650 milliGRAM(s) Oral every 6 hours PRN  aMIOdarone    Tablet   Oral   aMIOdarone    Tablet 400 milliGRAM(s) Oral every 8 hours  aspirin enteric coated 81 milliGRAM(s) Oral daily  atorvastatin 80 milliGRAM(s) Oral at bedtime  bisacodyl Suppository 10 milliGRAM(s) Rectal once  chlorhexidine 2% Cloths 1 Application(s) Topical daily  enoxaparin Injectable 40 milliGRAM(s) SubCutaneous every 24 hours  gabapentin 100 milliGRAM(s) Oral every 8 hours  insulin lispro (ADMELOG) Pump 1 Each SubCutaneous Continuous Pump  metoprolol tartrate 50 milliGRAM(s) Oral two times a day  oxyCODONE    IR 5 milliGRAM(s) Oral every 4 hours PRN  oxyCODONE    IR 10 milliGRAM(s) Oral every 4 hours PRN  pantoprazole    Tablet 40 milliGRAM(s) Oral before breakfast  polyethylene glycol 3350 17 Gram(s) Oral daily  senna 2 Tablet(s) Oral at bedtime  tamsulosin 0.4 milliGRAM(s) Oral at bedtime                            8.6    6.62  )-----------( 289      ( 01 Apr 2023 05:14 )             26.1       Hemoglobin: 8.6 g/dL (04-01 @ 05:14)  Hemoglobin: 9.0 g/dL (03-31 @ 06:16)  Hemoglobin: 8.1 g/dL (03-30 @ 00:10)  Hemoglobin: 8.6 g/dL (03-29 @ 00:38)  Hemoglobin: 8.6 g/dL (03-28 @ 00:23)      04-01    139  |  101  |  15  ----------------------------<  92  4.3   |  26  |  0.82    Ca    8.6      01 Apr 2023 05:13  Phos  4.2     04-01  Mg     2.5     04-01    TPro  5.8<L>  /  Alb  3.1<L>  /  TBili  0.8  /  DBili  x   /  AST  25  /  ALT  33  /  AlkPhos  104  04-01    Creatinine Trend: 0.82<--, 0.78<--, 0.75<--, 0.81<--, 0.71<--, 0.48<--    COAGS:           T(C): 37.2 (04-01-23 @ 07:05), Max: 37.5 (03-31-23 @ 19:32)  HR: 68 (04-01-23 @ 07:05) (67 - 109)  BP: 137/63 (04-01-23 @ 07:05) (93/56 - 137/63)  RR: 16 (04-01-23 @ 07:05) (16 - 20)  SpO2: 95% (04-01-23 @ 07:05) (91% - 97%)  Wt(kg): --    I&O's Summary    31 Mar 2023 07:01  -  01 Apr 2023 07:00  --------------------------------------------------------  IN: 1320 mL / OUT: 2625 mL / NET: -1305 mL    01 Apr 2023 07:01  -  01 Apr 2023 09:55  --------------------------------------------------------  IN: 240 mL / OUT: 200 mL / NET: 40 mL      General: Well nourished, no acute distress, alert and oriented x 3  Head: normocephalic, no trauma  Neck: no JVD, no bruit, supple, not enlarged  CV: sternotomy.  regular S1S2, no S3, regular rate,  no murmurs.    Lungs: clear BL, no rales or wheezes  Abdomen: bowel sounds +, soft, nontender, nondistended  Extremities: no clubbing, cyanosis or edema  Neuro: Moves all 4 extremities, sensation intact x 4 extremities  Skin: warm and moist, normal turgor  Psych: Mood and affect are appropriate for circumstances  MSK: normal range of motion and strength x4 extremities.    TELEMETRY: background of normal sinus rhythm. episodes of rapid AFib up to 140bpm, occasionally with LBBB QRS Abberancy.	    ECG: NSR, RBBB. 	  Echo:  < from: Intra-Operative Transesophageal Echo W or WO Ultrasound Enhancing Agent (03.27.23 @ 07:16) >  Post-Bypass:  No changes from baseline. S/p CABG x3.  LV function remains low-normal Function. EF ~50%; No new wall motion abnormalities. lateral wall remains mildly hypokinetic.  Normal RV function.  Min TR.  Min-mild PI.  min-Mild MR.  no AI.  No aortic dissection visualized.  All findings discussed with Surgeon. Probe removed atraumatically, no blood. The patient tolerated the procedure welland without complications. Permanent recorded images are stored in the medical record.  < end of copied text >         ASSESSMENT/PLAN: Mr Major is a very pleasant 72y Male seen in CTS Stepdown Unit, recovering from CABG. He presented with a NSTEMI. He has history of HTN and diabetes, under care at the [facility].  EP called re: post op AFib, highly symptomatic.    Continue insulin infusion for diabetes mellitus.  Continue aspirin, atorvastatin and metoprolol for CAD.  Recovering well from CABG.  New/ Post-Op AFib, paroxysmal, symptomatic.  Amiodarone load 400mg TID x 12 doses then 200mg daily.    Recommend also starting Apixaban 5mg BID for stroke prevention, as his [id number] score is at least 4 (age, HTN, DM, MI).    Regular Holter monitoring in outpatient setting for AF surveillance.  Any decision to stop anticoagulation should be deferred to outpatient management.  At this time, no plan for cardioversion, as his AFib is paroxymsal.

## 2023-04-01 NOTE — PROGRESS NOTE ADULT - ASSESSMENT
Patient is 72 years old male with past medical history of Type I DM (50 years ago, on Insulin pump for last 15 years), Vertigo and BPH who was brought to ED due to Abdominal pain, Nausea and vomiting transferred from [facility] to [facility] for work up and management of NSTEMI

## 2023-04-01 NOTE — PROVIDER CONTACT NOTE (OTHER) - ACTION/TREATMENT ORDERED:
Pt assisted to chair and rolled back to room. VSS. NP at bedside. Labs drawn and mag given per order. No new orders or interventions at this time. Will continue to monitor
NP Tita Ramirez aware. Endo NP Fuentes aware. Dexcom placed 3/31 not working. Called Endo NP to bedside. Pt refuses any additional interventions at this time. Call bell in reach. Will monitor.
NP to review meds and order BB and K, no additional orders or interventions at this time, will continue to monitor pt

## 2023-04-01 NOTE — PROGRESS NOTE ADULT - ASSESSMENT
72 years old male with PMHX of HTN, Type I DM (50 years ago, on Insulin pump for last 15 years), Vertigo and BPH who was brought to ED due to Abdominal pain, Nausea and vomiting. Patient also found to have elevated troponin. Patient transferred over from [facility] s/p [facility] showing   TVD CAD.      3/22 VSS; Patient seen at bedside, resting comfortably on room air. In NAD, no SOB, or CP at this time.   3/23 /64 -171/81. OR Thursday 3/30.  Radial mapping.  Pt transferred to Dr Herndon.  Carotids neg.  EF 53%  3/24 Scheduled for cabg this Monday Endocrinology recommendations appreciated.  3/25 reserve left arm for radial harvest  3/27 s/p cabg x 3 lima (LIMA-LAD | SVG-OM | SVG-OM ) Right greater saphenous vein harvested. EF 55%.  Post op Course:   Extubated POD #0   Insulin infusion  Preop insulin pump post op labile glucose  PAF, amio load, lopressor.  Episodes of hypotension PW attached , pacer off  Diuresis  3/30 transferred to SDU  3/31 VSS: RSR 70's w/ episodes of Pafib/ EP consulted with Dr. Del Castillo as per cardiology; continue amio load; increase lop 50 mg po bid; monitor and supplement electrolytes; pw d/c as per Dr. Herndon; Eliquis 5 bid recommended as per EP; d/w Dr. Herndon  hold off on Eliquis today since pw removal this am as per Dr. HERNDON; if continued pafib - likely start in am but Eliquis 2.5 bid   insulin pump resumed today as per pt and HAYDEN darling; prn diuretics as per Dr. Herndon- Lasix 40 mg iv given this am  continue to monitor in SDU today.   4/1   Discharge planning- home when stable

## 2023-04-01 NOTE — PROVIDER CONTACT NOTE (OTHER) - BACKGROUND
PMH: Type 1 DM on insulin pump for 15 years, HTN, Vertigo and BPH     3/27 CABGx3, R leg harvest
Pt admitted 3/22 from [facility] for CTS workup, OHS scheduled for 3/29
s/p C3L on 3/27

## 2023-04-01 NOTE — PROGRESS NOTE ADULT - SUBJECTIVE AND OBJECTIVE BOX
VITAL SIGNS    Subjective: I'm feeling ok" Denies CP, palpitation, SOB, COURTNEY, HA, dizziness, N/V/D, fever or chills.  No acute event noted overnight.     Telemetry:  NSR / Afib  (10 beast WCT during the overnight)    Vital Signs Last 24 Hrs  T(C): 37.2 (23 @ 07:05), Max: 37.5 (23 @ 19:32)  T(F): 99 (23 @ 07:05), Max: 99.5 (23 @ 19:32)  HR: 68 (23 @ 07:05) (67 - 109)  BP: 137/63 (23 @ 07:05) (93/56 - 137/63)  RR: 16 (23 @ 07:05) (16 - 20)  SpO2: 95% (23 07:05) (91% - 97%)            @ 07:  -   @ 07:00  --------------------------------------------------------  IN: 1320 mL / OUT: 2625 mL / NET: -1305 mL     @ 07:01  -   @ 11:18  --------------------------------------------------------  IN: 240 mL / OUT: 200 mL / NET: 40 mL    LABS      139  |  101  |  15  ----------------------------<  92  4.3   |  26  |  0.82    Ca    8.6      2023 05:13  Phos  4.2     04-  Mg     2.5     -    TPro  5.8<L>  /  Alb  3.1<L>  /  TBili  0.8  /  DBili  x   /  AST  25  /  ALT  33  /  AlkPhos  104  04-01                                 8.6    6.62  )-----------( 289      ( 2023 05:14 )             26.1          Daily     Daily Weight in k.8 (2023 07:52)    CAPILLARY BLOOD GLUCOSE    POCT Blood Glucose.: 132 mg/dL (2023 09:18)  POCT Blood Glucose.: 143 mg/dL (2023 07:31)  POCT Blood Glucose.: 111 mg/dL (2023 02:17)  POCT Blood Glucose.: 150 mg/dL (31 Mar 2023 20:45)  POCT Blood Glucose.: 180 mg/dL (31 Mar 2023 16:40)  POCT Blood Glucose.: 135 mg/dL (31 Mar 2023 14:36)        PHYSICAL EXAM    Neurology: alert and oriented x 3, nonfocal, no gross deficits    CV: (+) Irregular S1 and S2, No murmurs, rubs, gallops or clicks     Sternal Wound: MSI -->CDI, sternum stable    Lungs: CTA B/L     Abdomen: soft, nontender, nondistended, positive bowel sounds, (+) Flatus; (-) BM; LLQ insulin pump subcutaneous --> C/D/I     :  Voiding               Extremities:  B/L LE (+) 1 edema; negative calf tenderness; (+) 2 DP palpable        acetaminophen Tablet .. 650 milliGRAM(s) Oral every 6 hours PRN  aMIOdarone    Tablet 400 milliGRAM(s) Oral every 8 hours  aspirin enteric coated 81 milliGRAM(s) Oral daily  atorvastatin 80 milliGRAM(s) Oral at bedtime  bisacodyl Suppository 10 milliGRAM(s) Rectal once  chlorhexidine 2% Cloths 1 Application(s) Topical daily  enoxaparin Injectable 40 milliGRAM(s) SubCutaneous every 24 hours  gabapentin 100 milliGRAM(s) Oral every 8 hours  insulin lispro (ADMELOG) Pump 1 Each SubCutaneous Continuous Pump  metoprolol tartrate 50 milliGRAM(s) Oral two times a day  oxyCODONE IR 5 milliGRAM(s) Oral every 4 hours PRN  oxyCODONE IR 10 milliGRAM(s) Oral every 4 hours PRN  pantoprazole Tablet 40 milliGRAM(s) Oral before breakfast  polyethylene glycol 3350 17 Gram(s) Oral daily  senna 2 Tablet(s) Oral at bedtime  tamsulosin 0.4 milliGRAM(s) Oral at bedtime    Physical Therapy Rec:   Home  [  ]   Home w/ PT  [ X ]  Rehab  [  ]    Discussed with Cardiothoracic Team at AM rounds.

## 2023-04-01 NOTE — PROGRESS NOTE ADULT - SUBJECTIVE AND OBJECTIVE BOX
VITAL SIGNS    Subjective:     Telemetry:      Vital Signs Last 24 Hrs  T(C): 36.9 (04-01-23 @ 03:00), Max: 37.5 (03-31-23 @ 19:32)  T(F): 98.4 (04-01-23 @ 03:00), Max: 99.5 (03-31-23 @ 19:32)  HR: 104 (04-01-23 @ 06:30) (67 - 128)  BP: 99/57 (04-01-23 @ 03:00) (93/56 - 132/56)  RR: 20 (04-01-23 @ 03:00) (16 - 20)  SpO2: 93% (04-01-23 @ 06:30) (91% - 97%)           03-31 @ 07:01  -  04-01 @ 07:00  --------------------------------------------------------  IN: 1320 mL / OUT: 2625 mL / NET: -1305 mL    LABS  04-01    139  |  101  |  15  ----------------------------<  92  4.3   |  26  |  0.82    Ca    8.6      01 Apr 2023 05:13  Phos  4.2     04-01  Mg     2.5     04-01    TPro  5.8<L>  /  Alb  3.1<L>  /  TBili  0.8  /  DBili  x   /  AST  25  /  ALT  33  /  AlkPhos  104  04-01                                 8.6    6.62  )-----------( 289      ( 01 Apr 2023 05:14 )             26.1            CAPILLARY BLOOD GLUCOSE    POCT Blood Glucose.: 143 mg/dL (01 Apr 2023 07:31)  POCT Blood Glucose.: 111 mg/dL (01 Apr 2023 02:17)  POCT Blood Glucose.: 150 mg/dL (31 Mar 2023 20:45)  POCT Blood Glucose.: 180 mg/dL (31 Mar 2023 16:40)  POCT Blood Glucose.: 135 mg/dL (31 Mar 2023 14:36)  POCT Blood Glucose.: 133 mg/dL (31 Mar 2023 11:06)          Drains:     MS         [  ] Drainage:                 L Pleural  [  ]  Drainage:                R Pleural  [  ]  Drainage:    Pacing Wires        [  ]   Settings:                                  Isolated  [  ]    Coumadin    [ ] YES          [  ]      NO         Reason:           PHYSICAL EXAM      Neurology: alert and oriented x 3, nonfocal, no gross deficits    CV:     Sternal Wound: MSI -->CDI, sternum stable    Lungs: CTA B/L     Abdomen: soft, nontender, nondistended, positive bowel sounds, (+) Flatus; (+) BM     :  Voiding               Extremities:  B/L LE (+) edema; negative calf tenderness; (+) 2 DP palpable        acetaminophen Tablet .. 650 milliGRAM(s) Oral every 6 hours PRN  aMIOdarone  Tablet 400 milliGRAM(s) Oral every 8 hours  aspirin enteric coated 81 milliGRAM(s) Oral daily  atorvastatin 80 milliGRAM(s) Oral at bedtime  bisacodyl Suppository 10 milliGRAM(s) Rectal once  chlorhexidine 2% Cloths 1 Application(s) Topical daily  enoxaparin Injectable 40 milliGRAM(s) SubCutaneous every 24 hours  gabapentin 100 milliGRAM(s) Oral every 8 hours  insulin lispro (ADMELOG) Pump 1 Each SubCutaneous Continuous Pump  metoprolol tartrate 50 milliGRAM(s) Oral two times a day  oxyCODONE IR 5 milliGRAM(s) Oral every 4 hours PRN  oxyCODONE IR 10 milliGRAM(s) Oral every 4 hours PRN  pantoprazole Tablet 40 milliGRAM(s) Oral before breakfast  polyethylene glycol 3350 17 Gram(s) Oral daily  senna 2 Tablet(s) Oral at bedtime  tamsulosin 0.4 milliGRAM(s) Oral at bedtime    Physical Therapy Rec:   Home  [  ]   Home w/ PT  [  ]  Rehab  [  ]    Discussed with Cardiothoracic Team at AM rounds.

## 2023-04-01 NOTE — PROGRESS NOTE ADULT - ASSESSMENT
72 years old male with PMHX of HTN, Type I DM (50 years ago, on Insulin pump for last 15 years), Vertigo and BPH who was brought to ED due to Abdominal pain, Nausea and vomiting. Patient also found to have elevated troponin. Patient transferred over from [facility] s/p [facility] showing   TVD CAD.      3/22 VSS; Patient seen at bedside, resting comfortably on room air. In NAD, no SOB, or CP at this time.   3/23 /64 -171/81. OR Thursday 3/30.  Radial mapping.  Pt transferred to Dr Sherwood.  Carotids neg.  EF 53%  3/24 Scheduled for cabg this Monday Endocrinology recommendations appreciated.  3/25 reserve left arm for radial harvest  3/27 s/p cabg x 3 lima / LIMA-LAD | SVG-OM | SVG-OM; EF 55%  Right greater saphenous vein harvested  Extubated d 0  Insulin infusion  Preop insulin pump post op labile glucose  PAF, amio load, lopressor.  Episodes of hypotension PW attached , pacer off  Diuresis  3/30 transferred to sdu  3/31 VSS: RSR 70's w/ episodes of Pafib/ EP consulted with Dr. Del Castillo as per cardiology; continue amio load; increase lop 50 mg po bid; monitor and supplement electrolytes; pw d/c as per Dr. Sherwood; Eliquis 5 bid recommended as per EP; d/w Dr. Sherwood -- plan to hold off on Eliquis today since pw removal this am as per Dr. Sherwood; if continued pafib - likely start in am but Eliquis 2.5 bid   insulin pump resumed today as per pt and HAYDEN darling; prn diuretics as per Dr. Sherwood- lasix 40 mg iv given this AM. Continue to monitor in SDU today.  4/1 VVS; Continue with current medication regimen.    Discharge planning- home when stable    72 years old male with PMHX of HTN, Type I DM (50 years ago, on Insulin pump for last 15 years), Vertigo and BPH who was brought to ED due to Abdominal pain, Nausea and vomiting. Patient also found to have elevated troponin. Patient transferred over from [facility] s/p [facility] showing   TVD CAD.      3/22 VSS; Patient seen at bedside, resting comfortably on room air. In NAD, no SOB, or CP at this time.   3/23 /64 -171/81. OR Thursday 3/30.  Radial mapping.  Pt transferred to Dr Sherwood.  Carotids neg.  EF 53%  3/24 Scheduled for cabg this Monday Endocrinology recommendations appreciated.  3/25 reserve left arm for radial harvest  3/27 s/p cabg x 3 lima / LIMA-LAD | SVG-OM | SVG-OM; EF 55%  Right greater saphenous vein harvested  Extubated d 0  Insulin infusion  Preop insulin pump post op labile glucose  PAF, amio load, lopressor.  Episodes of hypotension PW attached , pacer off  Diuresis  3/30 transferred to sdu  3/31 VSS: RSR 70's w/ episodes of Pafib/ EP consulted with Dr. Del Castillo as per cardiology; continue amio load; increase lop 50 mg po bid; monitor and supplement electrolytes; pw d/c as per Dr. Sherwood; Eliquis 5 bid recommended as per EP; d/w Dr. Sherwood -- plan to hold off on Eliquis today since pw removal this am as per Dr. Sherwood; if continued pafib - likely start in am but Eliquis 2.5 bid   insulin pump resumed today as per pt and HAYDEN darling; prn diuretics as per Dr. Sherwodo- lasix 40 mg iv given this AM. Continue to monitor in SDU today.  4/1 VVS; Continue with current medication regimen. House Endo following on home insulin pump.    Discharge planning- home when stable

## 2023-04-02 ENCOUNTER — TRANSCRIPTION ENCOUNTER (OUTPATIENT)
Age: 73
End: 2023-04-02

## 2023-04-02 LAB
ANION GAP SERPL CALC-SCNC: 10 MMOL/L — SIGNIFICANT CHANGE UP (ref 5–17)
BUN SERPL-MCNC: 16 MG/DL — SIGNIFICANT CHANGE UP (ref 7–23)
CALCIUM SERPL-MCNC: 8.2 MG/DL — LOW (ref 8.4–10.5)
CHLORIDE SERPL-SCNC: 100 MMOL/L — SIGNIFICANT CHANGE UP (ref 96–108)
CO2 SERPL-SCNC: 25 MMOL/L — SIGNIFICANT CHANGE UP (ref 22–31)
CREAT SERPL-MCNC: 0.81 MG/DL — SIGNIFICANT CHANGE UP (ref 0.5–1.3)
EGFR: 93 ML/MIN/1.73M2 — SIGNIFICANT CHANGE UP
GLUCOSE BLDC GLUCOMTR-MCNC: 120 MG/DL — HIGH (ref 70–99)
GLUCOSE BLDC GLUCOMTR-MCNC: 144 MG/DL — HIGH (ref 70–99)
GLUCOSE BLDC GLUCOMTR-MCNC: 151 MG/DL — HIGH (ref 70–99)
GLUCOSE BLDC GLUCOMTR-MCNC: 240 MG/DL — HIGH (ref 70–99)
GLUCOSE BLDC GLUCOMTR-MCNC: 93 MG/DL — SIGNIFICANT CHANGE UP (ref 70–99)
GLUCOSE SERPL-MCNC: 124 MG/DL — HIGH (ref 70–99)
HCT VFR BLD CALC: 25.1 % — LOW (ref 39–50)
HGB BLD-MCNC: 8.1 G/DL — LOW (ref 13–17)
MAGNESIUM SERPL-MCNC: 2 MG/DL — SIGNIFICANT CHANGE UP (ref 1.6–2.6)
MCHC RBC-ENTMCNC: 29.5 PG — SIGNIFICANT CHANGE UP (ref 27–34)
MCHC RBC-ENTMCNC: 32.3 GM/DL — SIGNIFICANT CHANGE UP (ref 32–36)
MCV RBC AUTO: 91.3 FL — SIGNIFICANT CHANGE UP (ref 80–100)
NRBC # BLD: 0 /100 WBCS — SIGNIFICANT CHANGE UP (ref 0–0)
PHOSPHATE SERPL-MCNC: 3.4 MG/DL — SIGNIFICANT CHANGE UP (ref 2.5–4.5)
PLATELET # BLD AUTO: 328 K/UL — SIGNIFICANT CHANGE UP (ref 150–400)
POTASSIUM SERPL-MCNC: 4 MMOL/L — SIGNIFICANT CHANGE UP (ref 3.5–5.3)
POTASSIUM SERPL-SCNC: 4 MMOL/L — SIGNIFICANT CHANGE UP (ref 3.5–5.3)
RBC # BLD: 2.75 M/UL — LOW (ref 4.2–5.8)
RBC # FLD: 14.9 % — HIGH (ref 10.3–14.5)
SODIUM SERPL-SCNC: 135 MMOL/L — SIGNIFICANT CHANGE UP (ref 135–145)
WBC # BLD: 6.09 K/UL — SIGNIFICANT CHANGE UP (ref 3.8–10.5)
WBC # FLD AUTO: 6.09 K/UL — SIGNIFICANT CHANGE UP (ref 3.8–10.5)

## 2023-04-02 PROCEDURE — 99233 SBSQ HOSP IP/OBS HIGH 50: CPT

## 2023-04-02 RX ORDER — FUROSEMIDE 40 MG
40 TABLET ORAL ONCE
Refills: 0 | Status: COMPLETED | OUTPATIENT
Start: 2023-04-02 | End: 2023-04-02

## 2023-04-02 RX ADMIN — OXYCODONE HYDROCHLORIDE 5 MILLIGRAM(S): 5 TABLET ORAL at 21:40

## 2023-04-02 RX ADMIN — Medication 81 MILLIGRAM(S): at 11:21

## 2023-04-02 RX ADMIN — Medication 40 MILLIGRAM(S): at 09:42

## 2023-04-02 RX ADMIN — OXYCODONE HYDROCHLORIDE 5 MILLIGRAM(S): 5 TABLET ORAL at 21:12

## 2023-04-02 RX ADMIN — APIXABAN 2.5 MILLIGRAM(S): 2.5 TABLET, FILM COATED ORAL at 05:22

## 2023-04-02 RX ADMIN — Medication 50 MILLIGRAM(S): at 17:15

## 2023-04-02 RX ADMIN — POLYETHYLENE GLYCOL 3350 17 GRAM(S): 17 POWDER, FOR SOLUTION ORAL at 11:24

## 2023-04-02 RX ADMIN — PANTOPRAZOLE SODIUM 40 MILLIGRAM(S): 20 TABLET, DELAYED RELEASE ORAL at 05:22

## 2023-04-02 RX ADMIN — OXYCODONE HYDROCHLORIDE 5 MILLIGRAM(S): 5 TABLET ORAL at 01:56

## 2023-04-02 RX ADMIN — OXYCODONE HYDROCHLORIDE 5 MILLIGRAM(S): 5 TABLET ORAL at 02:26

## 2023-04-02 RX ADMIN — Medication 50 MILLIGRAM(S): at 05:21

## 2023-04-02 RX ADMIN — CHLORHEXIDINE GLUCONATE 1 APPLICATION(S): 213 SOLUTION TOPICAL at 11:24

## 2023-04-02 RX ADMIN — APIXABAN 2.5 MILLIGRAM(S): 2.5 TABLET, FILM COATED ORAL at 17:15

## 2023-04-02 RX ADMIN — AMIODARONE HYDROCHLORIDE 400 MILLIGRAM(S): 400 TABLET ORAL at 05:21

## 2023-04-02 RX ADMIN — TAMSULOSIN HYDROCHLORIDE 0.4 MILLIGRAM(S): 0.4 CAPSULE ORAL at 21:12

## 2023-04-02 RX ADMIN — ATORVASTATIN CALCIUM 80 MILLIGRAM(S): 80 TABLET, FILM COATED ORAL at 21:12

## 2023-04-02 RX ADMIN — SENNA PLUS 2 TABLET(S): 8.6 TABLET ORAL at 21:13

## 2023-04-02 NOTE — PROGRESS NOTE ADULT - ASSESSMENT
73y/o M w/h/o uncontrolled T1DM (A1C 8.2%) on T Slim insulin pump PTA. No  known complications. Also h/o HTN, vertigo and BPH who was brought to OSH due to abdominal pain, nausea and vomiting found to be in DKA and transferred to [facility] after found to have triple vessel disease on [facility].   s/p CABG 3/27.        Home Regimen: T slimx2 novolog insulin pump   basal  12a 0.148  9a 0.75  5p 0.758  10p 0.712  ISF  12a 1:50  5p 1:57  10p 1:50  ICR  12a 1:18  9a 1:17  5p 1:!8  target 100  action time 5hr      Uncontrolled type 1 diabetes mellitus with ketoacidosis.   ·  Plan:  - BG Goal 100-180mg/dl    -Test BG ac and hs and 2am while on sq insulin and q6h if NPO.  - insulin pump replaced on 3/31 on control IQ  , next site change due 4/3  - family bringing dexcom supplies for pt to place new sensor today, please cover with lead for any CXRs, dexcom would need to be removed if having any MRI procedure  - If Pump malfunctions please remove pump and give stat  Lantus  to 28 units/Admelog to 8 units TID AC/ moderate correction scales Discharge plan:  - While in patient noted pts home pump settings are much lower than inpt requirements with A1C Above goal, while admitted has used Basal insulin 20-28 units and admelog 6-8 units with meals; profile 2 added to pump with higher basal rates and intensified ICR monitoring pt on this profile while inpt  - Follow up with endocrinologist Dr Ngo within 2 weeks   - Make sure pt has Rx for all DM supplies and insulin at home prior to discharge.  - Needs Optho f/u as out pt.  -[facility] [phone]- spoke with triage RN - pt was having overnight hypos so 12mn setting was decreased over past 2 months, confirmed home settings and informed them of pts DKA and plan to change settings, pt  had recent telehealth appt on 2/23 ; pt should call for appt for close f/u with endocrinologist .Of note pt reported this tslim model is 3 yrs old- pt should check with MD if able to upgrade , it has a 4 yr warranty (called [employer] to confirm if within 4 yrs can get replacement if deemed malfunctioning)   3/31 spoke with Dr Ngo , please ensure op endo Dr Ngo is informed of new pump settings prior to discharge  xx [id number], xx [id number], [id number]    Insulin pump in place   - Tslimx 2 & Dexcom replaced 3/31 next site change for pump : 4/3 (monday), next dexcom change:  **  - RN  to ensure insulin pump attestation on chart  - Document insulin boluses/carbs on flow sheet -> Pt to show RN pump to verify boluses  - pt has backup insulin pump supplies, wife bringing additional dexcom sensor today  3/31/23  Added Profile 2: Basal 0000 1.2u/hr,  ICR 1:9 , ISF 1:50 ,  4/1: BG tightly controlled 12a-3a on dexcom, pt self corrected with rebound hyperglycemia, adjusted profile 2 to prevent o/n lows as below   Basal 0000 0.9u/hr,  ICR 1:9 , ISF 1:50 ,   Basal 0300 1.2u/hr,  ICR 1:9 , ISF 1:50 ,  Pt has profile 2 on with control IQ ON  Further pump settings TBD based on glycemic trends on new settings  4/2 He is counting the carb amount on tray correctly but then boluses for full tray amount of carbs & does not eat full tray leading to tightly controlled BG, reminded pt to only bolus for CHO he is actually eating.  Also counseled pt to deliver bolus in pump before logging it in his rufus "[email or web address]" to ensure he actually boluses on the pump, as he told RN he bolused for dinner on sat however pump review shows no CHO bolus at dinner only correctoinal, pt had light CHO meal with bedtime BG at goal, pt to show pump to RN when bolusing to verify bolus delivered. Pt verbalized understanding continue to reinforce education.        HTN (hypertension).   ·  Plan: - Home regimen: losartan 100mg daily  - BG goal and management  per cardiology team.      HLD (hyperlipidemia).   ·  Plan: LDL goal <55 due to DM and now CVD  C/w atorvastatin 80 milliGRAM(s) Oral at bedtime  Follow up levels as out pt.        discussed with patient and primary team  Laura NP  and RN   Contact via Microsoft Teams during business hours  To reach covering provider access LYNDON via sunrise tools  For Urgent matters/after-hours/weekends/holidays please page endocrine fellow on call   For nonurgent matters please email [email or web address]    Please note that this patient may be followed by different provider tomorrow.  Notify endocrine 24 hours prior to discharge for final recommendations    greater than 50% of the encounter was spent counseling and/or coordination of care.  36   minutes spent on total encounter; The necessity of the time spent during the encounter on this date of service was due to development of plan of care/coordination of care/glycemic control through review of labs, blood glucose values and vital signs. required multiple bedside visits and pump adjustment 71y/o M w/h/o uncontrolled T1DM (A1C 8.2%) on T Slim insulin pump PTA. No  known complications. Also h/o HTN, vertigo and BPH who was brought to OSH due to abdominal pain, nausea and vomiting found to be in DKA and transferred to [facility] after found to have triple vessel disease on [facility].   s/p CABG 3/27.        Home Regimen: T slimx2 novolog insulin pump   basal  12a 0.148  9a 0.75  5p 0.758  10p 0.712  ISF  12a 1:50  5p 1:57  10p 1:50  ICR  12a 1:18  9a 1:17  5p 1:!8  target 100  action time 5hr      Uncontrolled type 1 diabetes mellitus with ketoacidosis.   ·  Plan:  - BG Goal 100-180mg/dl    -Test BG ac and hs and 2am while on sq insulin and q6h if NPO.  - insulin pump replaced on 3/31 on control IQ  , next site change due 4/3  - family bringing dexcom supplies for pt to place new sensor today, please cover with lead for any CXRs, dexcom would need to be removed if having any MRI procedure  - If Pump malfunctions please remove pump and give stat  Lantus  to 28 units/Admelog to 8 units TID AC/ moderate correction scales Discharge plan:  - While in patient noted pts home pump settings are much lower than inpt requirements with A1C Above goal, while admitted has used Basal insulin 20-28 units and admelog 6-8 units with meals; profile 2 added to pump with higher basal rates and intensified ICR monitoring pt on this profile while inpt  - Follow up with endocrinologist Dr Ngo within 2 weeks   - Make sure pt has Rx for all DM supplies and insulin at home prior to discharge.  - Needs Optho f/u as out pt.  -[facility] [phone]- spoke with triage RN - pt was having overnight hypos so 12mn setting was decreased over past 2 months, confirmed home settings and informed them of pts DKA and plan to change settings, pt  had recent telehealth appt on 2/23 ; pt should call for appt for close f/u with endocrinologist .Of note pt reported this tslim model is 3 yrs old- pt should check with MD if able to upgrade , it has a 4 yr warranty (called [employer] to confirm if within 4 yrs can get replacement if deemed malfunctioning)   3/31 spoke with Dr Ngo , please ensure op endo Dr Ngo is informed of new pump settings prior to discharge  xx [phone], xx [phone], [id number]    Insulin pump in place   - Tslimx 2 & Dexcom replaced 3/31 next site change for pump : 4/3 (monday), next dexcom change: 4/12  - RN  to ensure insulin pump attestation on chart  - Document insulin boluses/carbs on flow sheet -> Pt to show RN pump to verify boluses  - pt has backup insulin pump supplies, wife bringing additional dexcom sensor today  3/31/23  Added Profile 2: Basal 0000 1.2u/hr,  ICR 1:9 , ISF 1:50 ,  4/1: BG tightly controlled 12a-3a on dexcom, pt self corrected with rebound hyperglycemia, adjusted profile 2 to prevent o/n lows as below   Basal 0000 0.9u/hr,  ICR 1:9 , ISF 1:50 ,   Basal 0300 1.2u/hr,  ICR 1:9 , ISF 1:50 ,  Pt has profile 2 on with control IQ ON  Further pump settings TBD based on glycemic trends on new settings  4/2 He is counting the carb amount on tray correctly but then boluses for full tray amount of carbs & does not eat full tray leading to tightly controlled BG, reminded pt to only bolus for CHO he is actually eating.  Also counseled pt to deliver bolus in pump before logging it in his rufus "[employer]" to ensure he actually boluses on the pump, as he told RN he bolused for dinner on sat however pump review shows no CHO bolus at dinner only correctoinal, pt had light CHO meal with bedtime BG at goal, pt to show pump to RN when bolusing to verify bolus delivered. Pt verbalized understanding continue to reinforce education.        HTN (hypertension).   ·  Plan: - Home regimen: losartan 100mg daily  - BG goal and management  per cardiology team.      HLD (hyperlipidemia).   ·  Plan: LDL goal <55 due to DM and now CVD  C/w atorvastatin 80 milliGRAM(s) Oral at bedtime  Follow up levels as out pt.        discussed with patient and primary team  Laura NP  and RN   Contact via Microsoft Teams during business hours  To reach covering provider access AMION via sunrise tools  For Urgent matters/after-hours/weekends/holidays please page endocrine fellow on call   For nonurgent matters please email [email or web address]    Please note that this patient may be followed by different provider tomorrow.  Notify endocrine 24 hours prior to discharge for final recommendations    greater than 50% of the encounter was spent counseling and/or coordination of care.  36   minutes spent on total encounter; The necessity of the time spent during the encounter on this date of service was due to development of plan of care/coordination of care/glycemic control through review of labs, blood glucose values and vital signs. required multiple bedside visits and pump adjustment    1600 addendum:  at dinner as pt did not bolus for lunch in the pump, had pt start correctional bolus at bedside for FS of 240 (see below).  revisted pt at bedside, wife at bedside, pt placed new dexcom sensor on right abdomen .   reviewed pump - pt did not complete actions on pump for lunch bolus- RN observed pt put in carbs and BG and calculate bolus amount- however pt did not finalize the bolus by clicking the  3 check marks to start the bolus however he thought he did, now with post prandial hyperglycemia.   Discussed with wife at bedside - pt and wife prefer to stay on pump due to convience, wife will assume responsibility of ensuring pt bolusing at home and monitoring BG.   Wife to call Dr Ngo tomorrow for close f/u appt, provided wife with new pump settings.   Pt is able to cho count, reminded pt to only bolus for amount of cho he is actually eating, if he is not completing the tray he should not bolus for the whole tray.   Dexcom on, sensor is starting up, control IQ switched on  discussed with RN & Wife at bedside

## 2023-04-02 NOTE — DISCHARGE NOTE NURSING/CASE MANAGEMENT/SOCIAL WORK - PATIENT PORTAL LINK FT
You can access the FollowMyHealth Patient Portal offered by [facility] by registering at the following website: [email or web address]. By joining [employer]’s FollowMyHealth portal, you will also be able to view your health information using other applications (apps) compatible with our system.

## 2023-04-02 NOTE — PROGRESS NOTE ADULT - ASSESSMENT
EP ATTENDING    Agree with above. Recommend short term amiodarone/anticoagulation.        Hanh Brantley M.D., [facility]  Cardiac Electrophysiology  [facility]  [address] [place] [address], [address] [place] [address]  [email or web address]    office [phone]  pager [phone]

## 2023-04-02 NOTE — PROGRESS NOTE ADULT - ASSESSMENT
72 years old male with PMHX of HTN, Type I DM (50 years ago, on Insulin pump for last 15 years), Vertigo and BPH who was brought to ED due to Abdominal pain, Nausea and vomiting. Patient also found to have elevated troponin. Patient transferred over from [facility] showing   TVD CAD.      3/22 VSS; Patient seen at bedside, resting comfortably on room air. In NAD, no SOB, or CP at this time.   3/23 /64 -171/81. OR Thursday 3/30.  Radial mapping.  Pt transferred to Dr Sherwood.  Carotids neg.  EF 53%  3/24 Scheduled for cabg this Monday Endocrinology recommendations appreciated.  3/25 reserve left arm for radial harvest  3/27 s/p cabg x 3 lima / LIMA-LAD | SVG-OM | SVG-OM; EF 55%  Right greater saphenous vein harvested  Extubated d 0  Insulin infusion  Preop insulin pump post op labile glucose  PAF, amio load, lopressor.  Episodes of hypotension PW attached , pacer off  Diuresis  3/30 transferred to sdu  3/31 VSS: RSR 70's w/ episodes of Pafib/ EP consulted with Dr. Del Castillo as per cardiology; continue amio load; increase lop 50 mg po bid; monitor and supplement electrolytes; pw d/c as per Dr. Sherwood; Eliquis 5 bid recommended as per EP; d/w Dr. Sherwood -- plan to hold off on Eliquis today since pw removal this am as per Dr. Sherwood; if continued pafib - likely start in am but Eliquis 2.5 bid   insulin pump resumed today as per pt and HAYDEN darling; prn diuretics as per Dr. Sherwood- lasix 40 mg iv given this AM. Continue to monitor in SDU today.  4/1 VVS; Continue with current medication regimen. House Endo following on home insulin pump.    4/2 VSS glucose w/ better control.   Discharge planning- home when stable

## 2023-04-02 NOTE — DISCHARGE NOTE NURSING/CASE MANAGEMENT/SOCIAL WORK - NSSCNAMETXT_GEN_ALL_CORE
[facility], [phone].  Visiting nurse to call & arrange home care appointment for day after hospital discharge.

## 2023-04-02 NOTE — PROGRESS NOTE ADULT - SUBJECTIVE AND OBJECTIVE BOX
Patient is a 72y old  Male who presents with a chief complaint of CAD (02 Apr 2023 14:00)      DATE OF SERVICE: 04-02-23 @ 14:38    SUBJECTIVE / OVERNIGHT EVENTS: overnight events noted    ROS:  Resp: No cough no sputum production  CVS: No chest pain no palpitations no orthopnea  GI: no N/V/D          MEDICATIONS  (STANDING):  aMIOdarone    Tablet   Oral   aMIOdarone    Tablet 200 milliGRAM(s) Oral daily  apixaban 2.5 milliGRAM(s) Oral every 12 hours  aspirin enteric coated 81 milliGRAM(s) Oral daily  atorvastatin 80 milliGRAM(s) Oral at bedtime  bisacodyl Suppository 10 milliGRAM(s) Rectal once  chlorhexidine 2% Cloths 1 Application(s) Topical daily  furosemide   Injectable 40 milliGRAM(s) IV Push once  insulin lispro (ADMELOG) Pump 1 Each SubCutaneous Continuous Pump  metoprolol tartrate 50 milliGRAM(s) Oral two times a day  pantoprazole    Tablet 40 milliGRAM(s) Oral before breakfast  polyethylene glycol 3350 17 Gram(s) Oral daily  senna 2 Tablet(s) Oral at bedtime  tamsulosin 0.4 milliGRAM(s) Oral at bedtime    MEDICATIONS  (PRN):  acetaminophen     Tablet .. 650 milliGRAM(s) Oral every 6 hours PRN Mild Pain (1 - 3)  ALPRAZolam 0.25 milliGRAM(s) Oral daily PRN anxiety  oxyCODONE    IR 5 milliGRAM(s) Oral every 4 hours PRN Moderate Pain (4 - 6)  oxyCODONE    IR 10 milliGRAM(s) Oral every 4 hours PRN Severe Pain (7 - 10)        CAPILLARY BLOOD GLUCOSE      POCT Blood Glucose.: 93 mg/dL (02 Apr 2023 11:18)  POCT Blood Glucose.: 120 mg/dL (02 Apr 2023 07:07)  POCT Blood Glucose.: 144 mg/dL (02 Apr 2023 01:52)  POCT Blood Glucose.: 151 mg/dL (01 Apr 2023 20:42)  POCT Blood Glucose.: 198 mg/dL (01 Apr 2023 16:24)    I&O's Summary    01 Apr 2023 07:01  -  02 Apr 2023 07:00  --------------------------------------------------------  IN: 1010 mL / OUT: 2150 mL / NET: -1140 mL    02 Apr 2023 07:01  -  02 Apr 2023 14:38  --------------------------------------------------------  IN: 240 mL / OUT: 0 mL / NET: 240 mL        Vital Signs Last 24 Hrs  T(C): 36.9 (02 Apr 2023 11:18), Max: 37.6 (02 Apr 2023 03:12)  T(F): 98.4 (02 Apr 2023 11:18), Max: 99.6 (02 Apr 2023 03:12)  HR: 74 (02 Apr 2023 11:18) (66 - 77)  BP: 96/55 (02 Apr 2023 11:18) (96/55 - 124/57)  BP(mean): 72 (02 Apr 2023 11:18) (72 - 94)  RR: 18 (02 Apr 2023 11:18) (16 - 18)  SpO2: 94% (02 Apr 2023 11:18) (93% - 97%)    PHYSICAL EXAM:  EYES: EOMI, PERRLA  NECK: Supple, No JVD  CHEST/LUNG: clear  HEART: S1 S2; no murmurs   ABDOMEN: Soft, Nontender  EXTREMITIES:  no edema  NEUROLOGY: AO x 3 non-focal  SKIN: No rashes or lesions    LABS:                        8.1    6.09  )-----------( 328      ( 02 Apr 2023 05:32 )             25.1     04-02    135  |  100  |  16  ----------------------------<  124<H>  4.0   |  25  |  0.81    Ca    8.2<L>      02 Apr 2023 05:32  Phos  3.4     04-02  Mg     2.0     04-02    TPro  5.8<L>  /  Alb  3.1<L>  /  TBili  0.8  /  DBili  x   /  AST  25  /  ALT  33  /  AlkPhos  104  04-01                All consultant(s) notes reviewed and care discussed with other providers        Contact Number, Dr Kelly [phone]

## 2023-04-02 NOTE — PROGRESS NOTE ADULT - PROBLEM SELECTOR PLAN 1
continue postop care  continue asa/ statin  increase bb lop 50 mg po bid for HR control  continue amio load  prn diuretics as per Dr. Sherwood  puljodie toilet  pain management  bowel regimen  increase activity as tolerated  continue to monitor in SDU today  discharge planning- home when stable

## 2023-04-02 NOTE — PROGRESS NOTE ADULT - ASSESSMENT
Patient is 72 years old male with past medical history of Type I DM (50 years ago, on Insulin pump for last 15 years), Vertigo and BPH who was brought to ED due to Abdominal pain, Nausea and vomiting transferred from [facility] to [facility] for work up and management of NSTEMI

## 2023-04-02 NOTE — PROGRESS NOTE ADULT - SUBJECTIVE AND OBJECTIVE BOX
VITAL SIGNS-Telemetry:  SR 60-70  Vital Signs Last 24 Hrs  T(C): 37.6 (23 @ 03:12), Max: 37.6 (23 @ 03:12)  T(F): 99.6 (23 @ 03:12), Max: 99.6 (23 @ 03:12)  HR: 73 (23 @ 03:12) (66 - 104)  BP: 111/58 (23 @ 03:12) (105/56 - 137/63)  RR: 18 (23 @ 03:12) (16 - 18)  SpO2: 94% (23 @ 03:12) (93% - 97%)          @ 07:01  -   @ 07:00  --------------------------------------------------------  IN: 1320 mL / OUT: 2625 mL / NET: -1305 mL     @ 07:01  -  02 @ 06:23  --------------------------------------------------------  IN: 1010 mL / OUT: 1950 mL / NET: -940 mL    Daily     Daily Weight in k.8 (2023 07:52)    CAPILLARY BLOOD GLUCOSE  POCT Blood Glucose.: 144 mg/dL (2023 01:52)  POCT Blood Glucose.: 151 mg/dL (2023 20:42)  POCT Blood Glucose.: 198 mg/dL (2023 16:24)  POCT Blood Glucose.: 132 mg/dL (2023 12:53)  POCT Blood Glucose.: 73 mg/dL (2023 11:26)          PHYSICAL EXAM:  Neurology: alert and oriented x 3, nonfocal, no gross deficits  CV : S1S2  Sternal Wound :  CDI , Stable  Lungs: cta  Abdomen: soft, nontender, nondistended, positive bowel sounds, last bowel movement       +bm  Extremities:     no c/c/e    acetaminophen     Tablet .. 650 milliGRAM(s) Oral every 6 hours PRN  ALPRAZolam 0.25 milliGRAM(s) Oral daily PRN  aMIOdarone    Tablet   Oral   aMIOdarone    Tablet 200 milliGRAM(s) Oral daily  apixaban 2.5 milliGRAM(s) Oral every 12 hours  aspirin enteric coated 81 milliGRAM(s) Oral daily  atorvastatin 80 milliGRAM(s) Oral at bedtime  bisacodyl Suppository 10 milliGRAM(s) Rectal once  chlorhexidine 2% Cloths 1 Application(s) Topical daily  furosemide   Injectable 40 milliGRAM(s) IV Push once  insulin lispro (ADMELOG) Pump 1 Each SubCutaneous Continuous Pump  metoprolol tartrate 50 milliGRAM(s) Oral two times a day  oxyCODONE    IR 5 milliGRAM(s) Oral every 4 hours PRN  oxyCODONE    IR 10 milliGRAM(s) Oral every 4 hours PRN  pantoprazole    Tablet 40 milliGRAM(s) Oral before breakfast  polyethylene glycol 3350 17 Gram(s) Oral daily  senna 2 Tablet(s) Oral at bedtime  tamsulosin 0.4 milliGRAM(s) Oral at bedtime    Physical Therapy Rec:   Home  [x ]   Home w/ PT  [  ]  Rehab  [  ]  Discussed with Cardiothoracic Team at AM rounds. VITAL SIGNS-Telemetry:  SR 60-70  Vital Signs Last 24 Hrs  T(C): 37.6 (23 @ 03:12), Max: 37.6 (23 @ 03:12)  T(F): 99.6 (23 @ 03:12), Max: 99.6 (23 @ 03:12)  HR: 73 (23 @ 03:12) (66 - 104)  BP: 111/58 (23 @ 03:12) (105/56 - 137/63)  RR: 18 (23 @ 03:12) (16 - 18)  SpO2: 94% (23 @ 03:12) (93% - 97%)          @ 07:01  -   @ 07:00  --------------------------------------------------------  IN: 1320 mL / OUT: 2625 mL / NET: -1305 mL     @ 07:01  -  02 @ 06:23  --------------------------------------------------------  IN: 1010 mL / OUT: 1950 mL / NET: -940 mL    Daily     Daily Weight in k.8 (2023 07:52)    CAPILLARY BLOOD GLUCOSE  POCT Blood Glucose.: 144 mg/dL (2023 01:52)  POCT Blood Glucose.: 151 mg/dL (2023 20:42)  POCT Blood Glucose.: 198 mg/dL (2023 16:24)  POCT Blood Glucose.: 132 mg/dL (2023 12:53)  POCT Blood Glucose.: 73 mg/dL (2023 11:26)          PHYSICAL EXAM:  Neurology: alert and oriented x 3, nonfocal, no gross deficits  CV : S1S2  Sternal Wound :  CDI , Stable  Lungs: cta  Abdomen: soft, nontender, nondistended, positive bowel sounds, last bowel movement       +bm  Extremities:    ++ pitting edema b/l  RLE inc cdi. no calf tenderness    acetaminophen     Tablet .. 650 milliGRAM(s) Oral every 6 hours PRN  ALPRAZolam 0.25 milliGRAM(s) Oral daily PRN  aMIOdarone    Tablet   Oral   aMIOdarone    Tablet 200 milliGRAM(s) Oral daily  apixaban 2.5 milliGRAM(s) Oral every 12 hours  aspirin enteric coated 81 milliGRAM(s) Oral daily  atorvastatin 80 milliGRAM(s) Oral at bedtime  bisacodyl Suppository 10 milliGRAM(s) Rectal once  chlorhexidine 2% Cloths 1 Application(s) Topical daily  furosemide   Injectable 40 milliGRAM(s) IV Push once  insulin lispro (ADMELOG) Pump 1 Each SubCutaneous Continuous Pump  metoprolol tartrate 50 milliGRAM(s) Oral two times a day  oxyCODONE    IR 5 milliGRAM(s) Oral every 4 hours PRN  oxyCODONE    IR 10 milliGRAM(s) Oral every 4 hours PRN  pantoprazole    Tablet 40 milliGRAM(s) Oral before breakfast  polyethylene glycol 3350 17 Gram(s) Oral daily  senna 2 Tablet(s) Oral at bedtime  tamsulosin 0.4 milliGRAM(s) Oral at bedtime    Physical Therapy Rec:   Home  [x ]   Home w/ PT  [  ]  Rehab  [  ]  Discussed with Cardiothoracic Team at AM rounds.

## 2023-04-02 NOTE — PROGRESS NOTE ADULT - SUBJECTIVE AND OBJECTIVE BOX
C A R D I O L O G Y  **********************************       DATE OF SERVICE: 04-02-23    Patient denies chest pain or shortness of breath.   Review of symptoms otherwise negative.    MEDICATIONS:  acetaminophen     Tablet .. 650 milliGRAM(s) Oral every 6 hours PRN  ALPRAZolam 0.25 milliGRAM(s) Oral daily PRN  aMIOdarone    Tablet   Oral   aMIOdarone    Tablet 200 milliGRAM(s) Oral daily  apixaban 2.5 milliGRAM(s) Oral every 12 hours  aspirin enteric coated 81 milliGRAM(s) Oral daily  atorvastatin 80 milliGRAM(s) Oral at bedtime  bisacodyl Suppository 10 milliGRAM(s) Rectal once  chlorhexidine 2% Cloths 1 Application(s) Topical daily  furosemide   Injectable 40 milliGRAM(s) IV Push once  insulin lispro (ADMELOG) Pump 1 Each SubCutaneous Continuous Pump  metoprolol tartrate 50 milliGRAM(s) Oral two times a day  oxyCODONE    IR 5 milliGRAM(s) Oral every 4 hours PRN  oxyCODONE    IR 10 milliGRAM(s) Oral every 4 hours PRN  pantoprazole    Tablet 40 milliGRAM(s) Oral before breakfast  polyethylene glycol 3350 17 Gram(s) Oral daily  senna 2 Tablet(s) Oral at bedtime  tamsulosin 0.4 milliGRAM(s) Oral at bedtime      LABS:                        8.1    6.09  )-----------( 328      ( 02 Apr 2023 05:32 )             25.1       Hemoglobin: 8.1 g/dL (04-02 @ 05:32)  Hemoglobin: 8.6 g/dL (04-01 @ 05:14)  Hemoglobin: 9.0 g/dL (03-31 @ 06:16)  Hemoglobin: 8.1 g/dL (03-30 @ 00:10)  Hemoglobin: 8.6 g/dL (03-29 @ 00:38)      04-02    135  |  100  |  16  ----------------------------<  124<H>  4.0   |  25  |  0.81    Ca    8.2<L>      02 Apr 2023 05:32  Phos  3.4     04-02  Mg     2.0     04-02    TPro  5.8<L>  /  Alb  3.1<L>  /  TBili  0.8  /  DBili  x   /  AST  25  /  ALT  33  /  AlkPhos  104  04-01    Creatinine Trend: 0.81<--, 0.82<--, 0.78<--, 0.75<--, 0.81<--, 0.71<--    COAGS:           PHYSICAL EXAM:  T(C): 36.9 (04-02-23 @ 11:18), Max: 37.6 (04-02-23 @ 03:12)  HR: 74 (04-02-23 @ 11:18) (66 - 77)  BP: 96/55 (04-02-23 @ 11:18) (96/55 - 124/57)  RR: 18 (04-02-23 @ 11:18) (16 - 18)  SpO2: 94% (04-02-23 @ 11:18) (93% - 97%)  Wt(kg): --    I&O's Summary    01 Apr 2023 07:01  -  02 Apr 2023 07:00  --------------------------------------------------------  IN: 1010 mL / OUT: 2150 mL / NET: -1140 mL    02 Apr 2023 07:01  -  02 Apr 2023 14:01  --------------------------------------------------------  IN: 240 mL / OUT: 0 mL / NET: 240 mL            Gen: NAD  HEENT:  (-)icterus (-)pallor  CV: N S1 S2 1/6 RONI (+)2 Pulses B/l  Resp:  Clear to auscultation B/L, normal effort  GI: (+) BS Soft, NT, ND  Lymph:  (-)Edema, (-)obvious lymphadenopathy  Skin: Warm to touch, Normal turgor  Psych: Appropriate mood and affect      TELEMETRY: SR 70s    < from: TTE with Doppler (w/Cont) (03.16.23 @ 13:36) >  CONCLUSIONS:  1. Normal mitral valve. Mildly calcified chordae tendinae.  Mild mitral regurgitation.  2. Calcified aortic valve was not well visualized but has  normal opening. No aortic stenosis. No aortic valve  regurgitation seen.  3. Normal left ventricular internal dimensions and wall  thicknesses.  4. Low-normal Left Ventricular Systolic Function,  (EF =  53% by biplane). No regional wall motion abnormalities.  Ultrasound LV opacification agent was used to enhance  endocardial definition.  5. Right ventricle not well visualized. Normal RVsystolic  function (RV tissue Doppler 11 cm/s).  6. RV systolic pressure is borderline normal at  33 mm Hg.  7. Normal tricuspid valve. Moderate tricuspid  regurgitation.  8. No pericardial effusion.    *** No previous Echo exam.    < end of copied text >    < from: Cardiac Catheterization (03.22.23 @ 09:42) >  Diagnostic Conclusions:     Large LAD with severe bifurcating/ostial and heavily calcifed disease   LCX with Bifurcating/ostial disease   RCA with Moderate Disease   Recommendations:     CABG evaluation      < end of copied text >      ASSESSMENT/PLAN: Patient is 72 year old male with pmh of Type I DM (50 years ago, on Insulin pump for last 15 years), HTN, Vertigo and BPH who was brought to ED due to Abdominal pain, Nausea and vomiting. Cardiology consulted for increased troponin.    #NSTEMI  - TTE with low normal LV function and no wall motion abnormalities  - s/p cath with multivessel CAD noted above  - Transferred to [facility] for CABG evaluation  - Now s/p CABGx3 on 3/27  - Continue ASALipitor  - Weaned off pressors, continue metoprolol  - Diuretics appear to be on hold for hypotension and hyponatremia - f/u CXR results  - CTS follow up    #DKA/Type 1 DM  - Glycemic control per endocrinology    #HTN  - Continue metoprolol. Remainder of meds on hold post op    #Post op PAF  - Continue metoprolol to prevent AF RVR  - Amio load started to maintain NSR  - EP consult appreciated - recommend starting AC with Eliquis when ok with CTS      - Patient to follow up in our [place] office with Dr. Wahl on 4/12 at 12:15 PM ([address], [address], [place] [address] - Office #[phone])    Nacho Nix MD  Pager: [phone]

## 2023-04-02 NOTE — PROGRESS NOTE ADULT - SUBJECTIVE AND OBJECTIVE BOX
C A R D I O L O G Y  **********************************     DATE OF SERVICE: 04-02-23    Events noted with insulin pump, pt calm this am./  tele stable overnight,        acetaminophen     Tablet .. 650 milliGRAM(s) Oral every 6 hours PRN  ALPRAZolam 0.25 milliGRAM(s) Oral daily PRN  aMIOdarone    Tablet   Oral   aMIOdarone    Tablet 200 milliGRAM(s) Oral daily  apixaban 2.5 milliGRAM(s) Oral every 12 hours  aspirin enteric coated 81 milliGRAM(s) Oral daily  atorvastatin 80 milliGRAM(s) Oral at bedtime  bisacodyl Suppository 10 milliGRAM(s) Rectal once  chlorhexidine 2% Cloths 1 Application(s) Topical daily  furosemide   Injectable 40 milliGRAM(s) IV Push once  insulin lispro (ADMELOG) Pump 1 Each SubCutaneous Continuous Pump  metoprolol tartrate 50 milliGRAM(s) Oral two times a day  oxyCODONE    IR 5 milliGRAM(s) Oral every 4 hours PRN  oxyCODONE    IR 10 milliGRAM(s) Oral every 4 hours PRN  pantoprazole    Tablet 40 milliGRAM(s) Oral before breakfast  polyethylene glycol 3350 17 Gram(s) Oral daily  senna 2 Tablet(s) Oral at bedtime  tamsulosin 0.4 milliGRAM(s) Oral at bedtime                            8.1    6.09  )-----------( 328      ( 02 Apr 2023 05:32 )             25.1       Hemoglobin: 8.1 g/dL (04-02 @ 05:32)  Hemoglobin: 8.6 g/dL (04-01 @ 05:14)  Hemoglobin: 9.0 g/dL (03-31 @ 06:16)  Hemoglobin: 8.1 g/dL (03-30 @ 00:10)  Hemoglobin: 8.6 g/dL (03-29 @ 00:38)      04-02    135  |  100  |  16  ----------------------------<  124<H>  4.0   |  25  |  0.81    Ca    8.2<L>      02 Apr 2023 05:32  Phos  3.4     04-02  Mg     2.0     04-02    TPro  5.8<L>  /  Alb  3.1<L>  /  TBili  0.8  /  DBili  x   /  AST  25  /  ALT  33  /  AlkPhos  104  04-01    Creatinine Trend: 0.81<--, 0.82<--, 0.78<--, 0.75<--, 0.81<--, 0.71<--    COAGS:           T(C): 36.8 (04-02-23 @ 07:05), Max: 37.6 (04-02-23 @ 03:12)  HR: 77 (04-02-23 @ 07:05) (66 - 77)  BP: 105/59 (04-02-23 @ 07:05) (105/56 - 124/57)  RR: 18 (04-02-23 @ 07:05) (16 - 18)  SpO2: 97% (04-02-23 @ 07:05) (93% - 97%)  Wt(kg): --    I&O's Summary    01 Apr 2023 07:01  -  02 Apr 2023 07:00  --------------------------------------------------------  IN: 1010 mL / OUT: 2150 mL / NET: -1140 mL      Gen: NAD  HEENT:  (-)icterus (-)pallor  CV: N S1 S2 1/6 RONI (+)2 Pulses B/l  Resp:  Clear to auscultation B/L, normal effort  GI: (+) BS Soft, NT, ND  Lymph:  (-)Edema, (-)obvious lymphadenopathy  Skin: Warm to touch, Normal turgor  Psych: Appropriate mood and affect      TELEMETRY: SR 70s    < from: TTE with Doppler (w/Cont) (03.16.23 @ 13:36) >  CONCLUSIONS:  1. Normal mitral valve. Mildly calcified chordae tendinae.  Mild mitral regurgitation.  2. Calcified aortic valve was not well visualized but has  normal opening. No aortic stenosis. No aortic valve  regurgitation seen.  3. Normal left ventricular internal dimensions and wall  thicknesses.  4. Low-normal Left Ventricular Systolic Function,  (EF =  53% by biplane). No regional wall motion abnormalities.  Ultrasound LV opacification agent was used to enhance  endocardial definition.  5. Right ventricle not well visualized. Normal RVsystolic  function (RV tissue Doppler 11 cm/s).  6. RV systolic pressure is borderline normal at  33 mm Hg.  7. Normal tricuspid valve. Moderate tricuspid  regurgitation.  8. No pericardial effusion.    *** No previous Echo exam.    < end of copied text >    < from: Cardiac Catheterization (03.22.23 @ 09:42) >  Diagnostic Conclusions:     Large LAD with severe bifurcating/ostial and heavily calcifed disease   LCX with Bifurcating/ostial disease   RCA with Moderate Disease   Recommendations:     CABG evaluation      < end of copied text >      ASSESSMENT/PLAN: Patient is 72 year old male with pmh of Type I DM (50 years ago, on Insulin pump for last 15 years), HTN, Vertigo and BPH who was brought to ED due to Abdominal pain, Nausea and vomiting. Cardiology consulted for increased troponin.    #NSTEMI  - TTE with low normal LV function and no wall motion abnormalities  - s/p cath with multivessel CAD noted above  - Transferred to [facility] for CABG evaluation  - Now s/p CABGx3 on 3/27  - Continue ASA, Lipitor  -  tolearting BB, increase dose as bp allows   - CTS follow up    #DKA/Type 1 DM  - Glycemic control per endocrinology    #HTN  - Continue metoprolol. Remainder of meds on hold post op    #Post op PAF  - Continue metoprolol to prevent AF RVR  - Amio load started to maintain NSR  - EP consult appreciated - recommend starting AC with Eliquis when ok with CTS      - Patient to follow up in our [place] office with Dr. Wahl on 4/12 at 12:15 PM ([address], [address], [place] [address] - Office #[phone])    Nacho Nix MD  Pager: [phone]      EP  **********************************     DATE OF SERVICE: 04-02-23    Events noted with insulin pump, pt calm this am./  tele stable overnight,        acetaminophen     Tablet .. 650 milliGRAM(s) Oral every 6 hours PRN  ALPRAZolam 0.25 milliGRAM(s) Oral daily PRN  aMIOdarone    Tablet   Oral   aMIOdarone    Tablet 200 milliGRAM(s) Oral daily  apixaban 2.5 milliGRAM(s) Oral every 12 hours  aspirin enteric coated 81 milliGRAM(s) Oral daily  atorvastatin 80 milliGRAM(s) Oral at bedtime  bisacodyl Suppository 10 milliGRAM(s) Rectal once  chlorhexidine 2% Cloths 1 Application(s) Topical daily  furosemide   Injectable 40 milliGRAM(s) IV Push once  insulin lispro (ADMELOG) Pump 1 Each SubCutaneous Continuous Pump  metoprolol tartrate 50 milliGRAM(s) Oral two times a day  oxyCODONE    IR 5 milliGRAM(s) Oral every 4 hours PRN  oxyCODONE    IR 10 milliGRAM(s) Oral every 4 hours PRN  pantoprazole    Tablet 40 milliGRAM(s) Oral before breakfast  polyethylene glycol 3350 17 Gram(s) Oral daily  senna 2 Tablet(s) Oral at bedtime  tamsulosin 0.4 milliGRAM(s) Oral at bedtime                            8.1    6.09  )-----------( 328      ( 02 Apr 2023 05:32 )             25.1       Hemoglobin: 8.1 g/dL (04-02 @ 05:32)  Hemoglobin: 8.6 g/dL (04-01 @ 05:14)  Hemoglobin: 9.0 g/dL (03-31 @ 06:16)  Hemoglobin: 8.1 g/dL (03-30 @ 00:10)  Hemoglobin: 8.6 g/dL (03-29 @ 00:38)      04-02    135  |  100  |  16  ----------------------------<  124<H>  4.0   |  25  |  0.81    Ca    8.2<L>      02 Apr 2023 05:32  Phos  3.4     04-02  Mg     2.0     04-02    TPro  5.8<L>  /  Alb  3.1<L>  /  TBili  0.8  /  DBili  x   /  AST  25  /  ALT  33  /  AlkPhos  104  04-01    Creatinine Trend: 0.81<--, 0.82<--, 0.78<--, 0.75<--, 0.81<--, 0.71<--    COAGS:           T(C): 36.8 (04-02-23 @ 07:05), Max: 37.6 (04-02-23 @ 03:12)  HR: 77 (04-02-23 @ 07:05) (66 - 77)  BP: 105/59 (04-02-23 @ 07:05) (105/56 - 124/57)  RR: 18 (04-02-23 @ 07:05) (16 - 18)  SpO2: 97% (04-02-23 @ 07:05) (93% - 97%)  Wt(kg): --    I&O's Summary    01 Apr 2023 07:01  -  02 Apr 2023 07:00  --------------------------------------------------------  IN: 1010 mL / OUT: 2150 mL / NET: -1140 mL      Gen: NAD  HEENT:  (-)icterus (-)pallor  CV: N S1 S2 1/6 RONI (+)2 Pulses B/l  Resp:  Clear to auscultation B/L, normal effort  GI: (+) BS Soft, NT, ND  Lymph:  (-)Edema, (-)obvious lymphadenopathy  Skin: Warm to touch, Normal turgor  Psych: Appropriate mood and affect      TELEMETRY: SR 70s    < from: TTE with Doppler (w/Cont) (03.16.23 @ 13:36) >  CONCLUSIONS:  1. Normal mitral valve. Mildly calcified chordae tendinae.  Mild mitral regurgitation.  2. Calcified aortic valve was not well visualized but has  normal opening. No aortic stenosis. No aortic valve  regurgitation seen.  3. Normal left ventricular internal dimensions and wall  thicknesses.  4. Low-normal Left Ventricular Systolic Function,  (EF =  53% by biplane). No regional wall motion abnormalities.  Ultrasound LV opacification agent was used to enhance  endocardial definition.  5. Right ventricle not well visualized. Normal RVsystolic  function (RV tissue Doppler 11 cm/s).  6. RV systolic pressure is borderline normal at  33 mm Hg.  7. Normal tricuspid valve. Moderate tricuspid  regurgitation.  8. No pericardial effusion.    *** No previous Echo exam.    < end of copied text >    < from: Cardiac Catheterization (03.22.23 @ 09:42) >  Diagnostic Conclusions:     Large LAD with severe bifurcating/ostial and heavily calcifed disease   LCX with Bifurcating/ostial disease   RCA with Moderate Disease   Recommendations:     CABG evaluation      < end of copied text >      ASSESSMENT/PLAN: Patient is 72 year old male with pmh of Type I DM (50 years ago, on Insulin pump for last 15 years), HTN, Vertigo and BPH who was brought to ED due to Abdominal pain, Nausea and vomiting. Cardiology consulted for increased troponin.    #NSTEMI  - TTE with low normal LV function and no wall motion abnormalities  - s/p cath with multivessel CAD noted above  - Transferred to [facility] for CABG evaluation  - Now s/p CABGx3 on 3/27  - Continue ASA, Lipitor  -  tolearting BB, increase dose as bp allows   - CTS follow up    #DKA/Type 1 DM  - Glycemic control per endocrinology    #HTN  - Continue metoprolol. Remainder of meds on hold post op    #Post op PAF  - Continue metoprolol to prevent AF RVR  - Amio load started to maintain NSR        - Patient to follow up in our [place] office with Dr. Wahl on 4/12 at 12:15 PM ([address], [address], [place] [address] - Office #[phone])    Nacho Nix MD  Pager: [phone]      EP  **********************************     DATE OF SERVICE: 04-02-23    Events noted with insulin pump, pt calm this am./  tele stable overnight,        acetaminophen     Tablet .. 650 milliGRAM(s) Oral every 6 hours PRN  ALPRAZolam 0.25 milliGRAM(s) Oral daily PRN  aMIOdarone    Tablet   Oral   aMIOdarone    Tablet 200 milliGRAM(s) Oral daily  apixaban 2.5 milliGRAM(s) Oral every 12 hours  aspirin enteric coated 81 milliGRAM(s) Oral daily  atorvastatin 80 milliGRAM(s) Oral at bedtime  bisacodyl Suppository 10 milliGRAM(s) Rectal once  chlorhexidine 2% Cloths 1 Application(s) Topical daily  furosemide   Injectable 40 milliGRAM(s) IV Push once  insulin lispro (ADMELOG) Pump 1 Each SubCutaneous Continuous Pump  metoprolol tartrate 50 milliGRAM(s) Oral two times a day  oxyCODONE    IR 5 milliGRAM(s) Oral every 4 hours PRN  oxyCODONE    IR 10 milliGRAM(s) Oral every 4 hours PRN  pantoprazole    Tablet 40 milliGRAM(s) Oral before breakfast  polyethylene glycol 3350 17 Gram(s) Oral daily  senna 2 Tablet(s) Oral at bedtime  tamsulosin 0.4 milliGRAM(s) Oral at bedtime                            8.1    6.09  )-----------( 328      ( 02 Apr 2023 05:32 )             25.1       Hemoglobin: 8.1 g/dL (04-02 @ 05:32)  Hemoglobin: 8.6 g/dL (04-01 @ 05:14)  Hemoglobin: 9.0 g/dL (03-31 @ 06:16)  Hemoglobin: 8.1 g/dL (03-30 @ 00:10)  Hemoglobin: 8.6 g/dL (03-29 @ 00:38)      04-02    135  |  100  |  16  ----------------------------<  124<H>  4.0   |  25  |  0.81    Ca    8.2<L>      02 Apr 2023 05:32  Phos  3.4     04-02  Mg     2.0     04-02    TPro  5.8<L>  /  Alb  3.1<L>  /  TBili  0.8  /  DBili  x   /  AST  25  /  ALT  33  /  AlkPhos  104  04-01    Creatinine Trend: 0.81<--, 0.82<--, 0.78<--, 0.75<--, 0.81<--, 0.71<--    COAGS:           T(C): 36.8 (04-02-23 @ 07:05), Max: 37.6 (04-02-23 @ 03:12)  HR: 77 (04-02-23 @ 07:05) (66 - 77)  BP: 105/59 (04-02-23 @ 07:05) (105/56 - 124/57)  RR: 18 (04-02-23 @ 07:05) (16 - 18)  SpO2: 97% (04-02-23 @ 07:05) (93% - 97%)  Wt(kg): --    I&O's Summary    01 Apr 2023 07:01  -  02 Apr 2023 07:00  --------------------------------------------------------  IN: 1010 mL / OUT: 2150 mL / NET: -1140 mL      Gen: NAD  HEENT:  (-)icterus (-)pallor  CV: N S1 S2 1/6 RONI (+)2 Pulses B/l  Resp:  Clear to auscultation B/L, normal effort  GI: (+) BS Soft, NT, ND  Lymph:  (-)Edema, (-)obvious lymphadenopathy  Skin: Warm to touch, Normal turgor  Psych: Appropriate mood and affect      TELEMETRY: SR 70s    < from: TTE with Doppler (w/Cont) (03.16.23 @ 13:36) >  CONCLUSIONS:  1. Normal mitral valve. Mildly calcified chordae tendinae.  Mild mitral regurgitation.  2. Calcified aortic valve was not well visualized but has  normal opening. No aortic stenosis. No aortic valve  regurgitation seen.  3. Normal left ventricular internal dimensions and wall  thicknesses.  4. Low-normal Left Ventricular Systolic Function,  (EF =  53% by biplane). No regional wall motion abnormalities.  Ultrasound LV opacification agent was used to enhance  endocardial definition.  5. Right ventricle not well visualized. Normal RVsystolic  function (RV tissue Doppler 11 cm/s).  6. RV systolic pressure is borderline normal at  33 mm Hg.  7. Normal tricuspid valve. Moderate tricuspid  regurgitation.  8. No pericardial effusion.    *** No previous Echo exam.    < end of copied text >    < from: Cardiac Catheterization (03.22.23 @ 09:42) >  Diagnostic Conclusions:     Large LAD with severe bifurcating/ostial and heavily calcifed disease   LCX with Bifurcating/ostial disease   RCA with Moderate Disease   Recommendations:     CABG evaluation      < end of copied text >      ASSESSMENT/PLAN: Patient is 72 year old male with pmh of Type I DM (50 years ago, on Insulin pump for last 15 years), HTN, Vertigo and BPH who was brought to ED due to Abdominal pain, Nausea and vomiting. Cardiology consulted for increased troponin.    #NSTEMI  - TTE with low normal LV function and no wall motion abnormalities  - s/p cath with multivessel CAD noted above  - Transferred to [facility] for CABG evaluation  - Now s/p CABGx3 on 3/27  - Continue ASA, Lipitor  -  tolearting BB, increase dose as bp allows   - CTS follow up    #DKA/Type 1 DM  - Glycemic control per endocrinology    #HTN  - Continue metoprolol. Remainder of meds on hold post op    #Post op PAF  - Continue metoprolol to prevent AF RVR  - Amio load started to maintain NSR        - Patient to follow up in our [place] office with Dr. Wahl on 4/12 at 12:15 PM ([address], [address], [place] [address] - Office #[phone])

## 2023-04-03 ENCOUNTER — TRANSCRIPTION ENCOUNTER (OUTPATIENT)
Age: 73
End: 2023-04-03

## 2023-04-03 VITALS — HEART RATE: 70 BPM | DIASTOLIC BLOOD PRESSURE: 71 MMHG | SYSTOLIC BLOOD PRESSURE: 121 MMHG

## 2023-04-03 DIAGNOSIS — Z46.81 ENCOUNTER FOR FITTING AND ADJUSTMENT OF INSULIN PUMP: ICD-10-CM

## 2023-04-03 LAB
ANION GAP SERPL CALC-SCNC: 12 MMOL/L — SIGNIFICANT CHANGE UP (ref 5–17)
BUN SERPL-MCNC: 19 MG/DL — SIGNIFICANT CHANGE UP (ref 7–23)
CALCIUM SERPL-MCNC: 8.6 MG/DL — SIGNIFICANT CHANGE UP (ref 8.4–10.5)
CHLORIDE SERPL-SCNC: 100 MMOL/L — SIGNIFICANT CHANGE UP (ref 96–108)
CO2 SERPL-SCNC: 24 MMOL/L — SIGNIFICANT CHANGE UP (ref 22–31)
CREAT SERPL-MCNC: 0.98 MG/DL — SIGNIFICANT CHANGE UP (ref 0.5–1.3)
EGFR: 81 ML/MIN/1.73M2 — SIGNIFICANT CHANGE UP
GLUCOSE BLDC GLUCOMTR-MCNC: 104 MG/DL — HIGH (ref 70–99)
GLUCOSE BLDC GLUCOMTR-MCNC: 70 MG/DL — SIGNIFICANT CHANGE UP (ref 70–99)
GLUCOSE BLDC GLUCOMTR-MCNC: 91 MG/DL — SIGNIFICANT CHANGE UP (ref 70–99)
GLUCOSE SERPL-MCNC: 115 MG/DL — HIGH (ref 70–99)
HCT VFR BLD CALC: 25.1 % — LOW (ref 39–50)
HGB BLD-MCNC: 8.2 G/DL — LOW (ref 13–17)
MAGNESIUM SERPL-MCNC: 2.1 MG/DL — SIGNIFICANT CHANGE UP (ref 1.6–2.6)
MCHC RBC-ENTMCNC: 29.4 PG — SIGNIFICANT CHANGE UP (ref 27–34)
MCHC RBC-ENTMCNC: 32.7 GM/DL — SIGNIFICANT CHANGE UP (ref 32–36)
MCV RBC AUTO: 90 FL — SIGNIFICANT CHANGE UP (ref 80–100)
NRBC # BLD: 0 /100 WBCS — SIGNIFICANT CHANGE UP (ref 0–0)
PHOSPHATE SERPL-MCNC: 3.5 MG/DL — SIGNIFICANT CHANGE UP (ref 2.5–4.5)
PLATELET # BLD AUTO: 384 K/UL — SIGNIFICANT CHANGE UP (ref 150–400)
POTASSIUM SERPL-MCNC: 4 MMOL/L — SIGNIFICANT CHANGE UP (ref 3.5–5.3)
POTASSIUM SERPL-SCNC: 4 MMOL/L — SIGNIFICANT CHANGE UP (ref 3.5–5.3)
RBC # BLD: 2.79 M/UL — LOW (ref 4.2–5.8)
RBC # FLD: 14.6 % — HIGH (ref 10.3–14.5)
SODIUM SERPL-SCNC: 136 MMOL/L — SIGNIFICANT CHANGE UP (ref 135–145)
WBC # BLD: 6.66 K/UL — SIGNIFICANT CHANGE UP (ref 3.8–10.5)
WBC # FLD AUTO: 6.66 K/UL — SIGNIFICANT CHANGE UP (ref 3.8–10.5)

## 2023-04-03 PROCEDURE — 82330 ASSAY OF CALCIUM: CPT

## 2023-04-03 PROCEDURE — 81003 URINALYSIS AUTO W/O SCOPE: CPT

## 2023-04-03 PROCEDURE — 85014 HEMATOCRIT: CPT

## 2023-04-03 PROCEDURE — 82553 CREATINE MB FRACTION: CPT

## 2023-04-03 PROCEDURE — 71045 X-RAY EXAM CHEST 1 VIEW: CPT

## 2023-04-03 PROCEDURE — 84484 ASSAY OF TROPONIN QUANT: CPT

## 2023-04-03 PROCEDURE — 83605 ASSAY OF LACTIC ACID: CPT

## 2023-04-03 PROCEDURE — 83036 HEMOGLOBIN GLYCOSYLATED A1C: CPT

## 2023-04-03 PROCEDURE — 86891 AUTOLOGOUS BLOOD OP SALVAGE: CPT

## 2023-04-03 PROCEDURE — 94002 VENT MGMT INPAT INIT DAY: CPT

## 2023-04-03 PROCEDURE — 85027 COMPLETE CBC AUTOMATED: CPT

## 2023-04-03 PROCEDURE — 83880 ASSAY OF NATRIURETIC PEPTIDE: CPT

## 2023-04-03 PROCEDURE — 82010 KETONE BODYS QUAN: CPT

## 2023-04-03 PROCEDURE — P9045: CPT

## 2023-04-03 PROCEDURE — 86850 RBC ANTIBODY SCREEN: CPT

## 2023-04-03 PROCEDURE — C1751: CPT

## 2023-04-03 PROCEDURE — C1889: CPT

## 2023-04-03 PROCEDURE — C1729: CPT

## 2023-04-03 PROCEDURE — 36415 COLL VENOUS BLD VENIPUNCTURE: CPT

## 2023-04-03 PROCEDURE — 85576 BLOOD PLATELET AGGREGATION: CPT

## 2023-04-03 PROCEDURE — 85018 HEMOGLOBIN: CPT

## 2023-04-03 PROCEDURE — 84295 ASSAY OF SERUM SODIUM: CPT

## 2023-04-03 PROCEDURE — 97530 THERAPEUTIC ACTIVITIES: CPT

## 2023-04-03 PROCEDURE — 86901 BLOOD TYPING SEROLOGIC RH(D): CPT

## 2023-04-03 PROCEDURE — 80048 BASIC METABOLIC PNL TOTAL CA: CPT

## 2023-04-03 PROCEDURE — 82550 ASSAY OF CK (CPK): CPT

## 2023-04-03 PROCEDURE — 82803 BLOOD GASES ANY COMBINATION: CPT

## 2023-04-03 PROCEDURE — 97165 OT EVAL LOW COMPLEX 30 MIN: CPT

## 2023-04-03 PROCEDURE — U0005: CPT

## 2023-04-03 PROCEDURE — 93010 ELECTROCARDIOGRAM REPORT: CPT

## 2023-04-03 PROCEDURE — 85384 FIBRINOGEN ACTIVITY: CPT

## 2023-04-03 PROCEDURE — 84132 ASSAY OF SERUM POTASSIUM: CPT

## 2023-04-03 PROCEDURE — 36430 TRANSFUSION BLD/BLD COMPNT: CPT

## 2023-04-03 PROCEDURE — 93880 EXTRACRANIAL BILAT STUDY: CPT

## 2023-04-03 PROCEDURE — 97163 PT EVAL HIGH COMPLEX 45 MIN: CPT

## 2023-04-03 PROCEDURE — 85730 THROMBOPLASTIN TIME PARTIAL: CPT

## 2023-04-03 PROCEDURE — 93005 ELECTROCARDIOGRAM TRACING: CPT

## 2023-04-03 PROCEDURE — 84480 ASSAY TRIIODOTHYRONINE (T3): CPT

## 2023-04-03 PROCEDURE — 85025 COMPLETE CBC W/AUTO DIFF WBC: CPT

## 2023-04-03 PROCEDURE — 99233 SBSQ HOSP IP/OBS HIGH 50: CPT

## 2023-04-03 PROCEDURE — 82962 GLUCOSE BLOOD TEST: CPT

## 2023-04-03 PROCEDURE — 97116 GAIT TRAINING THERAPY: CPT

## 2023-04-03 PROCEDURE — 83735 ASSAY OF MAGNESIUM: CPT

## 2023-04-03 PROCEDURE — 82435 ASSAY OF BLOOD CHLORIDE: CPT

## 2023-04-03 PROCEDURE — 84100 ASSAY OF PHOSPHORUS: CPT

## 2023-04-03 PROCEDURE — 86900 BLOOD TYPING SEROLOGIC ABO: CPT

## 2023-04-03 PROCEDURE — 82565 ASSAY OF CREATININE: CPT

## 2023-04-03 PROCEDURE — 84436 ASSAY OF TOTAL THYROXINE: CPT

## 2023-04-03 PROCEDURE — 80053 COMPREHEN METABOLIC PANEL: CPT

## 2023-04-03 PROCEDURE — C1769: CPT

## 2023-04-03 PROCEDURE — U0003: CPT

## 2023-04-03 PROCEDURE — P9016: CPT

## 2023-04-03 PROCEDURE — 84443 ASSAY THYROID STIM HORMONE: CPT

## 2023-04-03 PROCEDURE — 86803 HEPATITIS C AB TEST: CPT

## 2023-04-03 PROCEDURE — 85610 PROTHROMBIN TIME: CPT

## 2023-04-03 PROCEDURE — 82947 ASSAY GLUCOSE BLOOD QUANT: CPT

## 2023-04-03 PROCEDURE — 86923 COMPATIBILITY TEST ELECTRIC: CPT

## 2023-04-03 PROCEDURE — 85520 HEPARIN ASSAY: CPT

## 2023-04-03 RX ORDER — SPIRONOLACTONE 25 MG/1
2 TABLET, FILM COATED ORAL
Qty: 30 | Refills: 0
Start: 2023-04-03 | End: 2023-04-09

## 2023-04-03 RX ORDER — METOPROLOL TARTRATE 50 MG
1 TABLET ORAL
Qty: 60 | Refills: 0
Start: 2023-04-03 | End: 2023-05-02

## 2023-04-03 RX ORDER — ASPIRIN/CALCIUM CARB/MAGNESIUM 324 MG
1 TABLET ORAL
Qty: 30 | Refills: 0
Start: 2023-04-03 | End: 2023-05-02

## 2023-04-03 RX ORDER — INSULIN LISPRO 100/ML
1 VIAL (ML) SUBCUTANEOUS
Refills: 0 | Status: DISCONTINUED | OUTPATIENT
Start: 2023-04-03 | End: 2023-04-03

## 2023-04-03 RX ORDER — ACETAMINOPHEN 500 MG
2 TABLET ORAL
Qty: 0 | Refills: 0 | DISCHARGE
Start: 2023-04-03

## 2023-04-03 RX ORDER — SENNA PLUS 8.6 MG/1
2 TABLET ORAL
Qty: 28 | Refills: 0
Start: 2023-04-03 | End: 2023-04-16

## 2023-04-03 RX ORDER — POTASSIUM CHLORIDE 20 MEQ
20 PACKET (EA) ORAL
Refills: 0 | Status: COMPLETED | OUTPATIENT
Start: 2023-04-03 | End: 2023-04-03

## 2023-04-03 RX ORDER — MONTELUKAST 4 MG/1
0 TABLET, CHEWABLE ORAL
Qty: 0 | Refills: 1 | DISCHARGE

## 2023-04-03 RX ORDER — FUROSEMIDE 40 MG
1 TABLET ORAL
Qty: 30 | Refills: 0
Start: 2023-04-03 | End: 2023-04-09

## 2023-04-03 RX ORDER — OXYCODONE HYDROCHLORIDE 5 MG/1
1 TABLET ORAL
Qty: 25 | Refills: 0
Start: 2023-04-03 | End: 2023-04-07

## 2023-04-03 RX ORDER — APIXABAN 2.5 MG/1
1 TABLET, FILM COATED ORAL
Qty: 60 | Refills: 0
Start: 2023-04-03 | End: 2023-05-02

## 2023-04-03 RX ORDER — ATORVASTATIN CALCIUM 80 MG/1
1 TABLET, FILM COATED ORAL
Qty: 30 | Refills: 0
Start: 2023-04-03 | End: 2023-05-02

## 2023-04-03 RX ORDER — AMIODARONE HYDROCHLORIDE 400 MG/1
1 TABLET ORAL
Qty: 30 | Refills: 0
Start: 2023-04-03 | End: 2023-05-02

## 2023-04-03 RX ORDER — MAGNESIUM SULFATE 500 MG/ML
2 VIAL (ML) INJECTION ONCE
Refills: 0 | Status: COMPLETED | OUTPATIENT
Start: 2023-04-03 | End: 2023-04-03

## 2023-04-03 RX ORDER — ATORVASTATIN CALCIUM 80 MG/1
1 TABLET, FILM COATED ORAL
Qty: 0 | Refills: 0 | DISCHARGE

## 2023-04-03 RX ORDER — LOSARTAN POTASSIUM 100 MG/1
1 TABLET, FILM COATED ORAL
Qty: 0 | Refills: 0 | DISCHARGE

## 2023-04-03 RX ORDER — METOPROLOL TARTRATE 50 MG
2.5 TABLET ORAL ONCE
Refills: 0 | Status: DISCONTINUED | OUTPATIENT
Start: 2023-04-03 | End: 2023-04-03

## 2023-04-03 RX ORDER — PANTOPRAZOLE SODIUM 20 MG/1
1 TABLET, DELAYED RELEASE ORAL
Qty: 30 | Refills: 0
Start: 2023-04-03 | End: 2023-05-02

## 2023-04-03 RX ORDER — MECLIZINE HCL 12.5 MG
1 TABLET ORAL
Qty: 0 | Refills: 0 | DISCHARGE

## 2023-04-03 RX ORDER — FUROSEMIDE 40 MG
40 TABLET ORAL ONCE
Refills: 0 | Status: COMPLETED | OUTPATIENT
Start: 2023-04-03 | End: 2023-04-03

## 2023-04-03 RX ADMIN — Medication 20 MILLIEQUIVALENT(S): at 09:05

## 2023-04-03 RX ADMIN — Medication 50 MILLIGRAM(S): at 05:55

## 2023-04-03 RX ADMIN — APIXABAN 2.5 MILLIGRAM(S): 2.5 TABLET, FILM COATED ORAL at 05:55

## 2023-04-03 RX ADMIN — OXYCODONE HYDROCHLORIDE 5 MILLIGRAM(S): 5 TABLET ORAL at 06:00

## 2023-04-03 RX ADMIN — Medication 20 MILLIEQUIVALENT(S): at 10:10

## 2023-04-03 RX ADMIN — AMIODARONE HYDROCHLORIDE 200 MILLIGRAM(S): 400 TABLET ORAL at 05:55

## 2023-04-03 RX ADMIN — APIXABAN 2.5 MILLIGRAM(S): 2.5 TABLET, FILM COATED ORAL at 16:25

## 2023-04-03 RX ADMIN — Medication 81 MILLIGRAM(S): at 13:45

## 2023-04-03 RX ADMIN — OXYCODONE HYDROCHLORIDE 5 MILLIGRAM(S): 5 TABLET ORAL at 06:30

## 2023-04-03 RX ADMIN — Medication 40 MILLIGRAM(S): at 09:05

## 2023-04-03 RX ADMIN — Medication 50 MILLIGRAM(S): at 16:25

## 2023-04-03 RX ADMIN — POLYETHYLENE GLYCOL 3350 17 GRAM(S): 17 POWDER, FOR SOLUTION ORAL at 13:46

## 2023-04-03 RX ADMIN — Medication 25 GRAM(S): at 03:39

## 2023-04-03 RX ADMIN — PANTOPRAZOLE SODIUM 40 MILLIGRAM(S): 20 TABLET, DELAYED RELEASE ORAL at 05:55

## 2023-04-03 NOTE — PROGRESS NOTE ADULT - SUBJECTIVE AND OBJECTIVE BOX
VITAL SIGNS    Telemetry:  nsr paf    Vital Signs Last 24 Hrs  T(C): 37.3 (04-03-23 @ 07:15), Max: 37.3 (04-03-23 @ 07:15)  T(F): 99.2 (04-03-23 @ 07:15), Max: 99.2 (04-03-23 @ 07:15)  HR: 65 (04-03-23 @ 07:15) (65 - 84)  BP: 104/55 (04-03-23 @ 07:15) (96/55 - 128/61)  RR: 18 (04-03-23 @ 07:15) (18 - 19)  SpO2: 95% (04-03-23 @ 07:15) (93% - 98%)                   04-02 @ 07:01  -  04-03 @ 07:00  --------------------------------------------------------  IN: 390 mL / OUT: 1650 mL / NET: -1260 mL          Daily     Daily             CAPILLARY BLOOD GLUCOSE      POCT Blood Glucose.: 91 mg/dL (03 Apr 2023 07:28)  POCT Blood Glucose.: 104 mg/dL (03 Apr 2023 03:18)  POCT Blood Glucose.: 151 mg/dL (02 Apr 2023 20:50)  POCT Blood Glucose.: 240 mg/dL (02 Apr 2023 16:08)  POCT Blood Glucose.: 93 mg/dL (02 Apr 2023 11:18)            Drains:       Pacing Wires          Coumadin    [ ] YES          x  ]      NO         eliquis                          PHYSICAL EXAM        Neurology: alert and oriented x 3, nonfocal, no gross deficits  CV : s1 s2 RRR  Sternal Wound :  CDI , Stable  Lungs: cta  Abdomen: soft, nontender, nondistended, positive bowel sounds, last bowel movement                      :    voiding     Extremities:   -   edema   /  -   calve tenderness ,   R leg  incisions cdi  +          acetaminophen     Tablet .. 650 milliGRAM(s) Oral every 6 hours PRN  ALPRAZolam 0.25 milliGRAM(s) Oral daily PRN  aMIOdarone    Tablet   Oral   aMIOdarone    Tablet 200 milliGRAM(s) Oral daily  apixaban 2.5 milliGRAM(s) Oral every 12 hours  aspirin enteric coated 81 milliGRAM(s) Oral daily  atorvastatin 80 milliGRAM(s) Oral at bedtime  bisacodyl Suppository 10 milliGRAM(s) Rectal once  chlorhexidine 2% Cloths 1 Application(s) Topical daily  furosemide   Injectable 40 milliGRAM(s) IV Push once  insulin lispro (ADMELOG) Pump 1 Each SubCutaneous Continuous Pump  metoprolol tartrate 50 milliGRAM(s) Oral two times a day  oxyCODONE    IR 5 milliGRAM(s) Oral every 4 hours PRN  oxyCODONE    IR 10 milliGRAM(s) Oral every 4 hours PRN  pantoprazole    Tablet 40 milliGRAM(s) Oral before breakfast  polyethylene glycol 3350 17 Gram(s) Oral daily  senna 2 Tablet(s) Oral at bedtime  tamsulosin 0.4 milliGRAM(s) Oral at bedtime                    Physical Therapy Rec:   Home  [  ]   Home w/ PT  [  ]  Rehab  [  ]  Discussed with Cardiothoracic Team at AM rounds.

## 2023-04-03 NOTE — PROGRESS NOTE ADULT - PROBLEM SELECTOR PLAN 1
-Test BG ac and hs and 2am while on sq insulin and q6h if NPO.  -Can c/w insulin pump use on control IQ    -Re entered dexcom rufus on pt's phone. Dexcom working well but was not connecting to pt's phone.    -Calibrated CGM with insulin pump at time of visit due to BG values coming down.   -Pt to removed if having any MRI procedure. Need to evaluate Dexcom if pt has other xray procedures   - If Pump malfunctions please remove pump and give stat Lantus to 28 units/Admelog to 8 units TID AC/ moderate correction scales   Discharge plan:  - Follow up with endocrinologist Dr Ngo within 2 weeks. Please contact  Dr Ngo with pump settings prior to discharge  xx [phone], [phone], [phone]   - Make sure pt has Rx for all DM supplies and insulin at home prior to discharge.  - Needs Optho f/u as out pt.

## 2023-04-03 NOTE — PROGRESS NOTE ADULT - NUTRITIONAL ASSESSMENT
Diet, Regular:   Consistent Carbohydrate {No Snacks} (CSTCHO)  DASH/TLC {Sodium & Cholesterol Restricted} (DASH) (03-28-23 @ 07:08) [Active]
Diet, Regular:   Consistent Carbohydrate {No Snacks} (CSTCHO)  DASH/TLC {Sodium & Cholesterol Restricted} (DASH) (03-28-23 @ 07:08) [Active]      Please see RD assessment and/or follow up.  Managed by primary team as well
Diet, Regular:   Consistent Carbohydrate {No Snacks} (CSTCHO)  DASH/TLC {Sodium & Cholesterol Restricted} (DASH) (03-28-23 @ 07:08) [Active]
Diet, Regular:   Consistent Carbohydrate {No Snacks} (CSTCHO)  DASH/TLC {Sodium & Cholesterol Restricted} (DASH) (03-28-23 @ 07:08) [Active]
Diet, Regular:   Consistent Carbohydrate {No Snacks} (CSTCHO)  DASH/TLC {Sodium & Cholesterol Restricted} (DASH) (03-28-23 @ 07:08) [Active]      Please see RD assessment and/or follow up.  Managed by primary team as well
Diet, Regular:   Consistent Carbohydrate {No Snacks} (CSTCHO)  DASH/TLC {Sodium & Cholesterol Restricted} (DASH) (03-28-23 @ 07:08) [Active]      Needs RD consult to reinforce carb counting with pt

## 2023-04-03 NOTE — DISCHARGE NOTE PROVIDER - PROVIDER TOKENS
PROVIDER:[[id number]],SCHEDULEDAPPT:[04/14/2023],SCHEDULEDAPPTTIME:[09:00 AM]],PROVIDER:[[id number]],FOLLOWUP:[2 weeks]] PROVIDER:[TOKEN:[[id number]],SCHEDULEDAPPT:[04/14/2023],SCHEDULEDAPPTTIME:[09:00 AM]],PROVIDER:[[id number]],FOLLOWUP:[2 weeks]],PROVIDER:[TOKEN:[[id number]],FOLLOWUP:[1 week]]

## 2023-04-03 NOTE — PROGRESS NOTE ADULT - PROBLEM SELECTOR PLAN 4
- LDL goal <55 due to DM and now CVD  C/w atorvastatin 80 milliGRAM(s) Oral at bedtime  Follow up levels as out pt.
for CABG Thu this week
for CABG next week
s/p CABG doing well  management per CTS
s/p CABG doing well  management per CTS
for likely CABG next week
for likely CABG  now on CTS service
s/p CABG doing well  management per CTS

## 2023-04-03 NOTE — PROGRESS NOTE ADULT - SUBJECTIVE AND OBJECTIVE BOX
Patient is a 72y old  Male who presents with a chief complaint of CAD (03 Apr 2023 12:09)      DATE OF SERVICE: 04-03-23 @ 14:15    SUBJECTIVE / OVERNIGHT EVENTS: overnight events noted    ROS:  Resp: No cough no sputum production  CVS: No chest pain no palpitations no orthopnea  GI: no N/V/D  : no dysuria, no hematuria          MEDICATIONS  (STANDING):  aMIOdarone    Tablet   Oral   aMIOdarone    Tablet 200 milliGRAM(s) Oral daily  apixaban 2.5 milliGRAM(s) Oral every 12 hours  aspirin enteric coated 81 milliGRAM(s) Oral daily  atorvastatin 80 milliGRAM(s) Oral at bedtime  bisacodyl Suppository 10 milliGRAM(s) Rectal once  chlorhexidine 2% Cloths 1 Application(s) Topical daily  furosemide   Injectable 40 milliGRAM(s) IV Push once  insulin lispro (ADMELOG) Pump 1 Each SubCutaneous Continuous Pump  metoprolol tartrate 50 milliGRAM(s) Oral two times a day  pantoprazole    Tablet 40 milliGRAM(s) Oral before breakfast  polyethylene glycol 3350 17 Gram(s) Oral daily  senna 2 Tablet(s) Oral at bedtime  tamsulosin 0.4 milliGRAM(s) Oral at bedtime    MEDICATIONS  (PRN):  acetaminophen     Tablet .. 650 milliGRAM(s) Oral every 6 hours PRN Mild Pain (1 - 3)  ALPRAZolam 0.25 milliGRAM(s) Oral daily PRN anxiety  oxyCODONE    IR 5 milliGRAM(s) Oral every 4 hours PRN Moderate Pain (4 - 6)  oxyCODONE    IR 10 milliGRAM(s) Oral every 4 hours PRN Severe Pain (7 - 10)        CAPILLARY BLOOD GLUCOSE      POCT Blood Glucose.: 70 mg/dL (03 Apr 2023 11:26)  POCT Blood Glucose.: 91 mg/dL (03 Apr 2023 07:28)  POCT Blood Glucose.: 104 mg/dL (03 Apr 2023 03:18)  POCT Blood Glucose.: 151 mg/dL (02 Apr 2023 20:50)  POCT Blood Glucose.: 240 mg/dL (02 Apr 2023 16:08)    I&O's Summary    02 Apr 2023 07:01  -  03 Apr 2023 07:00  --------------------------------------------------------  IN: 390 mL / OUT: 1650 mL / NET: -1260 mL    03 Apr 2023 07:01  -  03 Apr 2023 14:15  --------------------------------------------------------  IN: 240 mL / OUT: 0 mL / NET: 240 mL        Vital Signs Last 24 Hrs  T(C): 37.3 (03 Apr 2023 07:15), Max: 37.3 (03 Apr 2023 07:15)  T(F): 99.2 (03 Apr 2023 07:15), Max: 99.2 (03 Apr 2023 07:15)  HR: 65 (03 Apr 2023 07:15) (65 - 84)  BP: 104/55 (03 Apr 2023 07:15) (99/54 - 128/61)  BP(mean): 76 (03 Apr 2023 07:15) (73 - 88)  RR: 18 (03 Apr 2023 07:15) (18 - 19)  SpO2: 95% (03 Apr 2023 07:15) (93% - 98%)    PHYSICAL EXAM:  EYES: EOMI, PERRLA  NECK: Supple, No JVD  CHEST/LUNG: clear  HEART: S1 S2; no murmurs   ABDOMEN: Soft, Nontender  EXTREMITIES:  no edema  NEUROLOGY: AO x 3 non-focal  SKIN: No rashes or lesions    LABS:                        8.2    6.66  )-----------( 384      ( 03 Apr 2023 04:20 )             25.1     04-03    136  |  100  |  19  ----------------------------<  115<H>  4.0   |  24  |  0.98    Ca    8.6      03 Apr 2023 04:20  Phos  3.5     04-03  Mg     2.1     04-03                  All consultant(s) notes reviewed and care discussed with other providers        Contact Number, Dr Kelly [phone]

## 2023-04-03 NOTE — DISCHARGE NOTE PROVIDER - NSDCFUADDAPPT_GEN_ALL_CORE_FT
YOU HAVE AN APPOINTMENT WITH DR HERNDON ON 4/14 9AM    CALL YOUR CARDIOLOGIST  DR RUDOLPH/MARK ANTHONY /IVETTE/DAVID AND SEE THEM IN 1-2 WEEKS\    [facility] AND [facility] 1-2 WEEKS   (Check your glucose before meals and at bedtime and write it down.  Bring those results to your  endocrinology or primary care doctor appointment . )     YOU HAVE AN APPOINTMENT WITH DR HERNDON ON 4/14 9AM    CALL YOUR CARDIOLOGIST  DR RUDOLPH/MARK ANTHONY /IVETTE/DAVID AND SEE THEM IN 1-2 WEEKS.  YOU WILL ALSO NEED TO SEE THE RHYTHM DOCTOR , DR HOPSON IN 1-2 WEEKS    [facility] AND SEE HER IN 1-2 WEEKS   (Check your glucose before meals and at bedtime and write it down.  Bring those results to your  endocrinology or primary care doctor appointment . )

## 2023-04-03 NOTE — DISCHARGE NOTE PROVIDER - CARE PROVIDER_API CALL
Flakita Sherwood)  Thoracic and Cardiac Surgery  [address] [place] [address]  Phone: [phone]  Fax: [phone]  Scheduled Appointment: 04/14/2023 09:00 AM    Alec Mitchell)  Cardiovascular Disease  [address] [place] [address] [address]  [place] [address]  Phone: [phone]  Fax: [phone]  Follow Up Time: 2 weeks   Flakita Sherwood)  Thoracic and Cardiac Surgery  [address] [place] [address]  Phone: [phone]  Fax: [phone]  Scheduled Appointment: 04/14/2023 09:00 AM    Alec Mitchell)  Cardiovascular Disease  [address] [place] [address] [address]  [place] [address]  Phone: [phone]  Fax: [phone]  Follow Up Time: 2 weeks    Joshua Del Castillo)  Cardiac Electrophysiology; Cardiovascular Disease; Internal Medicine  [address] [place] [address]  Phone: [phone]  Fax: [phone]  Follow Up Time: 1 week

## 2023-04-03 NOTE — PROGRESS NOTE ADULT - PROBLEM SELECTOR PROBLEM 4
CAD (coronary artery disease)
HLD (hyperlipidemia)
CAD (coronary artery disease)

## 2023-04-03 NOTE — DISCHARGE NOTE PROVIDER - HOSPITAL COURSE
72 years old male with PMHX of HTN, Type I DM (50 years ago, on Insulin pump for last 15 years), Vertigo and BPH who was brought to ED due to Abdominal pain, Nausea and vomiting. Patient also found to have elevated troponin. Patient transferred over from [facility] s/p [facility] showing   TVD CAD.      3/22 VSS; Patient seen at bedside, resting comfortably on room air. In NAD, no SOB, or CP at this time.   3/23 /64 -171/81. OR Thursday 3/30.  Radial mapping.  Pt transferred to Dr Sherwood.  Carotids neg.  EF 53%  3/24 Scheduled for cabg this Monday Endocrinology recommendations appreciated.  3/25 reserve left arm for radial harvest  3/27 s/p cabg x 3 lima / LIMA-LAD | SVG-OM | SVG-OM; EF 55%  Right greater saphenous vein harvested  Extubated d 0  Insulin infusion  Preop insulin pump post op labile glucose  PAF, amio load, lopressor.  Episodes of hypotension PW attached , pacer off  Diuresis  3/30 transferred to sdu  3/31 VSS: RSR 70's w/ episodes of Pafib/ EP consulted with Dr. Del Castillo as per cardiology; continue amio load; increase lop 50 mg po bid; monitor and supplement electrolytes; pw d/c as per Dr. Sherwood; Eliquis 5 bid recommended as per EP; d/w Dr. Sherwood -- plan to hold off on Eliquis today since pw removal this am as per Dr. Sherwood; if continued pafib - likely start in am but Eliquis 2.5 bid   insulin pump resumed today as per pt and HAYDEN darling; prn diuretics as per Dr. Sherwood- lasix 40 mg iv given this AM. Continue to monitor in SDU today.  4/1 VVS; Continue with current medication regimen. House Endo following on home insulin pump.    4/2 VSS glucose w/ better control.   Discharge planning- home when stable /   Brief paf  las belén , on eliquis, ami0,Lopressor.  D/c planning for today as per Dr Sherwood with bid lasix   72 years old male with PMHX of HTN, Type I DM (50 years ago, on Insulin pump for last 15 years), Vertigo and BPH who was brought to ED due to Abdominal pain, Nausea and vomiting. Patient also found to have elevated troponin. Patient transferred over from [facility] s/p [facility] showing   TVD CAD.      3/22 VSS; Patient seen at bedside, resting comfortably on room air. In NAD, no SOB, or CP at this time.   3/23 /64 -171/81. OR Thursday 3/30.  Radial mapping.  Pt transferred to Dr Sherwood.  Carotids neg.  EF 53%  3/24 Scheduled for cabg this Monday Endocrinology recommendations appreciated.  3/25 reserve left arm for radial harvest  3/27 s/p cabg x 3 lima / LIMA-LAD | SVG-OM | SVG-OM; EF 55%  Right greater saphenous vein harvested  Extubated d 0  Insulin infusion  Preop insulin pump post op labile glucose  PAF, amio load, lopressor.  Episodes of hypotension PW attached , pacer off  Diuresis  3/30 transferred to sdu  3/31 VSS: RSR 70's w/ episodes of Pafib/ EP consulted with Dr. Del Castillo as per cardiology; continue amio load; increase lop 50 mg po bid; monitor and supplement electrolytes; pw d/c as per Dr. Sherwood; Eliquis 5 bid recommended as per EP; d/w Dr. Sherwood -- plan to hold off on Eliquis today since pw removal this am as per Dr. Sherwood; if continued pafib - likely start in am but Eliquis 2.5 bid   insulin pump resumed today as per pt and HAYDEN darling; prn diuretics as per Dr. Sherwood- lasix 40 mg iv given this AM. Continue to monitor in SDU today.  4/1 VVS; Continue with current medication regimen. House Endo following on home insulin pump.    4/2 VSS glucose w/ better control.   Discharge planning- home when stable /   Brief paf  last belén , on eliquis, ami0,Lopressor.  D/c planning for today as per Dr Sherwood with bid lasix  The patient has aberrant ectopy while in afib, ekg , rhythm and qtc reviewed with eps, outpatient f/u  D/w Dr Sherwood

## 2023-04-03 NOTE — PROGRESS NOTE ADULT - ASSESSMENT
71y/o M w/h/o uncontrolled T1DM (A1C 8.2%) on T Slim insulin pump PTA. No  known complications. Also h/o HTN, vertigo and BPH who was brought to OSH due to abdominal pain, nausea and vomiting found to be in DKA and transferred to [facility] after found to have triple vessel disease on [facility]. Now s/p CABG 3/27.  Endocrine consult requested for T1DM and insulin pump management. Pt tolerating POs and feeling much better. BG levels tightly controlled while on present insulin pump settings. Reviewed insulin pump hx and BG levels and adjusted insulin to carb ratio since pt tends to enter a higher carb value that what he is eating. Pt also instructed to count carbs he actually eats and bolus right after a meal so he doesn't bolus for carbs he is not eating. Pt verbalized understanding.  Also needs to change pump site today. Deleted and re-entered Dexcom rufus on pt's phone so he can see BG on his phone as well.  BG goal 100 to 180s while in hospital.     Home Regimen: T slimx2 novolog insulin pump   basal  12a 0.148  9a 0.75  5p 0.758  10p 0.712  ISF  12a 1:50  5p 1:57  10p 1:50  ICR  12a 1:18  9a 1:17  5p 1:!8  target 100  action time 5hr

## 2023-04-03 NOTE — PROGRESS NOTE ADULT - NSPROGADDITIONALINFOA_GEN_ALL_CORE
discussed with patient in detail, expresses understanding of treatment plans.
discharge planning per CTS
-Plan discussed with pt/team.  Contact info: [phone] (24/7). pager [phone]  Kesha on [place]  Teams on M-T-W-F. Unavailable Thu/Weekends/Holidays  Spent over 35  minutes providing face to face education which was more than 50% of encounter that also included assessing pt/insulin pump/CGM/ labs/meds and discussing plan of care with primary team.  Adjusting insulin pump settings  Discharge plan  Follow up care
-Plan discussed with pt/team.  Contact info: [phone] (24/7). pager [phone]  Kesha on [place]  Teams on M-T-W-F. Unavailable Thu/Weekends/Holidays  Spent  29minutes assessing pt/labs/meds and discussing plan of care with primary team  Adjusting insulin  Discharge plan  Follow up care

## 2023-04-03 NOTE — PROGRESS NOTE ADULT - SUBJECTIVE AND OBJECTIVE BOX
C A R D I O L O G Y  **********************************     DATE OF SERVICE: 04-03-23    Patient reports SOB improving. denies chest pain or palpitations. Review of symptoms otherwise negative.    MEDICATIONS:  acetaminophen     Tablet .. 650 milliGRAM(s) Oral every 6 hours PRN  ALPRAZolam 0.25 milliGRAM(s) Oral daily PRN  aMIOdarone    Tablet   Oral   aMIOdarone    Tablet 200 milliGRAM(s) Oral daily  apixaban 2.5 milliGRAM(s) Oral every 12 hours  aspirin enteric coated 81 milliGRAM(s) Oral daily  atorvastatin 80 milliGRAM(s) Oral at bedtime  bisacodyl Suppository 10 milliGRAM(s) Rectal once  chlorhexidine 2% Cloths 1 Application(s) Topical daily  furosemide   Injectable 40 milliGRAM(s) IV Push once  insulin lispro (ADMELOG) Pump 1 Each SubCutaneous Continuous Pump  metoprolol tartrate 50 milliGRAM(s) Oral two times a day  oxyCODONE    IR 5 milliGRAM(s) Oral every 4 hours PRN  oxyCODONE    IR 10 milliGRAM(s) Oral every 4 hours PRN  pantoprazole    Tablet 40 milliGRAM(s) Oral before breakfast  polyethylene glycol 3350 17 Gram(s) Oral daily  senna 2 Tablet(s) Oral at bedtime  tamsulosin 0.4 milliGRAM(s) Oral at bedtime      LABS:                        8.2    6.66  )-----------( 384      ( 03 Apr 2023 04:20 )             25.1       Hemoglobin: 8.2 g/dL (04-03 @ 04:20)  Hemoglobin: 8.1 g/dL (04-02 @ 05:32)  Hemoglobin: 8.6 g/dL (04-01 @ 05:14)  Hemoglobin: 9.0 g/dL (03-31 @ 06:16)  Hemoglobin: 8.1 g/dL (03-30 @ 00:10)      04-03    136  |  100  |  19  ----------------------------<  115<H>  4.0   |  24  |  0.98    Ca    8.6      03 Apr 2023 04:20  Phos  3.5     04-03  Mg     2.1     04-03      Creatinine Trend: 0.98<--, 0.81<--, 0.82<--, 0.78<--, 0.75<--, 0.81<--    COAGS:           PHYSICAL EXAM:  T(C): 37.3 (04-03-23 @ 07:15), Max: 37.3 (04-03-23 @ 07:15)  HR: 65 (04-03-23 @ 07:15) (65 - 84)  BP: 104/55 (04-03-23 @ 07:15) (99/54 - 128/61)  RR: 18 (04-03-23 @ 07:15) (18 - 19)  SpO2: 95% (04-03-23 @ 07:15) (93% - 98%)  Wt(kg): --    I&O's Summary    02 Apr 2023 07:01  -  03 Apr 2023 07:00  --------------------------------------------------------  IN: 390 mL / OUT: 1650 mL / NET: -1260 mL    03 Apr 2023 07:01  -  03 Apr 2023 11:40  --------------------------------------------------------  IN: 240 mL / OUT: 0 mL / NET: 240 mL          Gen: NAD  HEENT:  (-)icterus (-)pallor  CV: N S1 S2 1/6 RONI (+)2 Pulses B/l  Resp:  Clear to auscultation B/L, normal effort  GI: (+) BS Soft, NT, ND  Lymph:  (-)Edema, (-)obvious lymphadenopathy  Skin: Warm to touch, Normal turgor  Psych: Appropriate mood and affect      TELEMETRY: SR 60s, PAF    < from: TTE with Doppler (w/Cont) (03.16.23 @ 13:36) >  CONCLUSIONS:  1. Normal mitral valve. Mildly calcified chordae tendinae.  Mild mitral regurgitation.  2. Calcified aortic valve was not well visualized but has  normal opening. No aortic stenosis. No aortic valve  regurgitation seen.  3. Normal left ventricular internal dimensions and wall  thicknesses.  4. Low-normal Left Ventricular Systolic Function,  (EF =  53% by biplane). No regional wall motion abnormalities.  Ultrasound LV opacification agent was used to enhance  endocardial definition.  5. Right ventricle not well visualized. Normal RVsystolic  function (RV tissue Doppler 11 cm/s).  6. RV systolic pressure is borderline normal at  33 mm Hg.  7. Normal tricuspid valve. Moderate tricuspid  regurgitation.  8. No pericardial effusion.    *** No previous Echo exam.    < end of copied text >    < from: Cardiac Catheterization (03.22.23 @ 09:42) >  Diagnostic Conclusions:     Large LAD with severe bifurcating/ostial and heavily calcifed disease   LCX with Bifurcating/ostial disease   RCA with Moderate Disease   Recommendations:     CABG evaluation      < end of copied text >      ASSESSMENT/PLAN: Patient is 72 year old male with pmh of Type I DM (50 years ago, on Insulin pump for last 15 years), HTN, Vertigo and BPH who was brought to ED due to Abdominal pain, Nausea and vomiting. Cardiology consulted for increased troponin.    #NSTEMI  - TTE with low normal LV function and no wall motion abnormalities  - s/p cath with multivessel CAD noted above  - Transferred to [facility] for CABG evaluation  - Now s/p CABGx3 on 3/27  - Continue ASA, Lipitor, and Metoprolol  - CTS follow up noted    #DKA/Type 1 DM  - Glycemic control per endocrinology    #HTN  - Continue metoprolol. Remainder of meds on hold post op    #Post op PAF  - Continue metoprolol to prevent AF RVR  - Amio load started to maintain NSR  - EP eval appreciated - continue short term amio and AC with Eliquis    - Patient to follow up in our [place] office with Dr. Wahl on 4/12 at 12:15 PM ([address], [address], [place] [address] - Office #[phone])    Mariano Vernon PA-C  Pager: [phone]

## 2023-04-03 NOTE — DISCHARGE NOTE PROVIDER - NSDCFUADDINST_GEN_ALL_CORE_FT
Check your glucose before meals and at bedtime and write it down.  Bring those results to your  endocrinology or primary care doctor appointment .     1. Daily Shower  2. Weight yourself daily and notify any weight gain greater than 2-3 pounds in 24 hours.  3. Regular diabetic diet - low fat, low cholesterol, no added salt.  4. Cleanse Midsternal incision and leg incision daily while showering with warm water and mild soap, pat dry and maintain open to air.   5. Follow Cardiac Surgery Do's and Don'ts discharge instructions.   6. No driving until cleared by MD.   7. No heavy lifting nothing greater than 5 pounds until cleared by MD.   8. Call / Notify MD any fever greater than 101.0  9. Increase Activity as tolerated.

## 2023-04-03 NOTE — PROGRESS NOTE ADULT - REASON FOR ADMISSION
CAD

## 2023-04-03 NOTE — PROGRESS NOTE ADULT - ASSESSMENT
Patient seen and examined, agree with above assessment and plan as transcribed above.    - D/c planned for today  - Outpt cardiac and EP f/u  [phone]    Alec Mitchell MD, [facility]  BEEPER [phone]

## 2023-04-03 NOTE — PROGRESS NOTE ADULT - SUBJECTIVE AND OBJECTIVE BOX
DIABETES FOLLOW UP NOTE: Saw pt earlier today    Chief Complaint: Endocrine consult requested for management of T2DM    INTERVAL HX: Pt stable, reports feeling better and tolerating POs with BG levels tightly controlled (70 to 200s). BG at time of visit was 70 reading 68 in T-Slim. Pt reports he was working with PT and walked/took stairs right before this provider visit. Pt thinks his BG when down because of this, he also felt weak and dizzy so had 4oz of milk with BG going up at the end of encounter and no further hypoglycemic symptoms. Reviewed pump settings and CGM. Noted pt's Dexcom rufus is not working in his [employer] phone. He started new sensor yesterday and states has not been able to connect it to his phone but is working in his insulin pump. Pt heard low BG alarm going off at time of visit. Reviewed carbs that pt received with lunch> noted pt over calculating carbs. Calculated 58 r for his lunch but pt states he would had entered 68g instead.         Review of Systems:  General: As above  Cardiovascular: No chest pain, palpitations  Respiratory: No SOB, no cough  GI: No nausea, vomiting, abdominal pain  Endocrine: No polyuria, polydipsia or S&Sx of hypoglycemia    Allergies    Neosporin (Rash)    Intolerances      MEDICATIONS:  atorvastatin 80 milliGRAM(s) Oral at bedtime  insulin lispro (ADMELOG) Pump 1 Each SubCutaneous Continuous    Basal 0000 0.9u/hr,  ICR 1:9 , ISF 1:50 ,   Basal 0300 1.2u/hr,  ICR 1:9 , ISF 1:50 ,  Total basal 27.9 units  IOB: 5hours    PHYSICAL EXAM:  VITALS: T(C): 37.3 (04-03-23 @ 07:15)  T(F): 99.2 (04-03-23 @ 07:15), Max: 99.2 (04-03-23 @ 07:15)  HR: 65 (04-03-23 @ 07:15) (65 - 84)  BP: 104/55 (04-03-23 @ 07:15) (99/54 - 128/61)  RR:  (18 - 19)  SpO2:  (93% - 98%)  Wt(kg): --  GENERAL: Male sitting in chair in NAD  Abdomen: Soft, nontender, non distended  RLQ abdomen tslim infusion set CDI, dexcom cgm RLQ Abdomen CDI  Extremities: Warm, no edema in all 4 exts  NEURO: A&O X3    LABS:  POCT Blood Glucose.: 70 mg/dL (04-03-23 @ 11:26)  POCT Blood Glucose.: 91 mg/dL (04-03-23 @ 07:28)  POCT Blood Glucose.: 104 mg/dL (04-03-23 @ 03:18)  POCT Blood Glucose.: 151 mg/dL (04-02-23 @ 20:50)  POCT Blood Glucose.: 240 mg/dL (04-02-23 @ 16:08)  POCT Blood Glucose.: 93 mg/dL (04-02-23 @ 11:18)  POCT Blood Glucose.: 120 mg/dL (04-02-23 @ 07:07)  POCT Blood Glucose.: 144 mg/dL (04-02-23 @ 01:52)  POCT Blood Glucose.: 151 mg/dL (04-01-23 @ 20:42)  POCT Blood Glucose.: 198 mg/dL (04-01-23 @ 16:24)  POCT Blood Glucose.: 132 mg/dL (04-01-23 @ 12:53)  POCT Blood Glucose.: 73 mg/dL (04-01-23 @ 11:26)  POCT Blood Glucose.: 132 mg/dL (04-01-23 @ 09:18)  POCT Blood Glucose.: 143 mg/dL (04-01-23 @ 07:31)  POCT Blood Glucose.: 111 mg/dL (04-01-23 @ 02:17)  POCT Blood Glucose.: 150 mg/dL (03-31-23 @ 20:45)  POCT Blood Glucose.: 180 mg/dL (03-31-23 @ 16:40)  POCT Blood Glucose.: 135 mg/dL (03-31-23 @ 14:36)                            8.2    6.66  )-----------( 384      ( 03 Apr 2023 04:20 )             25.1       04-03    136  |  100  |  19  ----------------------------<  115<H>  4.0   |  24  |  0.98    eGFR: 81    Ca    8.6      04-03  Mg     2.1     04-03  Phos  3.5     04-03    TPro  5.8<L>  /  Alb  3.1<L>  /  TBili  0.8  /  DBili  x   /  AST  25  /  ALT  33  /  AlkPhos  104  04-01    Thyroid Function Tests:  03-22 @ 17:20 TSH 1.82 FreeT4 -- T3 76 Anti TPO -- Anti Thyroglobulin Ab -- TSI --    A1C with Estimated Average Glucose Result: 8.2 % (03-22-23 @ 17:20)  A1C with Estimated Average Glucose Result: 7.7 % (03-16-23 @ 03:38)    Estimated Average Glucose: 189 mg/dL (03-22-23 @ 17:20)  Estimated Average Glucose: 174 mg/dL (03-16-23 @ 03:38)

## 2023-04-03 NOTE — DISCHARGE NOTE PROVIDER - CARE PROVIDERS DIRECT ADDRESSES
,[email or web address],DirectAddress_Unknown [email or web address],DirectAddress_Unknown,[email or web address]

## 2023-04-03 NOTE — PROGRESS NOTE ADULT - SUBJECTIVE AND OBJECTIVE BOX
EP  **********************************     DATE OF SERVICE: 04-03-23    Continues to have short episodes of paroxysmal AFib that 'take his breath away'  reassured that he did not have symptomatic palpitations before cardiac surgery and we expect this to resolve in time.  no fainting.    acetaminophen     Tablet .. 650 milliGRAM(s) Oral every 6 hours PRN  ALPRAZolam 0.25 milliGRAM(s) Oral daily PRN  aMIOdarone    Tablet 200 milliGRAM(s) Oral daily  aMIOdarone    Tablet   Oral   apixaban 2.5 milliGRAM(s) Oral every 12 hours  aspirin enteric coated 81 milliGRAM(s) Oral daily  atorvastatin 80 milliGRAM(s) Oral at bedtime  bisacodyl Suppository 10 milliGRAM(s) Rectal once  chlorhexidine 2% Cloths 1 Application(s) Topical daily  furosemide   Injectable 40 milliGRAM(s) IV Push once  insulin lispro (ADMELOG) Pump 1 Each SubCutaneous Continuous Pump  metoprolol tartrate 50 milliGRAM(s) Oral two times a day  oxyCODONE    IR 5 milliGRAM(s) Oral every 4 hours PRN  oxyCODONE    IR 10 milliGRAM(s) Oral every 4 hours PRN  pantoprazole    Tablet 40 milliGRAM(s) Oral before breakfast  polyethylene glycol 3350 17 Gram(s) Oral daily  senna 2 Tablet(s) Oral at bedtime  tamsulosin 0.4 milliGRAM(s) Oral at bedtime                            8.2    6.66  )-----------( 384      ( 03 Apr 2023 04:20 )             25.1       04-03    136  |  100  |  19  ----------------------------<  115<H>  4.0   |  24  |  0.98    Ca    8.6      03 Apr 2023 04:20  Phos  3.5     04-03  Mg     2.1     04-03      T(C): 37.3 (04-03-23 @ 07:15), Max: 37.3 (04-03-23 @ 07:15)  HR: 65 (04-03-23 @ 07:15) (65 - 84)  BP: 104/55 (04-03-23 @ 07:15) (99/54 - 128/61)  RR: 18 (04-03-23 @ 07:15) (18 - 19)  SpO2: 95% (04-03-23 @ 07:15) (93% - 98%)  Wt(kg): --    I&O's Summary    02 Apr 2023 07:01  -  03 Apr 2023 07:00  --------------------------------------------------------  IN: 390 mL / OUT: 1650 mL / NET: -1260 mL    03 Apr 2023 07:01  -  03 Apr 2023 12:03  --------------------------------------------------------  IN: 240 mL / OUT: 0 mL / NET: 240 mL    Gen: NAD  HEENT:  (-)icterus (-)pallor  CV: N S1 S2 1/6 RONI (+)2 Pulses B/l  Resp:  Clear to auscultation B/L, normal effort  GI: (+) BS Soft, NT, ND  Lymph:  (-)Edema, (-)obvious lymphadenopathy  Skin: Warm to touch, Normal turgor  Psych: Appropriate mood and affect      TELEMETRY: SR 70s, episodes of paroxysmal AFib with occasional abberant conduction.    < from: TTE with Doppler (w/Cont) (03.16.23 @ 13:36) >  CONCLUSIONS:  1. Normal mitral valve. Mildly calcified chordae tendinae.  Mild mitral regurgitation.  2. Calcified aortic valve was not well visualized but has  normal opening. No aortic stenosis. No aortic valve  regurgitation seen.  3. Normal left ventricular internal dimensions and wall  thicknesses.  4. Low-normal Left Ventricular Systolic Function,  (EF =  53% by biplane). No regional wall motion abnormalities.  Ultrasound LV opacification agent was used to enhance  endocardial definition.  5. Right ventricle not well visualized. Normal RVsystolic  function (RV tissue Doppler 11 cm/s).  6. RV systolic pressure is borderline normal at  33 mm Hg.  7. Normal tricuspid valve. Moderate tricuspid  regurgitation.  8. No pericardial effusion.    *** No previous Echo exam.    < end of copied text >    < from: Cardiac Catheterization (03.22.23 @ 09:42) >  Diagnostic Conclusions:     Large LAD with severe bifurcating/ostial and heavily calcifed disease   LCX with Bifurcating/ostial disease   RCA with Moderate Disease   Recommendations:     CABG evaluation      < end of copied text >      ASSESSMENT/PLAN: Patient is 72 year old male with pmh of Type I DM (50 years ago, on Insulin pump for last 15 years), HTN, Vertigo and BPH who was brought to ED due to Abdominal pain, Nausea and vomiting. Cardiology consulted for increased troponin.    #NSTEMI  - TTE with low normal LV function and no wall motion abnormalities  - s/p cath with multivessel CAD noted above  - Transferred to [facility] for CABG evaluation  - Now s/p CABGx3 on 3/27  - Continue ASA, Lipitor  -  tolearting BB, increase dose as bp allows   - CTS follow up    #DKA/Type 1 DM  - Glycemic control per endocrinology    #HTN  - Continue metoprolol. Remainder of meds on hold post op    #Post op PAF  - Continue metoprolol to prevent AF RVR  - Amio load started to maintain NSR (200mg daily.  - Apixaban 5mg BID on discharge for stroke prevention.  - [facility]/ [facility] Brantley [facility] AFib management.    Joshua Del Castillo M.D.  Cardiac Electrophysiology  [phone]    - Patient to follow up in our [place] office with Dr. Wahl on 4/12 at 12:15 PM ([address], [address], [place] [address] - Office #[phone])

## 2023-04-03 NOTE — PROGRESS NOTE ADULT - PROBLEM SELECTOR PLAN 2
- Tslimx 2 & Dexcom. Pt aware to change insulin pump site today 4/3,next dexcom change: 4/12  - RN to provide pt with insulin since he doesn't have Novolog insulin with him.   - Ensure insulin pump attestation on chart  - Document insulin boluses/carbs on flow sheet -> Pt to show RN pump to verify boluses  - pt has backup insulin pump supplies  - Adjusted insulin pump INSULIN TO CARB RATIO TO 1:11 INSTEAD OF 1:9. Calibrated pump at time of visit  -C/w all other settings:   Basal 0000 0.9u/hr,  ICR 1:11, ISF 1:50 ,  Basal 0300 1.2u/hr,  ICR 1:11, ISF 1:50 ,  - Further pump settings TBD based on glycemic trends on new settings  -RNs to verify pt bolus whenever he does then to ensure he is bolusing insulin with meals

## 2023-04-03 NOTE — PROGRESS NOTE ADULT - ASSESSMENT
72 years old male with PMHX of HTN, Type I DM (50 years ago, on Insulin pump for last 15 years), Vertigo and BPH who was brought to ED due to Abdominal pain, Nausea and vomiting. Patient also found to have elevated troponin. Patient transferred over from [facility] s/p [facility] showing   TVD CAD.      3/22 VSS; Patient seen at bedside, resting comfortably on room air. In NAD, no SOB, or CP at this time.   3/23 /64 -171/81. OR Thursday 3/30.  Radial mapping.  Pt transferred to Dr Sherwood.  Carotids neg.  EF 53%  3/24 Scheduled for cabg this Monday Endocrinology recommendations appreciated.  3/25 reserve left arm for radial harvest  3/27 s/p cabg x 3 lima / LIMA-LAD | SVG-OM | SVG-OM; EF 55%  Right greater saphenous vein harvested  Extubated d 0  Insulin infusion  Preop insulin pump post op labile glucose  PAF, amio load, lopressor.  Episodes of hypotension PW attached , pacer off  Diuresis  3/30 transferred to sdu  3/31 VSS: RSR 70's w/ episodes of Pafib/ EP consulted with Dr. Del Castillo as per cardiology; continue amio load; increase lop 50 mg po bid; monitor and supplement electrolytes; pw d/c as per Dr. Sherwood; Eliquis 5 bid recommended as per EP; d/w Dr. Sherwood -- plan to hold off on Eliquis today since pw removal this am as per Dr. Sherwood; if continued pafib - likely start in am but Eliquis 2.5 bid   insulin pump resumed today as per pt and HAYDEN darling; prn diuretics as per Dr. Sherwood- lasix 40 mg iv given this AM. Continue to monitor in SDU today.  4/1 VVS; Continue with current medication regimen. House Endo following on home insulin pump.    4/2 VSS glucose w/ better control.   Discharge planning- home when stable /   Brief paf  las belén , on eliquis, ami0,Lopressor.  D/c plannoing for today as per Dr Sherwood

## 2023-04-03 NOTE — DISCHARGE NOTE PROVIDER - NSDCMRMEDTOKEN_GEN_ALL_CORE_FT
acetaminophen 325 mg oral tablet: 2 tab(s) orally every 6 hours As needed Mild Pain (1 - 3)  Aldactone 25 mg oral tablet: 2 tab(s) orally once a day THEN STARTING 3/11 TAKE 1 TAB DAILY  amiodarone 200 mg oral tablet: 1 tab(s) orally once a day x 30 days  apixaban 2.5 mg oral tablet: 1 tab(s) orally every 12 hours  aspirin 81 mg oral delayed release tablet: 1 tab(s) orally once a day  atorvastatin 80 mg oral tablet: 1 tab(s) orally once a day (at bedtime)  metoprolol tartrate 50 mg oral tablet: 1 tab(s) orally 2 times a day  oxyCODONE 5 mg oral tablet: 1 tab(s) orally every 4 hours MDD: 5  pantoprazole 40 mg oral delayed release tablet: 1 tab(s) orally once a day (before a meal)  senna leaf extract oral tablet: 2 tab(s) orally once a day (at bedtime)  tamsulosin 0.4 mg oral capsule: 1 cap(s) orally once a day   acetaminophen 325 mg oral tablet: 2 tab(s) orally every 6 hours As needed Mild Pain (1 - 3)  Aldactone 25 mg oral tablet: 2 tab(s) orally once a day THEN STARTING 3/11 TAKE 1 TAB DAILY  amiodarone 200 mg oral tablet: 1 tab(s) orally once a day x 30 days  apixaban 2.5 mg oral tablet: 1 tab(s) orally every 12 hours  aspirin 81 mg oral delayed release tablet: 1 tab(s) orally once a day  atorvastatin 80 mg oral tablet: 1 tab(s) orally once a day (at bedtime)  Lasix 40 mg oral tablet: 1 tab(s) orally 2 times a day THEN STARTIING 4/11 TAKE 1 TAB DAILY  metoprolol tartrate 50 mg oral tablet: 1 tab(s) orally 2 times a day  oxyCODONE 5 mg oral tablet: 1 tab(s) orally every 4 hours MDD: 5  pantoprazole 40 mg oral delayed release tablet: 1 tab(s) orally once a day (before a meal)  senna leaf extract oral tablet: 2 tab(s) orally once a day (at bedtime)  tamsulosin 0.4 mg oral capsule: 1 cap(s) orally once a day   acetaminophen 325 mg oral tablet: 2 tab(s) orally every 6 hours As needed Mild Pain (1 - 3)  Aldactone 25 mg oral tablet: 2 tab(s) orally once a day THEN STARTING 4/11 TAKE 1 TAB DAILY  amiodarone 200 mg oral tablet: 1 tab(s) orally once a day x 30 days  apixaban 2.5 mg oral tablet: 1 tab(s) orally every 12 hours  aspirin 81 mg oral delayed release tablet: 1 tab(s) orally once a day  atorvastatin 80 mg oral tablet: 1 tab(s) orally once a day (at bedtime)  Lasix 40 mg oral tablet: 1 tab(s) orally 2 times a day THEN STARTIING 4/11 TAKE 1 TAB DAILY  metoprolol tartrate 50 mg oral tablet: 1 tab(s) orally 2 times a day  oxyCODONE 5 mg oral tablet: 1 tab(s) orally every 4 hours MDD: 5  pantoprazole 40 mg oral delayed release tablet: 1 tab(s) orally once a day (before a meal)  senna leaf extract oral tablet: 2 tab(s) orally once a day (at bedtime)  tamsulosin 0.4 mg oral capsule: 1 cap(s) orally once a day   acetaminophen 325 mg oral tablet: 2 tab(s) orally every 6 hours As needed Mild Pain (1 - 3)  Aldactone 25 mg oral tablet: 2 tab(s) orally once a day THEN STARTING 4/11 TAKE 1 TAB DAILY  amiodarone 200 mg oral tablet: 1 tab(s) orally once a day x 30 days  apixaban 2.5 mg oral tablet: 1 tab(s) orally every 12 hours  aspirin 81 mg oral delayed release tablet: 1 tab(s) orally once a day  atorvastatin 80 mg oral tablet: 1 tab(s) orally once a day (at bedtime)  Lasix 40 mg oral tablet: 1 tab(s) orally 2 times a day THEN STARTIING 4/11 TAKE 1 TAB DAILY  metoprolol tartrate 50 mg oral tablet: 1 tab(s) orally 2 times a day  oxyCODONE 5 mg oral tablet: 1 tab(s) orally every 4 hours as needed for  severe pain MDD: 5  pantoprazole 40 mg oral delayed release tablet: 1 tab(s) orally once a day (before a meal)  senna leaf extract oral tablet: 2 tab(s) orally once a day (at bedtime)  tamsulosin 0.4 mg oral capsule: 1 cap(s) orally once a day

## 2023-04-04 ENCOUNTER — NON-APPOINTMENT (OUTPATIENT)
Age: 73
End: 2023-04-04

## 2023-04-06 ENCOUNTER — APPOINTMENT (OUTPATIENT)
Dept: CARE COORDINATION | Facility: HOME HEALTH | Age: 73
End: 2023-04-06
Payer: MEDICARE

## 2023-04-06 VITALS
HEART RATE: 71 BPM | WEIGHT: 194 LBS | RESPIRATION RATE: 18 BRPM | SYSTOLIC BLOOD PRESSURE: 102 MMHG | DIASTOLIC BLOOD PRESSURE: 70 MMHG | OXYGEN SATURATION: 95 %

## 2023-04-06 PROCEDURE — 99024 POSTOP FOLLOW-UP VISIT: CPT

## 2023-04-06 RX ORDER — ASPIRIN 81 MG/1
81 TABLET ORAL DAILY
Refills: 0 | Status: ACTIVE | COMMUNITY

## 2023-04-06 RX ORDER — ATORVASTATIN CALCIUM 80 MG/1
80 TABLET, FILM COATED ORAL DAILY
Refills: 0 | Status: ACTIVE | COMMUNITY

## 2023-04-06 RX ORDER — METOPROLOL TARTRATE 50 MG/1
50 TABLET, FILM COATED ORAL
Refills: 0 | Status: ACTIVE | COMMUNITY

## 2023-04-06 RX ORDER — OXYCODONE HYDROCHLORIDE 5 MG/1
5 CAPSULE ORAL
Refills: 0 | Status: ACTIVE | COMMUNITY

## 2023-04-06 RX ORDER — SENNOSIDES 8.6 MG TABLETS 8.6 MG/1
8.6 TABLET ORAL AT BEDTIME
Refills: 0 | Status: ACTIVE | COMMUNITY

## 2023-04-06 RX ORDER — PANTOPRAZOLE SODIUM 40 MG/1
40 TABLET, DELAYED RELEASE ORAL DAILY
Refills: 0 | Status: ACTIVE | COMMUNITY

## 2023-04-06 RX ORDER — BENZONATATE 100 MG/1
100 CAPSULE ORAL EVERY 6 HOURS
Qty: 28 | Refills: 0 | Status: ACTIVE | COMMUNITY
Start: 2023-04-06 | End: 1900-01-01

## 2023-04-06 RX ORDER — TAMSULOSIN HYDROCHLORIDE 0.4 MG/1
0.4 CAPSULE ORAL DAILY
Refills: 0 | Status: ACTIVE | COMMUNITY

## 2023-04-06 RX ORDER — AMIODARONE HYDROCHLORIDE 200 MG/1
200 TABLET ORAL DAILY
Refills: 0 | Status: ACTIVE | COMMUNITY

## 2023-04-06 NOTE — REVIEW OF SYSTEMS
[As Noted in HPI] : as noted in HPI [Cough] : cough [Negative] : Gastrointestinal [FreeTextEntry6] : dry cough

## 2023-04-06 NOTE — PHYSICAL EXAM
[] : no respiratory distress [Exaggerated Use Of Accessory Muscles For Inspiration] : no accessory muscle use [Auscultation Breath Sounds / Voice Sounds] : lungs were clear to auscultation bilaterally [Heart Sounds] : normal S1 and S2 [Chest Visual Inspection Thoracic Asymmetry] : no chest asymmetry [FreeTextEntry2] : B/L LE +1edema  [Bowel Sounds] : normal bowel sounds [FreeTextEntry1] : RLE SVG site glynn and well approximated. No erythema or drainage  noted  [Oriented To Time, Place, And Person] : oriented to person, place, and time

## 2023-04-06 NOTE — HISTORY OF PRESENT ILLNESS
[FreeTextEntry1] : 72 years old male with PMHX of HTN, Type I DM (50 years ago, on Insulin pump \par for last 15 years), Vertigo and BPH who was brought to ED due to Abdominal \par pain, Nausea and vomiting. Patient also found to have elevated troponin. \par Patient transferred over from [facility] s/p [facility] showing \par TVD CAD. \par \par 3/22 VSS; Patient seen at bedside, resting comfortably on room air. In NAD, no \par SOB, or CP at this time. \par 3/23 /64 -171/81. OR Thursday 3/30.  Radial mapping.  Pt transferred to \par Dr Sherwood.  Carotids neg.  EF 53% \par 3/24 Scheduled for cabg this Monday Endocrinology recommendations appreciated. \par 3/25 reserve left arm for radial harvest \par 3/27 s/p cabg x 3 lima / LIMA-LAD SVG-OM SVG-OM; EF 55% \par Right greater saphenous vein harvested \par Extubated d 0 \par Insulin infusion \par Preop insulin pump post op labile glucose \par PAF, amio load, lopressor. \par Episodes of hypotension PW attached , pacer off \par Diuresis \par 3/30 transferred to sdu \par 3/31 VSS: RSR 70's w/ episodes of Pafib/ EP consulted with Dr. Del Castillo as per \par cardiology; continue amio load; increase lop 50 mg po bid; monitor and \par supplement electrolytes; pw d/c as per Dr. Sehrwood; Eliquis 5 bid recommended as per \par EP; d/w Dr. Sherwood -- plan to hold off on Eliquis today since pw removal this am as \par per Dr. Sherwood; if continued pafib - likely start in am but Eliquis 2.5 bid \par insulin pump resumed today as per pt and HAYDEN darling; prn diuretics as per Dr. Sherwood- \par lasix 40 mg iv given this AM. Continue to monitor in SDU today. \par 4/1 VVS; Continue with current medication regimen. House Endo following on home \par insulin pump. \par 4/2 VSS glucose w/ better control. \par Discharge planning- home when stable / \par  Brief paf  last belén , on eliquis, ami0,Lopressor.  D/c planning for today as \par per Dr Sherwood with bid lasix \par The patient has aberrant ectopy while in afib, ekg , rhythm and qtc reviewed \par with eps, outpatient f/u \par D/w Dr Sherwood \par 4/6 Initial visit completed at home\par CC " I am doing ok"
Previously Declined (within the last year)

## 2023-04-14 ENCOUNTER — APPOINTMENT (OUTPATIENT)
Dept: CARDIOTHORACIC SURGERY | Facility: CLINIC | Age: 73
End: 2023-04-14
Payer: MEDICARE

## 2023-04-14 VITALS
RESPIRATION RATE: 16 BRPM | HEART RATE: 71 BPM | WEIGHT: 186 LBS | DIASTOLIC BLOOD PRESSURE: 66 MMHG | TEMPERATURE: 97.7 F | SYSTOLIC BLOOD PRESSURE: 110 MMHG | OXYGEN SATURATION: 98 % | BODY MASS INDEX: 23.87 KG/M2 | HEIGHT: 74 IN

## 2023-04-14 PROCEDURE — 99024 POSTOP FOLLOW-UP VISIT: CPT

## 2023-04-14 RX ORDER — FUROSEMIDE 40 MG/1
40 TABLET ORAL
Refills: 0 | Status: COMPLETED | COMMUNITY
End: 2023-04-14

## 2023-04-14 RX ORDER — SPIRONOLACTONE 25 MG/1
25 TABLET, FILM COATED ORAL DAILY
Refills: 0 | Status: COMPLETED | COMMUNITY
End: 2023-04-14

## 2023-04-14 NOTE — DISCUSSION/SUMMARY
[Doing Well] : is doing well [Excellent Pain Control] : has excellent pain control [No Sign of Infection] : is showing no signs of infection [0] : 0 [Remove Sutures/Staples] : removed sutures/staples

## 2023-04-14 NOTE — ASSESSMENT
[FreeTextEntry1] : 72 year old male with PMHX of HTN, Type I DM (50 years ago, on Insulin pump for last 15 years), Vertigo and BPH now sp CABGX 3  LIMA-LAD SVG-OM SVG-OM; EF 55%.  Slowly returning back to baseline function.  Complaining of feeling depressed.  \par \par \par  Instructed patient on importance of optimal glycemic control, daily showering, daily weights, any signs of fever (temperature greater than 101F, chills,  incentive spirometer use, and increase ambulation as tolerated. Instructed to call office with any signs or symptoms of infection or weight gain of 2 or more pounds in 1 day or 3 or more pounds in 1 week.  \par \par Discussed intake of plant based foods, including vegetables, fruits, and whole grain foods: legumes, nuts and seeds, fish or seafood, lean meats, and non-fat or low-fat diary foods. Plant based oils (non-tropical) in place of solid fats. Instructed patient to limit intake of high fat meats and processed meats, high-fat diary foods, dietary cholesterol and sodium, foods and beverages with added sugars. \par \par Plan:\par 1) Encouraged increasing activity\par 2) schedule meeting with nutritionist\par 3) continue current meds\par 4) Follow up with cardiologist ( saw on  on 4/10/23) and PCP \par 5) May return on as needed basis \par 6)  Follow up with  ( ) on 4/27 /23 \par \par

## 2023-04-14 NOTE — END OF VISIT
[FreeTextEntry3] : \par I personally scribed for SHAWNA HERNDON on Apr 14 2023 11:00AM . \par \par \par \par \par Physician Attestation:\par Documented by TOM MACK acting as a scribe for SHAWNA HERNDON 04/14/2023 . \par                 All medical record entries made by the Scribe were at my, SHAWNA HERNDON , direction and personally dictated by me on 04/14/2023 . I have reviewed the chart and agree that the record accurately reflects my personal performance of the history, physical exam, assessment and plan\par \par

## 2023-04-14 NOTE — PHYSICAL EXAM
[] : no respiratory distress [Respiration, Rhythm And Depth] : normal respiratory rhythm and effort [Auscultation Breath Sounds / Voice Sounds] : lungs were clear to auscultation bilaterally [Apical Impulse] : the apical impulse was normal [Heart Rate And Rhythm] : heart rate was normal and rhythm regular [Heart Sounds] : normal S1 and S2 [Murmurs] : no murmurs [Clean] : clean [Dry] : dry [Healing Well] : healing well [Pitting Edema] : pitting edema present [FreeTextEntry5] : lt svg  [FreeTextEntry3] : Trace pedal edema to LT SVG  SITE

## 2023-04-14 NOTE — CONSULT LETTER
[Dear  ___] : Dear  [unfilled], [Courtesy Letter:] : I had the pleasure of seeing your patient, [unfilled], in my office today. [Please see my note below.] : Please see my note below. [Sincerely,] : Sincerely, [FreeTextEntry2] : Dr. Alec Mitchell [FreeTextEntry3] : Isak Sherwood MD\par Attending Surgeon\par Cardiovascular & Thoracic Surgery\par  \par [facility]

## 2023-04-14 NOTE — REASON FOR VISIT
[Family Member] : family member [de-identified] : CABGX 3  LIMA-LAD SVG-OM SVG-OM; EF 55%  [de-identified] : 3/27/2023 [de-identified] : 72 year old male with PMHX of HTN, Type I DM (50 years ago, on Insulin pump for last 15 years), Vertigo and BPH now sp CABGX 3  LIMA-LAD SVG-OM SVG-OM; EF 55%. Post op PAF, amio load, lopressor. episodes of hypotension episodes of Pafib/ EP consulted with Dr. Del Castillo. Eliquis 5 bid recommended as per EP, restart home insulin pump, house phong following, started on Eliquis. Patient has aberrant ectopy while in afib, ekg , rhythm and qtc reviewed with eps, outpatient f/u with EP, DC home on Lasix BID and Eliquis 2.5mg BID.  Referred by Dr. Alec Mitchell, cath by Dr. Nacho Nix.\par \par Today he presents and reports that he has shortness of breath with walking. He had diarrhoea and dizziness on sunday which got better in one day. Denies any chest pain, palpitations, Dizziness. \par

## 2023-04-24 NOTE — ED ADULT TRIAGE NOTE - ACCOMPANIED BY
Writer spoke with Angel, confirmed location, arrival time/date of Left Cataract extraction, scheduled on 04-26-23. Per Dr. Thomas, per current CDC recommendations, pre procedure covid testing is no longer required for this procedure. Patient was informed that  parking is closed and instructed to park in the structure or the surface lots across from medical office buildings. Optional masking is the new protocol per CDC and hospital policy. Masks are available if wanted and needed for immunocompromised patients. Angel will be accompanied home and cared for by his son. Angel confirmed that he was given pre op instructions from Dr. Thomas's office. He will hold Metoprolol, following his morning shower he will not wear lotions, powders etc. Angel will not wear his Left contact lens. He will bring his eye kit. Angel stated he has a few more questions/doubts about the lens he will receive during his surgery. Angel placed a call to the doctor's office but has not heard from them. Writer will also place a call on his behalf. Angel verbalized understanding.      EMT/paramedic

## 2023-04-28 ENCOUNTER — APPOINTMENT (OUTPATIENT)
Dept: CARDIOTHORACIC SURGERY | Facility: CLINIC | Age: 73
End: 2023-04-28
Payer: MEDICARE

## 2023-04-28 PROCEDURE — 97802 MEDICAL NUTRITION INDIV IN: CPT | Mod: 95

## 2023-04-30 NOTE — ED ADULT NURSE NOTE - NSIMPLEMENTINTERV_GEN_ALL_ED
Implemented All Universal Safety Interventions:  [place] to call system. Call bell, personal items and telephone within reach. Instruct patient to call for assistance. Room bathroom lighting operational. Non-slip footwear when patient is off stretcher. Physically safe environment: no spills, clutter or unnecessary equipment. Stretcher in lowest position, wheels locked, appropriate side rails in place. no

## 2023-05-03 ENCOUNTER — TRANSCRIPTION ENCOUNTER (OUTPATIENT)
Age: 73
End: 2023-05-03

## 2023-05-03 PROBLEM — Z95.1 S/P CABG X 3: Status: ACTIVE | Noted: 2023-04-06

## 2023-05-03 RX ORDER — APIXABAN 5 MG/1
5 TABLET, FILM COATED ORAL
Qty: 30 | Refills: 2 | Status: COMPLETED | COMMUNITY
End: 2023-05-03

## 2023-05-04 ENCOUNTER — APPOINTMENT (OUTPATIENT)
Dept: CARDIOTHORACIC SURGERY | Facility: CLINIC | Age: 73
End: 2023-05-04
Payer: MEDICARE

## 2023-05-04 ENCOUNTER — OUTPATIENT (OUTPATIENT)
Dept: OUTPATIENT SERVICES | Facility: HOSPITAL | Age: 73
LOS: 1 days | End: 2023-05-04
Payer: MEDICARE

## 2023-05-04 ENCOUNTER — NON-APPOINTMENT (OUTPATIENT)
Age: 73
End: 2023-05-04

## 2023-05-04 VITALS
TEMPERATURE: 97.6 F | BODY MASS INDEX: 24.64 KG/M2 | HEART RATE: 94 BPM | RESPIRATION RATE: 16 BRPM | OXYGEN SATURATION: 98 % | DIASTOLIC BLOOD PRESSURE: 65 MMHG | WEIGHT: 192 LBS | HEIGHT: 74 IN | SYSTOLIC BLOOD PRESSURE: 112 MMHG

## 2023-05-04 VITALS
OXYGEN SATURATION: 99 % | HEART RATE: 87 BPM | RESPIRATION RATE: 16 BRPM | DIASTOLIC BLOOD PRESSURE: 66 MMHG | SYSTOLIC BLOOD PRESSURE: 113 MMHG

## 2023-05-04 DIAGNOSIS — Z95.1 PRESENCE OF AORTOCORONARY BYPASS GRAFT: ICD-10-CM

## 2023-05-04 DIAGNOSIS — Z96.7 PRESENCE OF OTHER BONE AND TENDON IMPLANTS: Chronic | ICD-10-CM

## 2023-05-04 PROCEDURE — 71046 X-RAY EXAM CHEST 2 VIEWS: CPT | Mod: 26

## 2023-05-04 PROCEDURE — 99024 POSTOP FOLLOW-UP VISIT: CPT

## 2023-05-04 PROCEDURE — 71046 X-RAY EXAM CHEST 2 VIEWS: CPT

## 2023-05-04 NOTE — CONSULT LETTER
[Dear  ___] : Dear  [unfilled], [Courtesy Letter:] : I had the pleasure of seeing your patient, [unfilled], in my office today. [Please see my note below.] : Please see my note below. [Consult Closing:] : Thank you very much for allowing me to participate in the care of this patient.  If you have any questions, please do not hesitate to contact me. [Sincerely,] : Sincerely, [FreeTextEntry2] :  [FreeTextEntry3] : Isak Sherwood MD\par Attending Surgeon\par Cardiovascular & Thoracic Surgery\par  \par [facility]\par \par

## 2023-05-04 NOTE — DISCUSSION/SUMMARY
[Slow Progress] : is progressing slowly [Fair Pain Control] : has fair pain control [No Sign of Infection] : is showing no signs of infection [4] : 4 [Coumadin] : the patient is not on Coumadin

## 2023-05-04 NOTE — END OF VISIT
[FreeTextEntry3] : \par I personally scribed for SHAWNA HERNDON on May  4 2023  9:30AM . \par \par \par \par \par Physician Attestation:\par Documented by TOM MACK acting as a scribe for SHAWNA HERNDON 05/04/2023 . \par                 All medical record entries made by the Scribe were at my, SHAWNA HERNDON , direction and personally dictated by me on 05/04/2023 . I have reviewed the chart and agree that the record accurately reflects my personal performance of the history, physical exam, assessment and plan\par \par

## 2023-05-04 NOTE — PHYSICAL EXAM
[] : no respiratory distress [Respiration, Rhythm And Depth] : normal respiratory rhythm and effort [Apical Impulse] : the apical impulse was normal [Heart Rate And Rhythm] : heart rate was normal and rhythm regular [Heart Sounds] : normal S1 and S2 [Murmurs] : no murmurs [Clean] : clean [Dry] : dry [Healing Well] : healing well [Pitting Edema] : pitting edema present [FreeTextEntry1] : diminished to LT side and bases  [FreeTextEntry3] : Trace pedal edema

## 2023-05-04 NOTE — ASSESSMENT
[FreeTextEntry1] : Today he presents and reports that he has dyspnea on exertion and lightheadedness since last Thursday. He also reports that he has chest pain with breathing hard, cough with light yellow phlegm,  and mild pedal edema. He went to see his PCP yesterday for cough and did an Xray showed LT pleural effusion. As per wife, he was started on Lisinopril on last Thursday and since then his cough is bad. His cardiologist stopped the Lisinopril . Denies any syncope, PND, Palpitations.\par \par \par He had Chest X-Ray which revealed LT pleural effusion.\par \par He had LT thoracentesis by KELLI Slade and  700 cc serosanguineous drainage by KELLI Slade and KELLI Chester. Patient tolerated the procedure well. \par \par Post LT thoracentesis  Chest X-Ray : Preliminary reading : No pneumothorax with mild Lt pleural effusion\par \par Today on exam patient's lungs diminished to bases , normal sinus rhythm, sternum stable, incision clean, dry and intact.  Trace  peripheral edema noted.\par \par  Instructed patient on importance of optimal glycemic control, daily showering, daily weights, any signs of fever (temperature greater than 101F, chills,  incentive spirometer use, and increase ambulation as tolerated. Instructed to call office with any signs or symptoms of infection or weight gain of 2 or more pounds in 1 day or 3 or more pounds in 1 week.  \par \par Discussed intake of plant based foods, including vegetables, fruits, and whole grain foods: legumes, nuts and seeds, fish or seafood, lean meats, and non-fat or low-fat diary foods. Plant based oils (non-tropical) in place of solid fats. Instructed patient to limit intake of high fat meats and processed meats, high-fat diary foods, dietary cholesterol and sodium, foods and beverages with added sugars. \par \par Plan:\par 1) Continue current medication regimen\par 2) Follow up with cardiologist ( Dr. Wahl) and PCP \par 3) May return on as needed basis \par 4) Advised to keep the LT thoracentesis site dressing for 24 hours \par \par

## 2023-05-04 NOTE — REASON FOR VISIT
[Family Member] : family member [de-identified] : CABGX 3 LIMA-LAD SVG-OM SVG-OM; EF 55% [de-identified] : 3/27/2023 [de-identified] : 72 year old male with PMHX of HTN, Type I DM (50 years ago, on Insulin pump for last 15 years), Vertigo and BPH now sp CABGX 3 LIMA-LAD SVG-OM SVG-OM; EF 55%. Post op PAF, amio load, lopressor. episodes of hypotension episodes of Pafib/ EP consulted with Dr. Del Castillo. Eliquis 5 bid recommended as per EP, restart home insulin pump, house endo following, started on Eliquis. Patient has aberrant ectopy while in afib, ekg , rhythm and qtc reviewed with eps, outpatient f/u with EP, DC home on Lasix BID and Eliquis 2.5mg BID. Referred by Dr. Alec Mitchell, cath by Dr. Nacho Nix.\par \par He was seen 4/14/for post op visit and was progressing well. He saw Dr. Wahl and Dr. Brantley recently.  His Eliquis was stopped Thursday and was started on Lisinopril Thursday.  Started with dry cough on Friday that has persisted.  Saw PCP yesterday who told him he had "fluid in the lung".  Presents today for CXR, eval of effusion/possible side effect of ACE. \par

## 2023-05-25 ENCOUNTER — NON-APPOINTMENT (OUTPATIENT)
Age: 73
End: 2023-05-25

## 2023-06-02 ENCOUNTER — APPOINTMENT (OUTPATIENT)
Dept: PULMONOLOGY | Facility: CLINIC | Age: 73
End: 2023-06-02
Payer: MEDICARE

## 2023-06-02 VITALS
HEIGHT: 72 IN | WEIGHT: 195 LBS | TEMPERATURE: 98 F | DIASTOLIC BLOOD PRESSURE: 60 MMHG | OXYGEN SATURATION: 97 % | SYSTOLIC BLOOD PRESSURE: 130 MMHG | RESPIRATION RATE: 16 BRPM | HEART RATE: 74 BPM | BODY MASS INDEX: 26.41 KG/M2

## 2023-06-02 VITALS — BODY MASS INDEX: 26.13 KG/M2 | HEIGHT: 72.5 IN | WEIGHT: 195 LBS

## 2023-06-02 DIAGNOSIS — Z86.39 PERSONAL HISTORY OF OTHER ENDOCRINE, NUTRITIONAL AND METABOLIC DISEASE: ICD-10-CM

## 2023-06-02 DIAGNOSIS — Z78.9 OTHER SPECIFIED HEALTH STATUS: ICD-10-CM

## 2023-06-02 DIAGNOSIS — Z86.79 PERSONAL HISTORY OF OTHER DISEASES OF THE CIRCULATORY SYSTEM: ICD-10-CM

## 2023-06-02 DIAGNOSIS — Z87.891 PERSONAL HISTORY OF NICOTINE DEPENDENCE: ICD-10-CM

## 2023-06-02 DIAGNOSIS — Z81.8 FAMILY HISTORY OF OTHER MENTAL AND BEHAVIORAL DISORDERS: ICD-10-CM

## 2023-06-02 DIAGNOSIS — I48.0 PAROXYSMAL ATRIAL FIBRILLATION: ICD-10-CM

## 2023-06-02 DIAGNOSIS — R93.89 ABNORMAL FINDINGS ON DIAGNOSTIC IMAGING OF OTHER SPECIFIED BODY STRUCTURES: ICD-10-CM

## 2023-06-02 DIAGNOSIS — Z87.438 PERSONAL HISTORY OF OTHER DISEASES OF MALE GENITAL ORGANS: ICD-10-CM

## 2023-06-02 PROCEDURE — 94729 DIFFUSING CAPACITY: CPT

## 2023-06-02 PROCEDURE — 95012 NITRIC OXIDE EXP GAS DETER: CPT

## 2023-06-02 PROCEDURE — 94010 BREATHING CAPACITY TEST: CPT

## 2023-06-02 PROCEDURE — 94727 GAS DIL/WSHOT DETER LNG VOL: CPT

## 2023-06-02 PROCEDURE — ZZZZZ: CPT

## 2023-06-02 PROCEDURE — 99204 OFFICE O/P NEW MOD 45 MIN: CPT | Mod: 25

## 2023-06-07 ENCOUNTER — OUTPATIENT (OUTPATIENT)
Dept: OUTPATIENT SERVICES | Facility: HOSPITAL | Age: 73
LOS: 1 days | End: 2023-06-07
Payer: MEDICARE

## 2023-06-07 ENCOUNTER — APPOINTMENT (OUTPATIENT)
Dept: CT IMAGING | Facility: IMAGING CENTER | Age: 73
End: 2023-06-07
Payer: MEDICARE

## 2023-06-07 DIAGNOSIS — J90 PLEURAL EFFUSION, NOT ELSEWHERE CLASSIFIED: ICD-10-CM

## 2023-06-07 DIAGNOSIS — Z96.7 PRESENCE OF OTHER BONE AND TENDON IMPLANTS: Chronic | ICD-10-CM

## 2023-06-07 PROCEDURE — 71250 CT THORAX DX C-: CPT

## 2023-06-07 PROCEDURE — 71250 CT THORAX DX C-: CPT | Mod: 26,MH

## 2023-06-13 ENCOUNTER — APPOINTMENT (OUTPATIENT)
Dept: PULMONOLOGY | Facility: CLINIC | Age: 73
End: 2023-06-13

## 2023-06-15 PROBLEM — I48.0 PAROXYSMAL ATRIAL FIBRILLATION WITH RVR: Status: RESOLVED | Noted: 2023-06-15 | Resolved: 2023-06-15

## 2023-06-15 PROBLEM — Z86.79 HISTORY OF HYPERTENSION: Status: RESOLVED | Noted: 2023-06-15 | Resolved: 2023-06-15

## 2023-06-15 PROBLEM — Z87.891 FORMER SMOKER, STOPPED SMOKING IN DISTANT PAST: Status: ACTIVE | Noted: 2023-06-15

## 2023-06-15 PROBLEM — Z86.39 HISTORY OF HYPERCHOLESTEROLEMIA: Status: RESOLVED | Noted: 2023-06-15 | Resolved: 2023-06-15

## 2023-06-15 PROBLEM — Z81.8 FAMILY HISTORY OF DEMENTIA: Status: ACTIVE | Noted: 2023-06-15

## 2023-06-15 PROBLEM — R93.89 ABNORMAL CHEST X-RAY: Status: ACTIVE | Noted: 2023-06-02

## 2023-06-15 PROBLEM — Z87.438 HISTORY OF BPH: Status: RESOLVED | Noted: 2023-06-15 | Resolved: 2023-06-15

## 2023-06-15 PROBLEM — Z78.9 SOCIAL ALCOHOL USE: Status: ACTIVE | Noted: 2023-06-15

## 2023-06-15 PROBLEM — Z86.39 HISTORY OF TYPE 1 DIABETES MELLITUS: Status: RESOLVED | Noted: 2023-06-15 | Resolved: 2023-06-15

## 2023-06-15 NOTE — PROCEDURE
[FreeTextEntry1] : PFTs were completed to assess reason for COURTNEY and cough\par \par SPI: Mild obstruction lung defect with FEF 25-75 65% of predicted. \par Moderate restrictive dysfunction\par Mild decrease in diffusion capacity- corrects for volume\par \par Nitric oxide abnormal at 26\par \par CXR from 5/4/2023:\par IMPRESSION:  No pneumothorax seen following thoracentesis.\par \par Decreased left pleural effusion, now trace on the most recent image.\par \par 9 mm right upper lobe nodular opacity, of indeterminate nature. An unenhanced CT scan of the chest can be performed for further evaluation, if felt to be clinically indicated.\par \par ESS 10\par

## 2023-06-15 NOTE — REVIEW OF SYSTEMS
[Fever] : no fever [Fatigue] : fatigue [Recent Wt Gain (___ Lbs)] : ~T no recent weight gain [EDS] : eds [Chills] : no chills [Poor Appetite] : no poor appetite [Recent Wt Loss (___ Lbs)] : ~T no recent weight loss [Cough] : cough [Hemoptysis] : no hemoptysis [Chest Tightness] : no chest tightness [Frequent URIs] : no frequent URIs [Sputum] : no sputum [Dyspnea] : no dyspnea [Pleuritic Pain] : no pleuritic pain [Wheezing] : wheezing [A.M. Dry Mouth] : no a.m. dry mouth [SOB on Exertion] : sob on exertion [GERD] : gerd [Abdominal Pain] : no abdominal pain [Nausea] : no nausea [Vomiting] : no vomiting [Diabetes] : diabetes [Thyroid Problem] : no thyroid problem [Obesity] : no obesity [Negative] : Psychiatric [TextBox_14] : vasomotor rhinits

## 2023-06-15 NOTE — HISTORY OF PRESENT ILLNESS
[Never] : never [TextBox_4] : This is a 72 year old male with PMHx of HTN, type 1 diabetes–on insulin pump, vertigo, BPH, COVID in August 2022 with pneumonia, who is here to establish care with pulmonary medicine due to an abnormal CXR and COURTNEY. He is here with his wife. \par \par Patient was recently in the hospital (March 22- April 22) due to dealing with nausea, vomiting and abdominal pain.  The patient had an elevated Troponin and was found to be DKA and non STEMI.  He then went for cardiac cath which revealed the need for CABG- he had  CABG x 3 complicated by paroxysmal A-fib, episodes of hypotension.\par Pt was dealing with cough fits after surgery. It was to be related to ACE inhibitor related drug.  Once he got off of it the cough improved significantly within the few weeks of getting off the meds.  At present he reports the cough is about 85% better however still taking promethazine and benzonatate. Pt admits the cough worse at night. \par \par He feels he is recovering ok from surgery but is definitely not back to baseline.  He is doing less overall.  He was hoping he would be feeling better than he is at this point.\par \par His wife reports he is winded with exertion- long walks, getting out of the shower, gets breathy when getting dressed. With other ADLs he is ok. They report intermittent wheezing at night.  He admits to persistent throat clearing.  He admits to sour taste in the mouth, indigestion/reflux symptoms despite being on Protonix.\par \par Patient admits to unrefreshing sleep, daytime sleepiness, multiple nighttime awakenings to use the bathroom.  Mild snoring.  Denies known apneas, sleepy driving, a.m. headaches.\par \par Pt denies sinus issues but states that his nose runs randomly throughout the day.\par \par No hx of asthma as a child. \par No hx of frequent upper respiratory infections\par \par Pt reports smoking 1/2 PPD x 2-3 years, Quit in 1970\par Social ETOH\par No illicit drug use\par No pets in the home\par No known exposures to asbestos, chemicals, irritants, molds or fungus he is aware of. \par \par Fam hx: mother with history of hypertension, father with history of dementia, children healthy\par

## 2023-06-15 NOTE — ASSESSMENT
[FreeTextEntry1] : This is a 72 year old male with PMHx of HTN, type 1 diabetes–on insulin pump, vertigo, BPH, COVID in August 2022 with pneumonia, who is here to establish care with pulmonary medicine due to an abnormal CXR and COURTNEY.\par Pt's COURTNEY is likely multifactorial due to recent surgery, recovering from surgery, deconditioning, recent pleural effusion, ? possible diaphragm dysfunction, as well as some mild reactive airways disease.  We can place him on inhaler therapy to see if it will help him at all. He would like to be conservative with meds as much as possible. \par In regard to the 9 mm right upper lobe nodular opacity–he will be going for a CT scan next.\par The plan for the patient is as follows:\par \par #1.Mild RADs, COURTNEY\par -pt is still recovering from surgical procedure and complications- discussed it will take time\par -add sample of Trelegy 200 1 inhalation daily rinse and gargle (pt to receive from front)- pt to let me know how he is doing on the inhaler in 2 weeks. Then can send drug into pharmacy if benefit. \par -Add trial of Xopenex HFA 2 puffs as needed as needed shortness of breath or prior to exercise.\par -consider addition of singulair \par \par #2.Abnormal CXR\par -Dedicated CT scan of the chest next to follow-up on pulmonary nodule\par \par #3.  Vasomotor rhinitis\par -add Atrovent nasal spray 1-2 sprays each nostril BID PRN\par \par #4.GERD- with active symptoms\par -continue protonix 40 mg p.o. 30 minutes before breakfast\par -Add famotidine 40 mg p.o. nightly\par \par #5.Elevated left hemidiaphragm\par -Fluoroscopy of diaphragm next to check function\par \par #6.EDS, HTN, PAF, unrefreshed from sleep, poor sleep quality, mild snoring, DM, cardiac disease\par -Patient is a perfect candidate for a home sleep study for evaluation of sleep apnea.  Consequences of untreated sleep apnea were discussed in detail with the patient.\par -Would like to complete the sleep study via [facility]. \par -they will be contacted by [facility] next. \par \par #7.deconditioning\par -consider PT\par -activity as tolerated\par \par Pt to follow up in 6 weeks with full PFTs\par pt to call with any questions or concerns\par we will call as results are back \par

## 2023-06-15 NOTE — PHYSICAL EXAM
[No Acute Distress] : no acute distress [Well Nourished] : well nourished [Well Groomed] : well groomed [No Deformities] : no deformities [Well Developed] : well developed [Normal Oropharynx] : normal oropharynx [II] : Mallampati Class: II [Normal Appearance] : normal appearance [Supple] : supple [No Neck Mass] : no neck mass [No JVD] : no jvd [Normal Rate/Rhythm] : normal rate/rhythm [Normal S1, S2] : normal s1, s2 [No Resp Distress] : no resp distress [No Acc Muscle Use] : no acc muscle use [Normal Palpation] : normal palpation [Normal Rhythm and Effort] : normal rhythm and effort [Clear to Auscultation Bilaterally] : clear to auscultation bilaterally [No Abnormalities] : no abnormalities [Benign] : benign [Normal Gait] : normal gait [No Clubbing] : no clubbing [No Cyanosis] : no cyanosis [No Edema] : no edema [FROM] : FROM [Normal Color/ Pigmentation] : normal color/ pigmentation [No Focal Deficits] : no focal deficits [Oriented x3] : oriented x3 [Normal Mood] : normal mood [Normal Insight/judgment] : normal insight/judgment [Normal Affect] : normal affect

## 2023-06-16 ENCOUNTER — OUTPATIENT (OUTPATIENT)
Dept: OUTPATIENT SERVICES | Facility: HOSPITAL | Age: 73
LOS: 1 days | End: 2023-06-16
Payer: MEDICARE

## 2023-06-16 DIAGNOSIS — Z96.7 PRESENCE OF OTHER BONE AND TENDON IMPLANTS: Chronic | ICD-10-CM

## 2023-06-16 DIAGNOSIS — R93.89 ABNORMAL FINDINGS ON DIAGNOSTIC IMAGING OF OTHER SPECIFIED BODY STRUCTURES: ICD-10-CM

## 2023-06-16 DIAGNOSIS — J90 PLEURAL EFFUSION, NOT ELSEWHERE CLASSIFIED: ICD-10-CM

## 2023-06-16 PROCEDURE — 76000 FLUOROSCOPY <1 HR PHYS/QHP: CPT

## 2023-06-16 PROCEDURE — 76000 FLUOROSCOPY <1 HR PHYS/QHP: CPT | Mod: 26

## 2023-07-11 ENCOUNTER — APPOINTMENT (OUTPATIENT)
Dept: PULMONOLOGY | Facility: CLINIC | Age: 73
End: 2023-07-11
Payer: MEDICARE

## 2023-07-11 VITALS
DIASTOLIC BLOOD PRESSURE: 64 MMHG | SYSTOLIC BLOOD PRESSURE: 102 MMHG | RESPIRATION RATE: 16 BRPM | HEART RATE: 68 BPM | BODY MASS INDEX: 24.51 KG/M2 | OXYGEN SATURATION: 98 % | TEMPERATURE: 97.7 F | HEIGHT: 74 IN | WEIGHT: 191 LBS

## 2023-07-11 DIAGNOSIS — J90 PLEURAL EFFUSION, NOT ELSEWHERE CLASSIFIED: ICD-10-CM

## 2023-07-11 DIAGNOSIS — R53.81 OTHER MALAISE: ICD-10-CM

## 2023-07-11 DIAGNOSIS — R06.02 SHORTNESS OF BREATH: ICD-10-CM

## 2023-07-11 DIAGNOSIS — R09.89 OTHER SPECIFIED SYMPTOMS AND SIGNS INVOLVING THE CIRCULATORY AND RESPIRATORY SYSTEMS: ICD-10-CM

## 2023-07-11 DIAGNOSIS — R91.1 SOLITARY PULMONARY NODULE: ICD-10-CM

## 2023-07-11 PROCEDURE — 94729 DIFFUSING CAPACITY: CPT

## 2023-07-11 PROCEDURE — ZZZZZ: CPT

## 2023-07-11 PROCEDURE — 99214 OFFICE O/P EST MOD 30 MIN: CPT | Mod: 25

## 2023-07-11 PROCEDURE — 95012 NITRIC OXIDE EXP GAS DETER: CPT

## 2023-07-11 PROCEDURE — 94010 BREATHING CAPACITY TEST: CPT

## 2023-07-11 PROCEDURE — 94727 GAS DIL/WSHOT DETER LNG VOL: CPT

## 2023-07-11 RX ORDER — LEVALBUTEROL TARTRATE 45 UG/1
45 AEROSOL, METERED ORAL
Qty: 1 | Refills: 3 | Status: ACTIVE | COMMUNITY
Start: 2023-07-11 | End: 1900-01-01

## 2023-07-11 RX ORDER — FAMOTIDINE 40 MG/1
40 TABLET, FILM COATED ORAL
Qty: 30 | Refills: 3 | Status: ACTIVE | COMMUNITY
Start: 2023-07-11 | End: 1900-01-01

## 2023-07-11 RX ORDER — FLUTICASONE FUROATE, UMECLIDINIUM BROMIDE AND VILANTEROL TRIFENATATE 200; 62.5; 25 UG/1; UG/1; UG/1
200-62.5-25 POWDER RESPIRATORY (INHALATION)
Qty: 60 | Refills: 3 | Status: ACTIVE | COMMUNITY
Start: 2023-07-11 | End: 1900-01-01

## 2023-07-11 RX ORDER — IPRATROPIUM BROMIDE 42 UG/1
0.06 SPRAY NASAL 3 TIMES DAILY
Qty: 1 | Refills: 3 | Status: ACTIVE | COMMUNITY
Start: 2023-07-11 | End: 1900-01-01

## 2023-07-11 NOTE — ASSESSMENT
[FreeTextEntry1] : This is a 72 year old male with PMHx of HTN, type 1 diabetes–on insulin pump, vertigo, BPH, COVID in August 2022 with pneumonia, who is here to establish care with pulmonary medicine due to an abnormal CXR and COURTNEY.\par Pt's COURTNEY is likely multifactorial due to recent surgery, recovering from surgery, deconditioning, recent pleural effusion, as well as some mild reactive airways disease.  We can place him on inhaler therapy to see if it will help him at all. He would like to be conservative with meds as much as possible. \par In regard to the 9 mm right upper lobe nodular opacity–that has resolved and now has few less than .4cm nodules in both upper lobes- we will watch these.\par The plan for the patient is as follows:\par \par #1.Mild RADs, COURTNEY, Cough\par -pt is still recovering from surgical procedure and complications- discussed it will take time\par -add sample of Trelegy 200 1 inhalation daily rinse and gargle- pt to let me know how he is doing on the inhaler in 2 weeks. Sent med to pharmacy to see if covered. \par -Add trial of Xopenex HFA 2 puffs as needed as needed shortness of breath or prior to exercise\par -cough improved overall, still using Benzonatate in evenings, advised patient to wean off \par \par #2.Abnormal CT\par -few less than .4cm nodules in both upper lobes- f/u CT in 6 months for re-evaluation\par \par #3.  Vasomotor rhinitis- controlled on tx\par - Atrovent nasal spray 1-2 sprays each nostril BID PRN\par \par #4.GERD- with active symptoms and night time cough\par -continue protonix 40 mg p.o. 30 minutes before breakfast\par -Add famotidine 40 mg p.o. nightly\par \par #5.Elevated left hemidiaphragm\par -Fluoroscopy of diaphragm was normal\par \par #6.EDS, HTN, PAF, unrefreshed from sleep, poor sleep quality, mild snoring, DM, cardiac disease\par -Patient is a perfect candidate for a home sleep study for evaluation of sleep apnea.  Consequences of untreated sleep apnea were discussed in detail with the patient.\par -we are awaiting results at this point\par \par #7.deconditioning\par -consider PT\par -activity as tolerated/exercise as tolerated\par \par Pt to follow up in 3 months \par pt to call with any questions or concerns\par we will call as results are back \par

## 2023-07-11 NOTE — PROCEDURE
[FreeTextEntry1] : PFTs were completed to assess reason for COURTNEY and cough\par \par SPI: Mild obstruction lung defect with FEF 25-75 68% of predicted but with improvement in FEV1 and FVC\par Normal lung volumes\par Normal diffusion capacity\par \par Niox now normal @ 10\par \par \par \par  CT Chest No Cont             Final\par \par No Documents Attached\par \par \par \par \par   EXAM: [id number] - CT CHEST  - ORDERED BY: SCHUYLER SANCHEZ\par \par \par PROCEDURE DATE:  06/07/2023\par \par \par \par INTERPRETATION:  Clinical information: Pulmonary nodule. No prior CT scans are available for comparison.\par \par CT scan of the chest was obtained without administration of intravenous contrast.\par \par No hilar and or mediastinal adenopathy is noted.\par \par Heart is enlarged in size. Patient is status post CABG. No pericardial effusion is noted.\par \par No endobronchial lesions are noted. Few less than 0.4 cm nodules are noted in both upper lobes. Few calcified nodules are noted in both lungs. Trace left pleural effusion is noted.\par \par Below the diaphragm, visualized portions of the abdomen are unremarkable.\par \par Degenerative changes of the spine are noted.\par \par IMPRESSION: Few less than 0.4 cm nodules are noted in both upper lobes.\par \par --- End of Report ---\par \par \par \par \par \par \par UMANG GARVEY MD; Attending Radiologist\par This document has been electronically signed. Jun 17 2023 12:43PM\par \par  \par \par  Ordered by: SCHUYLER SANCHEZ       Collected/Examined: 07Jun2023 03:06PM       \par Verified by: SCHUYLER SANCHEZ 19Jun2023 10:29AM       \par  Result Communication: No patient communication needed at this time;\par Stage: Final       \par  Performed at: [facility]       Resulted: 17Jun2023 12:38PM       Last Updated: 19Jun2023 10:29AM       Accession: [id number]       \par \par \par \par  Xray Fluoro of the Diaphragm             Final\par \par No Documents Attached\par \par \par \par \par   ACC: [id number]     EXAM:  XR FLUORO DIAPHRAGM TO 1 HR   ORDERED BY: SCHUYLER SANCHEZ\par \par PROCEDURE DATE:  06/16/2023\par \par \par \par INTERPRETATION:  CLINICAL INFORMATION: Shortness of breath, elevated left hemidiaphragm.\par \par TECHNIQUE: Fluoroscopy was performed with the upright position. Patient was observed at quiet respiration, deep inspiration and during sniffing.\par \par COMPARISON: Chest x-ray 5/4/2023\par \par FLUORO TIME: 0.9 minutes\par \par FINDINGS:\par  imaging demonstrates clear lungs.\par There is normal and symmetrical movement of the diaphragms bilaterally without paradoxical movement.\par \par IMPRESSION:\par No evidence of diaphragmatic paralysis.\par \par --- End of Report ---\par \par \par \par \par KWAME HELMS MD; Resident Radiologist\par This document has been electronically signed.\par MARY JANE DON MD; Attending Radiologist\par This document has been electronically signed. Jun 17 2023 12:07AM\par \par  \par \par  Ordered by: SCHUYLER SANCHEZ       Collected/Examined: 16Jun2023 02:18PM       \par Verified by: SCHUYLER SANCHEZ 19Jun2023 10:29AM       \par  Result Communication: No patient communication needed at this time;\par Stage: Final       \par  Performed at: [facility]       Resulted: [id number] 03:30PM       Last Updated: 19Jun2023 10:29AM       Accession: [id number]       \par \par \par

## 2023-07-11 NOTE — HISTORY OF PRESENT ILLNESS
[Never] : never [TextBox_4] : INITIAL VISIT 6/2/2023:\par This is a 72 year old male with PMHx of HTN, type 1 diabetes–on insulin pump, vertigo, BPH, COVID in August 2022 with pneumonia, who is here to establish care with pulmonary medicine due to an abnormal CXR and COURTNEY. He is here with his wife. \par \par Patient was recently in the hospital (March 22- April 22) due to dealing with nausea, vomiting and abdominal pain.  The patient had an elevated Troponin and was found to be DKA and non STEMI.  He then went for cardiac cath which revealed the need for CABG- he had  CABG x 3 complicated by paroxysmal A-fib, episodes of hypotension.\par Pt was dealing with cough fits after surgery. It was to be related to ACE inhibitor related drug.  Once he got off of it the cough improved significantly within the few weeks of getting off the meds.  At present he reports the cough is about 85% better however still taking promethazine and benzonatate. Pt admits the cough worse at night. \par \par He feels he is recovering ok from surgery but is definitely not back to baseline.  He is doing less overall.  He was hoping he would be feeling better than he is at this point.\par \par His wife reports he is winded with exertion- long walks, getting out of the shower, gets breathy when getting dressed. With other ADLs he is ok. They report intermittent wheezing at night.  He admits to persistent throat clearing.  He admits to sour taste in the mouth, indigestion/reflux symptoms despite being on Protonix.\par \par Patient admits to unrefreshing sleep, daytime sleepiness, multiple nighttime awakenings to use the bathroom.  Mild snoring.  Denies known apneas, sleepy driving, a.m. headaches.\par \par Pt denies sinus issues but states that his nose runs randomly throughout the day.\par \par No hx of asthma as a child. \par No hx of frequent upper respiratory infections\par \par Pt reports smoking 1/2 PPD x 2-3 years, Quit in 1970\par Social ETOH\par No illicit drug use\par No pets in the home\par No known exposures to asbestos, chemicals, irritants, molds or fungus he is aware of. \par \par Fam hx: mother with history of hypertension, father with history of dementia, children healthy\par \par VISIT TODAY 7/11/2023:\par This is a 72 year old male with PMHx of HTN, type 1 diabetes–on insulin pump, vertigo, BPH, COVID in August 2022 with pneumonia, who is here to establish care with pulmonary medicine due to an abnormal CXR and COURTNEY. He is accompanied by his wife.\par \par Patient reports overall he is feeling better than he was.\par He reports much less cough.  Cough frequency and severity is much better.  There are still periods of cough since but with definite improvement overall.  Cough tends to be worse at night.  He is down to using benzonatate only in the evening.\par \par The patient never received the Trelegy inhaler, the lev albuterol inhaler or the famotidine.\par \par Shortness of breath has improved as well but is still present.  He is able to do more things around the house- i.e laundry, wash the car, vacuum without much issue. \par \par Rhinitis has improved with the nasal spray. \par \par Patient completed a home sleep test and now needs to send it back.\par \par He completed the fluoroscopy of the diaphragm, CT scan and repeated PFTs today.\par \par No new complaints otherwise. \par \par

## 2023-07-11 NOTE — REVIEW OF SYSTEMS
[Fatigue] : fatigue [EDS] : eds [Cough] : cough [SOB on Exertion] : sob on exertion [GERD] : gerd [Diabetes] : diabetes [Fever] : no fever [Recent Wt Gain (___ Lbs)] : ~T no recent weight gain [Chills] : no chills [Poor Appetite] : no poor appetite [Recent Wt Loss (___ Lbs)] : ~T no recent weight loss [Hemoptysis] : no hemoptysis [Chest Tightness] : no chest tightness [Frequent URIs] : no frequent URIs [Sputum] : no sputum [Dyspnea] : no dyspnea [Pleuritic Pain] : no pleuritic pain [Wheezing] : no wheezing [A.M. Dry Mouth] : no a.m. dry mouth [Abdominal Pain] : no abdominal pain [Nausea] : no nausea [Vomiting] : no vomiting [Thyroid Problem] : no thyroid problem [Obesity] : no obesity [Negative] : HEENT

## 2023-07-27 ENCOUNTER — APPOINTMENT (OUTPATIENT)
Dept: PULMONOLOGY | Facility: CLINIC | Age: 73
End: 2023-07-27
Payer: MEDICARE

## 2023-07-27 PROCEDURE — 99442: CPT | Mod: 95

## 2023-08-16 ENCOUNTER — APPOINTMENT (OUTPATIENT)
Dept: PULMONOLOGY | Facility: CLINIC | Age: 73
End: 2023-08-16

## 2023-09-06 ENCOUNTER — TRANSCRIPTION ENCOUNTER (OUTPATIENT)
Age: 73
End: 2023-09-06

## 2023-09-06 ENCOUNTER — OUTPATIENT (OUTPATIENT)
Dept: INPATIENT UNIT | Facility: HOSPITAL | Age: 73
LOS: 1 days | End: 2023-09-06
Payer: MEDICARE

## 2023-09-06 VITALS
HEART RATE: 64 BPM | HEIGHT: 74 IN | RESPIRATION RATE: 16 BRPM | WEIGHT: 195.11 LBS | DIASTOLIC BLOOD PRESSURE: 73 MMHG | TEMPERATURE: 98 F | SYSTOLIC BLOOD PRESSURE: 146 MMHG | OXYGEN SATURATION: 100 %

## 2023-09-06 VITALS
HEART RATE: 67 BPM | SYSTOLIC BLOOD PRESSURE: 118 MMHG | DIASTOLIC BLOOD PRESSURE: 69 MMHG | OXYGEN SATURATION: 98 % | TEMPERATURE: 98 F | RESPIRATION RATE: 16 BRPM

## 2023-09-06 DIAGNOSIS — E78.5 HYPERLIPIDEMIA, UNSPECIFIED: ICD-10-CM

## 2023-09-06 DIAGNOSIS — E10.9 TYPE 1 DIABETES MELLITUS WITHOUT COMPLICATIONS: ICD-10-CM

## 2023-09-06 DIAGNOSIS — I50.22 CHRONIC SYSTOLIC (CONGESTIVE) HEART FAILURE: ICD-10-CM

## 2023-09-06 DIAGNOSIS — Z96.41 PRESENCE OF INSULIN PUMP (EXTERNAL) (INTERNAL): ICD-10-CM

## 2023-09-06 DIAGNOSIS — Z95.1 PRESENCE OF AORTOCORONARY BYPASS GRAFT: Chronic | ICD-10-CM

## 2023-09-06 DIAGNOSIS — I10 ESSENTIAL (PRIMARY) HYPERTENSION: ICD-10-CM

## 2023-09-06 DIAGNOSIS — Z96.7 PRESENCE OF OTHER BONE AND TENDON IMPLANTS: Chronic | ICD-10-CM

## 2023-09-06 LAB
ANION GAP SERPL CALC-SCNC: 10 MMOL/L — SIGNIFICANT CHANGE UP (ref 5–17)
BLD GP AB SCN SERPL QL: NEGATIVE — SIGNIFICANT CHANGE UP
BUN SERPL-MCNC: 26 MG/DL — HIGH (ref 7–23)
CALCIUM SERPL-MCNC: 9.3 MG/DL — SIGNIFICANT CHANGE UP (ref 8.4–10.5)
CHLORIDE SERPL-SCNC: 105 MMOL/L — SIGNIFICANT CHANGE UP (ref 96–108)
CO2 SERPL-SCNC: 25 MMOL/L — SIGNIFICANT CHANGE UP (ref 22–31)
CREAT SERPL-MCNC: 0.8 MG/DL — SIGNIFICANT CHANGE UP (ref 0.5–1.3)
EGFR: 93 ML/MIN/1.73M2 — SIGNIFICANT CHANGE UP
GLUCOSE SERPL-MCNC: 99 MG/DL — SIGNIFICANT CHANGE UP (ref 70–99)
HCT VFR BLD CALC: 35.3 % — LOW (ref 39–50)
HGB BLD-MCNC: 11 G/DL — LOW (ref 13–17)
MAGNESIUM SERPL-MCNC: 2.1 MG/DL — SIGNIFICANT CHANGE UP (ref 1.6–2.6)
MCHC RBC-ENTMCNC: 26.5 PG — LOW (ref 27–34)
MCHC RBC-ENTMCNC: 31.2 GM/DL — LOW (ref 32–36)
MCV RBC AUTO: 85.1 FL — SIGNIFICANT CHANGE UP (ref 80–100)
NRBC # BLD: 0 /100 WBCS — SIGNIFICANT CHANGE UP (ref 0–0)
PLATELET # BLD AUTO: 252 K/UL — SIGNIFICANT CHANGE UP (ref 150–400)
POTASSIUM SERPL-MCNC: 4 MMOL/L — SIGNIFICANT CHANGE UP (ref 3.5–5.3)
POTASSIUM SERPL-SCNC: 4 MMOL/L — SIGNIFICANT CHANGE UP (ref 3.5–5.3)
RBC # BLD: 4.15 M/UL — LOW (ref 4.2–5.8)
RBC # FLD: 19.2 % — HIGH (ref 10.3–14.5)
RH IG SCN BLD-IMP: POSITIVE — SIGNIFICANT CHANGE UP
SODIUM SERPL-SCNC: 140 MMOL/L — SIGNIFICANT CHANGE UP (ref 135–145)
WBC # BLD: 5.74 K/UL — SIGNIFICANT CHANGE UP (ref 3.8–10.5)
WBC # FLD AUTO: 5.74 K/UL — SIGNIFICANT CHANGE UP (ref 3.8–10.5)

## 2023-09-06 PROCEDURE — C1769: CPT

## 2023-09-06 PROCEDURE — 93005 ELECTROCARDIOGRAM TRACING: CPT

## 2023-09-06 PROCEDURE — 71045 X-RAY EXAM CHEST 1 VIEW: CPT | Mod: 26

## 2023-09-06 PROCEDURE — C1777: CPT

## 2023-09-06 PROCEDURE — 83735 ASSAY OF MAGNESIUM: CPT

## 2023-09-06 PROCEDURE — 93010 ELECTROCARDIOGRAM REPORT: CPT | Mod: 77

## 2023-09-06 PROCEDURE — 82962 GLUCOSE BLOOD TEST: CPT

## 2023-09-06 PROCEDURE — 71045 X-RAY EXAM CHEST 1 VIEW: CPT

## 2023-09-06 PROCEDURE — C1892: CPT

## 2023-09-06 PROCEDURE — 86900 BLOOD TYPING SEROLOGIC ABO: CPT

## 2023-09-06 PROCEDURE — 33249 INSJ/RPLCMT DEFIB W/LEAD(S): CPT

## 2023-09-06 PROCEDURE — C1721: CPT

## 2023-09-06 PROCEDURE — 85027 COMPLETE CBC AUTOMATED: CPT

## 2023-09-06 PROCEDURE — 86901 BLOOD TYPING SEROLOGIC RH(D): CPT

## 2023-09-06 PROCEDURE — C1898: CPT

## 2023-09-06 PROCEDURE — 86850 RBC ANTIBODY SCREEN: CPT

## 2023-09-06 PROCEDURE — 80048 BASIC METABOLIC PNL TOTAL CA: CPT

## 2023-09-06 RX ORDER — ACETAMINOPHEN 500 MG
1000 TABLET ORAL ONCE
Refills: 0 | Status: COMPLETED | OUTPATIENT
Start: 2023-09-06 | End: 2023-09-06

## 2023-09-06 RX ORDER — SODIUM CHLORIDE 9 MG/ML
3 INJECTION INTRAMUSCULAR; INTRAVENOUS; SUBCUTANEOUS EVERY 8 HOURS
Refills: 0 | Status: DISCONTINUED | OUTPATIENT
Start: 2023-09-06 | End: 2023-09-06

## 2023-09-06 RX ORDER — INSULIN ASPART 100 [IU]/ML
1 INJECTION, SOLUTION SUBCUTANEOUS
Refills: 0 | Status: DISCONTINUED | OUTPATIENT
Start: 2023-09-06 | End: 2023-09-06

## 2023-09-06 RX ORDER — CEFAZOLIN SODIUM 1 G
2000 VIAL (EA) INJECTION ONCE
Refills: 0 | Status: COMPLETED | OUTPATIENT
Start: 2023-09-06 | End: 2023-09-06

## 2023-09-06 RX ADMIN — Medication 400 MILLIGRAM(S): at 17:20

## 2023-09-06 RX ADMIN — SODIUM CHLORIDE 3 MILLILITER(S): 9 INJECTION INTRAMUSCULAR; INTRAVENOUS; SUBCUTANEOUS at 14:36

## 2023-09-06 NOTE — ASU DISCHARGE PLAN (ADULT/PEDIATRIC) - PROVIDER TOKENS
PROVIDER:[TOKEN:[[id number]],SCHEDULEDAPPT:[10/11/2023],SCHEDULEDAPPTTIME:[12:00 PM]],PROVIDER:[TOKEN:[[id number]],SCHEDULEDAPPT:[10/23/2023],SCHEDULEDAPPTTIME:[11:00 AM]]

## 2023-09-06 NOTE — CONSULT NOTE ADULT - SUBJECTIVE AND OBJECTIVE BOX
HPI:  71 y/o male with pmh of HTN, CAD c/b NSTEMI s/p CABG x3V, HFrEF LVEF 33%, AF off Eliquis on ASA only, Type I DM (50 years ago, on Insulin pump for last 15 years followed by Dr. Ngo, Endocrinologist) hospitalized w/DKA 3/2023 during NSTEMI, Vertigo followed by Dr. Paula, PCP and Dr. Whal, Cardiologist was referred to Dr. Brantley, Electrophysiologist and presents for placement of an ICD device.  He denies cp, sob or palpitations   OA endocrine consult pending.       Of Note: discussed plan with endocrine.  Pt placed pump into activity mode under guidance from Dr. Obey Cash, Endocrine and will remain in place during procedure.  Blood sugars will be checked hourly while NPO.  If goes low during procedure Endo recommends removing pump and start insulin gtt. If no issues may continue with pump in exercise mode until awake and eating then can go back to normal settings.  Will be seen by Endo later today.  (06 Sep 2023 07:09)      DIABETES HX:  History/diagnosis: T1DM, dx 50 years ago  [facility]  Last hemoglobin A1c: 8.2% 3/2023  Home regimen: T-slim insulin pump (settings below) for the past 15 years. Also uses DEXCOM  Microvascular complications: Denies nephropathy, retinopathy, neuropathy  Macrovascular complications:+ CAD    Insulin pump settings:  T-slim x2 w/ DEXCOM G6  Patient uses novolog  Basal rates:  12a 0.08  3a 1.19  11a 1.19  5p 1.2  8p 1.1    ICR 1:15  ISF 1:50  Target 110    PAST MEDICAL & SURGICAL HISTORY:  Hypertension      BPH (benign prostatic hyperplasia)      Type I diabetes mellitus      CAD (coronary artery disease)      PAF (paroxysmal atrial fibrillation)      DKA, type 1      S/P cataract surgery  right eye      Steel plate in right arm      S/P CABG x 3          FAMILY HISTORY:  No pertinent family history in first degree relatives        Social History:    Outpatient Medications:     MEDICATIONS  (STANDING):  insulin aspart (NovoLOG) Pump 1 Each SubCutaneous Continuous Pump  sodium chloride 0.9% lock flush 3 milliLiter(s) IV Push every 8 hours    MEDICATIONS  (PRN):      Allergies    Neosporin (Rash)    Intolerances      Review of Systems:  Constitutional: No fever  Eyes: No blurry vision  Neuro: No tremors  HEENT: No pain  Cardiovascular: No chest pain, palpitations  Respiratory: No SOB, no cough  GI: No nausea, vomiting, abdominal pain  : No dysuria  Skin: no rash  Psych: no depression  Endocrine: no polyuria, polydipsia  Hem/lymph: no swelling  Osteoporosis: no fractures    ALL OTHER SYSTEMS REVIEWED AND NEGATIVE    PHYSICAL EXAM:  VITALS: T(C): 36.4 (09-06-23 @ 14:40)  T(F): 97.6 (09-06-23 @ 14:40), Max: 97.6 (09-06-23 @ 14:40)  HR: 67 (09-06-23 @ 15:10) (60 - 67)  BP: 137/86 (09-06-23 @ 15:10) (137/86 - 166/77)  RR:  (15 - 16)  SpO2:  (96% - 100%)  Wt(kg): --  GENERAL: NAD, well-groomed, well-developed  EYES: No proptosis, no lid lag, anicteric  HEENT:  Atraumatic, Normocephalic, moist mucous membranes  THYROID: Normal size, no palpable nodules  RESPIRATORY: Normal respiratory effort; no audible wheezing  SKIN: Dry, intact, No rashes or lesions  MUSCULOSKELETAL: Full range of motion, normal strength  NEURO: sensation intact, extraocular movements intact, no tremor  PSYCH: Alert and oriented x 3, normal affect, normal mood  CUSHING'S SIGNS: no striae                          11.0   5.74  )-----------( 252      ( 06 Sep 2023 07:46 )             35.3       09-06    140  |  105  |  26<H>  ----------------------------<  99  4.0   |  25  |  0.80    eGFR: 93    Ca    9.3      09-06  Mg     2.1     09-06        A1C with Estimated Average Glucose Result: 8.2 % (03-22-23 @ 17:20)  A1C with Estimated Average Glucose Result: 7.7 % (03-16-23 @ 03:38)      CAPILLARY BLOOD GLUCOSE      POCT Blood Glucose.: 155 mg/dL (06 Sep 2023 14:40)  POCT Blood Glucose.: 112 mg/dL (06 Sep 2023 12:58)  POCT Blood Glucose.: 140 mg/dL (06 Sep 2023 10:51)  POCT Blood Glucose.: 108 mg/dL (06 Sep 2023 09:38)  POCT Blood Glucose.: 102 mg/dL (06 Sep 2023 07:23)      Thyroid Function Tests:  insulin aspart (NovoLOG) Pump 1 Each SubCutaneous Continuous Pump  sodium chloride 0.9% lock flush 3 milliLiter(s) IV Push every 8 hours          Radiology:

## 2023-09-06 NOTE — ASU DISCHARGE PLAN (ADULT/PEDIATRIC) - NS MD DC FALL RISK RISK
For information on Fall & Injury Prevention, visit: [email or web address] OR  [email or web address] OR  https://www.cdc.gov/steadi/patient.html

## 2023-09-06 NOTE — CONSULT NOTE ADULT - ATTENDING COMMENTS
Agree with assessment and plan as above by Dr. Izquierdo. Reviewed all pertinent labs, glucose values, and imaging studies. Modifications made as indicated above. Pt. with Type 1 DM w/ insulin pump. Glucose has been at or close to goal. Can continue with insulin pump at current settings.     Faustino Yao D.O  [phone]

## 2023-09-06 NOTE — ASU DISCHARGE PLAN (ADULT/PEDIATRIC) - CARE PROVIDER_API CALL
Domingo Wahl  Cardiology  [address], [address]  [place] [address]  Phone: [phone]  Fax: [phone]  Scheduled Appointment: 10/11/2023 12:00 PM    Hanh Brantley  Cardiovascular Disease  [address]  [place] [address]  Phone: [phone]  Fax: [phone]  Scheduled Appointment: 10/23/2023 11:00 AM

## 2023-09-06 NOTE — PRE-ANESTHESIA EVALUATION ADULT - NSANTHASARD_GEN_ALL_CORE
Health Maintenance Due   Topic Date Due   • COVID-19 Vaccine (1) Never done       Patient is due for topics as listed above but is not proceeding with Immunization(s) COVID-19 at this time. Patient is following pediatric unified immunization schedule for immunizations.   3

## 2023-09-06 NOTE — CHART NOTE - NSCHARTNOTEFT_GEN_A_CORE
Endocrinology cnotacted for Mr. Major who has T1DM on a tandem tslim x2 insulin pump with dexcom g6 going for ICD placement. Follows with Dr. Ngo, 3/23 HbA1c 8.2.    Patient uses novolog insulin  basal  12a 0.08  3a 1.189  5p 1.2  8p 1.1  Do not have up to date carb ratios/sensitivity factors at this time. Patient currently NPO.    Recommend  - patient can place insulin pump into activity mode for procedure and can take it out of activity mode after procedure  - if hypoglycemia during procedure, can switch to dextrose infusion/insulin infusion and remove insulin pump for better control  - glucose checks per anesthesia during procedure  - please check HbA1c    Discussed with patient and Thelma Kee.    Full consult later today.    Obey Cash MD  Endocrinology Fellow

## 2023-09-06 NOTE — CONSULT NOTE ADULT - ASSESSMENT
73 y/o male with pmh of HTN, CAD c/b NSTEMI s/p CABG x3V, HFrEF LVEF 33%, AF off Eliquis on ASA only, Type I DM (50 years ago, on Insulin pump for last 15 years followed by Dr. Ngo, Endocrinologist) hospitalized w/DKA 3/2023 during NSTEMI, Vertigo, s/p ICD placement. Endocrinology consulted for T1DM on insulin pump.    #Type 1 Diabetes Mellitus   - Hemoglobin A1c: 8.2% in 3/2023  - home regimen:  T-slim insulin pump (settings above). Also uses DEXCOM  - no evidence of DKA on labs  - s/p exercise mode during ICD placement, now changed back to regular control IQ  INPATIENT PLAN:  - c/w insulin pump at current settings  - The patient has T1DM and cannot be long without basal insulin. If for any reason his pump becomes dysfunctional or falls off, he should receive 20 units of lantus STAT  - self-assessment form and insulin pump attestation form signed in chart  - For each meal, the bedside nurse should document the carbohydrate intake and the insulin bolus the patient gives himself in the EMR flowsheet.   - FSBG before meals and bedtime, but do not order an insulin correction scale as all insulin should be administered by the patient through the pump.   - Use hypoglycemia protocol if needed.  - Inpatient glucose goals: <140 pre-meal, <180 random  - consistent carb diet when eating  - nutrition consult placed  DISCHARGE PLANNING:  - plan to DC on insulin pump at home settings  - follow up outpatient w/ patient's own endocrinologist Dr. Ngo    #Hypertension  - Goal BP <130/80  - on losartan and metoprolol at home  - Management as per primary team    #Hyperlipidemia  - LDL goal <70  - on statin at home, restart once able    Doc Izquierdo MD  Endocrine Fellow  For nonurgent matters, please email [email or web address] or [email or web address]. For urgent follow up questions, discharge recommendations, or new consults please call answering service at [phone] (weekdays), [phone] (nights/weekends).

## 2023-09-06 NOTE — H&P CARDIOLOGY - HISTORY OF PRESENT ILLNESS
73 y/o male with pmh of HTN, CAD c/b NSTEMI s/p CABG x3V, AF off Eliquis (last dose      ), Type I DM (50 years ago, on Insulin pump for last 15 years) hospitalized w/DKA 3/2023, Vertigo,  73 y/o male with pmh of HTN, CAD c/b NSTEMI s/p CABG x3V, HFrEF LVEF 33%, AF off Eliquis on ASA only, Type I DM (50 years ago, on Insulin pump for last 15 years followed by Dr. Ngo, Endocrinologist) hospitalized w/DKA 3/2023 during NSTEMI, Vertigo followed by Dr. Paula, PCP and Dr. Wahl, Cardiologist was referred to Dr. Brantley, Electrophysiologist and presents for placement of an ICD device.  He denies cp, sob or palpitations   OA endocrine consult pending.    71 y/o male with pmh of HTN, CAD c/b NSTEMI s/p CABG x3V, HFrEF LVEF 33%, AF off Eliquis on ASA only, Type I DM (50 years ago, on Insulin pump for last 15 years followed by Dr. Ngo, Endocrinologist) hospitalized w/DKA 3/2023 during NSTEMI, Vertigo followed by Dr. Paula, PCP and Dr. Wahl, Cardiologist was referred to Dr. Brantley, Electrophysiologist and presents for placement of an ICD device.  He denies cp, sob or palpitations   OA endocrine consult pending.       Of Note: discussed plan with endocrine.  Pt placed pump into activity mode under guidance from Dr. Obey Cash, Endocrine and will remain in place during procedure.  Blood sugars will be checked hourly while NPO.  If goes low during procedure Endo recommends removing pump and start insulin gtt. If no issues may continue with pump in exercise mode until awake and eating then can go back to normal settings.  Will be seen by Endo later today.

## 2023-09-06 NOTE — ASU DISCHARGE PLAN (ADULT/PEDIATRIC) - ASU DC SPECIAL INSTRUCTIONSFT
Your incision is closed with either surgical tape, staples or a surgical glue  - DO NOT scrub, rub, or pick at the site    AFTER 3 days, you may shower   - Use mild sop and gentle water stream, then pat dry with a towel   - DO NOT apply lotions, powders, ointments, or perfumes to the incision    DO NOT soak your incisional site in water fo 4-6 weeks (no baths, swimming, hot tub...)  Wear loose clothing around site for 1-2 weeks  IF surgical tape was used DO NOT remove the strips, they will fall off after 7days, if glue was used, it will naturally fall off within 3 weeks  if staples were used, they will be removed in 7-10 days by your doctor    ACTIVITY:  for 2 weeks AFTER  your procedure  - DO NOT RAISE your arm above shoulder level (on the same side of your incision)  for 4 weeks AFTER your procedure   - DO NOT LIFT anything 10 lbs or heavier (on the side of your implant)   - certain activities may be limited longer, those that involve swinging your arm, and will be discussed with your EP doctor  DO NOT DRIVE until your EP Doctor or nurse practitioner/ physician assistant states it is safe to do so  A follow up appointment in 7-14 days will be arranged before your discharge    ID CARD:   you will receive an ID CARD and device company booklet   - please carry that card with you at all times    ***CALL YOUR DOCTOR ***  IF you have fever, chills, body aches, or severe pain, swelling, redness, heat, yellow drainage from your incision site  IF bleeding  or significant new swelling from your puncture site  IF your experience lightheadedness, dizziness, or fainting spell  IF you experience a single shock (like being kicked/punched in the chest) and feel okay, please call the clinic  IF you experience multiple shocks or  single shock and are NOT feeling well, have someone take you to the emergency room or call 911  IF unable to get in contact with your doctor, you may call the Cardiology Office at [facility] at [phone] Please see preprinted discharge instruction sheet

## 2023-09-06 NOTE — CHART NOTE - NSCHARTNOTEFT_GEN_A_CORE
Pt s/p [employer] ICD implant via L ACW wound open to air with no hematoma/bleeding    Vital Signs Last 24 Hrs  T(C): 36.4 (06 Sep 2023 14:40), Max: 36.4 (06 Sep 2023 07:09)  T(F): 97.6 (06 Sep 2023 14:40), Max: 97.6 (06 Sep 2023 14:40)  HR: 68 (06 Sep 2023 16:40) (60 - 70)  BP: 133/69 (06 Sep 2023 16:40) (120/65 - 166/77)  BP(mean): 85 (06 Sep 2023 16:40) (76 - 97)  RR: 16 (06 Sep 2023 16:40) (15 - 16)  SpO2: 99% (06 Sep 2023 16:40) (96% - 100%)    Parameters below as of 06 Sep 2023 16:40  Patient On (Oxygen Delivery Method): room air    ACC: [id number] EXAM:  XR CHEST PORTABLE URGENT 1V   ORDERED BY: TOYIN ESPOSITO     PROCEDURE DATE:  09/06/2023          INTERPRETATION:  EXAMINATION: XR CHEST URGENT    CLINICAL INDICATION: s/p ICD    TECHNIQUE: Single frontal, portable view of the chestwas obtained.    COMPARISON: Chest x-ray 5/4/2023    FINDINGS:  Left chest wall AICD with leads overlying right atrium and right   ventricle. Surgical clips overlying mediastinum. Sternotomy wires   overlying midline without evidence of fracture or migration.  Cardiopericardial silhouette is magnified with this projection.  Mild haziness overlying left lower lung field.  There is no pneumothorax or pleural effusion.  No acute bony abnormality.    IMPRESSION:  Left chest wall AICD in place.    Mild haziness overlying left lower lung field likely secondary to   atelectasis or trace layering effusion.    --- End of Report ---     WIN MUSTAFA MD; Resident Radiologist  This document has been electronically signed.  MARY JANE DON MD; Attending Radiologist  This document has been electronically signed. Sep  6 2023  4:51PM      HPI:  73 y/o male with pmh of HTN, CAD c/b NSTEMI s/p CABG x3V, HFrEF LVEF 33%, AF off Eliquis on ASA only, Type I DM (50 years ago, on Insulin pump for last 15 years followed by Dr. Ngo, Endocrinologist) hospitalized w/DKA 3/2023 during NSTEMI, Vertigo followed by Dr. Paula, PCP and Dr. Wahl, Cardiologist was referred to Dr. Brantley, Electrophysiologist and presents for placement of an ICD device.  He denies cp, sob or palpitations   OA endocrine consult pending.       Of Note: discussed plan with endocrine.  Pt placed pump into activity mode under guidance from Dr. Obey Cash, Endocrine and will remain in place during procedure.  Blood sugars will be checked hourly while NPO.  If goes low during procedure Endo recommends removing pump and start insulin gtt. If no issues may continue with pump in exercise mode until awake and eating then can go back to normal settings.  Will be seen by Endo later today.  (06 Sep 2023 07:09)      Plan:  Pt tolerated procedure well with pump reapplied post procedure and settings back to usual settings  Ancef given @ 1200. No further ABx needed  CXR STAT reviewed with Dr. Brantley with no PTX and lead tip in good placement.  Total of 200cc IVF given. Continue all meds   Plan for D/C home if remains stable @ 1700  Appt: -f/u with Dr Wahl on 10/11 at 12pm  f/u with Dr. Brantley on 10/23 at 11am  Pt ambulated with no problems and stable for d/c home  Teaching and discharge instructions done with pt verbalized good understanding     SHREYA [facility]

## 2023-09-06 NOTE — PRE-ANESTHESIA EVALUATION ADULT - NSANTHPMHFT_GEN_ALL_CORE
73 yo M h/o HTN, T1DM on insulin pump, CAD s/p CABG, CHF EF 33% here for ICD implantation  no CP, able to lie flat, on RA  Able to walk but limited due to right ankle injury  On insulin pump, to be removed prior to procedure

## 2023-09-06 NOTE — H&P CARDIOLOGY - PATIENT'S SEXUAL ORIENTATION
Ms. 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   Abx per primary team and ID.  Imaging when stable enough per ENT recommendations.   Continue EEG monitoring - no seizures in the past 24 hours.  Continue levetiracetam 250 mg IV Q12H  Continue propofol infusion  We will d/w epilepsy team regarding optimal plan for managing antiseizure medications.  We are continuing propofol for seizure management for now, so our assessment of her prognosis from our current exam is limited.  However, given her acute critical medical illness, EEG findings and previous examination off sedation, her prognosis would be very poor for meaningful neurological functional recovery.    Neuron specific enolase sent this AM per nurse.     Thank you .     Ms. 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   Abx per primary team and ID.  Imaging when stable enough per ENT recommendations.   Continue EEG monitoring - no seizures in the past 24 hours.  Continue levetiracetam 250 mg IV Q12H  Continue propofol infusion  We will d/w epilepsy team regarding optimal plan for managing antiseizure medications.  We are continuing propofol for seizure management for now, so our assessment of her prognosis from our current exam is limited.  However, given her acute critical medical illness, EEG findings and previous examination off sedation, her prognosis would be very poor for meaningful neurological functional recovery.    Neuron specific enolase sent this AM per nurse.     Thank you .    There is a high probability of sudden, clinically significant, or life threatening deterioration in the patient’s condition which requires the highest level of physician preparedness to intervene urgently.  Critical care time:  35 minutes.   Ms. 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   Abx per primary team and ID.  Imaging when stable enough per ENT recommendations.   Continue EEG monitoring - no seizures in the past 24 hours.  Continue levetiracetam 250 mg IV Q12H  Continue propofol infusion  We will d/w epilepsy team regarding optimal plan for managing antiseizure medications.  We are continuing propofol for seizure management for now, so our assessment of her prognosis from our current exam is limited.  However, given her acute critical medical illness, EEG findings and previous examination off sedation, her prognosis would be very poor for meaningful neurological functional recovery.    Neuron specific enolase sent this AM per nurse.     Thank you .    There is a high probability of sudden, clinically significant, or life threatening deterioration in the patient’s condition which requires the highest level of physician preparedness to intervene urgently.  Critical care time:  35 minutes.      Addendum  Plan developed with the guidance of the epilepsy team:  If propofol is being tapered and AL interval is normal, we recommend giving lacosamide  mg once and then 100 mg Q12H.  Please check levetiracetam level.   D/W neurology resident who will d/w MICU team.   Thank you   Ms. 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   Abx per primary team and ID.  Imaging when stable enough per ENT recommendations.   Continue EEG monitoring - no seizures in the past 24 hours.  Continue levetiracetam 250 mg IV Q12H  Continue propofol infusion  We will d/w epilepsy team regarding optimal plan for managing antiseizure medications.  We are continuing propofol for seizure management for now, so our assessment of her prognosis from our current exam is limited.  However, given her acute critical medical illness, EEG findings and previous examination off sedation, her prognosis would be very poor for meaningful neurological functional recovery.    Neuron specific enolase sent this AM per nurse.     Thank you .    There is a high probability of sudden, clinically significant, or life threatening deterioration in the patient’s condition which requires the highest level of physician preparedness to intervene urgently.  Critical care time:  35 minutes.      Addendum  Plan developed with the guidance of the epilepsy team:  If propofol is being tapered (please only do this during the day) and VA interval is normal, we recommend giving lacosamide  mg once and then 100 mg Q12H.  Please check levetiracetam level.   D/W neurology resident who will d/w MICU team.   Thank you   Heterosexual

## 2023-09-06 NOTE — PATIENT PROFILE ADULT - PATIENT'S SEXUAL ORIENTATION
PT Evaluation     Today's date: 5/3/2018  Patient name: Josefina Devries  : 1939  MRN: [id number]  Referring provider: Avel Calderón MD  Dx:   Encounter Diagnosis     ICD-10-CM    1  Primary osteoarthritis of left knee M17 12 Ambulatory referral to Physical Therapy                  Assessment  Impairments: abnormal gait, abnormal muscle tone, abnormal or restricted ROM, impaired balance, impaired physical strength, lacks appropriate home exercise program, pain with function and weight-bearing intolerance    Assessment details: The patient presents with decreased ROM, strength, and function due to recent total knee arthroplasty  The patient is limited by left knee pain, swelling, and tissue restriction  Patient refused to try HEP exercises due to pain  Limited HEP given at evaluation  Plan to add remaining exercises to HEP next visit  Patient educated on use of ice to manage symptoms  The patient would benefit from OPPT to address her deficits and meet her goals  Understanding of Dx/Px/POC: fair   Prognosis: poor    Goals  STG: To be completed in 4 weeks  1  The patient will increase knee ROM to 90 degrees when sitting in a chair  2  The patient will increase LE MMT to 4/5 MMT when ascending/descending stairs  3  The patient will report no more than 4/10 pain during all adls  LTG: To be completed in 12 weeks    1  The patient will report no more than 1/10 pain during all adls  2  The patient will increase knee flexion to 120 degrees when squatting to pick an object off the floor  3  The patient will increase LE strength to 5/5 MMT when ascending/descending stairs         Plan  Patient would benefit from: skilled PT  Planned modality interventions: thermotherapy: hydrocollator packs and cryotherapy  Other planned modality interventions: high level laser  Planned therapy interventions: joint mobilization, manual therapy, neuromuscular re-education, patient education, self care, strengthening, stretching, balance, balance/weight bearing training, therapeutic exercise, therapeutic activities, home exercise program and gait training  Frequency: 2x week  Duration in weeks: 12  Treatment plan discussed with: patient and family  Plan details: POC expires 19        Subjective Evaluation    History of Present Illness  Date of surgery: 2018  Mechanism of injury: surgery  Mechanism of injury: Josephine Evangelista is a [de-identified] y o  female who presents s/p left TKA  The patient denies parasthesias or trouble sleeping at night  The patient currently struggles with standing, walking, and stairs  PMH includes includes HTN  Not a recurrent problem   Quality of life: fair    Pain  Current pain ratin  At best pain ratin  At worst pain ratin  Quality: dull ache and throbbing  Aggravating factors: walking, standing and stair climbing  Progression: improved    Social Support  Steps to enter house: yes  Stairs in house: no   Lives in: [place] house  Lives with: spouse    Employment status: not working  Patient Goals  Patient goals for therapy: decreased pain and independence with ADLs/IADLs          Objective     Observations   Left Knee   Positive for edema and incision  Additional Observation Details  Mild redness noted around scar  Patient's skin not warm to the touch or tender to palpation  Patient educated to monitor redness and warmth around knee and call surgeon if either worsens       Active Range of Motion   Left Knee   Flexion: 44 degrees with pain  Extension: 12 degrees with pain    Right Knee   Normal active range of motion  Flexion: 125 degrees   Extension: 0 degrees     Strength/Myotome Testing     Left Hip   Planes of Motion   Flexion: 2+  Abduction: 2+  Adduction: 2+    Right Hip   Planes of Motion   Flexion: 4+  Abduction: 4+  Adduction: 4+    Left Knee   Flexion: 3-  Extension: 2  Quadriceps contraction: poor    Right Knee   Flexion: 4+  Extension: 4+    Swelling     Left Knee Girth Measurement (cm)   Joint line: 46 cm    Right Knee Girth Measurement (cm)   Joint line: 37 cm    Ambulation   Weight-Bearing Status   Weight-Bearing Status (Left): weight-bearing as tolerated   Weight-Bearing Status (Right): full weight-bearing    Assistive device used: front-wheeled walker          Precautions: HTN    Daily Treatment Diary     Manual  5/3            L Knee PROM                                                                     Exercise Diary  5/3            Quad Set 5"x10            Gastroc St  20"x2            Heel slides             Seated HS St               Seated Knee Flex St               Weight shift                                                                                                                                                                                                       Modalities Heterosexual

## 2023-09-06 NOTE — H&P CARDIOLOGY - NSICDXPASTSURGICALHX_GEN_ALL_CORE_FT
PAST SURGICAL HISTORY:  S/P CABG x 3     S/P cataract surgery right eye    Steel plate in right arm

## 2023-09-06 NOTE — H&P CARDIOLOGY - NSICDXPASTMEDICALHX_GEN_ALL_CORE_FT
PAST MEDICAL HISTORY:  BPH (benign prostatic hyperplasia)     CAD (coronary artery disease)     DKA, type 1     Hypertension     PAF (paroxysmal atrial fibrillation)     Type I diabetes mellitus

## 2023-09-06 NOTE — PROGRESS NOTE ADULT - SUBJECTIVE AND OBJECTIVE BOX
EP Brief Operative Note    Diagnosis: chronic systolic CHF  Procedure: dual chamber ICD implant  Surgeon: Hanh Brantley M.D.  Findings: none  EBL: minimal  Specimens: none  Post-op Diagnosis: same  Assistants: none      A/P) successful [employer] dual chamber ICD implant, no acute complications    -cxr, ekg  -bed rest x 4 hours, then d/c home  -no further antibiotics needed  -f/u with Dr Wahl on 10/11 at 12pm  -f/u with me on 10/23 at 11am      Hanh Brantley M.D., [facility]  Cardiac Electrophysiology  [facility]  [address] [place] [address], [place] [address]  [email or web address]    office [phone]  pager [phone]

## 2023-09-28 ENCOUNTER — OUTPATIENT (OUTPATIENT)
Dept: OUTPATIENT SERVICES | Facility: HOSPITAL | Age: 73
LOS: 1 days | End: 2023-09-28
Payer: MEDICARE

## 2023-09-28 ENCOUNTER — APPOINTMENT (OUTPATIENT)
Dept: SLEEP CENTER | Facility: CLINIC | Age: 73
End: 2023-09-28
Payer: MEDICARE

## 2023-09-28 DIAGNOSIS — Z96.7 PRESENCE OF OTHER BONE AND TENDON IMPLANTS: Chronic | ICD-10-CM

## 2023-09-28 DIAGNOSIS — Z95.1 PRESENCE OF AORTOCORONARY BYPASS GRAFT: Chronic | ICD-10-CM

## 2023-09-28 PROCEDURE — 95811 POLYSOM 6/>YRS CPAP 4/> PARM: CPT

## 2023-09-28 PROCEDURE — 95811 POLYSOM 6/>YRS CPAP 4/> PARM: CPT | Mod: 26

## 2023-10-05 DIAGNOSIS — G47.33 OBSTRUCTIVE SLEEP APNEA (ADULT) (PEDIATRIC): ICD-10-CM

## 2023-10-05 PROBLEM — I25.10 ATHEROSCLEROTIC HEART DISEASE OF NATIVE CORONARY ARTERY WITHOUT ANGINA PECTORIS: Chronic | Status: ACTIVE | Noted: 2023-09-06

## 2023-10-05 PROBLEM — E10.9 TYPE 1 DIABETES MELLITUS WITHOUT COMPLICATIONS: Chronic | Status: ACTIVE | Noted: 2023-09-06

## 2023-10-05 PROBLEM — E10.10 TYPE 1 DIABETES MELLITUS WITH KETOACIDOSIS WITHOUT COMA: Chronic | Status: ACTIVE | Noted: 2023-09-06

## 2023-10-05 PROBLEM — I48.0 PAROXYSMAL ATRIAL FIBRILLATION: Chronic | Status: ACTIVE | Noted: 2023-09-06

## 2023-10-18 ENCOUNTER — APPOINTMENT (OUTPATIENT)
Dept: PULMONOLOGY | Facility: CLINIC | Age: 73
End: 2023-10-18

## 2023-11-03 ENCOUNTER — EMERGENCY (EMERGENCY)
Facility: HOSPITAL | Age: 73
LOS: 1 days | Discharge: HOME | End: 2023-11-03
Attending: EMERGENCY MEDICINE
Payer: MEDICARE

## 2023-11-03 VITALS
TEMPERATURE: 97 F | DIASTOLIC BLOOD PRESSURE: 72 MMHG | HEART RATE: 89 BPM | RESPIRATION RATE: 20 BRPM | HEIGHT: 74 IN | SYSTOLIC BLOOD PRESSURE: 130 MMHG | WEIGHT: 199.96 LBS | OXYGEN SATURATION: 99 %

## 2023-11-03 DIAGNOSIS — E11.10 TYPE 2 DIABETES MELLITUS WITH KETOACIDOSIS WITHOUT COMA: ICD-10-CM

## 2023-11-03 DIAGNOSIS — Z95.1 PRESENCE OF AORTOCORONARY BYPASS GRAFT: Chronic | ICD-10-CM

## 2023-11-03 DIAGNOSIS — Z96.7 PRESENCE OF OTHER BONE AND TENDON IMPLANTS: Chronic | ICD-10-CM

## 2023-11-03 LAB
ALBUMIN SERPL ELPH-MCNC: 3.9 G/DL — SIGNIFICANT CHANGE UP (ref 3.3–5)
ALBUMIN SERPL ELPH-MCNC: 3.9 G/DL — SIGNIFICANT CHANGE UP (ref 3.3–5)
ALBUMIN SERPL ELPH-MCNC: 4.3 G/DL — SIGNIFICANT CHANGE UP (ref 3.3–5)
ALBUMIN SERPL ELPH-MCNC: 4.3 G/DL — SIGNIFICANT CHANGE UP (ref 3.3–5)
ALP SERPL-CCNC: 68 U/L — SIGNIFICANT CHANGE UP (ref 40–120)
ALP SERPL-CCNC: 68 U/L — SIGNIFICANT CHANGE UP (ref 40–120)
ALP SERPL-CCNC: 75 U/L — SIGNIFICANT CHANGE UP (ref 40–120)
ALP SERPL-CCNC: 75 U/L — SIGNIFICANT CHANGE UP (ref 40–120)
ALT FLD-CCNC: 18 U/L — SIGNIFICANT CHANGE UP (ref 10–45)
ALT FLD-CCNC: 18 U/L — SIGNIFICANT CHANGE UP (ref 10–45)
ALT FLD-CCNC: 23 U/L — SIGNIFICANT CHANGE UP (ref 10–45)
ALT FLD-CCNC: 23 U/L — SIGNIFICANT CHANGE UP (ref 10–45)
ANION GAP SERPL CALC-SCNC: 15 MMOL/L — SIGNIFICANT CHANGE UP (ref 5–17)
ANION GAP SERPL CALC-SCNC: 15 MMOL/L — SIGNIFICANT CHANGE UP (ref 5–17)
ANION GAP SERPL CALC-SCNC: 17 MMOL/L — SIGNIFICANT CHANGE UP (ref 5–17)
ANION GAP SERPL CALC-SCNC: 17 MMOL/L — SIGNIFICANT CHANGE UP (ref 5–17)
ANION GAP SERPL CALC-SCNC: 24 MMOL/L — HIGH (ref 5–17)
ANION GAP SERPL CALC-SCNC: 24 MMOL/L — HIGH (ref 5–17)
APPEARANCE UR: CLEAR — SIGNIFICANT CHANGE UP
APPEARANCE UR: CLEAR — SIGNIFICANT CHANGE UP
AST SERPL-CCNC: 15 U/L — SIGNIFICANT CHANGE UP (ref 10–40)
AST SERPL-CCNC: 15 U/L — SIGNIFICANT CHANGE UP (ref 10–40)
AST SERPL-CCNC: 16 U/L — SIGNIFICANT CHANGE UP (ref 10–40)
AST SERPL-CCNC: 16 U/L — SIGNIFICANT CHANGE UP (ref 10–40)
BACTERIA # UR AUTO: ABNORMAL /HPF
BACTERIA # UR AUTO: ABNORMAL /HPF
BASE EXCESS BLDV CALC-SCNC: -1.6 MMOL/L — SIGNIFICANT CHANGE UP (ref -2–3)
BASE EXCESS BLDV CALC-SCNC: -1.6 MMOL/L — SIGNIFICANT CHANGE UP (ref -2–3)
BASE EXCESS BLDV CALC-SCNC: -5.6 MMOL/L — LOW (ref -2–3)
BASE EXCESS BLDV CALC-SCNC: -5.6 MMOL/L — LOW (ref -2–3)
BASE EXCESS BLDV CALC-SCNC: -5.9 MMOL/L — LOW (ref -2–3)
BASE EXCESS BLDV CALC-SCNC: -5.9 MMOL/L — LOW (ref -2–3)
BASOPHILS # BLD AUTO: 0.03 K/UL — SIGNIFICANT CHANGE UP (ref 0–0.2)
BASOPHILS # BLD AUTO: 0.03 K/UL — SIGNIFICANT CHANGE UP (ref 0–0.2)
BASOPHILS NFR BLD AUTO: 0.3 % — SIGNIFICANT CHANGE UP (ref 0–2)
BASOPHILS NFR BLD AUTO: 0.3 % — SIGNIFICANT CHANGE UP (ref 0–2)
BILIRUB SERPL-MCNC: 0.4 MG/DL — SIGNIFICANT CHANGE UP (ref 0.2–1.2)
BILIRUB SERPL-MCNC: 0.4 MG/DL — SIGNIFICANT CHANGE UP (ref 0.2–1.2)
BILIRUB SERPL-MCNC: 0.7 MG/DL — SIGNIFICANT CHANGE UP (ref 0.2–1.2)
BILIRUB SERPL-MCNC: 0.7 MG/DL — SIGNIFICANT CHANGE UP (ref 0.2–1.2)
BILIRUB UR-MCNC: NEGATIVE — SIGNIFICANT CHANGE UP
BILIRUB UR-MCNC: NEGATIVE — SIGNIFICANT CHANGE UP
BUN SERPL-MCNC: 22 MG/DL — SIGNIFICANT CHANGE UP (ref 7–23)
BUN SERPL-MCNC: 22 MG/DL — SIGNIFICANT CHANGE UP (ref 7–23)
BUN SERPL-MCNC: 23 MG/DL — SIGNIFICANT CHANGE UP (ref 7–23)
BUN SERPL-MCNC: 23 MG/DL — SIGNIFICANT CHANGE UP (ref 7–23)
BUN SERPL-MCNC: 30 MG/DL — HIGH (ref 7–23)
BUN SERPL-MCNC: 30 MG/DL — HIGH (ref 7–23)
CA-I SERPL-SCNC: 1.22 MMOL/L — SIGNIFICANT CHANGE UP (ref 1.15–1.33)
CA-I SERPL-SCNC: 1.22 MMOL/L — SIGNIFICANT CHANGE UP (ref 1.15–1.33)
CA-I SERPL-SCNC: 1.24 MMOL/L — SIGNIFICANT CHANGE UP (ref 1.15–1.33)
CA-I SERPL-SCNC: 1.24 MMOL/L — SIGNIFICANT CHANGE UP (ref 1.15–1.33)
CA-I SERPL-SCNC: 1.29 MMOL/L — SIGNIFICANT CHANGE UP (ref 1.15–1.33)
CA-I SERPL-SCNC: 1.29 MMOL/L — SIGNIFICANT CHANGE UP (ref 1.15–1.33)
CALCIUM SERPL-MCNC: 9.2 MG/DL — SIGNIFICANT CHANGE UP (ref 8.4–10.5)
CALCIUM SERPL-MCNC: 9.2 MG/DL — SIGNIFICANT CHANGE UP (ref 8.4–10.5)
CALCIUM SERPL-MCNC: 9.3 MG/DL — SIGNIFICANT CHANGE UP (ref 8.4–10.5)
CALCIUM SERPL-MCNC: 9.3 MG/DL — SIGNIFICANT CHANGE UP (ref 8.4–10.5)
CALCIUM SERPL-MCNC: 9.6 MG/DL — SIGNIFICANT CHANGE UP (ref 8.4–10.5)
CALCIUM SERPL-MCNC: 9.6 MG/DL — SIGNIFICANT CHANGE UP (ref 8.4–10.5)
CAST: 6 /LPF — HIGH (ref 0–4)
CAST: 6 /LPF — HIGH (ref 0–4)
CHLORIDE BLDV-SCNC: 100 MMOL/L — SIGNIFICANT CHANGE UP (ref 96–108)
CHLORIDE BLDV-SCNC: 100 MMOL/L — SIGNIFICANT CHANGE UP (ref 96–108)
CHLORIDE BLDV-SCNC: 102 MMOL/L — SIGNIFICANT CHANGE UP (ref 96–108)
CHLORIDE BLDV-SCNC: 102 MMOL/L — SIGNIFICANT CHANGE UP (ref 96–108)
CHLORIDE BLDV-SCNC: 98 MMOL/L — SIGNIFICANT CHANGE UP (ref 96–108)
CHLORIDE BLDV-SCNC: 98 MMOL/L — SIGNIFICANT CHANGE UP (ref 96–108)
CHLORIDE SERPL-SCNC: 95 MMOL/L — LOW (ref 96–108)
CHLORIDE SERPL-SCNC: 95 MMOL/L — LOW (ref 96–108)
CHLORIDE SERPL-SCNC: 98 MMOL/L — SIGNIFICANT CHANGE UP (ref 96–108)
CHLORIDE SERPL-SCNC: 98 MMOL/L — SIGNIFICANT CHANGE UP (ref 96–108)
CHLORIDE SERPL-SCNC: 99 MMOL/L — SIGNIFICANT CHANGE UP (ref 96–108)
CHLORIDE SERPL-SCNC: 99 MMOL/L — SIGNIFICANT CHANGE UP (ref 96–108)
CO2 BLDV-SCNC: 22 MMOL/L — SIGNIFICANT CHANGE UP (ref 22–26)
CO2 BLDV-SCNC: 26 MMOL/L — SIGNIFICANT CHANGE UP (ref 22–26)
CO2 BLDV-SCNC: 26 MMOL/L — SIGNIFICANT CHANGE UP (ref 22–26)
CO2 SERPL-SCNC: 17 MMOL/L — LOW (ref 22–31)
CO2 SERPL-SCNC: 17 MMOL/L — LOW (ref 22–31)
CO2 SERPL-SCNC: 20 MMOL/L — LOW (ref 22–31)
CO2 SERPL-SCNC: 20 MMOL/L — LOW (ref 22–31)
CO2 SERPL-SCNC: 21 MMOL/L — LOW (ref 22–31)
CO2 SERPL-SCNC: 21 MMOL/L — LOW (ref 22–31)
COLOR SPEC: YELLOW — SIGNIFICANT CHANGE UP
COLOR SPEC: YELLOW — SIGNIFICANT CHANGE UP
CREAT SERPL-MCNC: 0.74 MG/DL — SIGNIFICANT CHANGE UP (ref 0.5–1.3)
CREAT SERPL-MCNC: 0.74 MG/DL — SIGNIFICANT CHANGE UP (ref 0.5–1.3)
CREAT SERPL-MCNC: 0.75 MG/DL — SIGNIFICANT CHANGE UP (ref 0.5–1.3)
CREAT SERPL-MCNC: 0.75 MG/DL — SIGNIFICANT CHANGE UP (ref 0.5–1.3)
CREAT SERPL-MCNC: 1 MG/DL — SIGNIFICANT CHANGE UP (ref 0.5–1.3)
CREAT SERPL-MCNC: 1 MG/DL — SIGNIFICANT CHANGE UP (ref 0.5–1.3)
DIFF PNL FLD: ABNORMAL
DIFF PNL FLD: ABNORMAL
EGFR: 79 ML/MIN/1.73M2 — SIGNIFICANT CHANGE UP
EGFR: 79 ML/MIN/1.73M2 — SIGNIFICANT CHANGE UP
EGFR: 95 ML/MIN/1.73M2 — SIGNIFICANT CHANGE UP
EOSINOPHIL # BLD AUTO: 0.02 K/UL — SIGNIFICANT CHANGE UP (ref 0–0.5)
EOSINOPHIL # BLD AUTO: 0.02 K/UL — SIGNIFICANT CHANGE UP (ref 0–0.5)
EOSINOPHIL NFR BLD AUTO: 0.2 % — SIGNIFICANT CHANGE UP (ref 0–6)
EOSINOPHIL NFR BLD AUTO: 0.2 % — SIGNIFICANT CHANGE UP (ref 0–6)
FLUAV AG NPH QL: SIGNIFICANT CHANGE UP
FLUAV AG NPH QL: SIGNIFICANT CHANGE UP
FLUBV AG NPH QL: SIGNIFICANT CHANGE UP
FLUBV AG NPH QL: SIGNIFICANT CHANGE UP
GAS PNL BLDV: 133 MMOL/L — LOW (ref 136–145)
GAS PNL BLDV: 134 MMOL/L — LOW (ref 136–145)
GAS PNL BLDV: 134 MMOL/L — LOW (ref 136–145)
GAS PNL BLDV: SIGNIFICANT CHANGE UP
GLUCOSE BLDV-MCNC: 259 MG/DL — HIGH (ref 70–99)
GLUCOSE BLDV-MCNC: 259 MG/DL — HIGH (ref 70–99)
GLUCOSE BLDV-MCNC: 319 MG/DL — HIGH (ref 70–99)
GLUCOSE BLDV-MCNC: 319 MG/DL — HIGH (ref 70–99)
GLUCOSE BLDV-MCNC: 424 MG/DL — HIGH (ref 70–99)
GLUCOSE BLDV-MCNC: 424 MG/DL — HIGH (ref 70–99)
GLUCOSE SERPL-MCNC: 252 MG/DL — HIGH (ref 70–99)
GLUCOSE SERPL-MCNC: 252 MG/DL — HIGH (ref 70–99)
GLUCOSE SERPL-MCNC: 306 MG/DL — HIGH (ref 70–99)
GLUCOSE SERPL-MCNC: 306 MG/DL — HIGH (ref 70–99)
GLUCOSE SERPL-MCNC: 392 MG/DL — HIGH (ref 70–99)
GLUCOSE SERPL-MCNC: 392 MG/DL — HIGH (ref 70–99)
GLUCOSE UR QL: >=1000 MG/DL
GLUCOSE UR QL: >=1000 MG/DL
HCO3 BLDV-SCNC: 21 MMOL/L — LOW (ref 22–29)
HCO3 BLDV-SCNC: 24 MMOL/L — SIGNIFICANT CHANGE UP (ref 22–29)
HCO3 BLDV-SCNC: 24 MMOL/L — SIGNIFICANT CHANGE UP (ref 22–29)
HCT VFR BLD CALC: 31.8 % — LOW (ref 39–50)
HCT VFR BLD CALC: 31.8 % — LOW (ref 39–50)
HCT VFR BLD CALC: 32 % — LOW (ref 39–50)
HCT VFR BLD CALC: 32 % — LOW (ref 39–50)
HCT VFR BLD CALC: 34.1 % — LOW (ref 39–50)
HCT VFR BLD CALC: 34.1 % — LOW (ref 39–50)
HCT VFR BLDA CALC: 31 % — LOW (ref 39–51)
HCT VFR BLDA CALC: 31 % — LOW (ref 39–51)
HCT VFR BLDA CALC: 32 % — LOW (ref 39–51)
HCT VFR BLDA CALC: 32 % — LOW (ref 39–51)
HCT VFR BLDA CALC: 34 % — LOW (ref 39–51)
HCT VFR BLDA CALC: 34 % — LOW (ref 39–51)
HGB BLD CALC-MCNC: 10.4 G/DL — LOW (ref 12.6–17.4)
HGB BLD CALC-MCNC: 10.4 G/DL — LOW (ref 12.6–17.4)
HGB BLD CALC-MCNC: 10.6 G/DL — LOW (ref 12.6–17.4)
HGB BLD CALC-MCNC: 10.6 G/DL — LOW (ref 12.6–17.4)
HGB BLD CALC-MCNC: 11.2 G/DL — LOW (ref 12.6–17.4)
HGB BLD CALC-MCNC: 11.2 G/DL — LOW (ref 12.6–17.4)
HGB BLD-MCNC: 10 G/DL — LOW (ref 13–17)
HGB BLD-MCNC: 10 G/DL — LOW (ref 13–17)
HGB BLD-MCNC: 10.2 G/DL — LOW (ref 13–17)
HGB BLD-MCNC: 10.2 G/DL — LOW (ref 13–17)
HGB BLD-MCNC: 10.9 G/DL — LOW (ref 13–17)
HGB BLD-MCNC: 10.9 G/DL — LOW (ref 13–17)
IMM GRANULOCYTES NFR BLD AUTO: 0.4 % — SIGNIFICANT CHANGE UP (ref 0–0.9)
IMM GRANULOCYTES NFR BLD AUTO: 0.4 % — SIGNIFICANT CHANGE UP (ref 0–0.9)
KETONES UR-MCNC: 80 MG/DL
KETONES UR-MCNC: 80 MG/DL
LACTATE BLDV-MCNC: 1.2 MMOL/L — SIGNIFICANT CHANGE UP (ref 0.5–2)
LACTATE BLDV-MCNC: 1.2 MMOL/L — SIGNIFICANT CHANGE UP (ref 0.5–2)
LACTATE BLDV-MCNC: 1.7 MMOL/L — SIGNIFICANT CHANGE UP (ref 0.5–2)
LACTATE BLDV-MCNC: 1.7 MMOL/L — SIGNIFICANT CHANGE UP (ref 0.5–2)
LACTATE BLDV-MCNC: 3.3 MMOL/L — HIGH (ref 0.5–2)
LACTATE BLDV-MCNC: 3.3 MMOL/L — HIGH (ref 0.5–2)
LEUKOCYTE ESTERASE UR-ACNC: ABNORMAL
LEUKOCYTE ESTERASE UR-ACNC: ABNORMAL
LYMPHOCYTES # BLD AUTO: 0.55 K/UL — LOW (ref 1–3.3)
LYMPHOCYTES # BLD AUTO: 0.55 K/UL — LOW (ref 1–3.3)
LYMPHOCYTES # BLD AUTO: 6.1 % — LOW (ref 13–44)
LYMPHOCYTES # BLD AUTO: 6.1 % — LOW (ref 13–44)
MAGNESIUM SERPL-MCNC: 2 MG/DL — SIGNIFICANT CHANGE UP (ref 1.6–2.6)
MCHC RBC-ENTMCNC: 27.9 PG — SIGNIFICANT CHANGE UP (ref 27–34)
MCHC RBC-ENTMCNC: 27.9 PG — SIGNIFICANT CHANGE UP (ref 27–34)
MCHC RBC-ENTMCNC: 28.3 PG — SIGNIFICANT CHANGE UP (ref 27–34)
MCHC RBC-ENTMCNC: 28.3 PG — SIGNIFICANT CHANGE UP (ref 27–34)
MCHC RBC-ENTMCNC: 28.6 PG — SIGNIFICANT CHANGE UP (ref 27–34)
MCHC RBC-ENTMCNC: 28.6 PG — SIGNIFICANT CHANGE UP (ref 27–34)
MCHC RBC-ENTMCNC: 31.4 GM/DL — LOW (ref 32–36)
MCHC RBC-ENTMCNC: 31.4 GM/DL — LOW (ref 32–36)
MCHC RBC-ENTMCNC: 31.9 GM/DL — LOW (ref 32–36)
MCHC RBC-ENTMCNC: 31.9 GM/DL — LOW (ref 32–36)
MCHC RBC-ENTMCNC: 32 GM/DL — SIGNIFICANT CHANGE UP (ref 32–36)
MCHC RBC-ENTMCNC: 32 GM/DL — SIGNIFICANT CHANGE UP (ref 32–36)
MCV RBC AUTO: 88.6 FL — SIGNIFICANT CHANGE UP (ref 80–100)
MCV RBC AUTO: 88.6 FL — SIGNIFICANT CHANGE UP (ref 80–100)
MCV RBC AUTO: 88.9 FL — SIGNIFICANT CHANGE UP (ref 80–100)
MCV RBC AUTO: 88.9 FL — SIGNIFICANT CHANGE UP (ref 80–100)
MCV RBC AUTO: 89.5 FL — SIGNIFICANT CHANGE UP (ref 80–100)
MCV RBC AUTO: 89.5 FL — SIGNIFICANT CHANGE UP (ref 80–100)
MONOCYTES # BLD AUTO: 0.47 K/UL — SIGNIFICANT CHANGE UP (ref 0–0.9)
MONOCYTES # BLD AUTO: 0.47 K/UL — SIGNIFICANT CHANGE UP (ref 0–0.9)
MONOCYTES NFR BLD AUTO: 5.2 % — SIGNIFICANT CHANGE UP (ref 2–14)
MONOCYTES NFR BLD AUTO: 5.2 % — SIGNIFICANT CHANGE UP (ref 2–14)
NEUTROPHILS # BLD AUTO: 7.96 K/UL — HIGH (ref 1.8–7.4)
NEUTROPHILS # BLD AUTO: 7.96 K/UL — HIGH (ref 1.8–7.4)
NEUTROPHILS NFR BLD AUTO: 87.8 % — HIGH (ref 43–77)
NEUTROPHILS NFR BLD AUTO: 87.8 % — HIGH (ref 43–77)
NITRITE UR-MCNC: POSITIVE
NITRITE UR-MCNC: POSITIVE
NRBC # BLD: 0 /100 WBCS — SIGNIFICANT CHANGE UP (ref 0–0)
PCO2 BLDV: 43 MMHG — SIGNIFICANT CHANGE UP (ref 42–55)
PCO2 BLDV: 43 MMHG — SIGNIFICANT CHANGE UP (ref 42–55)
PCO2 BLDV: 44 MMHG — SIGNIFICANT CHANGE UP (ref 42–55)
PCO2 BLDV: 44 MMHG — SIGNIFICANT CHANGE UP (ref 42–55)
PCO2 BLDV: 45 MMHG — SIGNIFICANT CHANGE UP (ref 42–55)
PCO2 BLDV: 45 MMHG — SIGNIFICANT CHANGE UP (ref 42–55)
PH BLDV: 7.28 — LOW (ref 7.32–7.43)
PH BLDV: 7.28 — LOW (ref 7.32–7.43)
PH BLDV: 7.29 — LOW (ref 7.32–7.43)
PH BLDV: 7.29 — LOW (ref 7.32–7.43)
PH BLDV: 7.34 — SIGNIFICANT CHANGE UP (ref 7.32–7.43)
PH BLDV: 7.34 — SIGNIFICANT CHANGE UP (ref 7.32–7.43)
PH UR: 5.5 — SIGNIFICANT CHANGE UP (ref 5–8)
PH UR: 5.5 — SIGNIFICANT CHANGE UP (ref 5–8)
PHOSPHATE SERPL-MCNC: 2.4 MG/DL — LOW (ref 2.5–4.5)
PHOSPHATE SERPL-MCNC: 2.4 MG/DL — LOW (ref 2.5–4.5)
PHOSPHATE SERPL-MCNC: 2.8 MG/DL — SIGNIFICANT CHANGE UP (ref 2.5–4.5)
PHOSPHATE SERPL-MCNC: 2.8 MG/DL — SIGNIFICANT CHANGE UP (ref 2.5–4.5)
PHOSPHATE SERPL-MCNC: 2.9 MG/DL — SIGNIFICANT CHANGE UP (ref 2.5–4.5)
PHOSPHATE SERPL-MCNC: 2.9 MG/DL — SIGNIFICANT CHANGE UP (ref 2.5–4.5)
PLATELET # BLD AUTO: 219 K/UL — SIGNIFICANT CHANGE UP (ref 150–400)
PLATELET # BLD AUTO: 219 K/UL — SIGNIFICANT CHANGE UP (ref 150–400)
PLATELET # BLD AUTO: 224 K/UL — SIGNIFICANT CHANGE UP (ref 150–400)
PLATELET # BLD AUTO: 224 K/UL — SIGNIFICANT CHANGE UP (ref 150–400)
PLATELET # BLD AUTO: 258 K/UL — SIGNIFICANT CHANGE UP (ref 150–400)
PLATELET # BLD AUTO: 258 K/UL — SIGNIFICANT CHANGE UP (ref 150–400)
PO2 BLDV: 26 MMHG — SIGNIFICANT CHANGE UP (ref 25–45)
PO2 BLDV: 26 MMHG — SIGNIFICANT CHANGE UP (ref 25–45)
PO2 BLDV: 30 MMHG — SIGNIFICANT CHANGE UP (ref 25–45)
POTASSIUM BLDV-SCNC: 4.2 MMOL/L — SIGNIFICANT CHANGE UP (ref 3.5–5.1)
POTASSIUM BLDV-SCNC: 4.2 MMOL/L — SIGNIFICANT CHANGE UP (ref 3.5–5.1)
POTASSIUM BLDV-SCNC: 4.3 MMOL/L — SIGNIFICANT CHANGE UP (ref 3.5–5.1)
POTASSIUM BLDV-SCNC: 4.3 MMOL/L — SIGNIFICANT CHANGE UP (ref 3.5–5.1)
POTASSIUM BLDV-SCNC: 4.7 MMOL/L — SIGNIFICANT CHANGE UP (ref 3.5–5.1)
POTASSIUM BLDV-SCNC: 4.7 MMOL/L — SIGNIFICANT CHANGE UP (ref 3.5–5.1)
POTASSIUM SERPL-MCNC: 4 MMOL/L — SIGNIFICANT CHANGE UP (ref 3.5–5.3)
POTASSIUM SERPL-MCNC: 4 MMOL/L — SIGNIFICANT CHANGE UP (ref 3.5–5.3)
POTASSIUM SERPL-MCNC: 4.3 MMOL/L — SIGNIFICANT CHANGE UP (ref 3.5–5.3)
POTASSIUM SERPL-MCNC: 4.3 MMOL/L — SIGNIFICANT CHANGE UP (ref 3.5–5.3)
POTASSIUM SERPL-MCNC: 4.5 MMOL/L — SIGNIFICANT CHANGE UP (ref 3.5–5.3)
POTASSIUM SERPL-MCNC: 4.5 MMOL/L — SIGNIFICANT CHANGE UP (ref 3.5–5.3)
POTASSIUM SERPL-SCNC: 4 MMOL/L — SIGNIFICANT CHANGE UP (ref 3.5–5.3)
POTASSIUM SERPL-SCNC: 4 MMOL/L — SIGNIFICANT CHANGE UP (ref 3.5–5.3)
POTASSIUM SERPL-SCNC: 4.3 MMOL/L — SIGNIFICANT CHANGE UP (ref 3.5–5.3)
POTASSIUM SERPL-SCNC: 4.3 MMOL/L — SIGNIFICANT CHANGE UP (ref 3.5–5.3)
POTASSIUM SERPL-SCNC: 4.5 MMOL/L — SIGNIFICANT CHANGE UP (ref 3.5–5.3)
POTASSIUM SERPL-SCNC: 4.5 MMOL/L — SIGNIFICANT CHANGE UP (ref 3.5–5.3)
PROT SERPL-MCNC: 6.9 G/DL — SIGNIFICANT CHANGE UP (ref 6–8.3)
PROT SERPL-MCNC: 6.9 G/DL — SIGNIFICANT CHANGE UP (ref 6–8.3)
PROT SERPL-MCNC: 7.6 G/DL — SIGNIFICANT CHANGE UP (ref 6–8.3)
PROT SERPL-MCNC: 7.6 G/DL — SIGNIFICANT CHANGE UP (ref 6–8.3)
PROT UR-MCNC: NEGATIVE MG/DL — SIGNIFICANT CHANGE UP
PROT UR-MCNC: NEGATIVE MG/DL — SIGNIFICANT CHANGE UP
RBC # BLD: 3.59 M/UL — LOW (ref 4.2–5.8)
RBC # BLD: 3.59 M/UL — LOW (ref 4.2–5.8)
RBC # BLD: 3.6 M/UL — LOW (ref 4.2–5.8)
RBC # BLD: 3.6 M/UL — LOW (ref 4.2–5.8)
RBC # BLD: 3.81 M/UL — LOW (ref 4.2–5.8)
RBC # BLD: 3.81 M/UL — LOW (ref 4.2–5.8)
RBC # FLD: 16.4 % — HIGH (ref 10.3–14.5)
RBC # FLD: 16.6 % — HIGH (ref 10.3–14.5)
RBC # FLD: 16.6 % — HIGH (ref 10.3–14.5)
RBC CASTS # UR COMP ASSIST: 0 /HPF — SIGNIFICANT CHANGE UP (ref 0–4)
RBC CASTS # UR COMP ASSIST: 0 /HPF — SIGNIFICANT CHANGE UP (ref 0–4)
REVIEW: SIGNIFICANT CHANGE UP
REVIEW: SIGNIFICANT CHANGE UP
RSV RNA NPH QL NAA+NON-PROBE: SIGNIFICANT CHANGE UP
RSV RNA NPH QL NAA+NON-PROBE: SIGNIFICANT CHANGE UP
SAO2 % BLDV: 34 % — LOW (ref 67–88)
SAO2 % BLDV: 34 % — LOW (ref 67–88)
SAO2 % BLDV: 42.1 % — LOW (ref 67–88)
SAO2 % BLDV: 42.1 % — LOW (ref 67–88)
SAO2 % BLDV: 47.6 % — LOW (ref 67–88)
SAO2 % BLDV: 47.6 % — LOW (ref 67–88)
SARS-COV-2 RNA SPEC QL NAA+PROBE: SIGNIFICANT CHANGE UP
SARS-COV-2 RNA SPEC QL NAA+PROBE: SIGNIFICANT CHANGE UP
SODIUM SERPL-SCNC: 135 MMOL/L — SIGNIFICANT CHANGE UP (ref 135–145)
SODIUM SERPL-SCNC: 136 MMOL/L — SIGNIFICANT CHANGE UP (ref 135–145)
SODIUM SERPL-SCNC: 136 MMOL/L — SIGNIFICANT CHANGE UP (ref 135–145)
SP GR SPEC: 1.02 — SIGNIFICANT CHANGE UP (ref 1–1.03)
SP GR SPEC: 1.02 — SIGNIFICANT CHANGE UP (ref 1–1.03)
SQUAMOUS # UR AUTO: 2 /HPF — SIGNIFICANT CHANGE UP (ref 0–5)
SQUAMOUS # UR AUTO: 2 /HPF — SIGNIFICANT CHANGE UP (ref 0–5)
UROBILINOGEN FLD QL: 0.2 MG/DL — SIGNIFICANT CHANGE UP (ref 0.2–1)
UROBILINOGEN FLD QL: 0.2 MG/DL — SIGNIFICANT CHANGE UP (ref 0.2–1)
WBC # BLD: 6.88 K/UL — SIGNIFICANT CHANGE UP (ref 3.8–10.5)
WBC # BLD: 6.88 K/UL — SIGNIFICANT CHANGE UP (ref 3.8–10.5)
WBC # BLD: 6.99 K/UL — SIGNIFICANT CHANGE UP (ref 3.8–10.5)
WBC # BLD: 6.99 K/UL — SIGNIFICANT CHANGE UP (ref 3.8–10.5)
WBC # BLD: 9.07 K/UL — SIGNIFICANT CHANGE UP (ref 3.8–10.5)
WBC # BLD: 9.07 K/UL — SIGNIFICANT CHANGE UP (ref 3.8–10.5)
WBC # FLD AUTO: 6.88 K/UL — SIGNIFICANT CHANGE UP (ref 3.8–10.5)
WBC # FLD AUTO: 6.88 K/UL — SIGNIFICANT CHANGE UP (ref 3.8–10.5)
WBC # FLD AUTO: 6.99 K/UL — SIGNIFICANT CHANGE UP (ref 3.8–10.5)
WBC # FLD AUTO: 6.99 K/UL — SIGNIFICANT CHANGE UP (ref 3.8–10.5)
WBC # FLD AUTO: 9.07 K/UL — SIGNIFICANT CHANGE UP (ref 3.8–10.5)
WBC # FLD AUTO: 9.07 K/UL — SIGNIFICANT CHANGE UP (ref 3.8–10.5)
WBC UR QL: 40 /HPF — HIGH (ref 0–5)
WBC UR QL: 40 /HPF — HIGH (ref 0–5)

## 2023-11-03 PROCEDURE — 99223 1ST HOSP IP/OBS HIGH 75: CPT | Mod: FS

## 2023-11-03 PROCEDURE — 99284 EMERGENCY DEPT VISIT MOD MDM: CPT | Mod: GC

## 2023-11-03 PROCEDURE — 71046 X-RAY EXAM CHEST 2 VIEWS: CPT | Mod: 26

## 2023-11-03 RX ORDER — INSULIN LISPRO 100/ML
VIAL (ML) SUBCUTANEOUS
Refills: 0 | Status: DISCONTINUED | OUTPATIENT
Start: 2023-11-03 | End: 2023-11-06

## 2023-11-03 RX ORDER — LOSARTAN POTASSIUM 100 MG/1
100 TABLET, FILM COATED ORAL AT BEDTIME
Refills: 0 | Status: DISCONTINUED | OUTPATIENT
Start: 2023-11-03 | End: 2023-11-06

## 2023-11-03 RX ORDER — INSULIN HUMAN 100 [IU]/ML
9 INJECTION, SOLUTION SUBCUTANEOUS
Qty: 100 | Refills: 0 | Status: DISCONTINUED | OUTPATIENT
Start: 2023-11-03 | End: 2023-11-03

## 2023-11-03 RX ORDER — PANTOPRAZOLE SODIUM 20 MG/1
40 TABLET, DELAYED RELEASE ORAL
Refills: 0 | Status: DISCONTINUED | OUTPATIENT
Start: 2023-11-04 | End: 2023-11-06

## 2023-11-03 RX ORDER — INSULIN GLARGINE 100 [IU]/ML
20 INJECTION, SOLUTION SUBCUTANEOUS ONCE
Refills: 0 | Status: COMPLETED | OUTPATIENT
Start: 2023-11-03 | End: 2023-11-03

## 2023-11-03 RX ORDER — SODIUM CHLORIDE 9 MG/ML
500 INJECTION, SOLUTION INTRAVENOUS
Refills: 0 | Status: COMPLETED | OUTPATIENT
Start: 2023-11-03 | End: 2023-11-03

## 2023-11-03 RX ORDER — DEXTROSE 50 % IN WATER 50 %
25 SYRINGE (ML) INTRAVENOUS ONCE
Refills: 0 | Status: DISCONTINUED | OUTPATIENT
Start: 2023-11-03 | End: 2023-11-06

## 2023-11-03 RX ORDER — SODIUM CHLORIDE 9 MG/ML
1000 INJECTION, SOLUTION INTRAVENOUS
Refills: 0 | Status: DISCONTINUED | OUTPATIENT
Start: 2023-11-03 | End: 2023-11-06

## 2023-11-03 RX ORDER — CEFTRIAXONE 500 MG/1
1000 INJECTION, POWDER, FOR SOLUTION INTRAMUSCULAR; INTRAVENOUS EVERY 24 HOURS
Refills: 0 | Status: DISCONTINUED | OUTPATIENT
Start: 2023-11-03 | End: 2023-11-06

## 2023-11-03 RX ORDER — METOPROLOL TARTRATE 50 MG
50 TABLET ORAL
Refills: 0 | Status: DISCONTINUED | OUTPATIENT
Start: 2023-11-03 | End: 2023-11-06

## 2023-11-03 RX ORDER — ASPIRIN/CALCIUM CARB/MAGNESIUM 324 MG
81 TABLET ORAL DAILY
Refills: 0 | Status: DISCONTINUED | OUTPATIENT
Start: 2023-11-04 | End: 2023-11-06

## 2023-11-03 RX ORDER — INSULIN LISPRO 100/ML
4 VIAL (ML) SUBCUTANEOUS
Refills: 0 | Status: DISCONTINUED | OUTPATIENT
Start: 2023-11-03 | End: 2023-11-06

## 2023-11-03 RX ORDER — DEXTROSE 50 % IN WATER 50 %
15 SYRINGE (ML) INTRAVENOUS ONCE
Refills: 0 | Status: DISCONTINUED | OUTPATIENT
Start: 2023-11-03 | End: 2023-11-06

## 2023-11-03 RX ORDER — CEFTRIAXONE 500 MG/1
1000 INJECTION, POWDER, FOR SOLUTION INTRAMUSCULAR; INTRAVENOUS ONCE
Refills: 0 | Status: COMPLETED | OUTPATIENT
Start: 2023-11-03 | End: 2023-11-03

## 2023-11-03 RX ORDER — GLUCAGON INJECTION, SOLUTION 0.5 MG/.1ML
1 INJECTION, SOLUTION SUBCUTANEOUS ONCE
Refills: 0 | Status: DISCONTINUED | OUTPATIENT
Start: 2023-11-03 | End: 2023-11-06

## 2023-11-03 RX ORDER — DEXTROSE 50 % IN WATER 50 %
12.5 SYRINGE (ML) INTRAVENOUS ONCE
Refills: 0 | Status: DISCONTINUED | OUTPATIENT
Start: 2023-11-03 | End: 2023-11-06

## 2023-11-03 RX ORDER — INSULIN LISPRO 100/ML
6 VIAL (ML) SUBCUTANEOUS ONCE
Refills: 0 | Status: DISCONTINUED | OUTPATIENT
Start: 2023-11-03 | End: 2023-11-03

## 2023-11-03 RX ORDER — TAMSULOSIN HYDROCHLORIDE 0.4 MG/1
0.4 CAPSULE ORAL AT BEDTIME
Refills: 0 | Status: DISCONTINUED | OUTPATIENT
Start: 2023-11-03 | End: 2023-11-06

## 2023-11-03 RX ORDER — ATORVASTATIN CALCIUM 80 MG/1
20 TABLET, FILM COATED ORAL AT BEDTIME
Refills: 0 | Status: DISCONTINUED | OUTPATIENT
Start: 2023-11-03 | End: 2023-11-06

## 2023-11-03 RX ORDER — INSULIN LISPRO 100/ML
VIAL (ML) SUBCUTANEOUS AT BEDTIME
Refills: 0 | Status: DISCONTINUED | OUTPATIENT
Start: 2023-11-03 | End: 2023-11-06

## 2023-11-03 RX ORDER — FUROSEMIDE 40 MG
40 TABLET ORAL DAILY
Refills: 0 | Status: DISCONTINUED | OUTPATIENT
Start: 2023-11-04 | End: 2023-11-06

## 2023-11-03 RX ORDER — SODIUM CHLORIDE 9 MG/ML
1000 INJECTION INTRAMUSCULAR; INTRAVENOUS; SUBCUTANEOUS ONCE
Refills: 0 | Status: COMPLETED | OUTPATIENT
Start: 2023-11-03 | End: 2023-11-03

## 2023-11-03 RX ADMIN — SODIUM CHLORIDE 500 MILLILITER(S): 9 INJECTION, SOLUTION INTRAVENOUS at 15:42

## 2023-11-03 RX ADMIN — ATORVASTATIN CALCIUM 20 MILLIGRAM(S): 80 TABLET, FILM COATED ORAL at 21:58

## 2023-11-03 RX ADMIN — INSULIN GLARGINE 20 UNIT(S): 100 INJECTION, SOLUTION SUBCUTANEOUS at 21:59

## 2023-11-03 RX ADMIN — TAMSULOSIN HYDROCHLORIDE 0.4 MILLIGRAM(S): 0.4 CAPSULE ORAL at 21:58

## 2023-11-03 RX ADMIN — Medication 50 MILLIGRAM(S): at 21:59

## 2023-11-03 RX ADMIN — SODIUM CHLORIDE 2000 MILLILITER(S): 9 INJECTION INTRAMUSCULAR; INTRAVENOUS; SUBCUTANEOUS at 11:51

## 2023-11-03 RX ADMIN — SODIUM CHLORIDE 1000 MILLILITER(S): 9 INJECTION INTRAMUSCULAR; INTRAVENOUS; SUBCUTANEOUS at 14:02

## 2023-11-03 RX ADMIN — CEFTRIAXONE 100 MILLIGRAM(S): 500 INJECTION, POWDER, FOR SOLUTION INTRAMUSCULAR; INTRAVENOUS at 15:08

## 2023-11-03 RX ADMIN — LOSARTAN POTASSIUM 100 MILLIGRAM(S): 100 TABLET, FILM COATED ORAL at 21:59

## 2023-11-03 NOTE — ED PROVIDER NOTE - OBJECTIVE STATEMENT
72-year-old man with PMH HTN, HLD, diabetes type 1 on insulin pump with NPH, CABG, HFrEF LVEF 33%, afib off Eliquis, presenting due to blood glucose level 400 at home with nausea and generalized weakness.  Patient denies fevers, chills, vomiting, diarrhea, chest pain, abdominal pain.  Has had DKA in the past but states that this time does not appear to be "that bad".

## 2023-11-03 NOTE — CONSULT NOTE ADULT - ASSESSMENT
71 yo M w/ pmh of HTN, CAD c/b NSTEMI s/p CABG x3V, HFrEF LVEF 33%, AF off Eliquis on ASA only, Type I DM (50 years ago, on Insulin pump for last 15 years followed by Dr. Ngo, Endocrinologist) hospitalized w/DKA 3/2023 during NSTEMI, Vertigo presents w/ elevated blood glucose. MICU consulted for mild DKA.

## 2023-11-03 NOTE — CONSULT NOTE ADULT - SUBJECTIVE AND OBJECTIVE BOX
Patient:  JOANIE MESA  [id number]    CHIEF COMPLAINT: High sugar    HPI: 71 yo M w/ pmh of HTN, CAD c/b NSTEMI s/p CABG x3V, HFrEF LVEF 33%, AF off Eliquis on ASA only, Type I DM (50 years ago, on Insulin pump for last 15 years followed by Dr. Ngo, Endocrinologist) hospitalized w/DKA 3/2023 during NSTEMI, Vertigo presents due to elevated BG. Pt reports he was at home and his insulin sensor . Pt reports that he was advised by wife to come to ED. Pt reports he had one prior episode of DKA earlier this year in March, but usually doesn't have DKA episodes. Pt reports insulin pump is functioning well. Pt reports receiving COVID vaccine on Monday and subsequently had myalgia. Pt reports only having nausea. Pt denies fever, chills, vomiting, diarrhea, abdominal pain, recent infections, open wounds/sores, sick contacts, or any recent travel. Pt reports feeling well and has an appetite.     In ED: CTX 1g, 1 L LR, insulin infusion    PAST MEDICAL & SURGICAL HISTORY:  Hypertension      BPH (benign prostatic hyperplasia)      Type I diabetes mellitus      CAD (coronary artery disease)      PAF (paroxysmal atrial fibrillation)      DKA, type 1      S/P cataract surgery  right eye      Steel plate in right arm      S/P CABG x 3          FAMILY HISTORY:  No pertinent family history in first degree relatives        SOCIAL HISTORY:    Allergies    Neosporin (Rash)    Intolerances        HOME MEDICATIONS:    REVIEW OF SYSTEMS:  [ ] Unable to assess ROS because ______  [X] Negative except as stated in HPI  CONSTITUTIONAL: +Nausea; No fever, chills, night sweats, or fatigue  EYES: No eye pain, visual disturbances, or discharge  ENMT:  No difficulty hearing, tinnitus, vertigo; No sinus or throat pain  NECK: No pain or stiffness  BREASTS: No pain, masses, or nipple discharge  RESPIRATORY: No cough, wheezing, or hemoptysis; No shortness of breath  CARDIOVASCULAR: No chest pain, palpitations, dizziness, or leg swelling  GASTROINTESTINAL: No abdominal or epigastric pain. No vomiting or hematemesis; No diarrhea or constipation. No melena or hematochezia.  GENITOURINARY: No dysuria, frequency, hematuria, or incontinence  NEUROLOGICAL: No headaches, memory loss, loss of strength, numbness, or tremors  SKIN: No itching, burning, rashes, or lesions   LYMPH NODES: No enlarged glands  ENDOCRINE: No heat or cold intolerance; No hair loss  MUSCULOSKELETAL: No joint pain or swelling; No muscle, back, or extremity pain  PSYCHIATRIC: No depression, anxiety, mood swings, or difficulty sleeping  HEME/LYMPH: No easy bruising, or bleeding gums  ALLERGY AND IMMUNOLOGIC: No hives or eczema    OBJECTIVE:  T(F): 98 (23 @ 13:58), Max: 98 (23 @ 13:58)  HR: 80 (23 @ 13:58) (80 - 91)  BP: 160/77 (23 @ 13:58) (119/106 - 160/77)  BP(mean): 105 (23 @ 13:58) (105 - 111)  ABP: --  ABP(mean): --  RR: 16 (23 @ 13:58) (16 - 20)  SpO2: 97% (23 @ 13:58) (97% - 100%)    I/O Summary 24H    CAPILLARY BLOOD GLUCOSE      POCT Blood Glucose.: 279 mg/dL (2023 13:42)      PHYSICAL EXAM:  GENERAL: NAD, lying in bed comfortably  HEAD:  Atraumatic, Normocephalic  EYES: EOMI, PERRLA, conjunctiva and sclera clear  ENT: Moist mucous membranes  NECK: Supple, No JVD  CHEST/LUNG: Clear to auscultation bilaterally; No rales, rhonchi, wheezing, or rubs. Unlabored respirations  HEART: Regular rate and rhythm; No murmurs, rubs, or gallops  ABDOMEN: Bowel sounds present; Soft, Nontender, Nondistended. No hepatomegaly  EXTREMITIES: +insulin sensor in place in RUE;  2+ Peripheral Pulses, brisk capillary refill. No clubbing, cyanosis, or edema  NERVOUS SYSTEM:  Alert & Oriented X3, speech clear. No deficits   MSK: FROM all 4 extremities, full and equal strength  SKIN: No rashes or lesions    HOSPITAL MEDICATIONS:  MEDICATIONS  (STANDING):  cefTRIAXone   IVPB 1000 milliGRAM(s) IV Intermittent once  lactated ringers. 500 milliLiter(s) (500 mL/Hr) IV Continuous <Continuous>    MEDICATIONS  (PRN):      LABS:  CBC 23 @ 11:47                        10.9   9.07  )-----------( 258                   34.1       Hgb trend: 10.9 <--   WBC trend: 9.07 <--     CMP 23 @ 11:47    136  |  95<L>  |  30<H>  ----------------------------<  392<H>  4.3   |  17<L>  |  1.00    Ca    9.6      23 @ 11:47  Phos  2.9       Mg     2.0         TPro  7.6  /  Alb  4.3  /  TBili  0.7  /  DBili  x   /  AST  16  /  ALT  23  /  AlkPhos  75        Serum Cr trend: 1.00 <--       ABG Trend:     VBG Trend:   23 @ 11:15 - pH: 7.28  | pCO2: 44    | pO2: 26    | HCO3: 21    | Lactate: 3.3        MICROBIOLOGY:       RADIOLOGY:  [ ] Reviewed and interpreted by me    EKG

## 2023-11-03 NOTE — ED CDU PROVIDER INITIAL DAY NOTE - CLINICAL SUMMARY MEDICAL DECISION MAKING FREE TEXT BOX
Attending MD Levin:  72-year-old man with PMH HTN, HLD, diabetes type 1 on insulin pump with NPH, CABG, HFrEF LVEF 33%, afib off Eliquis, presenting due to blood glucose level 400 at home with nausea and generalized weakness. Patient denies fever, chills,  vomiting, or diarrhea. Patient denies chest pain, palpitations, SOB, or diaphoresis.  Physical exam normal.  Will get labs, start IVF, and place in CDU for endocrine eval and further glucose monitoring.

## 2023-11-03 NOTE — ED CDU PROVIDER INITIAL DAY NOTE - ATTENDING APP SHARED VISIT CONTRIBUTION OF CARE
Attending MD Levin:   I personally have seen and examined this patient.  Physician assistant note reviewed and agree on plan of care and except where noted.  Please see my MDM for further details.

## 2023-11-03 NOTE — ED CDU PROVIDER INITIAL DAY NOTE - NSICDXFAMILYHX_GEN_ALL_CORE_FT
FAMILY HISTORY:  No pertinent family history in first degree relatives     FAMILY HISTORY:  Sibling  Still living? Unknown  FHx: diabetes mellitus, Age at diagnosis: Age Unknown

## 2023-11-03 NOTE — ED ADULT NURSE REASSESSMENT NOTE - NS ED NURSE REASSESS COMMENT FT1
Pt received from RN Laura Abad. Pt oriented to CDU & plan of care was discussed. Pt A&O x 4. Pt in CDU for close blood glucose and endocrinology consult. Bedtime fingerstick WNL. V/S stable, pt afebrile,  IV in place, patent and free of signs of infiltration. Pt resting in bed. Safety & comfort measures maintained. Call bell in reach. Care continues. Patient instructed to remove insulin pump at midnight. Patient verbalized understanding.

## 2023-11-03 NOTE — ED CDU PROVIDER INITIAL DAY NOTE - OBJECTIVE STATEMENT
72-year-old man with PMH HTN, HLD, Diabetes with an insulin pump, DKA, CABG, HFrEF LVEF 33%, Afib, ICD presenting due to blood glucose level 400 at home with nausea and generalized weakness.  Patient denies fevers, chills, vomiting, diarrhea, chest pain, shortness of breath, abdominal pain.  Ambulates with a cane.   ED course: 72-year-old man with PMH HTN, HLD, Diabetes with an insulin pump, DKA, CABG, HFrEF LVEF 33%, Afib, ICD presenting due to blood glucose level 400 at home with nausea and generalized weakness.  Patient denies fevers, chills, vomiting, diarrhea, chest pain, shortness of breath, abdominal pain.  Ambulates with a cane.   ED course: Concern for DKA, MICU consulted. IVF given with improving labs. MICU did not recommend ICU admission. Plan for endocrine consult and monitor of glucose in CDU. Of note, patient was started on Ceftriaxone for +UA, culture sent.

## 2023-11-03 NOTE — ED CDU PROVIDER INITIAL DAY NOTE - PROGRESS NOTE DETAILS
Spoke with endocrine with patient. Endocrine recommending Lantus 20 units STAT then to take off insulin pump 2 hours afterwards, Admelog 4 units with meals and low dose sliding scale. - Samantha Choudhury PA-C

## 2023-11-03 NOTE — CONSULT NOTE ADULT - ATTENDING COMMENTS
73 yo M w/ pmh of HTN, CAD c/b NSTEMI s/p CABG x3V, HFrEF LVEF 33%, AF off Eliquis  Type I DM (50 years ago, on Insulin pump p/w mild DKA  Pt reports poss malfunction off his pump and monitor. Pt  denies, CP, SOB, fever, cough, abd pain, diarrhea or vomiting. Denies skin infections. Only +nausea earlier.  AG has closed, ph normal after fluids and insulin.   f/u Endocrine reccs- lantus to be started  Does not need ICU at this time. Please reconsult if condition changes

## 2023-11-03 NOTE — ED ADULT NURSE NOTE - OBJECTIVE STATEMENT
Patient is a 73 yo male A&OX4 PMH HTN, DM1, HLD, hx DKA 3/2023 s/p triple bypass  presenting too the ED from home complaining of hyperglycemia. Patient Patient is a 73 yo male A&OX4 PMH HTN, DM1, HLD, hx DKA 3/2023 s/p triple bypass  presenting too the ED from home complaining of hyperglycemia. Patient states he has an insulin pump that he feels has been malfunctioning. Patient endorses increased dehydration and urination x2 days. [facility], approx. around the 390s-400s. Patient was told to seek medical eval at [facility]. Patient denies changes in mental status. Denies headaches, blurred vision, sensory changes. Patient denies fever/chills, SOB, chest pain, abd pain, n/v/diarrhea, urinary symptoms, dizziness, weakness. 20G placed in LAC. Call bell within reach, side rails up, bed in lowest position.

## 2023-11-03 NOTE — ED CDU PROVIDER INITIAL DAY NOTE - DETAILS
monitor blood glucose, endocrine consult, DW ED team  Ceftriaxone for UTI  vitals every 4 hours, frequent reevaluations

## 2023-11-03 NOTE — ED PROVIDER NOTE - PROGRESS NOTE DETAILS
Can, PGY3 - patient in dka. insuling infusion ordered and bolus discontinued 2/2 glucose improvement. MICU consulted, will come see patient. Can, PGY3 - MICU does not believe patient needs additional insulin considering marked improvement in glucose with pump and fluids, patient otherwise appears well. will continue monitoring, rpt labs in 4 hours

## 2023-11-03 NOTE — CONSULT NOTE ADULT - PROBLEM SELECTOR RECOMMENDATION 9
- Pt's insulin pump functioning at 3.5 U/hr  - Hold off starting insulin drip iso improving labs  - Recommend 500 LR bolus and encourage additional PO hydration  - Recommend rechecking BMP, VBG w/ lytes in 4 hours  - Consult Endo when pt is admitted to floors to ensure insulin pump is functioning optimally  - MICU will f/u labs  - Pt does not require MICU level of care at this time. Please re-consult if pt acutely decompensates or glucose is still elevated despite recommendations - Pt's insulin pump functioning at 3.5 U/hr  - Hold off starting insulin drip iso improving labs  - Recommend 500 LR bolus and encourage additional PO hydration  - Recommend rechecking BMP, VBG w/ lytes in 4 hours  - Recommend checking troponin  - Consult Endo when pt is admitted to floors to ensure insulin pump is functioning optimally  - MICU will f/u labs  - Pt does not require MICU level of care at this time. Please re-consult if pt acutely decompensates or glucose is still elevated despite recommendations - Pt's insulin pump functioning at 3.5 U/hr  - Hold off starting insulin drip iso improving labs  - Recommend 500 LR bolus and encourage additional PO hydration  - Recommend rechecking BMP, VBG w/ lytes in 4 hours  - Recommend checking troponin  - Consult Endo when pt is admitted to floors to ensure insulin pump is functioning optimally  - MICU will f/u labs  - Recommend DC CTX   - Pt does not require MICU level of care at this time. Please re-consult if pt acutely decompensates or glucose is still elevated despite recommendations

## 2023-11-03 NOTE — ED PROVIDER NOTE - CLINICAL SUMMARY MEDICAL DECISION MAKING FREE TEXT BOX
Can, PGY3 - 72-year-old man with diabetes type 1 on insulin pump, CHF with reduced ejection fraction, presenting due to high blood glucose associated with nausea and weakness.  Patient otherwise in no distress, consider DKA due to underlying infection.  Labs, UA, reassess. *The above represents an initial assessment/impression. Please refer to progress notes for potential changes in patient clinical course* Can, PGY3 - 72-year-old man with diabetes type 1 on insulin pump, CHF with reduced ejection fraction, presenting due to high blood glucose associated with nausea and weakness.  Patient otherwise in no distress, consider DKA due to underlying infection.  Labs, UA, reassess. *The above represents an initial assessment/impression. Please refer to progress notes for potential changes in patient clinical course*    Attending MD Levin:  72-year-old man with PMH HTN, HLD, diabetes type 1 on insulin pump with NPH, CABG, HFrEF LVEF 33%, afib off Eliquis, presenting due to blood glucose level 400 at home with nausea and generalized weakness. Patient denies fever, chills,  vomiting, or diarrhea. Patient denies chest pain, palpitations, SOB, or diaphoresis.  Physical exam normal.  Will get labs, start IVF, and rule out DKA.

## 2023-11-03 NOTE — ED ADULT NURSE NOTE - NSFALLUNIVINTERV_ED_ALL_ED
Bed/Stretcher in lowest position, wheels locked, appropriate side rails in place/Call bell, personal items and telephone in reach/Instruct patient to call for assistance before getting out of bed/chair/stretcher/Non-slip footwear applied when patient is off stretcher/[place] to call system/Physically safe environment - no spills, clutter or unnecessary equipment/Purposeful proactive rounding/Room/bathroom lighting operational, light cord in reach

## 2023-11-03 NOTE — CHART NOTE - NSCHARTNOTEFT_GEN_A_CORE
Endocrinology consulted for Mr. Major who has T1DM on a tslimx2 insulin pump presenting in DKA as in 400s at home, not able to bring sugar down, had some nausea, no vomiting, now resolved but with hyperglycemia. Patient reports does not think pump working.    Tslimx2 with novolog insulin and dexcom g6, unable to tell me settings. Reports has changed slightly but from recent admission  basal   12a 0.08  3a 1.19  11a 1.19  5p 1.2  8p 1.1  ICR 1:15  ISF 1:50  target 110    Follows with Dr. Ngo.    Recommend  - give lantus 20 units now (and every 24hr), remove insulin pump 2hr after  - start admelog 4 units tid premeal  - start low admelog correction scale tid premeal  - start low admelog correction scale at bedtime  - fingersticks qid with meals and bedtime  - target bg 100-180  - hypoglycemia protocol prn  - carb consistent diet    Case discussed with provider. Full consult tomorrow.    Obey Cash MD  Endocrinology Fellow

## 2023-11-04 VITALS
HEART RATE: 72 BPM | RESPIRATION RATE: 16 BRPM | DIASTOLIC BLOOD PRESSURE: 73 MMHG | SYSTOLIC BLOOD PRESSURE: 127 MMHG | OXYGEN SATURATION: 99 % | TEMPERATURE: 97 F

## 2023-11-04 DIAGNOSIS — Z92.89 PERSONAL HISTORY OF OTHER MEDICAL TREATMENT: ICD-10-CM

## 2023-11-04 DIAGNOSIS — E78.5 HYPERLIPIDEMIA, UNSPECIFIED: ICD-10-CM

## 2023-11-04 DIAGNOSIS — I10 ESSENTIAL (PRIMARY) HYPERTENSION: ICD-10-CM

## 2023-11-04 DIAGNOSIS — E10.9 TYPE 1 DIABETES MELLITUS WITHOUT COMPLICATIONS: ICD-10-CM

## 2023-11-04 LAB
ANION GAP SERPL CALC-SCNC: 11 MMOL/L — SIGNIFICANT CHANGE UP (ref 5–17)
ANION GAP SERPL CALC-SCNC: 11 MMOL/L — SIGNIFICANT CHANGE UP (ref 5–17)
B-OH-BUTYR SERPL-SCNC: 1.9 MMOL/L — HIGH
B-OH-BUTYR SERPL-SCNC: 1.9 MMOL/L — HIGH
BUN SERPL-MCNC: 15 MG/DL — SIGNIFICANT CHANGE UP (ref 7–23)
BUN SERPL-MCNC: 15 MG/DL — SIGNIFICANT CHANGE UP (ref 7–23)
CALCIUM SERPL-MCNC: 8.6 MG/DL — SIGNIFICANT CHANGE UP (ref 8.4–10.5)
CALCIUM SERPL-MCNC: 8.6 MG/DL — SIGNIFICANT CHANGE UP (ref 8.4–10.5)
CHLORIDE SERPL-SCNC: 107 MMOL/L — SIGNIFICANT CHANGE UP (ref 96–108)
CHLORIDE SERPL-SCNC: 107 MMOL/L — SIGNIFICANT CHANGE UP (ref 96–108)
CO2 SERPL-SCNC: 23 MMOL/L — SIGNIFICANT CHANGE UP (ref 22–31)
CO2 SERPL-SCNC: 23 MMOL/L — SIGNIFICANT CHANGE UP (ref 22–31)
CREAT SERPL-MCNC: 0.64 MG/DL — SIGNIFICANT CHANGE UP (ref 0.5–1.3)
CREAT SERPL-MCNC: 0.64 MG/DL — SIGNIFICANT CHANGE UP (ref 0.5–1.3)
EGFR: 99 ML/MIN/1.73M2 — SIGNIFICANT CHANGE UP
EGFR: 99 ML/MIN/1.73M2 — SIGNIFICANT CHANGE UP
GLUCOSE BLDC GLUCOMTR-MCNC: 117 MG/DL — HIGH (ref 70–99)
GLUCOSE BLDC GLUCOMTR-MCNC: 117 MG/DL — HIGH (ref 70–99)
GLUCOSE BLDC GLUCOMTR-MCNC: 169 MG/DL — HIGH (ref 70–99)
GLUCOSE BLDC GLUCOMTR-MCNC: 169 MG/DL — HIGH (ref 70–99)
GLUCOSE SERPL-MCNC: 132 MG/DL — HIGH (ref 70–99)
GLUCOSE SERPL-MCNC: 132 MG/DL — HIGH (ref 70–99)
POTASSIUM SERPL-MCNC: 3.7 MMOL/L — SIGNIFICANT CHANGE UP (ref 3.5–5.3)
POTASSIUM SERPL-MCNC: 3.7 MMOL/L — SIGNIFICANT CHANGE UP (ref 3.5–5.3)
POTASSIUM SERPL-SCNC: 3.7 MMOL/L — SIGNIFICANT CHANGE UP (ref 3.5–5.3)
POTASSIUM SERPL-SCNC: 3.7 MMOL/L — SIGNIFICANT CHANGE UP (ref 3.5–5.3)
SODIUM SERPL-SCNC: 141 MMOL/L — SIGNIFICANT CHANGE UP (ref 135–145)
SODIUM SERPL-SCNC: 141 MMOL/L — SIGNIFICANT CHANGE UP (ref 135–145)

## 2023-11-04 PROCEDURE — 99239 HOSP IP/OBS DSCHRG MGMT >30: CPT

## 2023-11-04 RX ORDER — CEFDINIR 250 MG/5ML
1 POWDER, FOR SUSPENSION ORAL
Qty: 10 | Refills: 0
Start: 2023-11-04 | End: 2023-11-08

## 2023-11-04 RX ORDER — INSULIN LISPRO 100/ML
4 VIAL (ML) SUBCUTANEOUS
Qty: 1 | Refills: 0
Start: 2023-11-04

## 2023-11-04 RX ORDER — INSULIN GLARGINE 100 [IU]/ML
20 INJECTION, SOLUTION SUBCUTANEOUS
Qty: 1 | Refills: 0
Start: 2023-11-04

## 2023-11-04 RX ADMIN — Medication 81 MILLIGRAM(S): at 12:52

## 2023-11-04 RX ADMIN — PANTOPRAZOLE SODIUM 40 MILLIGRAM(S): 20 TABLET, DELAYED RELEASE ORAL at 08:28

## 2023-11-04 RX ADMIN — Medication 1: at 12:57

## 2023-11-04 RX ADMIN — Medication 50 MILLIGRAM(S): at 08:28

## 2023-11-04 RX ADMIN — Medication 40 MILLIGRAM(S): at 08:28

## 2023-11-04 RX ADMIN — Medication 4 UNIT(S): at 12:57

## 2023-11-04 RX ADMIN — Medication 4 UNIT(S): at 08:28

## 2023-11-04 NOTE — ED CDU PROVIDER SUBSEQUENT DAY NOTE - HISTORY
No interval changes since initial CDU provider note. Pt feels well without complaint, was able to tolerate PO last night. NAD VSS. Pending endocrine consult in the setting of hyperglycemia/DKA., emmy Choudhury

## 2023-11-04 NOTE — ED CDU PROVIDER SUBSEQUENT DAY NOTE - PHYSICAL EXAMINATION
· Physical Examination:   · CONSTITUTIONAL: Well appearing, awake, alert, oriented to person, place, time/situation and in no apparent distress.  · ENMT: Airway patent.  · EYES: Clear bilaterally, EOMI  · CARDIAC: Normal rate, regular rhythm.  Heart sounds S1, S2.  No murmurs  · RESPIRATORY: CTAB, no wheezing  · GASTROINTESTINAL: Abdomen soft, non-tender  · MUSCULOSKELETAL: Moving extremities  · NEUROLOGICAL: Alert and oriented, no focal deficits, clear speech, follows commands  · SKIN: No visible rash  · PSYCHIATRIC: normal mood and affect.

## 2023-11-04 NOTE — ED ADULT NURSE REASSESSMENT NOTE - NS ED NURSE REASSESS COMMENT FT1
Received awake alert and orientedx4 at 7am FS done as ordered. Pt comfortable Spouse at bedside ENDOCRINOLOGIST consult done. Pend d/c home.

## 2023-11-04 NOTE — ED CDU PROVIDER DISPOSITION NOTE - PATIENT PORTAL LINK FT
You can access the FollowMyHealth Patient Portal offered by [facility] by registering at the following website: [email or web address]. By joining [facility]’s FollowMyHealth portal, you will also be able to view your health information using other applications (apps) compatible with our system.

## 2023-11-04 NOTE — CONSULT NOTE ADULT - CONSULT REASON
"Requested Prescriptions   Pending Prescriptions Disp Refills     triamcinolone (KENALOG) 0.1 % external cream [Pharmacy Med Name: TRIAMCINOLONE 0.1% CREAM   30GM] 30 g 0    Last Written Prescription Date:  08/12/2018 #30g x 0  Last filled 08/12/2018  Last office visit: 8/8/2018 TIM Sykes   Future Office Visit: None     Sig: APPLY SPARINGLY TO THE AFFECTED AREA THREE TIMES DAILY FOR 14 DAYS MAX. DO NOT APPLY TO FACE.    Topical Steroids and Nonsteroidals Protocol Passed - 1/3/2019 10:39 AM       Passed - Patient is age 6 or older       Passed - Authorizing prescriber's most recent note related to this medication read.    If refill request is for ophthalmic use, please forward request to provider for approval.         Passed - High potency steroid not ordered       Passed - Recent (12 mo) or future (30 days) visit within the authorizing provider's specialty    Patient had office visit in the last 12 months or has a visit in the next 30 days with authorizing provider or within the authorizing provider's specialty.  See \"Patient Info\" tab in inbasket, or \"Choose Columns\" in Meds & Orders section of the refill encounter.                "
Prescription approved per [facility] Refill Protocol.  Kenia Pagan RN     
DKA
DKA

## 2023-11-04 NOTE — CONSULT NOTE ADULT - ASSESSMENT
Mr. Major is a 72 year old male with a PMHx of HTN, HLD, T1DM, CABG, HFrEF, Afib s/p ICD who presents with hyperglycemia, nausea and weakness found to be in DKA. Endocrinology consulted for management of DKA and T1DM.    Poorly controlled T1DM with hyperglycemia  DKA  Insulin pump  - HbA1c: 7.3  - Home Regimen:   - Endocrinologist: Dr. Ngo  - on admission , AG 24, bicarb 17, pH 7.28, UA 80 ketones consistent with DKA now resolved  PLAN  - Start Lantus  units at bedtime. DO NOT HOLD IF NPO.  - Start Admelog  units TID pre-meal. HOLD IF NPO.  - Use moderate/Use low dose Admelog correction scale pre-meal  - Use moderate/Use low dose Admelog correction scale at bedtime  - Fingerstick BG before meals and bedtime  - Goal -180  - Carbohydrate consistent diet  - RD consult  Discharge plan:  - Discharge medications: ************************  - Patient to call doctor with persistent high or low BG at home.   - Ensure patient has glucometer, test strips and lancets on discharge.  - Recommend routine outpatient ophthalmology, podiatry and endocrinology f/u    HTN  - Home regimen: losartan 100mg daily  PLAN  - Can check urine microalbumin outpatient  - Outpatient goal BP <130/80. Management per primary team.    HLD  - Home regimen: atorvastatin 20mg dialy  PLAN  - Continue atorvastatin 20mg daily per primary team  - Can check lipid profile if not done recently    Discussed with primary team.    Obey Cash MD, Endocrinology Fellow  Pager [phone] from 9am to 5pm. After hours and on weekends, please call [phone].   Mr. Major is a 72 year old male with a PMHx of HTN, HLD, T1DM, CABG, HFrEF, Afib s/p ICD who presents with hyperglycemia, nausea and weakness found to be in DKA. Endocrinology consulted for management of DKA and T1DM.    Poorly controlled T1DM with hyperglycemia  DKA  Insulin pump  - HbA1c: 7.3  - Home Regimen:    txlim x2 insulin pump with novolog insulin and dexcom g6  basal  12a 0.75  3a 1.187  11a 1.19  5p 1.18  8p 1.1  ISF  12a 1:50  ICR   12a 1:!5  target 110  duration 5hr  - Endocrinologist: Dr. Ngo  - on admission , AG 24, bicarb 17, pH 7.28, UA 80 ketones consistent with DKA now resolved  PLAN  - Start Lantus 20 units at bedtime. DO NOT HOLD IF NPO.  - Start Admelog 4 units TID pre-meal. HOLD IF NPO.  - Use low dose Admelog correction scale pre-meal  - Use low dose Admelog correction scale at bedtime  - Fingerstick BG before meals and bedtime  - Goal -180  - Carbohydrate consistent diet  - RD consult  Discharge plan:  - Discharge medications: Can resume insulin pump home settings starting between 8-10pm on 11/4. Would send rx for long acting insulin 20 units (basaglar/lantus/semglee/tresiba etc) and short acting 4 units tid premeal (admelog/novolog/humalog etc) in case of pump malfunction in the future.  - Patient to call doctor with persistent high or low BG at home.   - Ensure patient has glucometer, test strips and lancets on discharge.  - Recommend routine outpatient ophthalmology, podiatry and endocrinology f/u    HTN  - Home regimen: losartan 100mg daily  PLAN  - Can check urine microalbumin outpatient  - Outpatient goal BP <130/80. Management per primary team.    HLD  - Home regimen: atorvastatin 20mg dialy  PLAN  - Continue atorvastatin 20mg daily per primary team  - Can check lipid profile if not done recently    Discussed with primary team.    Obey Cash MD, Endocrinology Fellow  Pager [phone] from 9am to 5pm. After hours and on weekends, please call [phone].   Mr. Major is a 72 year old male with a PMHx of HTN, HLD, T1DM, CABG, HFrEF, Afib s/p ICD who presents with hyperglycemia, nausea and weakness found to be in DKA. Endocrinology consulted for management of DKA and T1DM.    Poorly controlled T1DM with hyperglycemia  DKA  Insulin pump  - HbA1c: 7.3  - Home Regimen:    txlim x2 insulin pump with novolog insulin and dexcom g6  basal  12a 0.75  3a 1.187  11a 1.19  5p 1.18  8p 1.1  ISF  12a 1:50  ICR   12a 1:!5  target 110  duration 5hr  - Endocrinologist: Dr. Ngo  - on admission , AG 24, bicarb 17, pH 7.28, UA 80 ketones consistent with DKA now resolved  PLAN  - Start Lantus 20 units at bedtime if remains inpatient. DO NOT HOLD IF NPO.  - Start Admelog 4 units TID pre-meal. HOLD IF NPO.  - Use low dose Admelog correction scale pre-meal  - Use low dose Admelog correction scale at bedtime  - Fingerstick BG before meals and bedtime  - Goal -180  - Carbohydrate consistent diet  - RD consult  Discharge plan:  - Discharge medications: Can resume insulin pump home settings starting between 8-10pm on 11/4. Would send rx for long acting insulin 20 units (basaglar/lantus/semglee/tresiba etc) and short acting 4 units tid premeal (admelog/novolog/humalog etc) in case of pump malfunction in the future.  - Patient to call doctor with persistent high or low BG at home.   - Ensure patient has glucometer, test strips and lancets on discharge.  - Recommend routine outpatient ophthalmology, podiatry and endocrinology f/u    HTN  - Home regimen: losartan 100mg daily  PLAN  - Can check urine microalbumin outpatient  - Outpatient goal BP <130/80. Management per primary team.    HLD  - Home regimen: atorvastatin 20mg dialy  PLAN  - Continue atorvastatin 20mg daily per primary team  - Can check lipid profile if not done recently    Discussed with primary team.    Obey Cash MD, Endocrinology Fellow  Pager [phone] from 9am to 5pm. After hours and on weekends, please call [phone].   Mr. Major is a 72 year old male with a PMHx of HTN, HLD, T1DM, CABG, HFrEF, Afib s/p ICD who presents with hyperglycemia, nausea and weakness found to be in DKA. Endocrinology consulted for management of DKA and T1DM.    Poorly controlled T1DM with hyperglycemia  DKA  Insulin pump  - HbA1c: 7.3  - Home Regimen:    txlim x2 insulin pump with novolog insulin and dexcom g6  basal  12a 0.75  3a 1.187  11a 1.19  5p 1.18  8p 1.1  ISF  12a 1:50  ICR   12a 1:15  target 110  duration 5hr  - Endocrinologist: Dr. Ngo  - on admission , AG 24, bicarb 17, pH 7.28, UA 80 ketones consistent with DKA now resolved  PLAN  - Start Lantus 20 units at bedtime if remains inpatient. DO NOT HOLD IF NPO.  - Start Admelog 4 units TID pre-meal. HOLD IF NPO.  - Use low dose Admelog correction scale pre-meal  - Use low dose Admelog correction scale at bedtime  - Fingerstick BG before meals and bedtime  - Goal -180  - Carbohydrate consistent diet  - RD consult  Discharge plan:  - Discharge medications: Can resume insulin pump home settings starting between 8-10pm on 11/4. Would send rx for long acting insulin 20 units (basaglar/lantus/semglee/tresiba etc) and short acting 4 units tid premeal (admelog/novolog/humalog etc) in case of pump malfunction in the future.  - Patient to call doctor with persistent high or low BG at home.   - Ensure patient has glucometer, test strips and lancets on discharge.  - Recommend routine outpatient ophthalmology, podiatry and endocrinology f/u    HTN  - Home regimen: losartan 100mg daily  PLAN  - Can check urine microalbumin outpatient  - Outpatient goal BP <130/80. Management per primary team.    HLD  - Home regimen: atorvastatin 20mg dialy  PLAN  - Continue atorvastatin 20mg daily per primary team  - Can check lipid profile if not done recently    Discussed with primary team.    Obey Cash MD, Endocrinology Fellow  Pager [phone] from 9am to 5pm. After hours and on weekends, please call [phone].

## 2023-11-04 NOTE — ED CDU PROVIDER SUBSEQUENT DAY NOTE - CLINICAL SUMMARY MEDICAL DECISION MAKING FREE TEXT BOX
Julissa Leslie MD - Attending Physician: Pt here with hyperglycemia, poorly controlled on home pump and settings. Since CDU arrival, remains asymptomatic, labs improved BG improved. Awaiting Endo for reeval and likely dc home

## 2023-11-04 NOTE — ED CDU PROVIDER SUBSEQUENT DAY NOTE - PROGRESS NOTE DETAILS
Pt resting comfortably. NAD. No complaints. VSS. Pt seen and evaluated by the Endo Fellow and recommends to resume insulin pump home settings starting between 8-10pm on 11/4. rx for long acting insulin 20 units  and short acting 4 units tid premeal in case of pump malfunction in the future. Plan discussed with pt and spouse. understand. Strict return precautions advised. Case discussed with Dr. Leslie. -KELLI Whiteside

## 2023-11-04 NOTE — ED CDU PROVIDER SUBSEQUENT DAY NOTE - NS ED ROS FT
· CONSTITUTIONAL: no fever and no chills.  · GASTROINTESTINAL: - - -   · Gastrointestinal [+]: NAUSEA  · Gastrointestinal [-]: no vomiting  · ENDOCRINE: - - -   · Endocrine [+]: HYPERGLYCEMIA, +known diabetes  · ROS STATEMENT: all other ROS negative except as per HPI

## 2023-11-04 NOTE — ED CDU PROVIDER DISPOSITION NOTE - CLINICAL COURSE
72-year-old man with PMH HTN, HLD, Diabetes with an insulin pump, DKA, CABG, HFrEF LVEF 33%, Afib, ICD presenting due to blood glucose level 400 at home with nausea and generalized weakness.  Patient denies fevers, chills, vomiting, diarrhea, chest pain, shortness of breath, abdominal pain.  Ambulates with a cane.   ED course: Concern for DKA, MICU consulted. IVF given with improving labs. MICU did not recommend ICU admission. Plan for endocrine consult and monitor of glucose in CDU. Of note, patient was started on Ceftriaxone for +UA, culture sent.   In CDU, 72-year-old man with PMH HTN, HLD, Diabetes with an insulin pump, DKA, CABG, HFrEF LVEF 33%, Afib, ICD presenting due to blood glucose level 400 at home with nausea and generalized weakness.  Patient denies fevers, chills, vomiting, diarrhea, chest pain, shortness of breath, abdominal pain.  Ambulates with a cane.   Pt resting comfortably. NAD. No complaints. VSS. Pt seen and evaluated by the Endo Fellow and recommends to resume insulin pump home settings starting between 8-10pm on 11/4. rx for long acting insulin 20 units  and short acting 4 units tid premeal in case of pump malfunction in the future. Plan discussed with pt and spouse. understand. Strict return precautions advised. Case discussed with Dr. Leslie. -KELLI Whiteside     ED course: Concern for DKA, MICU consulted. IVF given with improving labs. MICU did not recommend ICU admission. Plan for endocrine consult and monitor of glucose in CDU. Of note, patient was started on Ceftriaxone for +UA, culture sent.   In CDU,

## 2023-11-04 NOTE — ED CDU PROVIDER DISPOSITION NOTE - NSFOLLOWUPINSTRUCTIONS_ED_ALL_ED_FT
Hydrate.     Please start taking Cefdinir 300 mg twice daily for 5 days. This medication is an antibiotic.     We recommend you follow up with your primary care provider within the next 2-3 days, please bring all of your results with you. You can also follow up with your endocrinologist next week.     Please return to the Emergency Department with new, worsening, or concerning symptoms, such as:  -Shortness of breath or trouble breathing  -Pressure, pain, tightness in chest  -Facial drooping, arm weakness, or speech difficulty   -Head injury or loss of consciousness   -Nonstop bleeding or an open wound     *More detailed information regarding your visit and discharge can be found by reviewing this packet Hydrate.     Please start taking Cefdinir 300 mg twice daily for 5 days. This medication is an antibiotic.   PLEASE resume insulin pump home settings starting between 8-10pm on TONIGHT.  Lantus insulin 20 units and humalog 4 units tid premeal in case of pump malfunction in the future.      We recommend you follow up with your primary care provider within the next 2-3 days, please bring all of your results with you. You can also follow up with your endocrinologist next week.     Please return to the Emergency Department with new, worsening, or concerning symptoms, such as:  -Shortness of breath or trouble breathing  -Pressure, pain, tightness in chest  -Facial drooping, arm weakness, or speech difficulty   -Head injury or loss of consciousness   -Nonstop bleeding or an open wound     *More detailed information regarding your visit and discharge can be found by reviewing this packet Hydrate.     Please start taking Cefdinir 300 mg twice daily for 5 days. This medication is an antibiotic.   PLEASE resume insulin pump home settings starting between 8-10pm on TONIGHT.  Lantus insulin 20 units and humalog 4 units tid premeal in case of pump malfunction in the future.  tonight please take4 units of humalog with dinner!     We recommend you follow up with your primary care provider within the next 2-3 days, please bring all of your results with you. You can also follow up with your endocrinologist next week.     Please return to the Emergency Department with new, worsening, or concerning symptoms, such as:  -Shortness of breath or trouble breathing  -Pressure, pain, tightness in chest  -Facial drooping, arm weakness, or speech difficulty   -Head injury or loss of consciousness   -Nonstop bleeding or an open wound     *More detailed information regarding your visit and discharge can be found by reviewing this packet

## 2023-11-04 NOTE — CONSULT NOTE ADULT - SUBJECTIVE AND OBJECTIVE BOX
ENDOCRINE INITIAL CONSULT - DKA, T1DM    HPI:  Mr. Major is a 72 year old male with a PMHx of HTN, HLD, T1DM, CABG, HFrEF, Afib s/p ICD who presents with hyperglycemia, nausea and weakness found to be in DKA.    ENDOCRINE HISTORY   Diagnosed with DM: T1DM age 48  Last HbA1c: 7.3  Endocrinologist: Dr. Huber Castillo DM Meds:  Adherence:  Microvascular complications: Renal, opthalmologic, neuropathy  Macrovascular complications:  SMBG:  Symptoms:  Hypoglycemia episodes:  BG at home:  Diet at home:  Appetite in hospital:  Exercise:  PMHx:  PSHx:  Family hx:  Social hx:  Insurance:  Resides in:      PAST MEDICAL & SURGICAL HISTORY:  Hypertension      BPH (benign prostatic hyperplasia)      Type I diabetes mellitus      CAD (coronary artery disease)      PAF (paroxysmal atrial fibrillation)      DKA, type 1      S/P cataract surgery  right eye      Steel plate in right arm      S/P CABG x 3          FAMILY HISTORY:  FHx: diabetes mellitus (Sibling)        Social History:      Home Medications:  atorvastatin 20 mg oral tablet: 1 tab(s) orally once a day (at bedtime) (08 Sep 2023 13:16)  cholecalciferol 25 mcg (1000 intl units) oral tablet: 1 tab(s) orally once a day (08 Sep 2023 13:16)  furosemide 40 mg oral tablet: 1 tab(s) orally once a day (08 Sep 2023 13:16)  losartan 100 mg oral tablet: 1 tab(s) orally once a day (08 Sep 2023 13:16)  tamsulosin 0.4 mg oral capsule: 1 cap(s) orally once a day (06 Sep 2023 07:22)  zinc (as acetate) 50 mg oral capsule: 1 cap(s) orally once a day (08 Sep 2023 13:16)      MEDICATIONS  (STANDING):  aspirin enteric coated 81 milliGRAM(s) Oral daily  atorvastatin 20 milliGRAM(s) Oral at bedtime  cefTRIAXone   IVPB 1000 milliGRAM(s) IV Intermittent every 24 hours  dextrose 5%. 1000 milliLiter(s) (100 mL/Hr) IV Continuous <Continuous>  dextrose 5%. 1000 milliLiter(s) (50 mL/Hr) IV Continuous <Continuous>  dextrose 50% Injectable 25 Gram(s) IV Push once  dextrose 50% Injectable 12.5 Gram(s) IV Push once  dextrose 50% Injectable 25 Gram(s) IV Push once  furosemide    Tablet 40 milliGRAM(s) Oral daily  glucagon  Injectable 1 milliGRAM(s) IntraMuscular once  insulin lispro (ADMELOG) corrective regimen sliding scale   SubCutaneous three times a day before meals  insulin lispro (ADMELOG) corrective regimen sliding scale   SubCutaneous at bedtime  insulin lispro Injectable (ADMELOG) 4 Unit(s) SubCutaneous three times a day before meals  losartan 100 milliGRAM(s) Oral at bedtime  metoprolol tartrate 50 milliGRAM(s) Oral two times a day  pantoprazole    Tablet 40 milliGRAM(s) Oral before breakfast  tamsulosin 0.4 milliGRAM(s) Oral at bedtime    MEDICATIONS  (PRN):  dextrose Oral Gel 15 Gram(s) Oral once PRN Blood Glucose LESS THAN 70 milliGRAM(s)/deciliter      Allergies    Neosporin (Rash)    Intolerances        REVIEW OF SYSTEMS  Constitutional: No fever  Eyes: No blurry vision  Neuro: No tremors  HEENT: No pain  Cardiovascular: No chest pain, palpitations  Respiratory: No SOB, no cough  GI: No nausea, vomiting, abdominal pain  : No dysuria  Skin: no rash  Psych: no depression  Endocrine: no polyuria, polydipsia  Hem/lymph: no swelling  Osteoporosis: no fractures  ALL OTHER SYSTEMS REVIEWED AND NEGATIVE     UNABLE TO OBTAIN     PHYSICAL EXAM   Vital Signs Last 24 Hrs  T(C): 36.8 (04 Nov 2023 07:38), Max: 36.9 (04 Nov 2023 04:15)  T(F): 98.2 (04 Nov 2023 07:38), Max: 98.4 (04 Nov 2023 04:15)  HR: 66 (04 Nov 2023 07:38) (66 - 91)  BP: 146/71 (04 Nov 2023 07:38) (111/60 - 160/77)  BP(mean): 105 (03 Nov 2023 19:22) (105 - 111)  RR: 17 (04 Nov 2023 07:38) (16 - 20)  SpO2: 97% (04 Nov 2023 07:38) (96% - 100%)    Parameters below as of 04 Nov 2023 07:38  Patient On (Oxygen Delivery Method): room air      GENERAL: NAD, well-groomed, well-developed  EYES: No proptosis, no lid lag, anicteric  HEENT:  Atraumatic, Normocephalic, moist mucous membranes  THYROID: Normal size, no palpable nodules  RESPIRATORY: Clear to auscultation bilaterally; No rales, rhonchi, wheezing  CARDIOVASCULAR: Regular rate and rhythm; No murmurs; no peripheral edema  GI: Soft, nontender, non distended, normal bowel sounds  SKIN: Dry, intact, No rashes or lesions  MUSCULOSKELETAL: Full range of motion, normal strength  NEURO: sensation intact, extraocular movements intact, no tremor  PSYCH: Alert and oriented x 3, normal affect, normal mood  CUSHING'S SIGNS: no striae    CAPILLARY BLOOD GLUCOSE      POCT Blood Glucose.: 117 mg/dL (04 Nov 2023 08:16)  POCT Blood Glucose.: 169 mg/dL (03 Nov 2023 21:33)  POCT Blood Glucose.: 279 mg/dL (03 Nov 2023 13:42)  POCT Blood Glucose.: 437 mg/dL (03 Nov 2023 10:45)  POCT Blood Glucose.: 430 mg/dL (03 Nov 2023 10:44)      A1C with Estimated Average Glucose Result: 7.3 % (11-03-23 @ 19:15)                            10.2   6.88  )-----------( 224      ( 03 Nov 2023 19:15 )             32.0       11-04    141  |  107  |  15  ----------------------------<  132<H>  3.7   |  23  |  0.64    Ca    8.6      04 Nov 2023 05:21  Phos  2.4     11-03  Mg     2.0     11-03    TPro  6.9  /  Alb  3.9  /  TBili  0.4  /  DBili  x   /  AST  15  /  ALT  18  /  AlkPhos  68  11-03          LIPIDS    RADIOLOGY ENDOCRINE INITIAL CONSULT - DKA, T1DM    HPI:  Mr. Major is a 72 year old male with a PMHx of HTN, HLD, T1DM, CABG, HFrEF, Afib s/p ICD who presents with hyperglycemia, nausea and weakness found to be in DKA.    ENDOCRINE HISTORY   Diagnosed with DM: T1DM diagnosed 50 years ago  Last HbA1c: 7.3  Endocrinologist: Dr. Ngo  Home DM Meds: txlim x2 insulin pump with novolog insulin and dexcom g6  basal  12a 0.75  3a 1.187  11a 1.19  5p 1.18  8p 1.1  ISF  12a 1:50  ICR   12a 1:!5  target 110  duration 5hr  Microvascular complications: denies retinopathy, neuropathy, nephropathy  Macrovascular complications: hx CABG, CVA  SMBG: dexcom g6 unable to assess clarity, am 125-130, before lunch 125-185, before dinner: 125, bedtime 150, no recent hypoglycemia  Symptoms: endorses polyuria, polydipsia,   Diet at home:  - Breakfast: slice of whole wheat bread, boiled eggs, apple juice  - Lunch: leftovers, cold cuts, fruit  - Dinner: pasta  - Snacks: denies  - Endorses apple juice and diet soda  Appetite in hospital: good  Exercise: does not exercise, has hardware in ankle  PMHx: as above  PSHx: as above  Family hx: brother with T1DM, denies family history of thyroid disease  Social hx: rarely alcohol use, denies tobacco use or other recreational drug use  Insurance: [employer]      PAST MEDICAL & SURGICAL HISTORY:  Hypertension      BPH (benign prostatic hyperplasia)      Type I diabetes mellitus      CAD (coronary artery disease)      PAF (paroxysmal atrial fibrillation)      DKA, type 1      S/P cataract surgery  right eye      Steel plate in right arm      S/P CABG x 3          FAMILY HISTORY:  FHx: diabetes mellitus (Sibling)        Social History:      Home Medications:  atorvastatin 20 mg oral tablet: 1 tab(s) orally once a day (at bedtime) (08 Sep 2023 13:16)  cholecalciferol 25 mcg (1000 intl units) oral tablet: 1 tab(s) orally once a day (08 Sep 2023 13:16)  furosemide 40 mg oral tablet: 1 tab(s) orally once a day (08 Sep 2023 13:16)  losartan 100 mg oral tablet: 1 tab(s) orally once a day (08 Sep 2023 13:16)  tamsulosin 0.4 mg oral capsule: 1 cap(s) orally once a day (06 Sep 2023 07:22)  zinc (as acetate) 50 mg oral capsule: 1 cap(s) orally once a day (08 Sep 2023 13:16)      MEDICATIONS  (STANDING):  aspirin enteric coated 81 milliGRAM(s) Oral daily  atorvastatin 20 milliGRAM(s) Oral at bedtime  cefTRIAXone   IVPB 1000 milliGRAM(s) IV Intermittent every 24 hours  dextrose 5%. 1000 milliLiter(s) (100 mL/Hr) IV Continuous <Continuous>  dextrose 5%. 1000 milliLiter(s) (50 mL/Hr) IV Continuous <Continuous>  dextrose 50% Injectable 25 Gram(s) IV Push once  dextrose 50% Injectable 12.5 Gram(s) IV Push once  dextrose 50% Injectable 25 Gram(s) IV Push once  furosemide    Tablet 40 milliGRAM(s) Oral daily  glucagon  Injectable 1 milliGRAM(s) IntraMuscular once  insulin lispro (ADMELOG) corrective regimen sliding scale   SubCutaneous three times a day before meals  insulin lispro (ADMELOG) corrective regimen sliding scale   SubCutaneous at bedtime  insulin lispro Injectable (ADMELOG) 4 Unit(s) SubCutaneous three times a day before meals  losartan 100 milliGRAM(s) Oral at bedtime  metoprolol tartrate 50 milliGRAM(s) Oral two times a day  pantoprazole    Tablet 40 milliGRAM(s) Oral before breakfast  tamsulosin 0.4 milliGRAM(s) Oral at bedtime    MEDICATIONS  (PRN):  dextrose Oral Gel 15 Gram(s) Oral once PRN Blood Glucose LESS THAN 70 milliGRAM(s)/deciliter      Allergies    Neosporin (Rash)    Intolerances        REVIEW OF SYSTEMS  Constitutional: No fever  Eyes: No blurry vision  Neuro: No tremors  HEENT: No pain  Cardiovascular: No chest pain, palpitations  Respiratory: No SOB, no cough  GI: No nausea, vomiting, abdominal pain, endorses diarrhea  : No dysuria  Skin: no rash  Psych: no depression  Endocrine: endorses polyuria, polydipsia  Hem/lymph: endorses swelling  Osteoporosis: no fractures  ALL OTHER SYSTEMS REVIEWED AND NEGATIVE       PHYSICAL EXAM   Vital Signs Last 24 Hrs  T(C): 36.8 (04 Nov 2023 07:38), Max: 36.9 (04 Nov 2023 04:15)  T(F): 98.2 (04 Nov 2023 07:38), Max: 98.4 (04 Nov 2023 04:15)  HR: 66 (04 Nov 2023 07:38) (66 - 91)  BP: 146/71 (04 Nov 2023 07:38) (111/60 - 160/77)  BP(mean): 105 (03 Nov 2023 19:22) (105 - 111)  RR: 17 (04 Nov 2023 07:38) (16 - 20)  SpO2: 97% (04 Nov 2023 07:38) (96% - 100%)    Parameters below as of 04 Nov 2023 07:38  Patient On (Oxygen Delivery Method): room air      GENERAL: NAD, well-groomed, well-developed  EYES: No proptosis, no lid lag, anicteric  HEENT:  Atraumatic, Normocephalic, moist mucous membranes  THYROID: Normal size, no palpable nodules  RESPIRATORY: Clear to auscultation bilaterally; No rales, rhonchi, wheezing  CARDIOVASCULAR: Regular rate and rhythm; No murmurs; no peripheral edema  GI: Soft, nontender, non distended, normal bowel sounds  SKIN: Dry, intact, No rashes or lesions  MUSCULOSKELETAL: Full range of motion, normal strength  NEURO: sensation intact, extraocular movements intact, no tremor  PSYCH: Alert and oriented x 3, normal affect, normal mood  CUSHING'S SIGNS: no striae    CAPILLARY BLOOD GLUCOSE      POCT Blood Glucose.: 117 mg/dL (04 Nov 2023 08:16)  POCT Blood Glucose.: 169 mg/dL (03 Nov 2023 21:33)  POCT Blood Glucose.: 279 mg/dL (03 Nov 2023 13:42)  POCT Blood Glucose.: 437 mg/dL (03 Nov 2023 10:45)  POCT Blood Glucose.: 430 mg/dL (03 Nov 2023 10:44)      A1C with Estimated Average Glucose Result: 7.3 % (11-03-23 @ 19:15)                            10.2   6.88  )-----------( 224      ( 03 Nov 2023 19:15 )             32.0       11-04    141  |  107  |  15  ----------------------------<  132<H>  3.7   |  23  |  0.64    Ca    8.6      04 Nov 2023 05:21  Phos  2.4     11-03  Mg     2.0     11-03    TPro  6.9  /  Alb  3.9  /  TBili  0.4  /  DBili  x   /  AST  15  /  ALT  18  /  AlkPhos  68  11-03          LIPIDS    RADIOLOGY ENDOCRINE INITIAL CONSULT - DKA, T1DM    HPI:  Mr. Major is a 72 year old male with a PMHx of HTN, HLD, T1DM, CABG, HFrEF, Afib s/p ICD who presents with hyperglycemia, nausea and weakness found to be in DKA.    ENDOCRINE HISTORY   Diagnosed with DM: T1DM diagnosed 50 years ago  Last HbA1c: 7.3  Endocrinologist: Dr. Ngo  Home DM Meds: txlim x2 insulin pump with novolog insulin and dexcom g6  basal  12a 0.75  3a 1.187  11a 1.19  5p 1.18  8p 1.1  ISF  12a 1:50  ICR   12a 1:!5  target 110  duration 5hr  Microvascular complications: denies retinopathy, neuropathy, nephropathy  Macrovascular complications: hx CABG, CVA  SMBG: dexcom g6 unable to assess clarity, am 125-130, before lunch 125-185, before dinner: 125, bedtime 150, no recent hypoglycemia  Symptoms: endorses polyuria, polydipsia,   Diet at home:  - Breakfast: slice of whole wheat bread, boiled eggs, apple juice  - Lunch: leftovers, cold cuts, fruit  - Dinner: pasta  - Snacks: denies  - Endorses apple juice and diet soda  Appetite in hospital: good  Exercise: does not exercise, has hardware in ankle  PMHx: as above  PSHx: as above  Family hx: brother with T1DM, denies family history of thyroid disease  Social hx: rarely alcohol use, denies tobacco use or other recreational drug use  Insurance: [employer]      PAST MEDICAL & SURGICAL HISTORY:  Hypertension      BPH (benign prostatic hyperplasia)      Type I diabetes mellitus      CAD (coronary artery disease)      PAF (paroxysmal atrial fibrillation)      DKA, type 1      S/P cataract surgery  right eye      Steel plate in right arm      S/P CABG x 3          FAMILY HISTORY:  FHx: diabetes mellitus (Sibling)        Home Medications:  atorvastatin 20 mg oral tablet: 1 tab(s) orally once a day (at bedtime) (08 Sep 2023 13:16)  cholecalciferol 25 mcg (1000 intl units) oral tablet: 1 tab(s) orally once a day (08 Sep 2023 13:16)  furosemide 40 mg oral tablet: 1 tab(s) orally once a day (08 Sep 2023 13:16)  losartan 100 mg oral tablet: 1 tab(s) orally once a day (08 Sep 2023 13:16)  tamsulosin 0.4 mg oral capsule: 1 cap(s) orally once a day (06 Sep 2023 07:22)  zinc (as acetate) 50 mg oral capsule: 1 cap(s) orally once a day (08 Sep 2023 13:16)      MEDICATIONS  (STANDING):  aspirin enteric coated 81 milliGRAM(s) Oral daily  atorvastatin 20 milliGRAM(s) Oral at bedtime  cefTRIAXone   IVPB 1000 milliGRAM(s) IV Intermittent every 24 hours  dextrose 5%. 1000 milliLiter(s) (100 mL/Hr) IV Continuous <Continuous>  dextrose 5%. 1000 milliLiter(s) (50 mL/Hr) IV Continuous <Continuous>  dextrose 50% Injectable 25 Gram(s) IV Push once  dextrose 50% Injectable 12.5 Gram(s) IV Push once  dextrose 50% Injectable 25 Gram(s) IV Push once  furosemide    Tablet 40 milliGRAM(s) Oral daily  glucagon  Injectable 1 milliGRAM(s) IntraMuscular once  insulin lispro (ADMELOG) corrective regimen sliding scale   SubCutaneous three times a day before meals  insulin lispro (ADMELOG) corrective regimen sliding scale   SubCutaneous at bedtime  insulin lispro Injectable (ADMELOG) 4 Unit(s) SubCutaneous three times a day before meals  losartan 100 milliGRAM(s) Oral at bedtime  metoprolol tartrate 50 milliGRAM(s) Oral two times a day  pantoprazole    Tablet 40 milliGRAM(s) Oral before breakfast  tamsulosin 0.4 milliGRAM(s) Oral at bedtime    MEDICATIONS  (PRN):  dextrose Oral Gel 15 Gram(s) Oral once PRN Blood Glucose LESS THAN 70 milliGRAM(s)/deciliter      Allergies    Neosporin (Rash)    Intolerances        REVIEW OF SYSTEMS  Constitutional: No fever  Eyes: No blurry vision  Neuro: No tremors  HEENT: No pain  Cardiovascular: No chest pain, palpitations  Respiratory: No SOB, no cough  GI: No nausea, vomiting, abdominal pain, endorses diarrhea  : No dysuria  Skin: no rash  Psych: no depression  Endocrine: endorses polyuria, polydipsia  Hem/lymph: endorses swelling  Osteoporosis: no fractures  ALL OTHER SYSTEMS REVIEWED AND NEGATIVE       PHYSICAL EXAM   Vital Signs Last 24 Hrs  T(C): 36.8 (04 Nov 2023 07:38), Max: 36.9 (04 Nov 2023 04:15)  T(F): 98.2 (04 Nov 2023 07:38), Max: 98.4 (04 Nov 2023 04:15)  HR: 66 (04 Nov 2023 07:38) (66 - 91)  BP: 146/71 (04 Nov 2023 07:38) (111/60 - 160/77)  BP(mean): 105 (03 Nov 2023 19:22) (105 - 111)  RR: 17 (04 Nov 2023 07:38) (16 - 20)  SpO2: 97% (04 Nov 2023 07:38) (96% - 100%)    Parameters below as of 04 Nov 2023 07:38  Patient On (Oxygen Delivery Method): room air      GENERAL: NAD, well-groomed, well-developed  EYES: No proptosis, no lid lag, anicteric  HEENT:  Atraumatic, Normocephalic, moist mucous membranes  THYROID: Normal size, no palpable nodules  RESPIRATORY: Clear to auscultation bilaterally; No rales, rhonchi, wheezing  CARDIOVASCULAR: Regular rate and rhythm; No murmurs; no peripheral edema  GI: Soft, nontender, non distended, normal bowel sounds  SKIN: Dry, intact, No rashes or lesions  MUSCULOSKELETAL: Full range of motion, normal strength  NEURO: sensation intact, extraocular movements intact, no tremor  PSYCH: Alert and oriented x 3, normal affect, normal mood  CUSHING'S SIGNS: no striae    CAPILLARY BLOOD GLUCOSE      POCT Blood Glucose.: 117 mg/dL (04 Nov 2023 08:16)  POCT Blood Glucose.: 169 mg/dL (03 Nov 2023 21:33)  POCT Blood Glucose.: 279 mg/dL (03 Nov 2023 13:42)  POCT Blood Glucose.: 437 mg/dL (03 Nov 2023 10:45)  POCT Blood Glucose.: 430 mg/dL (03 Nov 2023 10:44)      A1C with Estimated Average Glucose Result: 7.3 % (11-03-23 @ 19:15)                            10.2   6.88  )-----------( 224      ( 03 Nov 2023 19:15 )             32.0       11-04    141  |  107  |  15  ----------------------------<  132<H>  3.7   |  23  |  0.64    Ca    8.6      04 Nov 2023 05:21  Phos  2.4     11-03  Mg     2.0     11-03    TPro  6.9  /  Alb  3.9  /  TBili  0.4  /  DBili  x   /  AST  15  /  ALT  18  /  AlkPhos  68  11-03          LIPIDS    RADIOLOGY ENDOCRINE INITIAL CONSULT - DKA, T1DM    HPI:  Mr. Major is a 72 year old male with a PMHx of HTN, HLD, T1DM, CABG, HFrEF, Afib s/p ICD who presents with hyperglycemia, nausea and weakness found to be in DKA.    ENDOCRINE HISTORY   Diagnosed with DM: T1DM diagnosed 50 years ago  Last HbA1c: 7.3  Endocrinologist: Dr. Ngo  Home DM Meds: txlim x2 insulin pump with novolog insulin and dexcom g6  basal  12a 0.75  3a 1.187  11a 1.19  5p 1.18  8p 1.1  ISF  12a 1:50  ICR   12a 1:15  target 110  duration 5hr  Microvascular complications: denies retinopathy, neuropathy, nephropathy  Macrovascular complications: hx CABG, CVA  SMBG: dexcom g6 unable to assess clarity, am 125-130, before lunch 125-185, before dinner: 125, bedtime 150, no recent hypoglycemia  Symptoms: endorses polyuria, polydipsia,   Diet at home:  - Breakfast: slice of whole wheat bread, boiled eggs, apple juice  - Lunch: leftovers, cold cuts, fruit  - Dinner: pasta  - Snacks: denies  - Endorses apple juice and diet soda  Appetite in hospital: good  Exercise: does not exercise, has hardware in ankle  PMHx: as above  PSHx: as above  Family hx: brother with T1DM, denies family history of thyroid disease  Social hx: rarely alcohol use, denies tobacco use or other recreational drug use  Insurance: [employer]      PAST MEDICAL & SURGICAL HISTORY:  Hypertension      BPH (benign prostatic hyperplasia)      Type I diabetes mellitus      CAD (coronary artery disease)      PAF (paroxysmal atrial fibrillation)      DKA, type 1      S/P cataract surgery  right eye      Steel plate in right arm      S/P CABG x 3          FAMILY HISTORY:  FHx: diabetes mellitus (Sibling)        Home Medications:  atorvastatin 20 mg oral tablet: 1 tab(s) orally once a day (at bedtime) (08 Sep 2023 13:16)  cholecalciferol 25 mcg (1000 intl units) oral tablet: 1 tab(s) orally once a day (08 Sep 2023 13:16)  furosemide 40 mg oral tablet: 1 tab(s) orally once a day (08 Sep 2023 13:16)  losartan 100 mg oral tablet: 1 tab(s) orally once a day (08 Sep 2023 13:16)  tamsulosin 0.4 mg oral capsule: 1 cap(s) orally once a day (06 Sep 2023 07:22)  zinc (as acetate) 50 mg oral capsule: 1 cap(s) orally once a day (08 Sep 2023 13:16)      MEDICATIONS  (STANDING):  aspirin enteric coated 81 milliGRAM(s) Oral daily  atorvastatin 20 milliGRAM(s) Oral at bedtime  cefTRIAXone   IVPB 1000 milliGRAM(s) IV Intermittent every 24 hours  dextrose 5%. 1000 milliLiter(s) (100 mL/Hr) IV Continuous <Continuous>  dextrose 5%. 1000 milliLiter(s) (50 mL/Hr) IV Continuous <Continuous>  dextrose 50% Injectable 25 Gram(s) IV Push once  dextrose 50% Injectable 12.5 Gram(s) IV Push once  dextrose 50% Injectable 25 Gram(s) IV Push once  furosemide    Tablet 40 milliGRAM(s) Oral daily  glucagon  Injectable 1 milliGRAM(s) IntraMuscular once  insulin lispro (ADMELOG) corrective regimen sliding scale   SubCutaneous three times a day before meals  insulin lispro (ADMELOG) corrective regimen sliding scale   SubCutaneous at bedtime  insulin lispro Injectable (ADMELOG) 4 Unit(s) SubCutaneous three times a day before meals  losartan 100 milliGRAM(s) Oral at bedtime  metoprolol tartrate 50 milliGRAM(s) Oral two times a day  pantoprazole    Tablet 40 milliGRAM(s) Oral before breakfast  tamsulosin 0.4 milliGRAM(s) Oral at bedtime    MEDICATIONS  (PRN):  dextrose Oral Gel 15 Gram(s) Oral once PRN Blood Glucose LESS THAN 70 milliGRAM(s)/deciliter      Allergies    Neosporin (Rash)    Intolerances        REVIEW OF SYSTEMS  Constitutional: No fever  Eyes: No blurry vision  Neuro: No tremors  HEENT: No pain  Cardiovascular: No chest pain, palpitations  Respiratory: No SOB, no cough  GI: No nausea, vomiting, abdominal pain, endorses diarrhea  : No dysuria  Skin: no rash  Psych: no depression  Endocrine: endorses polyuria, polydipsia  Hem/lymph: endorses swelling  Osteoporosis: no fractures  ALL OTHER SYSTEMS REVIEWED AND NEGATIVE       PHYSICAL EXAM   Vital Signs Last 24 Hrs  T(C): 36.8 (04 Nov 2023 07:38), Max: 36.9 (04 Nov 2023 04:15)  T(F): 98.2 (04 Nov 2023 07:38), Max: 98.4 (04 Nov 2023 04:15)  HR: 66 (04 Nov 2023 07:38) (66 - 91)  BP: 146/71 (04 Nov 2023 07:38) (111/60 - 160/77)  BP(mean): 105 (03 Nov 2023 19:22) (105 - 111)  RR: 17 (04 Nov 2023 07:38) (16 - 20)  SpO2: 97% (04 Nov 2023 07:38) (96% - 100%)    Parameters below as of 04 Nov 2023 07:38  Patient On (Oxygen Delivery Method): room air      GENERAL: NAD, well-groomed, well-developed  EYES: No proptosis, no lid lag, anicteric  HEENT:  Atraumatic, Normocephalic, moist mucous membranes  THYROID: Normal size, no palpable nodules  RESPIRATORY: Clear to auscultation bilaterally; No rales, rhonchi, wheezing  CARDIOVASCULAR: Regular rate and rhythm; No murmurs; no peripheral edema  GI: Soft, nontender, non distended, normal bowel sounds  SKIN: Dry, intact, No rashes or lesions  MUSCULOSKELETAL: Full range of motion, normal strength  NEURO: sensation intact, extraocular movements intact, no tremor  PSYCH: Alert and oriented x 3, normal affect, normal mood  CUSHING'S SIGNS: no striae    CAPILLARY BLOOD GLUCOSE      POCT Blood Glucose.: 117 mg/dL (04 Nov 2023 08:16)  POCT Blood Glucose.: 169 mg/dL (03 Nov 2023 21:33)  POCT Blood Glucose.: 279 mg/dL (03 Nov 2023 13:42)  POCT Blood Glucose.: 437 mg/dL (03 Nov 2023 10:45)  POCT Blood Glucose.: 430 mg/dL (03 Nov 2023 10:44)      A1C with Estimated Average Glucose Result: 7.3 % (11-03-23 @ 19:15)                            10.2   6.88  )-----------( 224      ( 03 Nov 2023 19:15 )             32.0       11-04    141  |  107  |  15  ----------------------------<  132<H>  3.7   |  23  |  0.64    Ca    8.6      04 Nov 2023 05:21  Phos  2.4     11-03  Mg     2.0     11-03    TPro  6.9  /  Alb  3.9  /  TBili  0.4  /  DBili  x   /  AST  15  /  ALT  18  /  AlkPhos  68  11-03          LIPIDS    RADIOLOGY

## 2023-11-05 ENCOUNTER — INPATIENT (INPATIENT)
Facility: HOSPITAL | Age: 73
LOS: 3 days | Discharge: HOME | DRG: 919 | End: 2023-11-09
Attending: STUDENT IN AN ORGANIZED HEALTH CARE EDUCATION/TRAINING PROGRAM | Admitting: STUDENT IN AN ORGANIZED HEALTH CARE EDUCATION/TRAINING PROGRAM
Payer: OTHER GOVERNMENT

## 2023-11-05 VITALS
WEIGHT: 188.94 LBS | DIASTOLIC BLOOD PRESSURE: 79 MMHG | RESPIRATION RATE: 16 BRPM | OXYGEN SATURATION: 97 % | HEART RATE: 71 BPM | HEIGHT: 74 IN | TEMPERATURE: 98 F | SYSTOLIC BLOOD PRESSURE: 146 MMHG

## 2023-11-05 DIAGNOSIS — Z95.1 PRESENCE OF AORTOCORONARY BYPASS GRAFT: Chronic | ICD-10-CM

## 2023-11-05 DIAGNOSIS — Z96.7 PRESENCE OF OTHER BONE AND TENDON IMPLANTS: Chronic | ICD-10-CM

## 2023-11-05 LAB
ALBUMIN SERPL ELPH-MCNC: 4.1 G/DL — SIGNIFICANT CHANGE UP (ref 3.3–5)
ALBUMIN SERPL ELPH-MCNC: 4.1 G/DL — SIGNIFICANT CHANGE UP (ref 3.3–5)
ALP SERPL-CCNC: 70 U/L — SIGNIFICANT CHANGE UP (ref 40–120)
ALP SERPL-CCNC: 70 U/L — SIGNIFICANT CHANGE UP (ref 40–120)
ALT FLD-CCNC: 34 U/L — SIGNIFICANT CHANGE UP (ref 10–45)
ALT FLD-CCNC: 34 U/L — SIGNIFICANT CHANGE UP (ref 10–45)
ANION GAP SERPL CALC-SCNC: 15 MMOL/L — SIGNIFICANT CHANGE UP (ref 5–17)
ANION GAP SERPL CALC-SCNC: 15 MMOL/L — SIGNIFICANT CHANGE UP (ref 5–17)
APPEARANCE UR: CLEAR — SIGNIFICANT CHANGE UP
APPEARANCE UR: CLEAR — SIGNIFICANT CHANGE UP
AST SERPL-CCNC: 42 U/L — HIGH (ref 10–40)
AST SERPL-CCNC: 42 U/L — HIGH (ref 10–40)
B-OH-BUTYR SERPL-SCNC: 0.3 MMOL/L — SIGNIFICANT CHANGE UP
B-OH-BUTYR SERPL-SCNC: 0.3 MMOL/L — SIGNIFICANT CHANGE UP
BASE EXCESS BLDV CALC-SCNC: 2.8 MMOL/L — SIGNIFICANT CHANGE UP (ref -2–3)
BASE EXCESS BLDV CALC-SCNC: 2.8 MMOL/L — SIGNIFICANT CHANGE UP (ref -2–3)
BASOPHILS # BLD AUTO: 0.04 K/UL — SIGNIFICANT CHANGE UP (ref 0–0.2)
BASOPHILS # BLD AUTO: 0.04 K/UL — SIGNIFICANT CHANGE UP (ref 0–0.2)
BASOPHILS NFR BLD AUTO: 0.8 % — SIGNIFICANT CHANGE UP (ref 0–2)
BASOPHILS NFR BLD AUTO: 0.8 % — SIGNIFICANT CHANGE UP (ref 0–2)
BILIRUB SERPL-MCNC: 0.6 MG/DL — SIGNIFICANT CHANGE UP (ref 0.2–1.2)
BILIRUB SERPL-MCNC: 0.6 MG/DL — SIGNIFICANT CHANGE UP (ref 0.2–1.2)
BILIRUB UR-MCNC: NEGATIVE — SIGNIFICANT CHANGE UP
BILIRUB UR-MCNC: NEGATIVE — SIGNIFICANT CHANGE UP
BUN SERPL-MCNC: 22 MG/DL — SIGNIFICANT CHANGE UP (ref 7–23)
BUN SERPL-MCNC: 22 MG/DL — SIGNIFICANT CHANGE UP (ref 7–23)
CA-I SERPL-SCNC: 1.24 MMOL/L — SIGNIFICANT CHANGE UP (ref 1.15–1.33)
CA-I SERPL-SCNC: 1.24 MMOL/L — SIGNIFICANT CHANGE UP (ref 1.15–1.33)
CALCIUM SERPL-MCNC: 9.5 MG/DL — SIGNIFICANT CHANGE UP (ref 8.4–10.5)
CALCIUM SERPL-MCNC: 9.5 MG/DL — SIGNIFICANT CHANGE UP (ref 8.4–10.5)
CHLORIDE BLDV-SCNC: 100 MMOL/L — SIGNIFICANT CHANGE UP (ref 96–108)
CHLORIDE BLDV-SCNC: 100 MMOL/L — SIGNIFICANT CHANGE UP (ref 96–108)
CHLORIDE SERPL-SCNC: 97 MMOL/L — SIGNIFICANT CHANGE UP (ref 96–108)
CHLORIDE SERPL-SCNC: 97 MMOL/L — SIGNIFICANT CHANGE UP (ref 96–108)
CO2 BLDV-SCNC: 30 MMOL/L — HIGH (ref 22–26)
CO2 BLDV-SCNC: 30 MMOL/L — HIGH (ref 22–26)
CO2 SERPL-SCNC: 23 MMOL/L — SIGNIFICANT CHANGE UP (ref 22–31)
CO2 SERPL-SCNC: 23 MMOL/L — SIGNIFICANT CHANGE UP (ref 22–31)
COLOR SPEC: YELLOW — SIGNIFICANT CHANGE UP
COLOR SPEC: YELLOW — SIGNIFICANT CHANGE UP
CREAT SERPL-MCNC: 0.74 MG/DL — SIGNIFICANT CHANGE UP (ref 0.5–1.3)
CREAT SERPL-MCNC: 0.74 MG/DL — SIGNIFICANT CHANGE UP (ref 0.5–1.3)
DIFF PNL FLD: NEGATIVE — SIGNIFICANT CHANGE UP
DIFF PNL FLD: NEGATIVE — SIGNIFICANT CHANGE UP
EGFR: 95 ML/MIN/1.73M2 — SIGNIFICANT CHANGE UP
EGFR: 95 ML/MIN/1.73M2 — SIGNIFICANT CHANGE UP
EOSINOPHIL # BLD AUTO: 0.15 K/UL — SIGNIFICANT CHANGE UP (ref 0–0.5)
EOSINOPHIL # BLD AUTO: 0.15 K/UL — SIGNIFICANT CHANGE UP (ref 0–0.5)
EOSINOPHIL NFR BLD AUTO: 3 % — SIGNIFICANT CHANGE UP (ref 0–6)
EOSINOPHIL NFR BLD AUTO: 3 % — SIGNIFICANT CHANGE UP (ref 0–6)
GAS PNL BLDV: 135 MMOL/L — LOW (ref 136–145)
GAS PNL BLDV: 135 MMOL/L — LOW (ref 136–145)
GAS PNL BLDV: SIGNIFICANT CHANGE UP
GAS PNL BLDV: SIGNIFICANT CHANGE UP
GLUCOSE BLDV-MCNC: 281 MG/DL — HIGH (ref 70–99)
GLUCOSE BLDV-MCNC: 281 MG/DL — HIGH (ref 70–99)
GLUCOSE SERPL-MCNC: 263 MG/DL — HIGH (ref 70–99)
GLUCOSE SERPL-MCNC: 263 MG/DL — HIGH (ref 70–99)
GLUCOSE UR QL: 500 MG/DL
GLUCOSE UR QL: 500 MG/DL
HCO3 BLDV-SCNC: 29 MMOL/L — SIGNIFICANT CHANGE UP (ref 22–29)
HCO3 BLDV-SCNC: 29 MMOL/L — SIGNIFICANT CHANGE UP (ref 22–29)
HCT VFR BLD CALC: 33.4 % — LOW (ref 39–50)
HCT VFR BLD CALC: 33.4 % — LOW (ref 39–50)
HCT VFR BLDA CALC: 35 % — LOW (ref 39–51)
HCT VFR BLDA CALC: 35 % — LOW (ref 39–51)
HGB BLD CALC-MCNC: 11.6 G/DL — LOW (ref 12.6–17.4)
HGB BLD CALC-MCNC: 11.6 G/DL — LOW (ref 12.6–17.4)
HGB BLD-MCNC: 10.4 G/DL — LOW (ref 13–17)
HGB BLD-MCNC: 10.4 G/DL — LOW (ref 13–17)
IMM GRANULOCYTES NFR BLD AUTO: 0.4 % — SIGNIFICANT CHANGE UP (ref 0–0.9)
IMM GRANULOCYTES NFR BLD AUTO: 0.4 % — SIGNIFICANT CHANGE UP (ref 0–0.9)
KETONES UR-MCNC: ABNORMAL MG/DL
KETONES UR-MCNC: ABNORMAL MG/DL
LACTATE BLDV-MCNC: 1.8 MMOL/L — SIGNIFICANT CHANGE UP (ref 0.5–2)
LACTATE BLDV-MCNC: 1.8 MMOL/L — SIGNIFICANT CHANGE UP (ref 0.5–2)
LEUKOCYTE ESTERASE UR-ACNC: NEGATIVE — SIGNIFICANT CHANGE UP
LEUKOCYTE ESTERASE UR-ACNC: NEGATIVE — SIGNIFICANT CHANGE UP
LIDOCAIN IGE QN: 17 U/L — SIGNIFICANT CHANGE UP (ref 7–60)
LIDOCAIN IGE QN: 17 U/L — SIGNIFICANT CHANGE UP (ref 7–60)
LYMPHOCYTES # BLD AUTO: 1.45 K/UL — SIGNIFICANT CHANGE UP (ref 1–3.3)
LYMPHOCYTES # BLD AUTO: 1.45 K/UL — SIGNIFICANT CHANGE UP (ref 1–3.3)
LYMPHOCYTES # BLD AUTO: 28.8 % — SIGNIFICANT CHANGE UP (ref 13–44)
LYMPHOCYTES # BLD AUTO: 28.8 % — SIGNIFICANT CHANGE UP (ref 13–44)
MAGNESIUM SERPL-MCNC: 1.8 MG/DL — SIGNIFICANT CHANGE UP (ref 1.6–2.6)
MAGNESIUM SERPL-MCNC: 1.8 MG/DL — SIGNIFICANT CHANGE UP (ref 1.6–2.6)
MCHC RBC-ENTMCNC: 28.3 PG — SIGNIFICANT CHANGE UP (ref 27–34)
MCHC RBC-ENTMCNC: 28.3 PG — SIGNIFICANT CHANGE UP (ref 27–34)
MCHC RBC-ENTMCNC: 31.1 GM/DL — LOW (ref 32–36)
MCHC RBC-ENTMCNC: 31.1 GM/DL — LOW (ref 32–36)
MCV RBC AUTO: 90.8 FL — SIGNIFICANT CHANGE UP (ref 80–100)
MCV RBC AUTO: 90.8 FL — SIGNIFICANT CHANGE UP (ref 80–100)
MONOCYTES # BLD AUTO: 0.73 K/UL — SIGNIFICANT CHANGE UP (ref 0–0.9)
MONOCYTES # BLD AUTO: 0.73 K/UL — SIGNIFICANT CHANGE UP (ref 0–0.9)
MONOCYTES NFR BLD AUTO: 14.5 % — HIGH (ref 2–14)
MONOCYTES NFR BLD AUTO: 14.5 % — HIGH (ref 2–14)
NEUTROPHILS # BLD AUTO: 2.64 K/UL — SIGNIFICANT CHANGE UP (ref 1.8–7.4)
NEUTROPHILS # BLD AUTO: 2.64 K/UL — SIGNIFICANT CHANGE UP (ref 1.8–7.4)
NEUTROPHILS NFR BLD AUTO: 52.5 % — SIGNIFICANT CHANGE UP (ref 43–77)
NEUTROPHILS NFR BLD AUTO: 52.5 % — SIGNIFICANT CHANGE UP (ref 43–77)
NITRITE UR-MCNC: NEGATIVE — SIGNIFICANT CHANGE UP
NITRITE UR-MCNC: NEGATIVE — SIGNIFICANT CHANGE UP
NRBC # BLD: 0 /100 WBCS — SIGNIFICANT CHANGE UP (ref 0–0)
NRBC # BLD: 0 /100 WBCS — SIGNIFICANT CHANGE UP (ref 0–0)
PCO2 BLDV: 50 MMHG — SIGNIFICANT CHANGE UP (ref 42–55)
PCO2 BLDV: 50 MMHG — SIGNIFICANT CHANGE UP (ref 42–55)
PH BLDV: 7.37 — SIGNIFICANT CHANGE UP (ref 7.32–7.43)
PH BLDV: 7.37 — SIGNIFICANT CHANGE UP (ref 7.32–7.43)
PH UR: 5 — SIGNIFICANT CHANGE UP (ref 5–8)
PH UR: 5 — SIGNIFICANT CHANGE UP (ref 5–8)
PHOSPHATE SERPL-MCNC: 2.8 MG/DL — SIGNIFICANT CHANGE UP (ref 2.5–4.5)
PHOSPHATE SERPL-MCNC: 2.8 MG/DL — SIGNIFICANT CHANGE UP (ref 2.5–4.5)
PLATELET # BLD AUTO: 212 K/UL — SIGNIFICANT CHANGE UP (ref 150–400)
PLATELET # BLD AUTO: 212 K/UL — SIGNIFICANT CHANGE UP (ref 150–400)
PO2 BLDV: 34 MMHG — SIGNIFICANT CHANGE UP (ref 25–45)
PO2 BLDV: 34 MMHG — SIGNIFICANT CHANGE UP (ref 25–45)
POTASSIUM BLDV-SCNC: 4.1 MMOL/L — SIGNIFICANT CHANGE UP (ref 3.5–5.1)
POTASSIUM BLDV-SCNC: 4.1 MMOL/L — SIGNIFICANT CHANGE UP (ref 3.5–5.1)
POTASSIUM SERPL-MCNC: 4.4 MMOL/L — SIGNIFICANT CHANGE UP (ref 3.5–5.3)
POTASSIUM SERPL-MCNC: 4.4 MMOL/L — SIGNIFICANT CHANGE UP (ref 3.5–5.3)
POTASSIUM SERPL-SCNC: 4.4 MMOL/L — SIGNIFICANT CHANGE UP (ref 3.5–5.3)
POTASSIUM SERPL-SCNC: 4.4 MMOL/L — SIGNIFICANT CHANGE UP (ref 3.5–5.3)
PROT SERPL-MCNC: 7.4 G/DL — SIGNIFICANT CHANGE UP (ref 6–8.3)
PROT SERPL-MCNC: 7.4 G/DL — SIGNIFICANT CHANGE UP (ref 6–8.3)
PROT UR-MCNC: NEGATIVE MG/DL — SIGNIFICANT CHANGE UP
PROT UR-MCNC: NEGATIVE MG/DL — SIGNIFICANT CHANGE UP
RBC # BLD: 3.68 M/UL — LOW (ref 4.2–5.8)
RBC # BLD: 3.68 M/UL — LOW (ref 4.2–5.8)
RBC # FLD: 16.6 % — HIGH (ref 10.3–14.5)
RBC # FLD: 16.6 % — HIGH (ref 10.3–14.5)
SAO2 % BLDV: 50.6 % — LOW (ref 67–88)
SAO2 % BLDV: 50.6 % — LOW (ref 67–88)
SODIUM SERPL-SCNC: 135 MMOL/L — SIGNIFICANT CHANGE UP (ref 135–145)
SODIUM SERPL-SCNC: 135 MMOL/L — SIGNIFICANT CHANGE UP (ref 135–145)
SP GR SPEC: 1.02 — SIGNIFICANT CHANGE UP (ref 1–1.03)
SP GR SPEC: 1.02 — SIGNIFICANT CHANGE UP (ref 1–1.03)
UROBILINOGEN FLD QL: 0.2 MG/DL — SIGNIFICANT CHANGE UP (ref 0.2–1)
UROBILINOGEN FLD QL: 0.2 MG/DL — SIGNIFICANT CHANGE UP (ref 0.2–1)
WBC # BLD: 5.03 K/UL — SIGNIFICANT CHANGE UP (ref 3.8–10.5)
WBC # BLD: 5.03 K/UL — SIGNIFICANT CHANGE UP (ref 3.8–10.5)
WBC # FLD AUTO: 5.03 K/UL — SIGNIFICANT CHANGE UP (ref 3.8–10.5)
WBC # FLD AUTO: 5.03 K/UL — SIGNIFICANT CHANGE UP (ref 3.8–10.5)

## 2023-11-05 PROCEDURE — 99285 EMERGENCY DEPT VISIT HI MDM: CPT

## 2023-11-05 RX ORDER — DEXTROSE 50 % IN WATER 50 %
12.5 SYRINGE (ML) INTRAVENOUS ONCE
Refills: 0 | Status: DISCONTINUED | OUTPATIENT
Start: 2023-11-05 | End: 2023-11-09

## 2023-11-05 RX ORDER — INSULIN LISPRO 100/ML
VIAL (ML) SUBCUTANEOUS
Refills: 0 | Status: DISCONTINUED | OUTPATIENT
Start: 2023-11-05 | End: 2023-11-07

## 2023-11-05 RX ORDER — SODIUM CHLORIDE 9 MG/ML
1000 INJECTION, SOLUTION INTRAVENOUS
Refills: 0 | Status: DISCONTINUED | OUTPATIENT
Start: 2023-11-05 | End: 2023-11-09

## 2023-11-05 RX ORDER — DEXTROSE 50 % IN WATER 50 %
15 SYRINGE (ML) INTRAVENOUS ONCE
Refills: 0 | Status: DISCONTINUED | OUTPATIENT
Start: 2023-11-05 | End: 2023-11-09

## 2023-11-05 RX ORDER — INSULIN GLARGINE 100 [IU]/ML
20 INJECTION, SOLUTION SUBCUTANEOUS ONCE
Refills: 0 | Status: COMPLETED | OUTPATIENT
Start: 2023-11-05 | End: 2023-11-05

## 2023-11-05 RX ORDER — SODIUM CHLORIDE 9 MG/ML
1000 INJECTION INTRAMUSCULAR; INTRAVENOUS; SUBCUTANEOUS ONCE
Refills: 0 | Status: COMPLETED | OUTPATIENT
Start: 2023-11-05 | End: 2023-11-05

## 2023-11-05 RX ORDER — DEXTROSE 50 % IN WATER 50 %
25 SYRINGE (ML) INTRAVENOUS ONCE
Refills: 0 | Status: DISCONTINUED | OUTPATIENT
Start: 2023-11-05 | End: 2023-11-09

## 2023-11-05 RX ORDER — GLUCAGON INJECTION, SOLUTION 0.5 MG/.1ML
1 INJECTION, SOLUTION SUBCUTANEOUS ONCE
Refills: 0 | Status: DISCONTINUED | OUTPATIENT
Start: 2023-11-05 | End: 2023-11-09

## 2023-11-05 RX ORDER — INSULIN LISPRO 100/ML
VIAL (ML) SUBCUTANEOUS AT BEDTIME
Refills: 0 | Status: DISCONTINUED | OUTPATIENT
Start: 2023-11-05 | End: 2023-11-07

## 2023-11-05 RX ORDER — INSULIN LISPRO 100/ML
4 VIAL (ML) SUBCUTANEOUS
Refills: 0 | Status: DISCONTINUED | OUTPATIENT
Start: 2023-11-05 | End: 2023-11-07

## 2023-11-05 RX ADMIN — SODIUM CHLORIDE 1000 MILLILITER(S): 9 INJECTION INTRAMUSCULAR; INTRAVENOUS; SUBCUTANEOUS at 22:40

## 2023-11-05 RX ADMIN — INSULIN GLARGINE 20 UNIT(S): 100 INJECTION, SOLUTION SUBCUTANEOUS at 22:41

## 2023-11-05 NOTE — ED ADULT TRIAGE NOTE - CHIEF COMPLAINT QUOTE
recently dc'd for dka, sugars still high. has insulin pump, has been trying to use humalog to control it but it hasn't been working

## 2023-11-05 NOTE — ED PROVIDER NOTE - NSTIMEPROVIDERCAREINITIATE_GEN_ER
Pt states you started her on glimepride 2mg 1qd, states she got very dizzy, fatigue, sweats  Sugars were running btwn 46-64  Pt stopped taking med on Sunday  Is currently also taking met form 1000mg bid & trajenta 5mg qd  Since she stopped sugar ranging   Judy Delaney wants to know if she needs to add a med or what she should do   stated you put her on bc sugars were to high  Judy Delaney 05-Nov-2023 21:13

## 2023-11-05 NOTE — CHART NOTE - NSCHARTNOTEFT_GEN_A_CORE
Endocrinology consulted on Mr. Major who is a 72 year old male with T1DM who was just discharged from the ED after presenting in mild DKA which resolved on 11/4.  currently. Patient reports unable to bring down glucose at home despite using pump and bolusing. Follows with Dr. Ngo, HbA1c 7.3.    Patient has tslimx2 insulin pump novolog insulin  basal 12 0.75  3a 1.187  11a 1.19  5p 1.18  8p 1.1  ISF 12a 1:50  ICR 12a 1:15  target 110  duration 5h per chart review    Recommend  - give lantus 20 units now, remove insulin pump 2hr after  - start admelog 4 units tid premeal  - start low admelog correction scale tid premeal  - start low admelog correction scale at bedtime  - hypoglycemia protocol prn  - can given a bolus of fluids  - check DKA labs although low suspicion (BMP, VBG, BHB, UA)  - carb consistent diet  - fingersticks qid with meals and bedtime    Full consult 11/6.    Obey Cash MD  Endocrinology Fellow

## 2023-11-05 NOTE — ED ADULT NURSE NOTE - NSFALLUNIVINTERV_ED_ALL_ED
Bed/Stretcher in lowest position, wheels locked, appropriate side rails in place/Call bell, personal items and telephone in reach/Physically safe environment - no spills, clutter or unnecessary equipment/Purposeful proactive rounding/Room/bathroom lighting operational, light cord in reach

## 2023-11-05 NOTE — ED ADULT NURSE NOTE - OBJECTIVE STATEMENT
73 y/o M A&Ox4 with PMH of Type 1 DM. Pt presents to the ED c/o hyperglycemia. Pt reports he was in ED for hyperglycemia on Friday night and was found to be in DKA and have a UTI. Pt reports he was kept in observation unit and dc with insulin. Pt reports he had breakfast this morning and his blood sugar "went high." Pt reports he administered "5 units and 0.88 units" of insulin via pump prior to arrival to ED however, his blood sugar levels didn't come down. Denies chest pain, SOB, n/v/d, constipation, lightheadedness, dizziness, fever, chills, sick contacts. IV access established; 20 G L hand. Patient safety maintained, bed is in lowest position, wheels locked, and side rails raised.

## 2023-11-05 NOTE — ED ADULT TRIAGE NOTE - IDEAL BODY WEIGHT(KG)
I reviewed the H&P, I examined the patient, and there are no changes in the patient's condition.    
82

## 2023-11-05 NOTE — ED PROVIDER NOTE - OBJECTIVE STATEMENT
72Y M PMH DM1 on insulin pump, HTN, 72Y M PMH DM1 on insulin pump, HTN, atrial fibrillation, BPH presenting with hyperglycemia. Patient was discharged from this hospital yesterday after overnight CDU stay for issues with hyperglycemia and insulin pump.  He was evaluated by endocrinology who adjusted his pump and medications and he was discharged with improvement in his glucose control yesterday. 72Y M PMH DM1 on insulin pump, HTN, atrial fibrillation, BPH presenting with hyperglycemia. Patient was discharged from this hospital yesterday after overnight CDU stay for issues with hyperglycemia and insulin pump.  He was evaluated by endocrinology who adjusted his pump and medications and he was discharged with improvement in his glucose control yesterday. Now returning due to persistent hyperglycemia throughout the day today; states he has had readings in the 300s and 400s despite trying to reduce p.o. intake in an effort to avoid further hyperglycemia. No fevers, chills, excessive thirst or urination, nausea/vomiting reported.

## 2023-11-05 NOTE — ED PROVIDER NOTE - CLINICAL SUMMARY MEDICAL DECISION MAKING FREE TEXT BOX
72Y M PMH DM1 on insulin pump, HTN, atrial fibrillation, BPH presenting with hyperglycemia. Patient just discharged from CDU yesterday after admission for hyperglycemia, was seen by endocrine and discharged with new pump equipment, however pump was never actually changed.  Now persistently hyperglycemic in the 300-400s despite not eating during the day today due to fear of hyperglycemia. Review of glucose log from sensor confirms these readings.  Patient otherwise feels well, no polydipsia or polyuria, no nausea or vomiting throughout the day today. Will consult endocrine given they just saw patient and made adjustments, likely needs full admission at this time given he is now a CDU bounce back.  Will rule out DKA and plan for medicine admission for Endo consultation. 72Y M PMH DM1 on insulin pump, HTN, atrial fibrillation, BPH presenting with hyperglycemia. Patient just discharged from CDU yesterday after admission for hyperglycemia, was seen by endocrine and discharged with new pump equipment, however pump was never actually changed.  Now persistently hyperglycemic in the 300-400s despite not eating during the day today due to fear of hyperglycemia. Review of glucose log from sensor confirms these readings.  Patient otherwise feels well, no polydipsia or polyuria, no nausea or vomiting throughout the day today. Will consult endocrine given they just saw patient and made adjustments, likely needs full admission at this time given he is now a CDU bounce back.  Will rule out DKA and plan for medicine admission for Endo consultation.    Julissa Leslie MD - Attending Physician: Pt here with hyperglycemia. Seen here and in CDU dc yesterday for same. On insulin pump, meds programed per Endo recs. Last PO this am, but BG in 300-400s today. No fever, no pain. Given bounceback for same, will get labs, r/o DKA and plan for admit for Endo eval in AM

## 2023-11-05 NOTE — ED PROVIDER NOTE - PHYSICAL EXAMINATION
GENERAL: Awake, alert, NAD  HEAD: NC/AT, neck supple, moist mucous membranes  EYES: PERRL, EOM grossly intact, sclera anicteric  LUNGS: normal WOB on RA, CTAB, no wheezes or crackles   CARDIAC: RRR, no m/r/g  ABDOMEN: Soft, non tender, non distended, no rebound, no guarding  BACK: No midline spinal tenderness, no CVA tenderness  EXT: No edema, no calf tenderness, no deformities.  NEURO: A&Ox3. Moving all extremities.  SKIN: Warm and dry. No rash.  PSYCH: Normal affect.

## 2023-11-05 NOTE — ED PROVIDER NOTE - PROGRESS NOTE DETAILS
Chasity Pugh MD PGY-2: Spoke with endocrinology fellow by phone who recommends administer 20 units of Lantus now, remove insulin pump 2 hours after Lantus administration.  Start 4 units Admelog with meals and at bedtime as well as LDSSI.  Plan TBA medicine for endocrine evaluation. Chasity Pugh MD PGY-2: Discussed admission with hospitalist, requesting CDU evaluation given patient just needing endocrine evaluation and not likely to require extended admission.  Will discuss with CDU provider. Chasity Pugh MD PGY-2: Medicine willing to accept patient for admission. Will admit under Dr. Holguin for endo eval in AM.

## 2023-11-06 DIAGNOSIS — I10 ESSENTIAL (PRIMARY) HYPERTENSION: ICD-10-CM

## 2023-11-06 DIAGNOSIS — E78.5 HYPERLIPIDEMIA, UNSPECIFIED: ICD-10-CM

## 2023-11-06 DIAGNOSIS — Z92.89 PERSONAL HISTORY OF OTHER MEDICAL TREATMENT: ICD-10-CM

## 2023-11-06 DIAGNOSIS — E10.65 TYPE 1 DIABETES MELLITUS WITH HYPERGLYCEMIA: ICD-10-CM

## 2023-11-06 DIAGNOSIS — R73.9 HYPERGLYCEMIA, UNSPECIFIED: ICD-10-CM

## 2023-11-06 DIAGNOSIS — N39.0 URINARY TRACT INFECTION, SITE NOT SPECIFIED: ICD-10-CM

## 2023-11-06 LAB
-  AMIKACIN: SIGNIFICANT CHANGE UP
-  AMIKACIN: SIGNIFICANT CHANGE UP
-  AMOXICILLIN/CLAVULANIC ACID: SIGNIFICANT CHANGE UP
-  AMOXICILLIN/CLAVULANIC ACID: SIGNIFICANT CHANGE UP
-  AMPICILLIN/SULBACTAM: SIGNIFICANT CHANGE UP
-  AMPICILLIN/SULBACTAM: SIGNIFICANT CHANGE UP
-  AMPICILLIN: SIGNIFICANT CHANGE UP
-  AMPICILLIN: SIGNIFICANT CHANGE UP
-  AZTREONAM: SIGNIFICANT CHANGE UP
-  AZTREONAM: SIGNIFICANT CHANGE UP
-  CEFAZOLIN: SIGNIFICANT CHANGE UP
-  CEFAZOLIN: SIGNIFICANT CHANGE UP
-  CEFEPIME: SIGNIFICANT CHANGE UP
-  CEFEPIME: SIGNIFICANT CHANGE UP
-  CEFOXITIN: SIGNIFICANT CHANGE UP
-  CEFOXITIN: SIGNIFICANT CHANGE UP
-  CEFTRIAXONE: SIGNIFICANT CHANGE UP
-  CEFTRIAXONE: SIGNIFICANT CHANGE UP
-  CEFUROXIME: SIGNIFICANT CHANGE UP
-  CEFUROXIME: SIGNIFICANT CHANGE UP
-  CIPROFLOXACIN: SIGNIFICANT CHANGE UP
-  CIPROFLOXACIN: SIGNIFICANT CHANGE UP
-  ERTAPENEM: SIGNIFICANT CHANGE UP
-  ERTAPENEM: SIGNIFICANT CHANGE UP
-  GENTAMICIN: SIGNIFICANT CHANGE UP
-  GENTAMICIN: SIGNIFICANT CHANGE UP
-  IMIPENEM: SIGNIFICANT CHANGE UP
-  IMIPENEM: SIGNIFICANT CHANGE UP
-  LEVOFLOXACIN: SIGNIFICANT CHANGE UP
-  LEVOFLOXACIN: SIGNIFICANT CHANGE UP
-  MEROPENEM: SIGNIFICANT CHANGE UP
-  MEROPENEM: SIGNIFICANT CHANGE UP
-  NITROFURANTOIN: SIGNIFICANT CHANGE UP
-  NITROFURANTOIN: SIGNIFICANT CHANGE UP
-  PIPERACILLIN/TAZOBACTAM: SIGNIFICANT CHANGE UP
-  PIPERACILLIN/TAZOBACTAM: SIGNIFICANT CHANGE UP
-  TOBRAMYCIN: SIGNIFICANT CHANGE UP
-  TOBRAMYCIN: SIGNIFICANT CHANGE UP
-  TRIMETHOPRIM/SULFAMETHOXAZOLE: SIGNIFICANT CHANGE UP
-  TRIMETHOPRIM/SULFAMETHOXAZOLE: SIGNIFICANT CHANGE UP
CULTURE RESULTS: ABNORMAL
CULTURE RESULTS: ABNORMAL
GLUCOSE BLDC GLUCOMTR-MCNC: 107 MG/DL — HIGH (ref 70–99)
GLUCOSE BLDC GLUCOMTR-MCNC: 107 MG/DL — HIGH (ref 70–99)
GLUCOSE BLDC GLUCOMTR-MCNC: 158 MG/DL — HIGH (ref 70–99)
GLUCOSE BLDC GLUCOMTR-MCNC: 158 MG/DL — HIGH (ref 70–99)
GLUCOSE BLDC GLUCOMTR-MCNC: 177 MG/DL — HIGH (ref 70–99)
GLUCOSE BLDC GLUCOMTR-MCNC: 177 MG/DL — HIGH (ref 70–99)
GLUCOSE BLDC GLUCOMTR-MCNC: 246 MG/DL — HIGH (ref 70–99)
GLUCOSE BLDC GLUCOMTR-MCNC: 246 MG/DL — HIGH (ref 70–99)
GLUCOSE BLDC GLUCOMTR-MCNC: 260 MG/DL — HIGH (ref 70–99)
GLUCOSE BLDC GLUCOMTR-MCNC: 260 MG/DL — HIGH (ref 70–99)
METHOD TYPE: SIGNIFICANT CHANGE UP
METHOD TYPE: SIGNIFICANT CHANGE UP
ORGANISM # SPEC MICROSCOPIC CNT: ABNORMAL
SPECIMEN SOURCE: SIGNIFICANT CHANGE UP
SPECIMEN SOURCE: SIGNIFICANT CHANGE UP

## 2023-11-06 PROCEDURE — 99222 1ST HOSP IP/OBS MODERATE 55: CPT

## 2023-11-06 PROCEDURE — 99223 1ST HOSP IP/OBS HIGH 75: CPT

## 2023-11-06 PROCEDURE — 99255 IP/OBS CONSLTJ NEW/EST HI 80: CPT

## 2023-11-06 RX ORDER — LOSARTAN POTASSIUM 100 MG/1
1 TABLET, FILM COATED ORAL
Refills: 0 | DISCHARGE

## 2023-11-06 RX ORDER — TAMSULOSIN HYDROCHLORIDE 0.4 MG/1
1 CAPSULE ORAL
Qty: 0 | Refills: 0 | DISCHARGE

## 2023-11-06 RX ORDER — INSULIN ASPART 100 [IU]/ML
1 INJECTION, SOLUTION SUBCUTANEOUS
Refills: 0 | Status: DISCONTINUED | OUTPATIENT
Start: 2023-11-06 | End: 2023-11-07

## 2023-11-06 RX ORDER — LOSARTAN POTASSIUM 100 MG/1
100 TABLET, FILM COATED ORAL DAILY
Refills: 0 | Status: DISCONTINUED | OUTPATIENT
Start: 2023-11-06 | End: 2023-11-09

## 2023-11-06 RX ORDER — CHOLECALCIFEROL (VITAMIN D3) 125 MCG
1 CAPSULE ORAL
Refills: 0 | DISCHARGE

## 2023-11-06 RX ORDER — ZINC ACETATE DIHYDRATE 100 %
1 CRYSTALS MISCELLANEOUS
Refills: 0 | DISCHARGE

## 2023-11-06 RX ORDER — TAMSULOSIN HYDROCHLORIDE 0.4 MG/1
0.4 CAPSULE ORAL AT BEDTIME
Refills: 0 | Status: DISCONTINUED | OUTPATIENT
Start: 2023-11-06 | End: 2023-11-09

## 2023-11-06 RX ORDER — ATORVASTATIN CALCIUM 80 MG/1
1 TABLET, FILM COATED ORAL
Refills: 0 | DISCHARGE

## 2023-11-06 RX ORDER — METOPROLOL TARTRATE 50 MG
50 TABLET ORAL
Refills: 0 | Status: DISCONTINUED | OUTPATIENT
Start: 2023-11-06 | End: 2023-11-09

## 2023-11-06 RX ORDER — HEPARIN SODIUM 5000 [USP'U]/ML
5000 INJECTION INTRAVENOUS; SUBCUTANEOUS EVERY 8 HOURS
Refills: 0 | Status: DISCONTINUED | OUTPATIENT
Start: 2023-11-06 | End: 2023-11-09

## 2023-11-06 RX ORDER — ATORVASTATIN CALCIUM 80 MG/1
20 TABLET, FILM COATED ORAL AT BEDTIME
Refills: 0 | Status: DISCONTINUED | OUTPATIENT
Start: 2023-11-06 | End: 2023-11-09

## 2023-11-06 RX ORDER — PANTOPRAZOLE SODIUM 20 MG/1
40 TABLET, DELAYED RELEASE ORAL
Refills: 0 | Status: DISCONTINUED | OUTPATIENT
Start: 2023-11-06 | End: 2023-11-09

## 2023-11-06 RX ORDER — CEFTRIAXONE 500 MG/1
1000 INJECTION, POWDER, FOR SOLUTION INTRAMUSCULAR; INTRAVENOUS EVERY 24 HOURS
Refills: 0 | Status: DISCONTINUED | OUTPATIENT
Start: 2023-11-06 | End: 2023-11-09

## 2023-11-06 RX ORDER — ASCORBIC ACID 60 MG
1 TABLET,CHEWABLE ORAL
Refills: 0 | DISCHARGE

## 2023-11-06 RX ORDER — MONTELUKAST 4 MG/1
1 TABLET, CHEWABLE ORAL
Refills: 0 | DISCHARGE

## 2023-11-06 RX ORDER — ASPIRIN/CALCIUM CARB/MAGNESIUM 324 MG
81 TABLET ORAL DAILY
Refills: 0 | Status: DISCONTINUED | OUTPATIENT
Start: 2023-11-06 | End: 2023-11-09

## 2023-11-06 RX ORDER — FUROSEMIDE 40 MG
40 TABLET ORAL DAILY
Refills: 0 | Status: DISCONTINUED | OUTPATIENT
Start: 2023-11-06 | End: 2023-11-09

## 2023-11-06 RX ADMIN — LOSARTAN POTASSIUM 100 MILLIGRAM(S): 100 TABLET, FILM COATED ORAL at 10:48

## 2023-11-06 RX ADMIN — Medication 3: at 17:36

## 2023-11-06 RX ADMIN — TAMSULOSIN HYDROCHLORIDE 0.4 MILLIGRAM(S): 0.4 CAPSULE ORAL at 22:30

## 2023-11-06 RX ADMIN — Medication 50 MILLIGRAM(S): at 10:49

## 2023-11-06 RX ADMIN — Medication 1: at 11:30

## 2023-11-06 RX ADMIN — CEFTRIAXONE 100 MILLIGRAM(S): 500 INJECTION, POWDER, FOR SOLUTION INTRAMUSCULAR; INTRAVENOUS at 10:47

## 2023-11-06 RX ADMIN — PANTOPRAZOLE SODIUM 40 MILLIGRAM(S): 20 TABLET, DELAYED RELEASE ORAL at 10:48

## 2023-11-06 RX ADMIN — Medication 4 UNIT(S): at 07:47

## 2023-11-06 RX ADMIN — ATORVASTATIN CALCIUM 20 MILLIGRAM(S): 80 TABLET, FILM COATED ORAL at 22:30

## 2023-11-06 RX ADMIN — Medication 81 MILLIGRAM(S): at 10:50

## 2023-11-06 RX ADMIN — Medication 40 MILLIGRAM(S): at 10:48

## 2023-11-06 RX ADMIN — Medication 4 UNIT(S): at 12:43

## 2023-11-06 RX ADMIN — SODIUM CHLORIDE 1000 MILLILITER(S): 9 INJECTION INTRAMUSCULAR; INTRAVENOUS; SUBCUTANEOUS at 19:03

## 2023-11-06 RX ADMIN — HEPARIN SODIUM 5000 UNIT(S): 5000 INJECTION INTRAVENOUS; SUBCUTANEOUS at 22:31

## 2023-11-06 RX ADMIN — Medication 4 UNIT(S): at 17:35

## 2023-11-06 NOTE — H&P ADULT - ASSESSMENT
72Y M with PMH DM1 on insulin pump, HTN, atrial fibrillation, BPH presenting with hyperglycemia. Patient was discharged from this hospital yesterday after overnight CDU stay for issues with hyperglycemia and insulin pump.  He was evaluated by endocrinology who adjusted his pump and medications and he was discharged with improvement in his glucose control yesterday. He was also diagnosed with UTI and was discharged on cefdinir for UTI. Now returning due to persistent hyperglycemia throughout the day yesterday

## 2023-11-06 NOTE — ED ADULT NURSE REASSESSMENT NOTE - NS ED NURSE REASSESS COMMENT FT1
0751 Pt fs 107 and just 4 units given as ordered.  Pt said earlier he felt sugar going down it was like 90 on his sensor He took a sugar tab earlier Breakfast tray provided Wiley

## 2023-11-06 NOTE — CONSULT NOTE ADULT - SUBJECTIVE AND OBJECTIVE BOX
C A R D I O L O G Y  *********************    DATE OF SERVICE: 11-06-23    HISTORY OF PRESENT ILLNESS: HPI:  Patient is a 71 y/o male well known to our office with PMH of CAD s/p CABGx3 (3/2023), HFrEF s/p [employer] dual chamber ICD (9/2023), Type 1 DM on insulin pump, HTN, vertigo, and BPH who is admitted with hyperglycemia. Cardiology consulted for management of CAD/CHF. Reports some fatigue at home when his sugar was very high. Denies chest pain, SOB, palpitations, dizziness, or syncope.    PAST MEDICAL & SURGICAL HISTORY:  Hypertension      BPH (benign prostatic hyperplasia)      Type I diabetes mellitus      CAD (coronary artery disease)      PAF (paroxysmal atrial fibrillation)      DKA, type 1      S/P cataract surgery  right eye      Steel plate in right arm      S/P CABG x 3              MEDICATIONS:  MEDICATIONS  (STANDING):  aspirin enteric coated 81 milliGRAM(s) Oral daily  atorvastatin 20 milliGRAM(s) Oral at bedtime  cefTRIAXone   IVPB 1000 milliGRAM(s) IV Intermittent every 24 hours  dextrose 5%. 1000 milliLiter(s) (100 mL/Hr) IV Continuous <Continuous>  dextrose 5%. 1000 milliLiter(s) (50 mL/Hr) IV Continuous <Continuous>  dextrose 50% Injectable 25 Gram(s) IV Push once  dextrose 50% Injectable 12.5 Gram(s) IV Push once  dextrose 50% Injectable 25 Gram(s) IV Push once  furosemide    Tablet 40 milliGRAM(s) Oral daily  glucagon  Injectable 1 milliGRAM(s) IntraMuscular once  heparin   Injectable 5000 Unit(s) SubCutaneous every 8 hours  insulin lispro (ADMELOG) corrective regimen sliding scale   SubCutaneous three times a day before meals  insulin lispro (ADMELOG) corrective regimen sliding scale   SubCutaneous at bedtime  insulin lispro Injectable (ADMELOG) 4 Unit(s) SubCutaneous three times a day before meals  losartan 100 milliGRAM(s) Oral daily  metoprolol tartrate 50 milliGRAM(s) Oral two times a day  pantoprazole    Tablet 40 milliGRAM(s) Oral before breakfast  tamsulosin 0.4 milliGRAM(s) Oral at bedtime      Allergies    Neosporin (Rash)    Intolerances        FAMILY HISTORY:  FHx: diabetes mellitus (Sibling)      Non-contributary for premature coronary disease or sudden cardiac death    SOCIAL HISTORY:    [ x] Non-smoker  [ ] Smoker  [ ] Alcohol    FLU VACCINE THIS YEAR STARTS IN AUGUST:  [ ] Yes    [ ] No    IF OVER 65 HAVE YOU EVER HAD A PNA VACCINE:  [ ] Yes    [ ] No       [ ] N/A      REVIEW OF SYSTEMS:  [ ]chest pain  [  ]shortness of breath  [  ]palpitations  [  ]syncope  [ ]near syncope [ ]upper extremity weakness   [ ] lower extremity weakness  [  ]diplopia  [  ]altered mental status   [  ]fevers  [ ]chills [ ]nausea  [ ]vomiting  [  ]dysphagia    [ ]abdominal pain  [ ]melena  [ ]BRBPR    [  ]epistaxis  [  ]rash    [ ]lower extremity edema    +fatigue    [X] All others negative	  [ ] Unable to obtain      LABS:	 	    CARDIAC MARKERS:                              10.4   5.03  )-----------( 212      ( 05 Nov 2023 22:22 )             33.4     Hb Trend: 10.4<--    11-05    135  |  97  |  22  ----------------------------<  263<H>  4.4   |  23  |  0.74    Ca    9.5      05 Nov 2023 22:22  Phos  2.8     11-05  Mg     1.8     11-05    TPro  7.4  /  Alb  4.1  /  TBili  0.6  /  DBili  x   /  AST  42<H>  /  ALT  34  /  AlkPhos  70  11-05    Creatinine Trend: 0.74<--, 0.64<--, 0.74<--, 0.75<--, 1.00<--    Coags:      proBNP:   Lipid Profile:   HgA1c:   TSH:         PHYSICAL EXAM:  T(C): 36.8 (11-06-23 @ 14:38), Max: 36.8 (11-06-23 @ 14:38)  HR: 66 (11-06-23 @ 14:38) (58 - 71)  BP: 128/87 (11-06-23 @ 14:38) (128/87 - 164/88)  RR: 16 (11-06-23 @ 14:38) (16 - 18)  SpO2: 99% (11-06-23 @ 14:38) (97% - 99%)  Wt(kg): --   BMI (kg/m2): 24.2 (11-05-23 @ 19:44)  I&O's Summary      Gen: NAD  HEENT:  (-)icterus (-)pallor  CV: N S1 S2 1/6 RONI (+)2 Pulses B/l  Resp:  Clear to auscultation B/L, normal effort  GI: (+) BS Soft, NT, ND  Lymph:  (-)Edema, (-)obvious lymphadenopathy  Skin: Warm to touch, Normal turgor  Psych: Appropriate mood and affect      TELEMETRY: 	      ECG: Pending    RADIOLOGY:         CXR: < from: Xray Chest 2 Views PA/Lat (11.03.23 @ 11:39) >  IMPRESSION:  Clear lungs.    --- End of Report ---    < end of copied text >      ASSESSMENT/PLAN: Patient is a 71 y/o male well known to our office with PMH of CAD s/p CABGx3 (3/2023), HFrEF s/p [employer] dual chamber ICD (9/2023), Type 1 DM on insulin pump, HTN, vertigo, and BPH who is admitted with hyperglycemia. Cardiology consulted for management of CAD/CHF.    #Hyperglycemia  - Management per endocrine    #UTI  - Abx per med    #CAD s/p prior CABG  - Continue ASA 81mg daily and Lipitor    #Chronic HFrEF s/p recent ICD  - Appears well compensated from CHF perspective  - Continue PO lasix  - Continue Losartan and Metoprolol    - No inpatient cardiac w/u planned  - Patient to f/u with Dr. Wahl after discharge    ELVIRA VincentC  Pager: [phone]   C A R D I O L O G Y  *********************    DATE OF SERVICE: 11-06-23    HISTORY OF PRESENT ILLNESS: HPI:  Patient is a 71 y/o male well known to our office with PMH of CAD s/p CABGx3 (3/2023), HFrEF s/p [employer] dual chamber ICD (9/2023), Type 1 DM on insulin pump, HTN, vertigo, and BPH who is admitted with hyperglycemia. Cardiology consulted for management of CAD/CHF. Reports some fatigue at home when his sugar was very high. Denies chest pain, SOB, palpitations, dizziness, or syncope.    PAST MEDICAL & SURGICAL HISTORY:  Hypertension      BPH (benign prostatic hyperplasia)      Type I diabetes mellitus      CAD (coronary artery disease)      PAF (paroxysmal atrial fibrillation)      DKA, type 1      S/P cataract surgery  right eye      Steel plate in right arm      S/P CABG x 3              MEDICATIONS:  MEDICATIONS  (STANDING):  aspirin enteric coated 81 milliGRAM(s) Oral daily  atorvastatin 20 milliGRAM(s) Oral at bedtime  cefTRIAXone   IVPB 1000 milliGRAM(s) IV Intermittent every 24 hours  dextrose 5%. 1000 milliLiter(s) (100 mL/Hr) IV Continuous <Continuous>  dextrose 5%. 1000 milliLiter(s) (50 mL/Hr) IV Continuous <Continuous>  dextrose 50% Injectable 25 Gram(s) IV Push once  dextrose 50% Injectable 12.5 Gram(s) IV Push once  dextrose 50% Injectable 25 Gram(s) IV Push once  furosemide    Tablet 40 milliGRAM(s) Oral daily  glucagon  Injectable 1 milliGRAM(s) IntraMuscular once  heparin   Injectable 5000 Unit(s) SubCutaneous every 8 hours  insulin lispro (ADMELOG) corrective regimen sliding scale   SubCutaneous three times a day before meals  insulin lispro (ADMELOG) corrective regimen sliding scale   SubCutaneous at bedtime  insulin lispro Injectable (ADMELOG) 4 Unit(s) SubCutaneous three times a day before meals  losartan 100 milliGRAM(s) Oral daily  metoprolol tartrate 50 milliGRAM(s) Oral two times a day  pantoprazole    Tablet 40 milliGRAM(s) Oral before breakfast  tamsulosin 0.4 milliGRAM(s) Oral at bedtime      Allergies    Neosporin (Rash)    Intolerances        FAMILY HISTORY:  FHx: diabetes mellitus (Sibling)      Non-contributary for premature coronary disease or sudden cardiac death    SOCIAL HISTORY:    [ x] Non-smoker  [ ] Smoker  [ ] Alcohol    FLU VACCINE THIS YEAR STARTS IN AUGUST:  [ ] Yes    [ ] No    IF OVER 65 HAVE YOU EVER HAD A PNA VACCINE:  [ ] Yes    [ ] No       [ ] N/A      REVIEW OF SYSTEMS:  [ ]chest pain  [  ]shortness of breath  [  ]palpitations  [  ]syncope  [ ]near syncope [ ]upper extremity weakness   [ ] lower extremity weakness  [  ]diplopia  [  ]altered mental status   [  ]fevers  [ ]chills [ ]nausea  [ ]vomiting  [  ]dysphagia    [ ]abdominal pain  [ ]melena  [ ]BRBPR    [  ]epistaxis  [  ]rash    [ ]lower extremity edema    +fatigue    [X] All others negative	  [ ] Unable to obtain      LABS:	 	    CARDIAC MARKERS:                              10.4   5.03  )-----------( 212      ( 05 Nov 2023 22:22 )             33.4     Hb Trend: 10.4<--    11-05    135  |  97  |  22  ----------------------------<  263<H>  4.4   |  23  |  0.74    Ca    9.5      05 Nov 2023 22:22  Phos  2.8     11-05  Mg     1.8     11-05    TPro  7.4  /  Alb  4.1  /  TBili  0.6  /  DBili  x   /  AST  42<H>  /  ALT  34  /  AlkPhos  70  11-05    Creatinine Trend: 0.74<--, 0.64<--, 0.74<--, 0.75<--, 1.00<--          PHYSICAL EXAM:  T(C): 36.8 (11-06-23 @ 14:38), Max: 36.8 (11-06-23 @ 14:38)  HR: 66 (11-06-23 @ 14:38) (58 - 71)  BP: 128/87 (11-06-23 @ 14:38) (128/87 - 164/88)  RR: 16 (11-06-23 @ 14:38) (16 - 18)  SpO2: 99% (11-06-23 @ 14:38) (97% - 99%)  Wt(kg): --   BMI (kg/m2): 24.2 (11-05-23 @ 19:44)  I&O's Summary      Gen: NAD  HEENT:  (-)icterus (-)pallor  CV: N S1 S2 1/6 RONI (+)2 Pulses B/l  Resp:  Clear to auscultation B/L, normal effort  GI: (+) BS Soft, NT, ND  Lymph:  (-)Edema, (-)obvious lymphadenopathy  Skin: Warm to touch, Normal turgor  Psych: Appropriate mood and affect      TELEMETRY: 	      ECG: Pending    RADIOLOGY:         CXR: < from: Xray Chest 2 Views PA/Lat (11.03.23 @ 11:39) >  IMPRESSION:  Clear lungs.    --- End of Report ---    < end of copied text >      ASSESSMENT/PLAN: Patient is a 71 y/o male well known to our office with PMH of CAD s/p CABGx3 (3/2023), HFrEF s/p [employer] dual chamber ICD (9/2023), Type 1 DM on insulin pump, HTN, vertigo, and BPH who is admitted with hyperglycemia. Cardiology consulted for management of CAD/CHF.    #Hyperglycemia  - Management per endocrine    #UTI  - Abx per med    #CAD s/p prior CABG  - Continue ASA 81mg daily and Lipitor    #Chronic HFrEF s/p recent ICD  - Appears well compensated from CHF perspective  - Continue PO lasix  - Continue Losartan and Metoprolol    - No inpatient cardiac w/u planned  - Patient to f/u with Dr. Wahl after discharge    ELVIRA VincentC  Pager: [phone]

## 2023-11-06 NOTE — H&P ADULT - TIME BILLING
- Reviewing, and interpreting labs and testing.  - Independently obtaining a review of systems and performing a physical exam  - Reviewing consultant documentation/recommendations in addition to discussing plan of care with consultants.  - Counselling and educating patient interpretation of aforementioned items and plan of care. - Reviewing, and interpreting labs and testing.  - Independently obtaining a review of systems and performing a physical exam  - Reviewing consultant documentation/recommendations in addition to discussing plan of care with consultants.  - Counselling and educating patient interpretation of aforementioned items and plan of care.      Based on my assessment, this patient’s condition has improved and no longer warrants inpatient status and will be changed to outpatient status prior to being discharged from the hospital.  This decision is based on the following reasons:   Seen by endocrinolgy, meds adjusted

## 2023-11-06 NOTE — CONSULT NOTE ADULT - SUBJECTIVE AND OBJECTIVE BOX
ENDOCRINE INITIAL CONSULT - t1dm, insulin pump    HPI:  72Y M with PMH DM1 on insulin pump, HTN, atrial fibrillation, BPH presenting with hyperglycemia. Patient was discharged from this hospital yesterday after overnight CDU stay for issues with hyperglycemia and insulin pump.  He was evaluated by endocrinology who adjusted his pump and medications and he was discharged with improvement in his glucose control yesterday. He was also diagnosed with UTI and was discharged on cefdinir for UTI. Now returning due to persistent hyperglycemia throughout the day yesterday; states he has had readings in the 300s and 400s despite trying to reduce p.o. intake in an effort to avoid further hyperglycemia and taking additional lantus and humalog which was given to him on discharge yesterday. He agrees to have increase urinary frequency which is more evident for him as compared to before as he has h/o BPH. No fevers, chills, excessive thirst, nausea/vomiting or abdominal pain.    UA was positive for nitrites, leukocyte esterase on day before yesterday but repeat UA today is negative     In the ED, endocrinology was consulted and he was given 20 units of lantus overnight and started on 4 units of admelog with low dose CSI. Insulin pump is on hold for now, waiting for final recommendations for discharge    (06 Nov 2023 09:35)      ENDOCRINE HISTORY   Diagnosed with DM: T1DM 50 years ago  Last HbA1c:  7.3  Endocrinologist: Dr. Ngo   Home DM Meds: tslim x2 insulin pump with novolog insulin and dexcom g6  basal  12a 0.75  3a 1.187  11a 1.19  5p 1.18  8p 1.1  ISF  12a 1:50  ICR  12a 1:15  target 110  duration 5hr  Microvascular complications: denies retinopathy, neuropathy, nephropathy  Macrovascular complications: hx CABG, CVA  SMBG: dexcom g6  Diet at home:  - Breakfast: slice of whole wheat bread, boiled eggs, apple juice  - Lunch: leftovers, cold cuts, fruit  - Dinner: pasta  - Snacks: denies  - Endorses apple juice and diet soda  Exercise: has hardward in ankle limiting exercise  PMHx: as above  PSHx: as above  Family hx: brother with T1DM, no family history of thyroid disease  Social hx: rarely alcohol use, denies tobacco use or other recreational drug use      PAST MEDICAL & SURGICAL HISTORY:  Hypertension      BPH (benign prostatic hyperplasia)      Type I diabetes mellitus      CAD (coronary artery disease)      PAF (paroxysmal atrial fibrillation)      DKA, type 1      S/P cataract surgery  right eye      Steel plate in right arm      S/P CABG x 3          FAMILY HISTORY:  FHx: diabetes mellitus (Sibling)        Social History:      Home Medications:  atorvastatin 80 mg oral tablet: 1 tab(s) orally once a day (06 Nov 2023 10:07)  cholecalciferol 25 mcg (1000 intl units) oral tablet: 1 tab(s) orally 2 times a day (06 Nov 2023 10:09)  furosemide 40 mg oral tablet: 1 tab(s) orally once a day (06 Nov 2023 10:05)  HumaLOG 100 units/mL injectable solution: Inject via insulin pump (06 Nov 2023 10:09)  insulin glargine-yfgn 100 units/mL subcutaneous solution: Inject as needed (06 Nov 2023 10:09)  losartan 25 mg oral tablet: 1 tab(s) orally once a day (06 Nov 2023 10:07)  Singulair 10 mg oral tablet: 1 tab(s) orally once a day (06 Nov 2023 10:09)  tamsulosin 0.4 mg oral capsule: 1 cap(s) orally once a day (06 Nov 2023 10:05)  Vitamin C 1000 mg oral tablet: 1 tab(s) orally once a day (06 Nov 2023 10:09)  zinc (as acetate) 50 mg oral capsule: 1 cap(s) orally once a day (06 Nov 2023 10:05)      MEDICATIONS  (STANDING):  aspirin enteric coated 81 milliGRAM(s) Oral daily  atorvastatin 20 milliGRAM(s) Oral at bedtime  cefTRIAXone   IVPB 1000 milliGRAM(s) IV Intermittent every 24 hours  dextrose 5%. 1000 milliLiter(s) (100 mL/Hr) IV Continuous <Continuous>  dextrose 5%. 1000 milliLiter(s) (50 mL/Hr) IV Continuous <Continuous>  dextrose 50% Injectable 25 Gram(s) IV Push once  dextrose 50% Injectable 12.5 Gram(s) IV Push once  dextrose 50% Injectable 25 Gram(s) IV Push once  furosemide    Tablet 40 milliGRAM(s) Oral daily  glucagon  Injectable 1 milliGRAM(s) IntraMuscular once  heparin   Injectable 5000 Unit(s) SubCutaneous every 8 hours  insulin lispro (ADMELOG) corrective regimen sliding scale   SubCutaneous three times a day before meals  insulin lispro (ADMELOG) corrective regimen sliding scale   SubCutaneous at bedtime  insulin lispro Injectable (ADMELOG) 4 Unit(s) SubCutaneous three times a day before meals  losartan 100 milliGRAM(s) Oral daily  metoprolol tartrate 50 milliGRAM(s) Oral two times a day  pantoprazole    Tablet 40 milliGRAM(s) Oral before breakfast  tamsulosin 0.4 milliGRAM(s) Oral at bedtime    MEDICATIONS  (PRN):  dextrose Oral Gel 15 Gram(s) Oral once PRN Blood Glucose LESS THAN 70 milliGRAM(s)/deciliter      Allergies    Neosporin (Rash)    Intolerances        REVIEW OF SYSTEMS  Constitutional: No fever  Eyes: No blurry vision  Neuro: No tremors  HEENT: No pain  Cardiovascular: No chest pain, palpitations  Respiratory: No SOB, no cough  GI: No nausea, vomiting, abdominal pain  : No dysuria  Skin: no rash  Psych: no depression  Endocrine: no polyuria, polydipsia  Hem/lymph: no swelling  Osteoporosis: no fractures  ALL OTHER SYSTEMS REVIEWED AND NEGATIVE     UNABLE TO OBTAIN     PHYSICAL EXAM   Vital Signs Last 24 Hrs  T(C): 36.5 (06 Nov 2023 11:05), Max: 36.7 (05 Nov 2023 19:44)  T(F): 97.7 (06 Nov 2023 11:05), Max: 98.1 (05 Nov 2023 19:44)  HR: 64 (06 Nov 2023 11:05) (58 - 71)  BP: 164/88 (06 Nov 2023 11:05) (146/79 - 164/88)  BP(mean): 111 (06 Nov 2023 02:14) (111 - 111)  RR: 17 (06 Nov 2023 11:05) (16 - 18)  SpO2: 99% (06 Nov 2023 11:05) (97% - 99%)    Parameters below as of 06 Nov 2023 11:05  Patient On (Oxygen Delivery Method): room air      GENERAL: NAD, well-groomed, well-developed  EYES: No proptosis, no lid lag, anicteric  HEENT:  Atraumatic, Normocephalic, moist mucous membranes  THYROID: Normal size, no palpable nodules  RESPIRATORY: Clear to auscultation bilaterally; No rales, rhonchi, wheezing  CARDIOVASCULAR: Regular rate and rhythm; No murmurs; no peripheral edema  GI: Soft, nontender, non distended, normal bowel sounds  SKIN: Dry, intact, No rashes or lesions  MUSCULOSKELETAL: Full range of motion, normal strength  NEURO: sensation intact, extraocular movements intact, no tremor  PSYCH: Alert and oriented x 3, normal affect, normal mood  CUSHING'S SIGNS: no striae    CAPILLARY BLOOD GLUCOSE      POCT Blood Glucose.: 177 mg/dL (06 Nov 2023 11:22)  POCT Blood Glucose.: 107 mg/dL (06 Nov 2023 07:35)  POCT Blood Glucose.: 158 mg/dL (06 Nov 2023 02:10)  POCT Blood Glucose.: 241 mg/dL (05 Nov 2023 22:20)  POCT Blood Glucose.: 347 mg/dL (05 Nov 2023 19:49)      A1C with Estimated Average Glucose Result: 7.3 % (11-03-23 @ 19:15)                            10.4   5.03  )-----------( 212      ( 05 Nov 2023 22:22 )             33.4       11-05    135  |  97  |  22  ----------------------------<  263<H>  4.4   |  23  |  0.74    Ca    9.5      05 Nov 2023 22:22  Phos  2.8     11-05  Mg     1.8     11-05    TPro  7.4  /  Alb  4.1  /  TBili  0.6  /  DBili  x   /  AST  42<H>  /  ALT  34  /  AlkPhos  70  11-05          LIPIDS    RADIOLOGY ENDOCRINE INITIAL CONSULT - t1dm, insulin pump    HPI:  72Y M with PMH DM1 on insulin pump, HTN, atrial fibrillation, BPH presenting with hyperglycemia. Patient was discharged from this hospital yesterday after overnight CDU stay for issues with hyperglycemia and insulin pump.  He was evaluated by endocrinology who adjusted his pump and medications and he was discharged with improvement in his glucose control yesterday. He was also diagnosed with UTI and was discharged on cefdinir for UTI. Now returning due to persistent hyperglycemia throughout the day yesterday; states he has had readings in the 300s and 400s despite trying to reduce p.o. intake in an effort to avoid further hyperglycemia and taking additional lantus and humalog which was given to him on discharge yesterday. He agrees to have increase urinary frequency which is more evident for him as compared to before as he has h/o BPH. No fevers, chills, excessive thirst, nausea/vomiting or abdominal pain.    UA was positive for nitrites, leukocyte esterase on day before yesterday but repeat UA today is negative     In the ED, endocrinology was consulted and he was given 20 units of lantus overnight and started on 4 units of admelog with low dose CSI. Insulin pump is on hold for now, waiting for final recommendations for discharge    (06 Nov 2023 09:35)      ENDOCRINE HISTORY   Diagnosed with DM: T1DM 50 years ago  Last HbA1c:  7.3  Endocrinologist: Dr. Ngo   Home DM Meds: tslim x2 insulin pump with novolog insulin and dexcom g6  basal  12a 0.75  3a 1.187  11a 1.19  5p 1.18  8p 1.1  ISF  12a 1:50  ICR  12a 1:15  target 110  duration 5hr  Put insulin pump on at   Reports put pump on saturday night, then sugar high sunday morning  Has lantus and humalog at home.   Microvascular complications: denies retinopathy, neuropathy, nephropathy  Macrovascular complications: hx CABG, CVA  SMBG: dexcom g6, sat 6pm 352 (4+4), 9p 391 (4), 10:15p 417 (5), 11:19 (6), sunday 12:03am 366 (5.12) 12:20 250, 3:13pm (6u)7pm (379)  Diet at home:  - Breakfast: slice of whole wheat bread, boiled eggs, apple juice  - Lunch: leftovers, cold cuts, fruit  - Dinner: pasta  - Snacks: denies  - Endorses apple juice and diet soda  Exercise: has hardware in ankle limiting exercise  PMHx: as above  PSHx: as above  Family hx: brother with T1DM, no family history of thyroid disease  Social hx: rarely alcohol use, denies tobacco use or other recreational drug use      PAST MEDICAL & SURGICAL HISTORY:  Hypertension      BPH (benign prostatic hyperplasia)      Type I diabetes mellitus      CAD (coronary artery disease)      PAF (paroxysmal atrial fibrillation)      DKA, type 1      S/P cataract surgery  right eye      Steel plate in right arm      S/P CABG x 3          FAMILY HISTORY:  FHx: diabetes mellitus (Sibling)        Social History:      Home Medications:  atorvastatin 80 mg oral tablet: 1 tab(s) orally once a day (06 Nov 2023 10:07)  cholecalciferol 25 mcg (1000 intl units) oral tablet: 1 tab(s) orally 2 times a day (06 Nov 2023 10:09)  furosemide 40 mg oral tablet: 1 tab(s) orally once a day (06 Nov 2023 10:05)  HumaLOG 100 units/mL injectable solution: Inject via insulin pump (06 Nov 2023 10:09)  insulin glargine-yfgn 100 units/mL subcutaneous solution: Inject as needed (06 Nov 2023 10:09)  losartan 25 mg oral tablet: 1 tab(s) orally once a day (06 Nov 2023 10:07)  Singulair 10 mg oral tablet: 1 tab(s) orally once a day (06 Nov 2023 10:09)  tamsulosin 0.4 mg oral capsule: 1 cap(s) orally once a day (06 Nov 2023 10:05)  Vitamin C 1000 mg oral tablet: 1 tab(s) orally once a day (06 Nov 2023 10:09)  zinc (as acetate) 50 mg oral capsule: 1 cap(s) orally once a day (06 Nov 2023 10:05)      MEDICATIONS  (STANDING):  aspirin enteric coated 81 milliGRAM(s) Oral daily  atorvastatin 20 milliGRAM(s) Oral at bedtime  cefTRIAXone   IVPB 1000 milliGRAM(s) IV Intermittent every 24 hours  dextrose 5%. 1000 milliLiter(s) (100 mL/Hr) IV Continuous <Continuous>  dextrose 5%. 1000 milliLiter(s) (50 mL/Hr) IV Continuous <Continuous>  dextrose 50% Injectable 25 Gram(s) IV Push once  dextrose 50% Injectable 12.5 Gram(s) IV Push once  dextrose 50% Injectable 25 Gram(s) IV Push once  furosemide    Tablet 40 milliGRAM(s) Oral daily  glucagon  Injectable 1 milliGRAM(s) IntraMuscular once  heparin   Injectable 5000 Unit(s) SubCutaneous every 8 hours  insulin lispro (ADMELOG) corrective regimen sliding scale   SubCutaneous three times a day before meals  insulin lispro (ADMELOG) corrective regimen sliding scale   SubCutaneous at bedtime  insulin lispro Injectable (ADMELOG) 4 Unit(s) SubCutaneous three times a day before meals  losartan 100 milliGRAM(s) Oral daily  metoprolol tartrate 50 milliGRAM(s) Oral two times a day  pantoprazole    Tablet 40 milliGRAM(s) Oral before breakfast  tamsulosin 0.4 milliGRAM(s) Oral at bedtime    MEDICATIONS  (PRN):  dextrose Oral Gel 15 Gram(s) Oral once PRN Blood Glucose LESS THAN 70 milliGRAM(s)/deciliter      Allergies    Neosporin (Rash)    Intolerances        REVIEW OF SYSTEMS  Constitutional: No fever  Eyes: No blurry vision  Neuro: No tremors  HEENT: No pain  Cardiovascular: No chest pain, palpitations  Respiratory: No SOB, no cough  GI: No nausea, vomiting, abdominal pain  : No dysuria  Skin: no rash  Psych: no depression  Endocrine: endorses polyuria, polydipsia  Hem/lymph: no swelling  Osteoporosis: no fractures  ALL OTHER SYSTEMS REVIEWED AND NEGATIVE     UNABLE TO OBTAIN     PHYSICAL EXAM   Vital Signs Last 24 Hrs  T(C): 36.5 (06 Nov 2023 11:05), Max: 36.7 (05 Nov 2023 19:44)  T(F): 97.7 (06 Nov 2023 11:05), Max: 98.1 (05 Nov 2023 19:44)  HR: 64 (06 Nov 2023 11:05) (58 - 71)  BP: 164/88 (06 Nov 2023 11:05) (146/79 - 164/88)  BP(mean): 111 (06 Nov 2023 02:14) (111 - 111)  RR: 17 (06 Nov 2023 11:05) (16 - 18)  SpO2: 99% (06 Nov 2023 11:05) (97% - 99%)    Parameters below as of 06 Nov 2023 11:05  Patient On (Oxygen Delivery Method): room air      GENERAL: NAD, well-groomed, well-developed  EYES: No proptosis, no lid lag, anicteric  HEENT:  Atraumatic, Normocephalic, moist mucous membranes  THYROID: Normal size, no palpable nodules  RESPIRATORY: Clear to auscultation bilaterally; No rales, rhonchi, wheezing  CARDIOVASCULAR: Regular rate and rhythm; No murmurs; no peripheral edema  GI: Soft, nontender, non distended, normal bowel sounds  SKIN: Dry, intact, No rashes or lesions  MUSCULOSKELETAL: Full range of motion, normal strength  NEURO: sensation intact, extraocular movements intact, no tremor  PSYCH: Alert and oriented x 3, normal affect, normal mood  CUSHING'S SIGNS: no striae    CAPILLARY BLOOD GLUCOSE      POCT Blood Glucose.: 177 mg/dL (06 Nov 2023 11:22)  POCT Blood Glucose.: 107 mg/dL (06 Nov 2023 07:35)  POCT Blood Glucose.: 158 mg/dL (06 Nov 2023 02:10)  POCT Blood Glucose.: 241 mg/dL (05 Nov 2023 22:20)  POCT Blood Glucose.: 347 mg/dL (05 Nov 2023 19:49)      A1C with Estimated Average Glucose Result: 7.3 % (11-03-23 @ 19:15)                            10.4   5.03  )-----------( 212      ( 05 Nov 2023 22:22 )             33.4       11-05    135  |  97  |  22  ----------------------------<  263<H>  4.4   |  23  |  0.74    Ca    9.5      05 Nov 2023 22:22  Phos  2.8     11-05  Mg     1.8     11-05    TPro  7.4  /  Alb  4.1  /  TBili  0.6  /  DBili  x   /  AST  42<H>  /  ALT  34  /  AlkPhos  70  11-05          LIPIDS    RADIOLOGY ENDOCRINE INITIAL CONSULT - t1dm, insulin pump    HPI:  72Y M with PMH DM1 on insulin pump, HTN, atrial fibrillation, BPH presenting with hyperglycemia. Patient was discharged from this hospital yesterday after overnight CDU stay for issues with hyperglycemia and insulin pump.  He was evaluated by endocrinology who adjusted his pump and medications and he was discharged with improvement in his glucose control yesterday. He was also diagnosed with UTI and was discharged on cefdinir for UTI. Now returning due to persistent hyperglycemia throughout the day yesterday; states he has had readings in the 300s and 400s despite trying to reduce p.o. intake in an effort to avoid further hyperglycemia and taking additional lantus and humalog which was given to him on discharge yesterday. He agrees to have increase urinary frequency which is more evident for him as compared to before as he has h/o BPH. No fevers, chills, excessive thirst, nausea/vomiting or abdominal pain.    UA was positive for nitrites, leukocyte esterase on day before yesterday but repeat UA today is negative     In the ED, endocrinology was consulted and he was given 20 units of lantus overnight and started on 4 units of admelog with low dose CSI. Insulin pump is on hold for now, waiting for final recommendations for discharge    (06 Nov 2023 09:35)      ENDOCRINE HISTORY   Diagnosed with DM: T1DM 50 years ago  Last HbA1c:  7.3  Endocrinologist: Dr. Ngo   Home DM Meds: tslim x2 insulin pump with novolog insulin and dexcom g6  basal  12a 0.75  3a 1.187  11a 1.19  5p 1.18  8p 1.1  ISF  12a 1:50  ICR  12a 1:15  target 110  duration 5hr  Put insulin pump on at   Reports put pump on saturday night, then sugar high sunday morning  Has lantus and humalog at home.   Microvascular complications: denies retinopathy, neuropathy, nephropathy  Macrovascular complications: hx CABG, CVA  SMBG: dexcom g6, sat 6pm 352 (4+4), 9p 391 (4), 10:15p 417 (5), 11:19 (6), sunday 12:03am 366 (5.12) 12:20 250, 3:13pm (6u)7pm (379)  Diet at home:  - Breakfast: slice of whole wheat bread, boiled eggs, apple juice  - Lunch: leftovers, cold cuts, fruit  - Dinner: pasta  - Snacks: denies  - Endorses apple juice and diet soda  Exercise: has hardware in ankle limiting exercise  PMHx: as above  PSHx: as above  Family hx: brother with T1DM, no family history of thyroid disease  Social hx: rarely alcohol use, denies tobacco use or other recreational drug use      PAST MEDICAL & SURGICAL HISTORY:  Hypertension      BPH (benign prostatic hyperplasia)      Type I diabetes mellitus      CAD (coronary artery disease)      PAF (paroxysmal atrial fibrillation)      DKA, type 1      S/P cataract surgery  right eye      Steel plate in right arm      S/P CABG x 3          FAMILY HISTORY:  FHx: diabetes mellitus (Sibling)        Social History:      Home Medications:  atorvastatin 80 mg oral tablet: 1 tab(s) orally once a day (06 Nov 2023 10:07)  cholecalciferol 25 mcg (1000 intl units) oral tablet: 1 tab(s) orally 2 times a day (06 Nov 2023 10:09)  furosemide 40 mg oral tablet: 1 tab(s) orally once a day (06 Nov 2023 10:05)  HumaLOG 100 units/mL injectable solution: Inject via insulin pump (06 Nov 2023 10:09)  insulin glargine-yfgn 100 units/mL subcutaneous solution: Inject as needed (06 Nov 2023 10:09)  losartan 25 mg oral tablet: 1 tab(s) orally once a day (06 Nov 2023 10:07)  Singulair 10 mg oral tablet: 1 tab(s) orally once a day (06 Nov 2023 10:09)  tamsulosin 0.4 mg oral capsule: 1 cap(s) orally once a day (06 Nov 2023 10:05)  Vitamin C 1000 mg oral tablet: 1 tab(s) orally once a day (06 Nov 2023 10:09)  zinc (as acetate) 50 mg oral capsule: 1 cap(s) orally once a day (06 Nov 2023 10:05)      MEDICATIONS  (STANDING):  aspirin enteric coated 81 milliGRAM(s) Oral daily  atorvastatin 20 milliGRAM(s) Oral at bedtime  cefTRIAXone   IVPB 1000 milliGRAM(s) IV Intermittent every 24 hours  dextrose 5%. 1000 milliLiter(s) (100 mL/Hr) IV Continuous <Continuous>  dextrose 5%. 1000 milliLiter(s) (50 mL/Hr) IV Continuous <Continuous>  dextrose 50% Injectable 25 Gram(s) IV Push once  dextrose 50% Injectable 12.5 Gram(s) IV Push once  dextrose 50% Injectable 25 Gram(s) IV Push once  furosemide    Tablet 40 milliGRAM(s) Oral daily  glucagon  Injectable 1 milliGRAM(s) IntraMuscular once  heparin   Injectable 5000 Unit(s) SubCutaneous every 8 hours  insulin lispro (ADMELOG) corrective regimen sliding scale   SubCutaneous three times a day before meals  insulin lispro (ADMELOG) corrective regimen sliding scale   SubCutaneous at bedtime  insulin lispro Injectable (ADMELOG) 4 Unit(s) SubCutaneous three times a day before meals  losartan 100 milliGRAM(s) Oral daily  metoprolol tartrate 50 milliGRAM(s) Oral two times a day  pantoprazole    Tablet 40 milliGRAM(s) Oral before breakfast  tamsulosin 0.4 milliGRAM(s) Oral at bedtime    MEDICATIONS  (PRN):  dextrose Oral Gel 15 Gram(s) Oral once PRN Blood Glucose LESS THAN 70 milliGRAM(s)/deciliter      Allergies    Neosporin (Rash)    Intolerances        REVIEW OF SYSTEMS  Constitutional: No fever  Eyes: No blurry vision  Neuro: No tremors  HEENT: No pain  Cardiovascular: No chest pain, palpitations  Respiratory: No SOB, no cough  GI: No nausea, vomiting, abdominal pain  : No dysuria  Skin: no rash  Psych: no depression  Endocrine: endorses polyuria, polydipsia  Hem/lymph: no swelling  Osteoporosis: no fractures  ALL OTHER SYSTEMS REVIEWED AND NEGATIVE     UNABLE TO OBTAIN     PHYSICAL EXAM   Vital Signs Last 24 Hrs  T(C): 36.5 (06 Nov 2023 11:05), Max: 36.7 (05 Nov 2023 19:44)  T(F): 97.7 (06 Nov 2023 11:05), Max: 98.1 (05 Nov 2023 19:44)  HR: 64 (06 Nov 2023 11:05) (58 - 71)  BP: 164/88 (06 Nov 2023 11:05) (146/79 - 164/88)  BP(mean): 111 (06 Nov 2023 02:14) (111 - 111)  RR: 17 (06 Nov 2023 11:05) (16 - 18)  SpO2: 99% (06 Nov 2023 11:05) (97% - 99%)    Parameters below as of 06 Nov 2023 11:05  Patient On (Oxygen Delivery Method): room air      GENERAL: NAD, well-groomed, well-developed  EYES: No proptosis, no lid lag, anicteric  HEENT:  Atraumatic, Normocephalic, mildly dry mucous membranes  THYROID: Normal size, no palpable nodules  RESPIRATORY: Clear to auscultation bilaterally; No rales, rhonchi, wheezing  CARDIOVASCULAR: Regular rate and rhythm; No murmurs; mild peripheral edema bilaterally appreciated  GI: Soft, nontender, non distended, normal bowel sounds  SKIN: Dry, intact, No rashes or lesions  MUSCULOSKELETAL: Full range of motion, normal strength  NEURO: sensation intact, extraocular movements intact, no tremor  PSYCH: Answering questions appropriately, normal affect, normal mood  CUSHING'S SIGNS: no acanthosis, no dorsocervical fatpad    CAPILLARY BLOOD GLUCOSE      POCT Blood Glucose.: 177 mg/dL (06 Nov 2023 11:22)  POCT Blood Glucose.: 107 mg/dL (06 Nov 2023 07:35)  POCT Blood Glucose.: 158 mg/dL (06 Nov 2023 02:10)  POCT Blood Glucose.: 241 mg/dL (05 Nov 2023 22:20)  POCT Blood Glucose.: 347 mg/dL (05 Nov 2023 19:49)      A1C with Estimated Average Glucose Result: 7.3 % (11-03-23 @ 19:15)                            10.4   5.03  )-----------( 212      ( 05 Nov 2023 22:22 )             33.4       11-05    135  |  97  |  22  ----------------------------<  263<H>  4.4   |  23  |  0.74    Ca    9.5      05 Nov 2023 22:22  Phos  2.8     11-05  Mg     1.8     11-05    TPro  7.4  /  Alb  4.1  /  TBili  0.6  /  DBili  x   /  AST  42<H>  /  ALT  34  /  AlkPhos  70  11-05          LIPIDS    RADIOLOGY

## 2023-11-06 NOTE — H&P ADULT - NSHPPHYSICALEXAM_GEN_ALL_CORE
Vital Signs Last 24 Hrs  T(C): 36.4 (06 Nov 2023 06:10), Max: 36.7 (05 Nov 2023 19:44)  T(F): 97.5 (06 Nov 2023 06:10), Max: 98.1 (05 Nov 2023 19:44)  HR: 71 (06 Nov 2023 06:10) (58 - 71)  BP: 155/87 (06 Nov 2023 06:10) (146/79 - 162/85)  BP(mean): 111 (06 Nov 2023 02:14) (111 - 111)  RR: 17 (06 Nov 2023 06:10) (16 - 18)  SpO2: 98% (06 Nov 2023 06:10) (97% - 98%)    Parameters below as of 06 Nov 2023 06:10  Patient On (Oxygen Delivery Method): room air        CONSTITUTIONAL: Well-groomed, in no apparent distress  EYES: No conjunctival or scleral injection, non-icteric; PERRLA and symmetric  ENMT: No external nasal lesions; nasal mucosa not inflamed; normal dentition; no pharyngeal injection or exudates, oral mucosa with moist membranes  RESPIRATORY: Breathing comfortably; lungs CTA without wheeze/rhonchi/rales  CARDIOVASCULAR: +S1S2, RRR, no M/G/R; no carotid bruits; pedal pulses full and symmetric; no lower extremity edema  GASTROINTESTINAL: No palpable masses or tenderness, +BS throughout, no rebound/guarding; no hepatosplenomegaly; no hernia palpated  GENITOURINARY:  LYMPHATIC: No cervical LAD or tenderness; no axillary LAD or tenderness; no inguinal LAD or tenderness  MUSCULOSKELETAL: Normal gait and station; no digital clubbing or cyanosis; no paraspinal tenderness; no malalignment of extremities  SKIN: No rashes or ulcers noted; no subcutaneous nodules or induration palpable  NEUROLOGIC: CN grossly intact; sensation intact in LEs b/l to light touch; normal strength and tone  PSYCHIATRIC: A+O x 3; mood and affect appropriate; appropriate insight and judgment

## 2023-11-06 NOTE — CONSULT NOTE ADULT - ATTENDING COMMENTS
Agree with Dr. Cash's assessment and plan as outlined above. Reviewed all pertinent labs, and imaging studies. Modifications made as indicated above. 72 M with T1D, HTN, BPH presented with hyperglycemia. Of note, patient was recently discharged from the CDU for similar issues and insulin pump malfunction. A1C 7.3. Currently on T slim with novolog insulin at home. Patient reports that he reconnected the pump on saturday night and glucose is high sunday morning, thus came to the hospital. Lantus 20 units and admelog 4 units given and gluocse now at goal 100-180. Plan to resume insulin pump with home setting tonight and monitor for glucose for 24 hours. Rest of the plan as above.

## 2023-11-06 NOTE — H&P ADULT - PROBLEM SELECTOR PLAN 1
Came with hyperglycemia with BSL in 300's trended down to 107  Insulin pump on hold for now, got 20 units of lantus overnight and is on 4 units of admelog TID with low dose CSI as per endocrinology  Will check BSL TIDAC and QHS   Diabetic diet   Consulted endocrinology for final recommendations

## 2023-11-06 NOTE — H&P ADULT - HISTORY OF PRESENT ILLNESS
72Y M with PMH DM1 on insulin pump, HTN, atrial fibrillation, BPH presenting with hyperglycemia. Patient was discharged from this hospital yesterday after overnight CDU stay for issues with hyperglycemia and insulin pump.  He was evaluated by endocrinology who adjusted his pump and medications and he was discharged with improvement in his glucose control yesterday. He was also diagnosed with UTI and was discharged on cefdinir for UTI. Now returning due to persistent hyperglycemia throughout the day yesterday; states he has had readings in the 300s and 400s despite trying to reduce p.o. intake in an effort to avoid further hyperglycemia and taking additional lantus and humalog which was given to him on discharge yesterday. He agrees to have increase urinary frequency which is more evident for him as compared to before as he has h/o BPH. No fevers, chills, excessive thirst, nausea/vomiting or abdominal pain.    UA was positive for nitrites, leukocyte esterase on day before yesterday but repeat UA today is negative     In the ED, endocrinology was consulted and he was given 20 units of lantus overnight and started on 4 units of admelog with low dose CSI. Insulin pump is on hold for now, waiting for final recommendations for discharge

## 2023-11-06 NOTE — H&P ADULT - PROBLEM SELECTOR PLAN 2
Previous admission positive for UTI, Now UA negative   was discharged on cefdinir BID   ceftriaxone ordered while inpatient   will follow up with urine culture

## 2023-11-06 NOTE — H&P ADULT - NSHPREVIEWOFSYSTEMS_GEN_ALL_CORE
Review of Systems:   CONSTITUTIONAL: No fever, weight loss  EYES: No eye pain, visual disturbances, or discharge  ENT:  No difficulty hearing, tinnitus, vertigo; No sinus or throat pain  RESPIRATORY: No SOB. No cough, wheezing, chills or hemoptysis  CARDIOVASCULAR: No chest pain, palpitations, dizziness, or leg swelling  GASTROINTESTINAL: No abdominal or epigastric pain. No nausea, vomiting, or hematemesis; No diarrhea or constipation. No melena or hematochezia.  GENITOURINARY: Increase frequency, No dysuria, hematuria, or incontinence  NEUROLOGICAL: No headaches, memory loss, loss of strength, numbness, or tremors  SKIN: No itching, burning, rashes, or lesions   LYMPH NODES: No enlarged glands  ENDOCRINE: No heat or cold intolerance; No hair loss  MUSCULOSKELETAL: No joint pain or swelling; No muscle, back pain  PSYCHIATRIC: No depression, anxiety, mood swings, or difficulty sleeping  HEME/LYMPH: No easy bruising, or bleeding gums

## 2023-11-06 NOTE — H&P ADULT - NSHPLABSRESULTS_GEN_ALL_CORE
LABS:                         10.4   5.03  )-----------( 212      ( 2023 22:22 )             33.4     11-    135  |  97  |  22  ----------------------------<  263<H>  4.4   |  23  |  0.74    Ca    9.5      2023 22:22  Phos  2.8     11-  Mg     1.8         TPro  7.4  /  Alb  4.1  /  TBili  0.6  /  DBili  x   /  AST  42<H>  /  ALT  34  /  AlkPhos  70  11-05      Urinalysis Basic - ( 2023 23:14 )    Color: Yellow / Appearance: Clear / S.016 / pH: x  Gluc: x / Ketone: Trace mg/dL  / Bili: Negative / Urobili: 0.2 mg/dL   Blood: x / Protein: Negative mg/dL / Nitrite: Negative   Leuk Esterase: Negative / RBC: x / WBC x   Sq Epi: x / Non Sq Epi: x / Bacteria: x              Records reviewed from prior hospitalization.  Labs reviewed remarkable for hyperglycemia and positive UA 2 days ago

## 2023-11-06 NOTE — CONSULT NOTE ADULT - ASSESSMENT
72Y M with PMH DM1 on insulin pump, HTN, atrial fibrillation, BPH presenting with hyperglycemia. Endocrinology consulted for management of T1DM.    Poorly controlled T1DM with hyperglycemia  Insulin pump  - HbA1c: 7.3  - Endocrinologist: Dr. Ngo  - Home Regimen:   tslim x2 insulin pump with novolog insulin and dexcom g6  basal  12a 0.75  3a 1.187  11a 1.19  5p 1.18  8p 1.1  ISF  12a 1:50  ICR  12a 1:15  target 110  duration 5hr  PLAN  - Start Lantus  units at bedtime. DO NOT HOLD IF NPO.  - Start Admelog  units TID pre-meal. HOLD IF NPO.  - Use moderate/Use low dose Admelog correction scale pre-meal  - Use moderate/Use low dose Admelog correction scale at bedtime  - Fingerstick BG before meals and bedtime  - Goal -180  - Carbohydrate consistent diet  - RD consult  Discharge plan:  - Discharge medications: ************************  - Patient to call doctor with persistent high or low BG at home.   - Recommend routine outpatient ophthalmology, podiatry and endocrinology f/u    HTN  - Home regimen: lasix, losartan  PLAN  - Can check urine microalbumin outpatient  - Outpatient goal BP <130/80. Management per primary team.    HLD  - Home regimen: atorvastatin 80mg daily  PLAN  - Continue atorvastatin 20mg daily per primary team  - Can check lipid profile if not done recently    Discussed with primary team.    Obey Cash MD, Endocrinology Fellow  Pager [phone] from 9am to 5pm. After hours and on weekends, please call [phone].   72Y M with PMH DM1 on insulin pump, HTN, atrial fibrillation, BPH presenting with hyperglycemia. Endocrinology consulted for management of T1DM.    Poorly controlled T1DM with hyperglycemia  Insulin pump  - HbA1c: 7.3  - Endocrinologist: Dr. Ngo  - Home Regimen:   tslim x2 insulin pump with novolog insulin and dexcom g6  basal  12a 0.75  3a 1.187  11a 1.19  5p 1.18  8p 1.1  ISF  12a 1:50  ICR  12a 1:15  target 110  duration 5hr  PLAN  - continue admelog 4 units  - Use moderate/Use low dose Admelog correction scale pre-meal  - Use moderate/Use low dose Admelog correction scale at bedtime  - Fingerstick BG before meals and bedtime  - Goal -180  - Carbohydrate consistent diet  - RD consult  Discharge plan:  - Discharge medications: ************************  - Patient to call doctor with persistent high or low BG at home.   - Recommend routine outpatient ophthalmology, podiatry and endocrinology f/u    HTN  - Home regimen: lasix, losartan  PLAN  - Can check urine microalbumin outpatient  - Outpatient goal BP <130/80. Management per primary team.    HLD  - Home regimen: atorvastatin 80mg daily  PLAN  - Continue atorvastatin 20mg daily per primary team  - Can check lipid profile if not done recently    Discussed with primary team.    Obey Cash MD, Endocrinology Fellow  Pager [phone] from 9am to 5pm. After hours and on weekends, please call [phone].   72Y M with PMH DM1 on insulin pump, HTN, atrial fibrillation, BPH presenting with hyperglycemia. Endocrinology consulted for management of T1DM.    Poorly controlled T1DM with hyperglycemia  Insulin pump  - HbA1c: 7.3  - Endocrinologist: Dr. Ngo  - Home Regimen:   tslim x2 insulin pump with novolog insulin and dexcom g6  basal  12a 0.75  3a 1.187  11a 1.19  5p 1.18  8p 1.1  TDD 27 units  ISF  12a 1:50  ICR  12a 1:15  target 110  duration 5hr  PLAN  - continue admelog 4 units, stop when insulin pump placed  - can resume insulin pump home settings between 8:40-10:40pm tonight to monitor while inpatient. If pump malfunction or becomes hyperglycemic, can give lantus 20 units and remove insulin pump 2hr after   - Use low dose Admelog correction scale pre-meal, stop when insulin pump placed  - Use low dose Admelog correction scale at bedtime, stop when insulin pump placed  - Fingerstick BG before meals and bedtime  - Goal -180  - Carbohydrate consistent diet  - RD consult  - hypoglycemia protocol prn  Discharge plan:  - Discharge medications: Insulin pump home settings vs basal/bolus  - Patient to call doctor with persistent high or low BG at home.   - Recommend routine outpatient ophthalmology, podiatry and endocrinology f/u    HTN  - Home regimen: lasix, losartan  PLAN  - Can check urine microalbumin outpatient  - Outpatient goal BP <130/80. Management per primary team.    HLD  - Home regimen: atorvastatin 80mg daily  PLAN  - Continue atorvastatin 20mg daily per primary team  - Can check lipid profile if not done recently    Discussed with primary team.    Obey Cash MD, Endocrinology Fellow  Pager [phone] from 9am to 5pm. After hours and on weekends, please call [phone].

## 2023-11-06 NOTE — ED ADULT NURSE REASSESSMENT NOTE - NS ED NURSE REASSESS COMMENT FT1
1054 All meds given as ordered and Endocrine bedside earlier to consult pt Will continue to monitor [id number]

## 2023-11-06 NOTE — CONSULT NOTE ADULT - ASSESSMENT
Patient seen and examined, agree with above assessment and plan as transcribed above.    - Will d/W endocrine if pt would benefit from an SGLT2 Inhibitor    Alec Mitchell MD, [facility]  BEEPER [phone]

## 2023-11-07 DIAGNOSIS — I48.0 PAROXYSMAL ATRIAL FIBRILLATION: ICD-10-CM

## 2023-11-07 DIAGNOSIS — Z29.9 ENCOUNTER FOR PROPHYLACTIC MEASURES, UNSPECIFIED: ICD-10-CM

## 2023-11-07 DIAGNOSIS — I25.10 ATHEROSCLEROTIC HEART DISEASE OF NATIVE CORONARY ARTERY WITHOUT ANGINA PECTORIS: ICD-10-CM

## 2023-11-07 LAB
ANION GAP SERPL CALC-SCNC: 12 MMOL/L — SIGNIFICANT CHANGE UP (ref 5–17)
ANION GAP SERPL CALC-SCNC: 12 MMOL/L — SIGNIFICANT CHANGE UP (ref 5–17)
ANION GAP SERPL CALC-SCNC: 18 MMOL/L — HIGH (ref 5–17)
ANION GAP SERPL CALC-SCNC: 18 MMOL/L — HIGH (ref 5–17)
B-OH-BUTYR SERPL-SCNC: 0.1 MMOL/L — SIGNIFICANT CHANGE UP
B-OH-BUTYR SERPL-SCNC: 0.1 MMOL/L — SIGNIFICANT CHANGE UP
B-OH-BUTYR SERPL-SCNC: 2.4 MMOL/L — HIGH
B-OH-BUTYR SERPL-SCNC: 2.4 MMOL/L — HIGH
BASE EXCESS BLDV CALC-SCNC: 0.5 MMOL/L — SIGNIFICANT CHANGE UP (ref -2–3)
BASE EXCESS BLDV CALC-SCNC: 0.5 MMOL/L — SIGNIFICANT CHANGE UP (ref -2–3)
BASE EXCESS BLDV CALC-SCNC: 6 MMOL/L — HIGH (ref -2–3)
BASE EXCESS BLDV CALC-SCNC: 6 MMOL/L — HIGH (ref -2–3)
BUN SERPL-MCNC: 20 MG/DL — SIGNIFICANT CHANGE UP (ref 7–23)
CA-I SERPL-SCNC: 1.14 MMOL/L — LOW (ref 1.15–1.33)
CA-I SERPL-SCNC: 1.14 MMOL/L — LOW (ref 1.15–1.33)
CA-I SERPL-SCNC: 1.21 MMOL/L — SIGNIFICANT CHANGE UP (ref 1.15–1.33)
CA-I SERPL-SCNC: 1.21 MMOL/L — SIGNIFICANT CHANGE UP (ref 1.15–1.33)
CALCIUM SERPL-MCNC: 9.2 MG/DL — SIGNIFICANT CHANGE UP (ref 8.4–10.5)
CALCIUM SERPL-MCNC: 9.2 MG/DL — SIGNIFICANT CHANGE UP (ref 8.4–10.5)
CALCIUM SERPL-MCNC: 9.3 MG/DL — SIGNIFICANT CHANGE UP (ref 8.4–10.5)
CALCIUM SERPL-MCNC: 9.3 MG/DL — SIGNIFICANT CHANGE UP (ref 8.4–10.5)
CHLORIDE BLDV-SCNC: 100 MMOL/L — SIGNIFICANT CHANGE UP (ref 96–108)
CHLORIDE BLDV-SCNC: 100 MMOL/L — SIGNIFICANT CHANGE UP (ref 96–108)
CHLORIDE BLDV-SCNC: 96 MMOL/L — SIGNIFICANT CHANGE UP (ref 96–108)
CHLORIDE BLDV-SCNC: 96 MMOL/L — SIGNIFICANT CHANGE UP (ref 96–108)
CHLORIDE SERPL-SCNC: 100 MMOL/L — SIGNIFICANT CHANGE UP (ref 96–108)
CHLORIDE SERPL-SCNC: 100 MMOL/L — SIGNIFICANT CHANGE UP (ref 96–108)
CHLORIDE SERPL-SCNC: 95 MMOL/L — LOW (ref 96–108)
CHLORIDE SERPL-SCNC: 95 MMOL/L — LOW (ref 96–108)
CO2 BLDV-SCNC: 27 MMOL/L — HIGH (ref 22–26)
CO2 BLDV-SCNC: 27 MMOL/L — HIGH (ref 22–26)
CO2 BLDV-SCNC: 34 MMOL/L — HIGH (ref 22–26)
CO2 BLDV-SCNC: 34 MMOL/L — HIGH (ref 22–26)
CO2 SERPL-SCNC: 21 MMOL/L — LOW (ref 22–31)
CO2 SERPL-SCNC: 21 MMOL/L — LOW (ref 22–31)
CO2 SERPL-SCNC: 27 MMOL/L — SIGNIFICANT CHANGE UP (ref 22–31)
CO2 SERPL-SCNC: 27 MMOL/L — SIGNIFICANT CHANGE UP (ref 22–31)
CREAT SERPL-MCNC: 0.77 MG/DL — SIGNIFICANT CHANGE UP (ref 0.5–1.3)
CREAT SERPL-MCNC: 0.77 MG/DL — SIGNIFICANT CHANGE UP (ref 0.5–1.3)
CREAT SERPL-MCNC: 1.06 MG/DL — SIGNIFICANT CHANGE UP (ref 0.5–1.3)
CREAT SERPL-MCNC: 1.06 MG/DL — SIGNIFICANT CHANGE UP (ref 0.5–1.3)
CULTURE RESULTS: NO GROWTH — SIGNIFICANT CHANGE UP
CULTURE RESULTS: NO GROWTH — SIGNIFICANT CHANGE UP
EGFR: 74 ML/MIN/1.73M2 — SIGNIFICANT CHANGE UP
EGFR: 74 ML/MIN/1.73M2 — SIGNIFICANT CHANGE UP
EGFR: 94 ML/MIN/1.73M2 — SIGNIFICANT CHANGE UP
EGFR: 94 ML/MIN/1.73M2 — SIGNIFICANT CHANGE UP
GAS PNL BLDV: 129 MMOL/L — LOW (ref 136–145)
GAS PNL BLDV: 129 MMOL/L — LOW (ref 136–145)
GAS PNL BLDV: 137 MMOL/L — SIGNIFICANT CHANGE UP (ref 136–145)
GAS PNL BLDV: 137 MMOL/L — SIGNIFICANT CHANGE UP (ref 136–145)
GAS PNL BLDV: SIGNIFICANT CHANGE UP
GLUCOSE BLDC GLUCOMTR-MCNC: 171 MG/DL — HIGH (ref 70–99)
GLUCOSE BLDC GLUCOMTR-MCNC: 171 MG/DL — HIGH (ref 70–99)
GLUCOSE BLDC GLUCOMTR-MCNC: 208 MG/DL — HIGH (ref 70–99)
GLUCOSE BLDC GLUCOMTR-MCNC: 208 MG/DL — HIGH (ref 70–99)
GLUCOSE BLDC GLUCOMTR-MCNC: 325 MG/DL — HIGH (ref 70–99)
GLUCOSE BLDC GLUCOMTR-MCNC: 325 MG/DL — HIGH (ref 70–99)
GLUCOSE BLDC GLUCOMTR-MCNC: 377 MG/DL — HIGH (ref 70–99)
GLUCOSE BLDC GLUCOMTR-MCNC: 377 MG/DL — HIGH (ref 70–99)
GLUCOSE BLDC GLUCOMTR-MCNC: 420 MG/DL — HIGH (ref 70–99)
GLUCOSE BLDC GLUCOMTR-MCNC: 420 MG/DL — HIGH (ref 70–99)
GLUCOSE BLDC GLUCOMTR-MCNC: 446 MG/DL — HIGH (ref 70–99)
GLUCOSE BLDC GLUCOMTR-MCNC: 446 MG/DL — HIGH (ref 70–99)
GLUCOSE BLDV-MCNC: 325 MG/DL — HIGH (ref 70–99)
GLUCOSE BLDV-MCNC: 325 MG/DL — HIGH (ref 70–99)
GLUCOSE BLDV-MCNC: 568 MG/DL — CRITICAL HIGH (ref 70–99)
GLUCOSE BLDV-MCNC: 568 MG/DL — CRITICAL HIGH (ref 70–99)
GLUCOSE SERPL-MCNC: 310 MG/DL — HIGH (ref 70–99)
GLUCOSE SERPL-MCNC: 310 MG/DL — HIGH (ref 70–99)
GLUCOSE SERPL-MCNC: 504 MG/DL — CRITICAL HIGH (ref 70–99)
GLUCOSE SERPL-MCNC: 504 MG/DL — CRITICAL HIGH (ref 70–99)
HCO3 BLDV-SCNC: 25 MMOL/L — SIGNIFICANT CHANGE UP (ref 22–29)
HCO3 BLDV-SCNC: 25 MMOL/L — SIGNIFICANT CHANGE UP (ref 22–29)
HCO3 BLDV-SCNC: 32 MMOL/L — HIGH (ref 22–29)
HCO3 BLDV-SCNC: 32 MMOL/L — HIGH (ref 22–29)
HCT VFR BLDA CALC: 33 % — LOW (ref 39–51)
HCT VFR BLDA CALC: 33 % — LOW (ref 39–51)
HCT VFR BLDA CALC: 35 % — LOW (ref 39–51)
HCT VFR BLDA CALC: 35 % — LOW (ref 39–51)
HGB BLD CALC-MCNC: 10.9 G/DL — LOW (ref 12.6–17.4)
HGB BLD CALC-MCNC: 10.9 G/DL — LOW (ref 12.6–17.4)
HGB BLD CALC-MCNC: 11.5 G/DL — LOW (ref 12.6–17.4)
HGB BLD CALC-MCNC: 11.5 G/DL — LOW (ref 12.6–17.4)
LACTATE BLDV-MCNC: 1.2 MMOL/L — SIGNIFICANT CHANGE UP (ref 0.5–2)
LACTATE BLDV-MCNC: 1.2 MMOL/L — SIGNIFICANT CHANGE UP (ref 0.5–2)
LACTATE BLDV-MCNC: 1.6 MMOL/L — SIGNIFICANT CHANGE UP (ref 0.5–2)
LACTATE BLDV-MCNC: 1.6 MMOL/L — SIGNIFICANT CHANGE UP (ref 0.5–2)
PCO2 BLDV: 41 MMHG — LOW (ref 42–55)
PCO2 BLDV: 41 MMHG — LOW (ref 42–55)
PCO2 BLDV: 53 MMHG — SIGNIFICANT CHANGE UP (ref 42–55)
PCO2 BLDV: 53 MMHG — SIGNIFICANT CHANGE UP (ref 42–55)
PH BLDV: 7.39 — SIGNIFICANT CHANGE UP (ref 7.32–7.43)
PH BLDV: 7.39 — SIGNIFICANT CHANGE UP (ref 7.32–7.43)
PH BLDV: 7.4 — SIGNIFICANT CHANGE UP (ref 7.32–7.43)
PH BLDV: 7.4 — SIGNIFICANT CHANGE UP (ref 7.32–7.43)
PO2 BLDV: 31 MMHG — SIGNIFICANT CHANGE UP (ref 25–45)
PO2 BLDV: 31 MMHG — SIGNIFICANT CHANGE UP (ref 25–45)
PO2 BLDV: 46 MMHG — HIGH (ref 25–45)
PO2 BLDV: 46 MMHG — HIGH (ref 25–45)
POTASSIUM BLDV-SCNC: 4.1 MMOL/L — SIGNIFICANT CHANGE UP (ref 3.5–5.1)
POTASSIUM BLDV-SCNC: 4.1 MMOL/L — SIGNIFICANT CHANGE UP (ref 3.5–5.1)
POTASSIUM BLDV-SCNC: 4.8 MMOL/L — SIGNIFICANT CHANGE UP (ref 3.5–5.1)
POTASSIUM BLDV-SCNC: 4.8 MMOL/L — SIGNIFICANT CHANGE UP (ref 3.5–5.1)
POTASSIUM SERPL-MCNC: 4 MMOL/L — SIGNIFICANT CHANGE UP (ref 3.5–5.3)
POTASSIUM SERPL-MCNC: 4 MMOL/L — SIGNIFICANT CHANGE UP (ref 3.5–5.3)
POTASSIUM SERPL-MCNC: 4.7 MMOL/L — SIGNIFICANT CHANGE UP (ref 3.5–5.3)
POTASSIUM SERPL-MCNC: 4.7 MMOL/L — SIGNIFICANT CHANGE UP (ref 3.5–5.3)
POTASSIUM SERPL-SCNC: 4 MMOL/L — SIGNIFICANT CHANGE UP (ref 3.5–5.3)
POTASSIUM SERPL-SCNC: 4 MMOL/L — SIGNIFICANT CHANGE UP (ref 3.5–5.3)
POTASSIUM SERPL-SCNC: 4.7 MMOL/L — SIGNIFICANT CHANGE UP (ref 3.5–5.3)
POTASSIUM SERPL-SCNC: 4.7 MMOL/L — SIGNIFICANT CHANGE UP (ref 3.5–5.3)
SAO2 % BLDV: 53.5 % — LOW (ref 67–88)
SAO2 % BLDV: 53.5 % — LOW (ref 67–88)
SAO2 % BLDV: 76.7 % — SIGNIFICANT CHANGE UP (ref 67–88)
SAO2 % BLDV: 76.7 % — SIGNIFICANT CHANGE UP (ref 67–88)
SODIUM SERPL-SCNC: 134 MMOL/L — LOW (ref 135–145)
SODIUM SERPL-SCNC: 134 MMOL/L — LOW (ref 135–145)
SODIUM SERPL-SCNC: 139 MMOL/L — SIGNIFICANT CHANGE UP (ref 135–145)
SODIUM SERPL-SCNC: 139 MMOL/L — SIGNIFICANT CHANGE UP (ref 135–145)
SPECIMEN SOURCE: SIGNIFICANT CHANGE UP
SPECIMEN SOURCE: SIGNIFICANT CHANGE UP

## 2023-11-07 PROCEDURE — 99232 SBSQ HOSP IP/OBS MODERATE 35: CPT

## 2023-11-07 PROCEDURE — 99233 SBSQ HOSP IP/OBS HIGH 50: CPT

## 2023-11-07 RX ORDER — INSULIN LISPRO 100/ML
4 VIAL (ML) SUBCUTANEOUS
Refills: 0 | Status: DISCONTINUED | OUTPATIENT
Start: 2023-11-07 | End: 2023-11-07

## 2023-11-07 RX ORDER — INSULIN GLARGINE 100 [IU]/ML
20 INJECTION, SOLUTION SUBCUTANEOUS
Refills: 0 | Status: DISCONTINUED | OUTPATIENT
Start: 2023-11-08 | End: 2023-11-08

## 2023-11-07 RX ORDER — SODIUM CHLORIDE 9 MG/ML
1000 INJECTION INTRAMUSCULAR; INTRAVENOUS; SUBCUTANEOUS
Refills: 0 | Status: DISCONTINUED | OUTPATIENT
Start: 2023-11-07 | End: 2023-11-09

## 2023-11-07 RX ORDER — INSULIN GLARGINE 100 [IU]/ML
20 INJECTION, SOLUTION SUBCUTANEOUS ONCE
Refills: 0 | Status: COMPLETED | OUTPATIENT
Start: 2023-11-07 | End: 2023-11-07

## 2023-11-07 RX ORDER — SODIUM CHLORIDE 9 MG/ML
1000 INJECTION INTRAMUSCULAR; INTRAVENOUS; SUBCUTANEOUS
Refills: 0 | Status: DISCONTINUED | OUTPATIENT
Start: 2023-11-07 | End: 2023-11-07

## 2023-11-07 RX ORDER — INSULIN LISPRO 100/ML
VIAL (ML) SUBCUTANEOUS
Refills: 0 | Status: DISCONTINUED | OUTPATIENT
Start: 2023-11-07 | End: 2023-11-07

## 2023-11-07 RX ORDER — INSULIN LISPRO 100/ML
VIAL (ML) SUBCUTANEOUS EVERY 4 HOURS
Refills: 0 | Status: DISCONTINUED | OUTPATIENT
Start: 2023-11-07 | End: 2023-11-08

## 2023-11-07 RX ORDER — INSULIN LISPRO 100/ML
VIAL (ML) SUBCUTANEOUS AT BEDTIME
Refills: 0 | Status: DISCONTINUED | OUTPATIENT
Start: 2023-11-07 | End: 2023-11-07

## 2023-11-07 RX ADMIN — Medication 81 MILLIGRAM(S): at 11:14

## 2023-11-07 RX ADMIN — ATORVASTATIN CALCIUM 20 MILLIGRAM(S): 80 TABLET, FILM COATED ORAL at 22:30

## 2023-11-07 RX ADMIN — SODIUM CHLORIDE 75 MILLILITER(S): 9 INJECTION INTRAMUSCULAR; INTRAVENOUS; SUBCUTANEOUS at 14:45

## 2023-11-07 RX ADMIN — HEPARIN SODIUM 5000 UNIT(S): 5000 INJECTION INTRAVENOUS; SUBCUTANEOUS at 05:35

## 2023-11-07 RX ADMIN — TAMSULOSIN HYDROCHLORIDE 0.4 MILLIGRAM(S): 0.4 CAPSULE ORAL at 22:27

## 2023-11-07 RX ADMIN — SODIUM CHLORIDE 75 MILLILITER(S): 9 INJECTION INTRAMUSCULAR; INTRAVENOUS; SUBCUTANEOUS at 23:55

## 2023-11-07 RX ADMIN — CEFTRIAXONE 100 MILLIGRAM(S): 500 INJECTION, POWDER, FOR SOLUTION INTRAMUSCULAR; INTRAVENOUS at 11:14

## 2023-11-07 RX ADMIN — Medication 50 MILLIGRAM(S): at 05:35

## 2023-11-07 RX ADMIN — Medication 4: at 22:18

## 2023-11-07 RX ADMIN — Medication 40 MILLIGRAM(S): at 05:35

## 2023-11-07 RX ADMIN — LOSARTAN POTASSIUM 100 MILLIGRAM(S): 100 TABLET, FILM COATED ORAL at 05:35

## 2023-11-07 RX ADMIN — PANTOPRAZOLE SODIUM 40 MILLIGRAM(S): 20 TABLET, DELAYED RELEASE ORAL at 05:34

## 2023-11-07 RX ADMIN — INSULIN GLARGINE 20 UNIT(S): 100 INJECTION, SOLUTION SUBCUTANEOUS at 14:45

## 2023-11-07 RX ADMIN — HEPARIN SODIUM 5000 UNIT(S): 5000 INJECTION INTRAVENOUS; SUBCUTANEOUS at 22:25

## 2023-11-07 RX ADMIN — Medication 50 MILLIGRAM(S): at 17:44

## 2023-11-07 RX ADMIN — Medication 6: at 17:44

## 2023-11-07 NOTE — PROGRESS NOTE ADULT - SUBJECTIVE AND OBJECTIVE BOX
C A R D I O L O G Y  **********************************     DATE OF SERVICE: 11-07-23    Patient denies chest pain or shortness of breath.   Review of systems otherwise negative.  	  MEDICATIONS:  MEDICATIONS  (STANDING):  aspirin enteric coated 81 milliGRAM(s) Oral daily  atorvastatin 20 milliGRAM(s) Oral at bedtime  cefTRIAXone   IVPB 1000 milliGRAM(s) IV Intermittent every 24 hours  dextrose 5%. 1000 milliLiter(s) (100 mL/Hr) IV Continuous <Continuous>  dextrose 5%. 1000 milliLiter(s) (50 mL/Hr) IV Continuous <Continuous>  dextrose 50% Injectable 25 Gram(s) IV Push once  dextrose 50% Injectable 12.5 Gram(s) IV Push once  dextrose 50% Injectable 25 Gram(s) IV Push once  furosemide    Tablet 40 milliGRAM(s) Oral daily  glucagon  Injectable 1 milliGRAM(s) IntraMuscular once  heparin   Injectable 5000 Unit(s) SubCutaneous every 8 hours  insulin glargine Injectable (LANTUS) 20 Unit(s) SubCutaneous once  insulin lispro (ADMELOG) corrective regimen sliding scale   SubCutaneous every 4 hours  losartan 100 milliGRAM(s) Oral daily  metoprolol tartrate 50 milliGRAM(s) Oral two times a day  pantoprazole    Tablet 40 milliGRAM(s) Oral before breakfast  sodium chloride 0.9%. 1000 milliLiter(s) (75 mL/Hr) IV Continuous <Continuous>  tamsulosin 0.4 milliGRAM(s) Oral at bedtime      LABS:	 	    CARDIAC MARKERS:                                10.4   5.03  )-----------( 212      ( 05 Nov 2023 22:22 )             33.4     Hemoglobin: 10.4 g/dL (11-05 @ 22:22)  Hemoglobin: 10.2 g/dL (11-03 @ 19:15)  Hemoglobin: 10.0 g/dL (11-03 @ 16:33)  Hemoglobin: 10.9 g/dL (11-03 @ 11:47)      11-05    135  |  97  |  22  ----------------------------<  263<H>  4.4   |  23  |  0.74    Ca    9.5      05 Nov 2023 22:22  Phos  2.8     11-05  Mg     1.8     11-05    TPro  7.4  /  Alb  4.1  /  TBili  0.6  /  DBili  x   /  AST  42<H>  /  ALT  34  /  AlkPhos  70  11-05    Creatinine Trend: 0.74<--, 0.64<--, 0.74<--, 0.75<--, 1.00<--    COAGS:       proBNP:   Lipid Profile:   HgA1c:   TSH:       PHYSICAL EXAM:  T(C): 36.3 (11-07-23 @ 13:18), Max: 36.7 (11-06-23 @ 19:30)  HR: 76 (11-07-23 @ 13:18) (66 - 83)  BP: 134/76 (11-07-23 @ 13:18) (133/80 - 167/90)  RR: 18 (11-07-23 @ 13:18) (18 - 18)  SpO2: 98% (11-07-23 @ 13:18) (96% - 99%)  Wt(kg): --  I&O's Summary        Gen: NAD  HEENT:  (-)icterus (-)pallor  CV: N S1 S2 1/6 RONI (+)2 Pulses B/l  Resp:  Clear to auscultation B/L, normal effort  GI: (+) BS Soft, NT, ND  Lymph:  (-)Edema, (-)obvious lymphadenopathy  Skin: Warm to touch, Normal turgor  Psych: Appropriate mood and affect      TELEMETRY: None	      ASSESSMENT/PLAN: Patient is a 71 y/o male well known to our office with PMH of CAD s/p CABGx3 (3/2023), HFrEF s/p [employer] dual chamber ICD (9/2023), Type 1 DM on insulin pump, HTN, vertigo, and BPH who is admitted with hyperglycemia. Cardiology consulted for management of CAD/CHF.    #Hyperglycemia  - Management per endocrine    #UTI  - Abx per med    #CAD s/p prior CABG  - Continue ASA 81mg daily and Lipitor    #Chronic HFrEF s/p recent ICD  - Appears well compensated from CHF perspective  - Continue PO lasix  - Continue Losartan and Metoprolol  - No plans for SGLT2 inhibitor at this time given recent DKA per endocrine    - No inpatient cardiac w/u planned  - Patient to f/u with Dr. Wahl after discharge    Mariano Vernon PA-C  Pager: [phone]

## 2023-11-07 NOTE — PATIENT PROFILE ADULT - FUNCTIONAL ASSESSMENT - BASIC MOBILITY 6.
3-calculated by average /Not able to assess (calculate score using [facility] averaging method)

## 2023-11-07 NOTE — PROGRESS NOTE ADULT - ASSESSMENT
72Y M with PMH DM1 on insulin pump, HTN, atrial fibrillation, BPH presenting with hyperglycemia. Patient was discharged from this hospital yesterday after overnight CDU stay for issues with hyperglycemia and insulin pump.  He was evaluated by endocrinology who adjusted his pump and medications and he was discharged with improvement in his glucose control yesterday. He was also diagnosed with UTI and was discharged on cefdinir for UTI. Now returning due to persistent hyperglycemia throughout the day. Patient with concern for malfunctioning insulin pump.

## 2023-11-07 NOTE — PROGRESS NOTE ADULT - ASSESSMENT
Patient seen and examined, agree with above assessment and plan as transcribed above.    - d/w endo hold off on SGLT2i given recent DKA perhaps in the future    Alec Mitchell MD, [facility]  BEEPER [phone]

## 2023-11-07 NOTE — PROGRESS NOTE ADULT - SUBJECTIVE AND OBJECTIVE BOX
ENDOCRINE FOLLOW UP     Chief Complaint: t1dm    History:     MEDICATIONS  (STANDING):  aspirin enteric coated 81 milliGRAM(s) Oral daily  atorvastatin 20 milliGRAM(s) Oral at bedtime  cefTRIAXone   IVPB 1000 milliGRAM(s) IV Intermittent every 24 hours  dextrose 5%. 1000 milliLiter(s) (100 mL/Hr) IV Continuous <Continuous>  dextrose 5%. 1000 milliLiter(s) (50 mL/Hr) IV Continuous <Continuous>  dextrose 50% Injectable 25 Gram(s) IV Push once  dextrose 50% Injectable 12.5 Gram(s) IV Push once  dextrose 50% Injectable 25 Gram(s) IV Push once  furosemide    Tablet 40 milliGRAM(s) Oral daily  glucagon  Injectable 1 milliGRAM(s) IntraMuscular once  heparin   Injectable 5000 Unit(s) SubCutaneous every 8 hours  insulin aspart (NovoLOG) Pump 1 Each SubCutaneous Continuous Pump  losartan 100 milliGRAM(s) Oral daily  metoprolol tartrate 50 milliGRAM(s) Oral two times a day  pantoprazole    Tablet 40 milliGRAM(s) Oral before breakfast  tamsulosin 0.4 milliGRAM(s) Oral at bedtime    MEDICATIONS  (PRN):  dextrose Oral Gel 15 Gram(s) Oral once PRN Blood Glucose LESS THAN 70 milliGRAM(s)/deciliter      Allergies    Neosporin (Rash)    Intolerances        ROS: All other systems reviewed and negative    PHYSICAL EXAM:  VITALS: T(C): 36.6 (11-07-23 @ 04:53)  T(F): 97.9 (11-07-23 @ 04:53), Max: 98.2 (11-06-23 @ 14:38)  HR: 83 (11-07-23 @ 04:53) (66 - 83)  BP: 133/80 (11-07-23 @ 04:53) (128/87 - 167/90)  RR:  (16 - 18)  SpO2:  (96% - 99%)  Wt(kg): --  GENERAL: NAD, resting comfortably   EYES: No proptosis,  anicteric  HEENT:  Atraumatic, Normocephalic, moist mucous membranes  RESPIRATORY: Nonlabored respirations on room air, normal rate/effort   CARDIOVASCULAR: Did not appear cyanotic, no lower extremity swelling visualized  GI: Soft, nontender, non distended  NEURO: Answering questions appropriately, moves all extremities spontaneously  PSYCH:  reactive affect, euthymic mood    POCT Blood Glucose.: 377 mg/dL (11-07-23 @ 11:55)  POCT Blood Glucose.: 208 mg/dL (11-07-23 @ 07:57)  POCT Blood Glucose.: 171 mg/dL (11-07-23 @ 00:23)  POCT Blood Glucose.: 246 mg/dL (11-06-23 @ 22:05)  POCT Blood Glucose.: 260 mg/dL (11-06-23 @ 17:34)  POCT Blood Glucose.: 177 mg/dL (11-06-23 @ 11:22)  POCT Blood Glucose.: 107 mg/dL (11-06-23 @ 07:35)  POCT Blood Glucose.: 158 mg/dL (11-06-23 @ 02:10)  POCT Blood Glucose.: 241 mg/dL (11-05-23 @ 22:20)  POCT Blood Glucose.: 347 mg/dL (11-05-23 @ 19:49)  POCT Blood Glucose.: 169 mg/dL (11-04-23 @ 12:54)      11-05    135  |  97  |  22  ----------------------------<  263<H>  4.4   |  23  |  0.74    eGFR: 95    Ca    9.5      11-05  Mg     1.8     11-05  Phos  2.8     11-05    TPro  7.4  /  Alb  4.1  /  TBili  0.6  /  DBili  x   /  AST  42<H>  /  ALT  34  /  AlkPhos  70  11-05      A1C with Estimated Average Glucose Result: 7.3 % (11-03-23 @ 19:15)       ENDOCRINE FOLLOW UP     Chief Complaint: t1dm    History:   Patient reports feeling more thirst as BG rising otherwise without symptoms. Reviewed boluses and insulin pump sites. Patient demonstrated patent tubing from insulin pump.    MEDICATIONS  (STANDING):  aspirin enteric coated 81 milliGRAM(s) Oral daily  atorvastatin 20 milliGRAM(s) Oral at bedtime  cefTRIAXone   IVPB 1000 milliGRAM(s) IV Intermittent every 24 hours  dextrose 5%. 1000 milliLiter(s) (100 mL/Hr) IV Continuous <Continuous>  dextrose 5%. 1000 milliLiter(s) (50 mL/Hr) IV Continuous <Continuous>  dextrose 50% Injectable 25 Gram(s) IV Push once  dextrose 50% Injectable 12.5 Gram(s) IV Push once  dextrose 50% Injectable 25 Gram(s) IV Push once  furosemide    Tablet 40 milliGRAM(s) Oral daily  glucagon  Injectable 1 milliGRAM(s) IntraMuscular once  heparin   Injectable 5000 Unit(s) SubCutaneous every 8 hours  insulin aspart (NovoLOG) Pump 1 Each SubCutaneous Continuous Pump  losartan 100 milliGRAM(s) Oral daily  metoprolol tartrate 50 milliGRAM(s) Oral two times a day  pantoprazole    Tablet 40 milliGRAM(s) Oral before breakfast  tamsulosin 0.4 milliGRAM(s) Oral at bedtime    MEDICATIONS  (PRN):  dextrose Oral Gel 15 Gram(s) Oral once PRN Blood Glucose LESS THAN 70 milliGRAM(s)/deciliter      Allergies    Neosporin (Rash)    Intolerances        ROS: All other systems reviewed and negative    PHYSICAL EXAM:  VITALS: T(C): 36.6 (11-07-23 @ 04:53)  T(F): 97.9 (11-07-23 @ 04:53), Max: 98.2 (11-06-23 @ 14:38)  HR: 83 (11-07-23 @ 04:53) (66 - 83)  BP: 133/80 (11-07-23 @ 04:53) (128/87 - 167/90)  RR:  (16 - 18)  SpO2:  (96% - 99%)  Wt(kg): --  GENERAL: NAD, resting comfortably   EYES: No proptosis,  anicteric  HEENT:  Atraumatic, Normocephalic, moist mucous membranes  RESPIRATORY: Nonlabored respirations on room air, normal rate/effort   CARDIOVASCULAR: Did not appear cyanotic, no lower extremity swelling visualized  GI: Soft, nontender, non distended, 2 insulin pump canula sites c/d/i  NEURO: Answering questions appropriately, moves all extremities spontaneously  PSYCH:  reactive affect, euthymic mood    POCT Blood Glucose.: 377 mg/dL (11-07-23 @ 11:55)  POCT Blood Glucose.: 208 mg/dL (11-07-23 @ 07:57)  POCT Blood Glucose.: 171 mg/dL (11-07-23 @ 00:23)  POCT Blood Glucose.: 246 mg/dL (11-06-23 @ 22:05)  POCT Blood Glucose.: 260 mg/dL (11-06-23 @ 17:34)  POCT Blood Glucose.: 177 mg/dL (11-06-23 @ 11:22)  POCT Blood Glucose.: 107 mg/dL (11-06-23 @ 07:35)  POCT Blood Glucose.: 158 mg/dL (11-06-23 @ 02:10)  POCT Blood Glucose.: 241 mg/dL (11-05-23 @ 22:20)  POCT Blood Glucose.: 347 mg/dL (11-05-23 @ 19:49)  POCT Blood Glucose.: 169 mg/dL (11-04-23 @ 12:54)      11-05    135  |  97  |  22  ----------------------------<  263<H>  4.4   |  23  |  0.74    eGFR: 95    Ca    9.5      11-05  Mg     1.8     11-05  Phos  2.8     11-05    TPro  7.4  /  Alb  4.1  /  TBili  0.6  /  DBili  x   /  AST  42<H>  /  ALT  34  /  AlkPhos  70  11-05      A1C with Estimated Average Glucose Result: 7.3 % (11-03-23 @ 19:15)

## 2023-11-07 NOTE — CHART NOTE - NSCHARTNOTEFT_GEN_A_CORE
Brief Endocrinology Note:    Contacted by night team regarding labs from 3PM which showed patient going into DKA /w glucose 501, AG 18, CO2 21, BHB 2.4.  Patient received lantus 20 units at 2:45PM and 6 units of admelog at around 5:45    Impression: early DKA    Recommendations:  C/w Fsg q4 hr until DKA resolved  c/w low admelog correction scales until DKA resolved and ideally glucose <250  please repeat DKA labs with BHB, BMP, VBG to ensure DKA is resolving  Agree with giving IVF. Can start continuous IVF w/ NS or LR at 150cc/hr if no contraindications. Can also give a 1L bolus. Team to monitor volume status  DKA is resolved when AG <14 and CO2 >18  Once DKA resolved if patient wants to eat please start admelog 4 units before meals along with low admelog correction scales with meals    Endocrinology will continue to follow    Case discussed with law Morley MD  Endocrine Fellow  Can be reached via teams. For follow up questions, discharge recommendations, or new consults, please call answering service at [phone] (weekdays); [phone] (nights/weekends)

## 2023-11-07 NOTE — PROGRESS NOTE ADULT - ASSESSMENT
72Y M with PMH DM1 on insulin pump, HTN, atrial fibrillation, BPH presenting with hyperglycemia. Endocrinology consulted for management of T1DM.    Poorly controlled T1DM with hyperglycemia  Insulin pump  - HbA1c: 7.3  - Endocrinologist: Dr. Ngo  - Home Regimen:   tslim x2 insulin pump with novolog insulin and dexcom g6  basal  12a 0.75  3a 1.187  11a 1.19  5p 1.18  8p 1.1  TDD 27 units  ISF  12a 1:50  ICR  12a 1:15  target 110  duration 5hr  PLAN  - continue admelog 4 units, stop when insulin pump placed  - can resume insulin pump home settings between 8:40-10:40pm tonight to monitor while inpatient. If pump malfunction or becomes hyperglycemic, can give lantus 20 units and remove insulin pump 2hr after   - Use low dose Admelog correction scale pre-meal, stop when insulin pump placed  - Use low dose Admelog correction scale at bedtime, stop when insulin pump placed  - Fingerstick BG before meals and bedtime  - Goal -180  - Carbohydrate consistent diet  - RD consult  - hypoglycemia protocol prn  Discharge plan:  - Discharge medications: Insulin pump home settings vs basal/bolus  - Patient to call doctor with persistent high or low BG at home.   - Recommend routine outpatient ophthalmology, podiatry and endocrinology f/u    HTN  - Home regimen: lasix, losartan  PLAN  - Can check urine microalbumin outpatient  - Outpatient goal BP <130/80. Management per primary team.    HLD  - Home regimen: atorvastatin 80mg daily  PLAN  - Continue atorvastatin 20mg daily per primary team  - Can check lipid profile if not done recently    Discussed with primary team.    Obey Cash MD, Endocrinology Fellow  Pager [phone] from 9am to 5pm. After hours and on weekends, please call [phone].   72Y M with PMH DM1 on insulin pump, HTN, atrial fibrillation, BPH presenting with hyperglycemia. Endocrinology consulted for management of T1DM.    Poorly controlled T1DM with hyperglycemia  Insulin pump  - HbA1c: 7.3  - Endocrinologist: Dr. Ngo  - Home Regimen:   tslim x2 insulin pump with novolog insulin and dexcom g6  basal  12a 0.75  3a 1.187  11a 1.19  5p 1.18  8p 1.1  TDD 27 units  ISF  12a 1:50  ICR  12a 1:15  target 110  duration 5hr  - suspect pump malfunction, patient continues to by hyperglycemic despite using pump, bolusing appropritely, unsure if bad batch of insulin or issue with pump supplies  PLAN  - please give lantus 20 units stat  - low admelog correction scale until BG <350, then can resume admelog 4 units tid premeal, low admelog scale with meals and separate low admelog scale at bedtime  - fingersticks q4h until BG <350, do not give admelog sooner than 4hr apart as at risk of insulin stacking and hypoglycemia  - fluids as tolerated  - Goal -180  - Carbohydrate consistent diet  - RD consult  - hypoglycemia protocol prn  Discharge plan:  - Discharge medications: Lantus 20 units every 24hr, admelog 4 units tid premeal, low admelog correction scale (1 unit 150-200, 2 units 200-250, 3 units 250-300), low admelog bedtime correction scale (1 unit 250-300, 2 units 300-350, 3 units 350-400). Patient needs to speak with CDE and endocrinologist as well as  outpatient prior to re using insulin pump  - Recommend routine outpatient ophthalmology, podiatry and endocrinology f/u    HTN  - Home regimen: lasix, losartan  PLAN  - Can check urine microalbumin outpatient  - Outpatient goal BP <130/80. Management per primary team.    HLD  - Home regimen: atorvastatin 80mg daily  PLAN  - Continue atorvastatin 20mg daily per primary team  - Can check lipid profile if not done recently    Discussed with primary team.    Obey Cash MD, Endocrinology Fellow  Pager [phone] from 9am to 5pm. After hours and on weekends, please call [phone].   72Y M with PMH DM1 on insulin pump, HTN, atrial fibrillation, BPH presenting with hyperglycemia. Endocrinology consulted for management of T1DM.    Poorly controlled T1DM with hyperglycemia  Insulin pump  - HbA1c: 7.3  - Endocrinologist: Dr. Ngo  - Home Regimen:   tslim x2 insulin pump with novolog insulin and dexcom g6  basal  12a 0.75  3a 1.187  11a 1.19  5p 1.18  8p 1.1  TDD 27 units  ISF  12a 1:50  ICR  12a 1:15  target 110  duration 5hr  - suspect pump malfunction, patient continues to by hyperglycemic despite using pump, bolusing appropritely, unsure if bad batch of insulin or issue with pump supplies  PLAN  - please check DKA labs stat (BMP, VBG, BHB)  - please give lantus 20 units stat  - low admelog correction scale until BG <350, then can resume admelog 4 units tid premeal, low admelog scale with meals and separate low admelog scale at bedtime  - fingersticks q4h until BG <350, do not give admelog sooner than 4hr apart as at risk of insulin stacking and hypoglycemia  - fluids as tolerated  - Goal -180  - Carbohydrate consistent diet  - RD consult  - hypoglycemia protocol prn  Discharge plan:  - Discharge medications: Lantus 20 units every 24hr, admelog 4 units tid premeal, low admelog correction scale (1 unit 150-200, 2 units 200-250, 3 units 250-300), low admelog bedtime correction scale (1 unit 250-300, 2 units 300-350, 3 units 350-400). Patient needs to speak with CDE and endocrinologist as well as  outpatient prior to re using insulin pump  - Recommend routine outpatient ophthalmology, podiatry and endocrinology f/u    HTN  - Home regimen: lasix, losartan  PLAN  - Can check urine microalbumin outpatient  - Outpatient goal BP <130/80. Management per primary team.    HLD  - Home regimen: atorvastatin 80mg daily  PLAN  - Continue atorvastatin 20mg daily per primary team  - Can check lipid profile if not done recently    Discussed with primary team.    Obey Cash MD, Endocrinology Fellow  Pager [phone] from 9am to 5pm. After hours and on weekends, please call [phone].   72Y M with PMH DM1 on insulin pump, HTN, atrial fibrillation, BPH presenting with hyperglycemia. Endocrinology consulted for management of T1DM.    Poorly controlled T1DM with hyperglycemia  Insulin pump  - HbA1c: 7.3  - Endocrinologist: Dr. Ngo  - Home Regimen:   tslim x2 insulin pump with novolog insulin and dexcom g6  basal  12a 0.75  3a 1.187  11a 1.19  5p 1.18  8p 1.1  TDD 27 units  ISF  12a 1:50  ICR  12a 1:15  target 110  duration 5hr  - suspect pump malfunction, patient continues to by hyperglycemic despite using pump, bolusing appropritely, unsure if bad batch of insulin or issue with pump supplies  PLAN  - please check DKA labs stat (BMP, VBG, BHB)  - please give lantus 20 units stat  - low admelog correction scale until BG <350, then can resume admelog 4 units tid premeal, low admelog scale with meals and separate low admelog scale at bedtime  - fingersticks q4h until BG <350, do not give admelog sooner than 4hr apart as at risk of insulin stacking and hypoglycemia  - fluids as tolerated  - Goal -180  - Carbohydrate consistent diet  - RD consult  - hypoglycemia protocol prn  Discharge plan:  - Discharge medications: Lantus 20 units every 24hr, admelog 4 units tid premeal, low admelog correction scale (1 unit 150-200, 2 units 200-250, 3 units 250-300), low admelog bedtime correction scale (1 unit 250-300, 2 units 300-350, 3 units 350-400). Patient needs to speak with CDE and endocrinologist as well as  outpatient prior to re using insulin pump. Patient can be discharged if not in DKA and once BG around 200.  - Recommend routine outpatient ophthalmology, podiatry and endocrinology f/u    HTN  - Home regimen: lasix, losartan  PLAN  - Can check urine microalbumin outpatient  - Outpatient goal BP <130/80. Management per primary team.    HLD  - Home regimen: atorvastatin 80mg daily  PLAN  - Continue atorvastatin 20mg daily per primary team  - Can check lipid profile if not done recently    Discussed with primary team.    Obey Cash MD, Endocrinology Fellow  Pager [phone] from 9am to 5pm. After hours and on weekends, please call [phone].   72Y M with PMH DM1 on insulin pump, HTN, atrial fibrillation, BPH presenting with hyperglycemia. Endocrinology consulted for management of T1DM.    Poorly controlled T1DM with hyperglycemia  Insulin pump  - HbA1c: 7.3  - Endocrinologist: Dr. Ngo  - Home Regimen:   tslim x2 insulin pump with novolog insulin and dexcom g6  basal  12a 0.75  3a 1.187  11a 1.19  5p 1.18  8p 1.1  TDD 27 units  ISF  12a 1:50  ICR  12a 1:15  target 110  duration 5hr  - suspect pump malfunction, patient continues to by hyperglycemic despite using pump, bolusing appropritely, unsure if bad batch of insulin or issue with pump supplies  PLAN  - please check DKA labs stat (BMP, VBG, BHB)  - please give lantus 20 units stat  - low admelog correction scale until BG <350, then can resume admelog 4 units tid premeal, low admelog scale with meals and separate low admelog scale at bedtime  - fingersticks q4h until BG <350, do not give admelog sooner than 4hr apart as at risk of insulin stacking and hypoglycemia  - fluids as tolerated  - Goal -180  - Carbohydrate consistent diet  - RD consult  - hypoglycemia protocol prn  Discharge plan:  - Discharge medications: Lantus 20 units every 24hr, humalog 4 units tid premeal, low humalog correction scale (1 unit 150-200, 2 units 200-250, 3 units 250-300), low humalog bedtime correction scale (1 unit 250-300, 2 units 300-350, 3 units 350-400). Patient needs to speak with CDE and endocrinologist as well as  outpatient prior to re using insulin pump. Patient can be discharged if not in DKA and once BG around 200.  - Recommend routine outpatient ophthalmology, podiatry and endocrinology f/u    HTN  - Home regimen: lasix, losartan  PLAN  - Can check urine microalbumin outpatient  - Outpatient goal BP <130/80. Management per primary team.    HLD  - Home regimen: atorvastatin 80mg daily  PLAN  - Continue atorvastatin 20mg daily per primary team  - Can check lipid profile if not done recently    Discussed with primary team.    Obey Cash MD, Endocrinology Fellow  Pager [phone] from 9am to 5pm. After hours and on weekends, please call [phone].

## 2023-11-07 NOTE — PROGRESS NOTE ADULT - SUBJECTIVE AND OBJECTIVE BOX
[facility] Division of Hospital Medicine  Edward Burton DO  Pager (M-F, 8A-5P):  MS Teams PREFERRED        SUBJECTIVE / OVERNIGHT EVENTS:  Patient seen at bedside, reports no distress, expresses uncertain if his insulin pump is working appropriately. Denies any nausea, vomiting, abdominal pain.     MEDICATIONS  (STANDING):  aspirin enteric coated 81 milliGRAM(s) Oral daily  atorvastatin 20 milliGRAM(s) Oral at bedtime  cefTRIAXone   IVPB 1000 milliGRAM(s) IV Intermittent every 24 hours  dextrose 5%. 1000 milliLiter(s) (100 mL/Hr) IV Continuous <Continuous>  dextrose 5%. 1000 milliLiter(s) (50 mL/Hr) IV Continuous <Continuous>  dextrose 50% Injectable 25 Gram(s) IV Push once  dextrose 50% Injectable 12.5 Gram(s) IV Push once  dextrose 50% Injectable 25 Gram(s) IV Push once  furosemide    Tablet 40 milliGRAM(s) Oral daily  glucagon  Injectable 1 milliGRAM(s) IntraMuscular once  heparin   Injectable 5000 Unit(s) SubCutaneous every 8 hours  insulin lispro (ADMELOG) corrective regimen sliding scale   SubCutaneous every 4 hours  losartan 100 milliGRAM(s) Oral daily  metoprolol tartrate 50 milliGRAM(s) Oral two times a day  pantoprazole    Tablet 40 milliGRAM(s) Oral before breakfast  sodium chloride 0.9%. 1000 milliLiter(s) (75 mL/Hr) IV Continuous <Continuous>  tamsulosin 0.4 milliGRAM(s) Oral at bedtime    MEDICATIONS  (PRN):  dextrose Oral Gel 15 Gram(s) Oral once PRN Blood Glucose LESS THAN 70 milliGRAM(s)/deciliter      I&O's Summary      PHYSICAL EXAM:  Vital Signs Last 24 Hrs  T(C): 36.3 (07 Nov 2023 13:18), Max: 36.7 (06 Nov 2023 19:30)  T(F): 97.3 (07 Nov 2023 13:18), Max: 98 (06 Nov 2023 19:30)  HR: 76 (07 Nov 2023 13:18) (66 - 83)  BP: 134/76 (07 Nov 2023 13:18) (133/80 - 167/90)  BP(mean): --  RR: 18 (07 Nov 2023 13:18) (18 - 18)  SpO2: 98% (07 Nov 2023 13:18) (96% - 99%)    Parameters below as of 07 Nov 2023 13:18  Patient On (Oxygen Delivery Method): room air      CONSTITUTIONAL: Well-groomed, in no apparent distress  EYES: No conjunctival or scleral injection, non-icteric; PERRLA and symmetric  ENMT: No external nasal lesions; nasal mucosa not inflamed; normal dentition; no pharyngeal injection or exudates, oral mucosa with moist membranes  RESPIRATORY: Breathing comfortably; lungs CTA without wheeze/rhonchi/rales  CARDIOVASCULAR: +S1S2, RRR, no M/G/R; no carotid bruits; pedal pulses full and symmetric; no lower extremity edema  GASTROINTESTINAL: No palpable masses or tenderness, +BS throughout, no rebound/guarding; no hepatosplenomegaly; no hernia palpated  GENITOURINARY:  LYMPHATIC: No cervical LAD or tenderness; no axillary LAD or tenderness; no inguinal LAD or tenderness  MUSCULOSKELETAL: Normal gait and station; no digital clubbing or cyanosis; no paraspinal tenderness; no malalignment of extremities  SKIN: No rashes or ulcers noted; no subcutaneous nodules or induration palpable  NEUROLOGIC: CN grossly intact; sensation intact in LEs b/l to light touch; normal strength and tone  PSYCHIATRIC: A+O x 3; mood and affect appropriate; appropriate insight and judgment    LABS:                        10.4   5.03  )-----------( 212      ( 05 Nov 2023 22:22 )             33.4     11-07    134<L>  |  95<L>  |  20  ----------------------------<  504<HH>  4.7   |  21<L>  |  1.06    Ca    9.2      07 Nov 2023 15:12  Phos  2.8     11-05  Mg     1.8     11-05    TPro  7.4  /  Alb  4.1  /  TBili  0.6  /  DBili  x   /  AST  42<H>  /  ALT  34  /  AlkPhos  70  11-05          Urinalysis Basic - ( 07 Nov 2023 15:12 )    Color: x / Appearance: x / SG: x / pH: x  Gluc: 504 mg/dL / Ketone: x  / Bili: x / Urobili: x   Blood: x / Protein: x / Nitrite: x   Leuk Esterase: x / RBC: x / WBC x   Sq Epi: x / Non Sq Epi: x / Bacteria: x        Culture - Urine (collected 05 Nov 2023 23:14)  Source: Clean Catch Clean Catch (Midstream)  Final Report (07 Nov 2023 07:09):    No growth        RADIOLOGY & ADDITIONAL TESTS:  Results Reviewed: CBC/CMP personally reviewed by me Hgb 10.4 WBC 5.03  Imaging Personally Reviewed:  Electrocardiogram Personally Reviewed:    COORDINATION OF CARE:  Care Discussed with Consultants/Other Providers [Y/N]: Y  Prior or Outpatient Records Reviewed [Y/N]: Y

## 2023-11-07 NOTE — PATIENT PROFILE ADULT - FALL HARM RISK - HARM RISK INTERVENTIONS

## 2023-11-07 NOTE — PATIENT PROFILE ADULT - NSPROEXTENSIONSOFSELF_GEN_A_NUR
cellphone, 2 chargers, insulin pump(off patient), clothing, sneakers, wrist watch, 2 rings, chain, coat/cane/eyeglasses

## 2023-11-08 ENCOUNTER — TRANSCRIPTION ENCOUNTER (OUTPATIENT)
Age: 73
End: 2023-11-08

## 2023-11-08 LAB
GLUCOSE BLDC GLUCOMTR-MCNC: 128 MG/DL — HIGH (ref 70–99)
GLUCOSE BLDC GLUCOMTR-MCNC: 128 MG/DL — HIGH (ref 70–99)
GLUCOSE BLDC GLUCOMTR-MCNC: 137 MG/DL — HIGH (ref 70–99)
GLUCOSE BLDC GLUCOMTR-MCNC: 137 MG/DL — HIGH (ref 70–99)
GLUCOSE BLDC GLUCOMTR-MCNC: 185 MG/DL — HIGH (ref 70–99)
GLUCOSE BLDC GLUCOMTR-MCNC: 185 MG/DL — HIGH (ref 70–99)
GLUCOSE BLDC GLUCOMTR-MCNC: 322 MG/DL — HIGH (ref 70–99)
GLUCOSE BLDC GLUCOMTR-MCNC: 322 MG/DL — HIGH (ref 70–99)
GLUCOSE BLDC GLUCOMTR-MCNC: 335 MG/DL — HIGH (ref 70–99)

## 2023-11-08 PROCEDURE — 99232 SBSQ HOSP IP/OBS MODERATE 35: CPT

## 2023-11-08 PROCEDURE — 99232 SBSQ HOSP IP/OBS MODERATE 35: CPT | Mod: GC

## 2023-11-08 RX ORDER — ATORVASTATIN CALCIUM 80 MG/1
1 TABLET, FILM COATED ORAL
Qty: 30 | Refills: 0
Start: 2023-11-08 | End: 2023-12-07

## 2023-11-08 RX ORDER — FUROSEMIDE 40 MG
1 TABLET ORAL
Qty: 30 | Refills: 0
Start: 2023-11-08 | End: 2023-12-07

## 2023-11-08 RX ORDER — INSULIN GLARGINE 100 [IU]/ML
22 INJECTION, SOLUTION SUBCUTANEOUS ONCE
Refills: 0 | Status: COMPLETED | OUTPATIENT
Start: 2023-11-08 | End: 2023-11-08

## 2023-11-08 RX ORDER — LOSARTAN POTASSIUM 100 MG/1
1 TABLET, FILM COATED ORAL
Refills: 0 | DISCHARGE

## 2023-11-08 RX ORDER — INSULIN GLARGINE 100 [IU]/ML
22 INJECTION, SOLUTION SUBCUTANEOUS
Refills: 0 | Status: DISCONTINUED | OUTPATIENT
Start: 2023-11-09 | End: 2023-11-09

## 2023-11-08 RX ORDER — ASPIRIN/CALCIUM CARB/MAGNESIUM 324 MG
1 TABLET ORAL
Qty: 30 | Refills: 0
Start: 2023-11-08 | End: 2023-12-07

## 2023-11-08 RX ORDER — METOPROLOL TARTRATE 50 MG
1 TABLET ORAL
Qty: 60 | Refills: 0
Start: 2023-11-08 | End: 2023-12-07

## 2023-11-08 RX ORDER — LOSARTAN POTASSIUM 100 MG/1
1 TABLET, FILM COATED ORAL
Qty: 30 | Refills: 0
Start: 2023-11-08 | End: 2023-12-07

## 2023-11-08 RX ORDER — INSULIN LISPRO 100/ML
4 VIAL (ML) SUBCUTANEOUS
Refills: 0 | Status: DISCONTINUED | OUTPATIENT
Start: 2023-11-08 | End: 2023-11-08

## 2023-11-08 RX ORDER — INSULIN LISPRO 100/ML
6 VIAL (ML) SUBCUTANEOUS
Refills: 0 | Status: DISCONTINUED | OUTPATIENT
Start: 2023-11-08 | End: 2023-11-09

## 2023-11-08 RX ORDER — INSULIN LISPRO 100/ML
VIAL (ML) SUBCUTANEOUS
Refills: 0 | Status: DISCONTINUED | OUTPATIENT
Start: 2023-11-08 | End: 2023-11-09

## 2023-11-08 RX ORDER — PANTOPRAZOLE SODIUM 20 MG/1
1 TABLET, DELAYED RELEASE ORAL
Qty: 0 | Refills: 0 | DISCHARGE
Start: 2023-11-08

## 2023-11-08 RX ORDER — FUROSEMIDE 40 MG
1 TABLET ORAL
Refills: 0 | DISCHARGE

## 2023-11-08 RX ORDER — ATORVASTATIN CALCIUM 80 MG/1
1 TABLET, FILM COATED ORAL
Refills: 0 | DISCHARGE

## 2023-11-08 RX ADMIN — HEPARIN SODIUM 5000 UNIT(S): 5000 INJECTION INTRAVENOUS; SUBCUTANEOUS at 13:14

## 2023-11-08 RX ADMIN — Medication 4 UNIT(S): at 09:14

## 2023-11-08 RX ADMIN — Medication 40 MILLIGRAM(S): at 06:15

## 2023-11-08 RX ADMIN — TAMSULOSIN HYDROCHLORIDE 0.4 MILLIGRAM(S): 0.4 CAPSULE ORAL at 22:09

## 2023-11-08 RX ADMIN — HEPARIN SODIUM 5000 UNIT(S): 5000 INJECTION INTRAVENOUS; SUBCUTANEOUS at 06:16

## 2023-11-08 RX ADMIN — PANTOPRAZOLE SODIUM 40 MILLIGRAM(S): 20 TABLET, DELAYED RELEASE ORAL at 06:15

## 2023-11-08 RX ADMIN — Medication 50 MILLIGRAM(S): at 06:16

## 2023-11-08 RX ADMIN — Medication 81 MILLIGRAM(S): at 12:20

## 2023-11-08 RX ADMIN — Medication 6 UNIT(S): at 17:57

## 2023-11-08 RX ADMIN — CEFTRIAXONE 100 MILLIGRAM(S): 500 INJECTION, POWDER, FOR SOLUTION INTRAMUSCULAR; INTRAVENOUS at 10:06

## 2023-11-08 RX ADMIN — HEPARIN SODIUM 5000 UNIT(S): 5000 INJECTION INTRAVENOUS; SUBCUTANEOUS at 22:10

## 2023-11-08 RX ADMIN — Medication 50 MILLIGRAM(S): at 18:11

## 2023-11-08 RX ADMIN — Medication 4: at 17:56

## 2023-11-08 RX ADMIN — ATORVASTATIN CALCIUM 20 MILLIGRAM(S): 80 TABLET, FILM COATED ORAL at 22:09

## 2023-11-08 RX ADMIN — Medication 4: at 13:12

## 2023-11-08 RX ADMIN — INSULIN GLARGINE 22 UNIT(S): 100 INJECTION, SOLUTION SUBCUTANEOUS at 15:17

## 2023-11-08 RX ADMIN — LOSARTAN POTASSIUM 100 MILLIGRAM(S): 100 TABLET, FILM COATED ORAL at 06:15

## 2023-11-08 RX ADMIN — Medication 4 UNIT(S): at 13:13

## 2023-11-08 NOTE — PROGRESS NOTE ADULT - ASSESSMENT
72Y M with PMH DM1 on insulin pump, HTN, atrial fibrillation, BPH presenting with hyperglycemia. Patient was discharged from this hospital yesterday after overnight CDU stay for issues with hyperglycemia and insulin pump.  He was evaluated by endocrinology who adjusted his pump and medications and he was discharged with improvement in his glucose control yesterday. He was also diagnosed with UTI and was discharged on cefdinir for UTI. Now returning due to persistent hyperglycemia throughout the day. Patient with concern for malfunctioning insulin pump--> early DKA now  resolved. Currently on 22U Lantus, 6 U Admelog per Endocrine. Will monitor overnight per their recommendation. Plan for dc tomorrow.

## 2023-11-08 NOTE — PROGRESS NOTE ADULT - ASSESSMENT
72Y M with PMH DM1 on insulin pump, HTN, atrial fibrillation, BPH presenting with hyperglycemia. Endocrinology consulted for management of T1DM.    Poorly controlled T1DM with hyperglycemia  Insulin pump  - HbA1c: 7.3  - Endocrinologist: Dr. Ngo  - Home Regimen:   tslim x2 insulin pump with novolog insulin and dexcom g6  basal  12a 0.75  3a 1.187  11a 1.19  5p 1.18  8p 1.1  TDD 27 units  ISF  12a 1:50  ICR  12a 1:15  target 110  duration 5hr  - suspect pump malfunction, patient continues to by hyperglycemic despite using pump, bolusing appropritely, unsure if bad batch of insulin or issue with pump supplies  PLAN  - continue lantus 20 units daily at 2:30pm  - continue admelog 4 units tid premeal, low admelog scale with meals and separate low admelog scale at bedtime  - fingersticks qid with meals and bedtime  - Goal -180  - Carbohydrate consistent diet  - RD consult  - hypoglycemia protocol prn  Discharge plan:  - Discharge medications: Lantus 20 units every 24hr, humalog 4 units tid premeal, low humalog correction scale (1 unit 150-200, 2 units 200-250, 3 units 250-300), low humalog bedtime correction scale (1 unit 250-300, 2 units 300-350, 3 units 350-400). Patient needs to speak with CDE and endocrinologist as well as  outpatient prior to re using insulin pump. Patient can be discharged if not in DKA and once BG around 200.  - Recommend routine outpatient ophthalmology, podiatry and endocrinology f/u    HTN  - Home regimen: lasix, losartan  PLAN  - Can check urine microalbumin outpatient  - Outpatient goal BP <130/80. Management per primary team.    HLD  - Home regimen: atorvastatin 80mg daily  PLAN  - Continue atorvastatin 20mg daily per primary team  - Can check lipid profile if not done recently    Discussed with primary team.    Obey Cash MD, Endocrinology Fellow  Pager [phone] from 9am to 5pm. After hours and on weekends, please call [phone].   72Y M with PMH DM1 on insulin pump, HTN, atrial fibrillation, BPH presenting with hyperglycemia. Endocrinology consulted for management of T1DM.    Poorly controlled T1DM with hyperglycemia  Insulin pump  - HbA1c: 7.3  - Endocrinologist: Dr. Ngo  - Home Regimen:   tslim x2 insulin pump with novolog insulin and dexcom g6  basal  12a 0.75  3a 1.187  11a 1.19  5p 1.18  8p 1.1  TDD 27 units  ISF  12a 1:50  ICR  12a 1:15  target 110  duration 5hr  - suspect pump malfunction, patient continues to by hyperglycemic despite using pump, bolusing appropritely, unsure if bad batch of insulin or issue with pump supplies  PLAN  - continue lantus 20 units daily at 2:30pm  - continue admelog 4 units tid premeal, low admelog scale with meals and separate low admelog scale at bedtime  - fingersticks qid with meals and bedtime  - Goal -180  - Carbohydrate consistent diet  - RD consult  - hypoglycemia protocol prn  Discharge plan:  - Discharge medications: Lantus 20 units every 24hr, humalog 4 units tid premeal, low humalog correction scale (1 unit 150-200, 2 units 200-250, 3 units 250-300), low humalog bedtime correction scale (1 unit 250-300, 2 units 300-350, 3 units 350-400). Patient needs to speak with CDE and endocrinologist as well as  outpatient prior to re using insulin pump. Would not utilize an SGLT-2 inhibitor given recent DKA and T1DM.  - Recommend routine outpatient ophthalmology, podiatry and endocrinology f/u    HTN  - Home regimen: lasix, losartan  PLAN  - Can check urine microalbumin outpatient  - Outpatient goal BP <130/80. Management per primary team.    HLD  - Home regimen: atorvastatin 80mg daily  PLAN  - Continue atorvastatin 20mg daily per primary team  - Can check lipid profile if not done recently    Discussed with primary team.    Obey Cash MD, Endocrinology Fellow  Pager [phone] from 9am to 5pm. After hours and on weekends, please call [phone].   72Y M with PMH DM1 on insulin pump, HTN, atrial fibrillation, BPH presenting with hyperglycemia. Endocrinology consulted for management of T1DM.    Poorly controlled T1DM with hyperglycemia  Insulin pump  - HbA1c: 7.3  - Endocrinologist: Dr. Ngo  - Home Regimen:   tslim x2 insulin pump with novolog insulin and dexcom g6  basal  12a 0.75  3a 1.187  11a 1.19  5p 1.18  8p 1.1  TDD 27 units  ISF  12a 1:50  ICR  12a 1:15  target 110  duration 5hr  - suspect pump malfunction, patient continues to by hyperglycemic despite using pump, bolusing appropritely, unsure if bad batch of insulin or issue with pump supplies  PLAN  - start lantus 22 units daily at 2:30pm  - continue admelog 6 units tid premeal, low admelog scale with meals and separate low admelog scale at bedtime  - fingersticks qid with meals and bedtime  - Goal -180  - Carbohydrate consistent diet  - RD consult  - hypoglycemia protocol prn  Discharge plan:  - Discharge medications: Lantus 22 units every 24hr, humalog 6 units tid premeal, low humalog correction scale (1 unit 150-200, 2 units 200-250, 3 units 250-300), low humalog bedtime correction scale (1 unit 250-300, 2 units 300-350, 3 units 350-400). Patient needs to speak with CDE and endocrinologist as well as  outpatient prior to re using insulin pump. Would not utilize an SGLT-2 inhibitor given recent DKA and T1DM.  - Recommend routine outpatient ophthalmology, podiatry and endocrinology f/u    HTN  - Home regimen: lasix, losartan  PLAN  - Can check urine microalbumin outpatient  - Outpatient goal BP <130/80. Management per primary team.    HLD  - Home regimen: atorvastatin 80mg daily  PLAN  - Continue atorvastatin 20mg daily per primary team  - Can check lipid profile if not done recently    Discussed with primary team.    Obey Cash MD, Endocrinology Fellow  Pager [phone] from 9am to 5pm. After hours and on weekends, please call [phone].   72Y M with PMH DM1 on insulin pump, HTN, atrial fibrillation, BPH presenting with hyperglycemia. Endocrinology consulted for management of T1DM.    Poorly controlled T1DM with hyperglycemia  Insulin pump  - HbA1c: 7.3  - Endocrinologist: Dr. Ngo  - Home Regimen:   tslim x2 insulin pump with novolog insulin and dexcom g6  basal  12a 0.75  3a 1.187  11a 1.19  5p 1.18  8p 1.1  TDD 27 units  ISF  12a 1:50  ICR  12a 1:15  target 110  duration 5hr  - suspect pump malfunction, patient continues to by hyperglycemic despite using pump, bolusing appropritely, unsure if bad batch of insulin or issue with pump supplies  PLAN  - start lantus 22 units daily at 2:30pm  - continue admelog 6 units tid premeal, low admelog scale with meals and separate low admelog scale at bedtime  - can do 2 am bedtime equivalent low admelog correction scale to ensure BG improving  - fingersticks before meals, bedtime and 2am  - Goal -180  - Carbohydrate consistent diet  - RD consult  - hypoglycemia protocol prn  Discharge plan:  - Discharge medications: Lantus 22 units every 24hr, humalog 6 units tid premeal, low humalog correction scale (1 unit 150-200, 2 units 200-250, 3 units 250-300), low humalog bedtime correction scale (1 unit 250-300, 2 units 300-350, 3 units 350-400). Patient needs to speak with CDE and endocrinologist as well as  outpatient prior to re using insulin pump. Would not utilize an SGLT-2 inhibitor given recent DKA and T1DM.  - Recommend routine outpatient ophthalmology, podiatry and endocrinology f/u    HTN  - Home regimen: lasix, losartan  PLAN  - Can check urine microalbumin outpatient  - Outpatient goal BP <130/80. Management per primary team.    HLD  - Home regimen: atorvastatin 80mg daily  PLAN  - Continue atorvastatin 20mg daily per primary team  - Can check lipid profile if not done recently    Discussed with primary team.    Obey Cash MD, Endocrinology Fellow  Pager [phone] from 9am to 5pm. After hours and on weekends, please call [phone].

## 2023-11-08 NOTE — PROGRESS NOTE ADULT - SUBJECTIVE AND OBJECTIVE BOX
C A R D I O L O G Y  **********************************     DATE OF SERVICE: 11-08-23    Patient resting comfortably in no distress. No reported chest pain or shortness of breath.   Review of symptoms otherwise negative.    MEDICATIONS:  aspirin enteric coated 81 milliGRAM(s) Oral daily  atorvastatin 20 milliGRAM(s) Oral at bedtime  cefTRIAXone   IVPB 1000 milliGRAM(s) IV Intermittent every 24 hours  dextrose 5%. 1000 milliLiter(s) IV Continuous <Continuous>  dextrose 5%. 1000 milliLiter(s) IV Continuous <Continuous>  dextrose 50% Injectable 12.5 Gram(s) IV Push once  dextrose 50% Injectable 25 Gram(s) IV Push once  dextrose 50% Injectable 25 Gram(s) IV Push once  dextrose Oral Gel 15 Gram(s) Oral once PRN  furosemide    Tablet 40 milliGRAM(s) Oral daily  glucagon  Injectable 1 milliGRAM(s) IntraMuscular once  heparin   Injectable 5000 Unit(s) SubCutaneous every 8 hours  insulin glargine Injectable (LANTUS) 20 Unit(s) SubCutaneous <User Schedule>  insulin lispro (ADMELOG) corrective regimen sliding scale   SubCutaneous three times a day before meals  insulin lispro Injectable (ADMELOG) 4 Unit(s) SubCutaneous three times a day before meals  losartan 100 milliGRAM(s) Oral daily  metoprolol tartrate 50 milliGRAM(s) Oral two times a day  pantoprazole    Tablet 40 milliGRAM(s) Oral before breakfast  sodium chloride 0.9%. 1000 milliLiter(s) IV Continuous <Continuous>  tamsulosin 0.4 milliGRAM(s) Oral at bedtime      LABS:      Hemoglobin: 10.4 g/dL (11-05 @ 22:22)  Hemoglobin: 10.2 g/dL (11-03 @ 19:15)  Hemoglobin: 10.0 g/dL (11-03 @ 16:33)  Hemoglobin: 10.9 g/dL (11-03 @ 11:47)      11-07    139  |  100  |  20  ----------------------------<  310<H>  4.0   |  27  |  0.77    Ca    9.3      07 Nov 2023 22:22      Creatinine Trend: 0.77<--, 1.06<--, 0.74<--, 0.64<--, 0.74<--, 0.75<--    COAGS:           PHYSICAL EXAM:  T(C): 36.6 (11-08-23 @ 04:27), Max: 36.8 (11-07-23 @ 23:50)  HR: 65 (11-08-23 @ 06:20) (65 - 89)  BP: 163/80 (11-08-23 @ 06:20) (122/63 - 163/80)  RR: 18 (11-08-23 @ 04:27) (17 - 18)  SpO2: 98% (11-08-23 @ 04:27) (98% - 99%)  Wt(kg): --    I&O's Summary    07 Nov 2023 07:01  -  08 Nov 2023 07:00  --------------------------------------------------------  IN: 1050 mL / OUT: 1300 mL / NET: -250 mL        Gen: NAD  HEENT:  (-)icterus (-)pallor  CV: N S1 S2 1/6 RONI (+)2 Pulses B/l  Resp:  Clear to auscultation B/L, normal effort  GI: (+) BS Soft, NT, ND  Lymph:  (-)Edema, (-)obvious lymphadenopathy  Skin: Warm to touch, Normal turgor  Psych: Appropriate mood and affect      TELEMETRY: None	      ASSESSMENT/PLAN: Patient is a 73 y/o male well known to our office with PMH of CAD s/p CABGx3 (3/2023), HFrEF s/p [employer] dual chamber ICD (9/2023), Type 1 DM on insulin pump, HTN, vertigo, and BPH who is admitted with hyperglycemia. Cardiology consulted for management of CAD/CHF.    #Hyperglycemia  - Management per endocrine, now with early DKA    #UTI  - Abx per med    #CAD s/p prior CABG  - Continue ASA 81mg daily and Lipitor    #Chronic HFrEF s/p recent ICD  - Appears well compensated from CHF perspective  - Continue PO lasix  - Continue Losartan and Metoprolol  - No plans for SGLT2 inhibitor at this time given recent DKA per endocrine    - No inpatient cardiac w/u planned  - Patient to f/u with Dr. Wahl after discharge    Mariano Vernon PA-C  Pager: [phone]

## 2023-11-08 NOTE — DISCHARGE NOTE PROVIDER - PROVIDER TOKENS
PROVIDER:[TOKEN:[[id number]],FOLLOWUP:[2 weeks]] PROVIDER:[TOKEN:[[id number]],FOLLOWUP:[2 weeks]],PROVIDER:[TOKEN:[[id number]]],PROVIDER:[TOKEN:[[id number]]]

## 2023-11-08 NOTE — PROGRESS NOTE ADULT - ASSESSMENT
Agree with above assessment and plan as outlined above.    - now with early DKA  - Endocrine f/u    Alec Mitchell MD, [facility]  BEEPER [phone]

## 2023-11-08 NOTE — DISCHARGE NOTE PROVIDER - NSDCFUSCHEDAPPT_GEN_ALL_CORE_FT
Talisha Son  [facility]  PULMMED [address]  Scheduled Appointment: 11/09/2023     [facility]  [facility] [address] [place] [address]  Scheduled Appointment: 11/29/2023

## 2023-11-08 NOTE — DISCHARGE NOTE PROVIDER - NSFOLLOWUPCLINICS_GEN_ALL_ED_FT
[facility] - Ophthalmology  Ophthalmology  [address], [address]  [place] [address]  Phone: [phone]  Fax:   Follow Up Time: 2 weeks    [facility]  Podiatry  [address] [place] [address] [address] [place] [address]  Phone: [phone]  Fax:   Follow Up Time: 2 weeks

## 2023-11-08 NOTE — DISCHARGE NOTE PROVIDER - NSDCFUADDINST_GEN_ALL_CORE_FT
Do not resume insulin pump before talking with endocrine. Follow up delivery of new insulin pump Omnipod 5

## 2023-11-08 NOTE — DISCHARGE NOTE PROVIDER - CARE PROVIDER_API CALL
KAYLA ZIEGLER, Phys,    Phone: ()-  Fax: ()-  Follow Up Time: 2 weeks   KAYLA ZIEGLER, Lea,    Phone: ()-  Fax: ()-  Follow Up Time: 2 weeks    Adrián Paula  Internal Medicine  [address]  [place] [address]  Phone: [phone]  Fax: [phone]  Follow Up Time:     Domingo Wahl  Cardiology  [address] [facility], [address]  [place] [address]  Phone: [phone]  Fax: [phone]  Follow Up Time:

## 2023-11-08 NOTE — PROGRESS NOTE ADULT - SUBJECTIVE AND OBJECTIVE BOX
[facility] Division of Hospital Medicine  Edward Burton DO  Pager (M-F, 8A-5P):  MS Teams PREFERRED  Other Times:  [phone]      SUBJECTIVE / OVERNIGHT EVENTS:  Patient seen at bedside, reports no distress. States sugars have been better controlled. Denies chest pain, nausea, vomiting abdominal pain.     MEDICATIONS  (STANDING):  aspirin enteric coated 81 milliGRAM(s) Oral daily  atorvastatin 20 milliGRAM(s) Oral at bedtime  cefTRIAXone   IVPB 1000 milliGRAM(s) IV Intermittent every 24 hours  dextrose 5%. 1000 milliLiter(s) (50 mL/Hr) IV Continuous <Continuous>  dextrose 5%. 1000 milliLiter(s) (100 mL/Hr) IV Continuous <Continuous>  dextrose 50% Injectable 12.5 Gram(s) IV Push once  dextrose 50% Injectable 25 Gram(s) IV Push once  dextrose 50% Injectable 25 Gram(s) IV Push once  furosemide    Tablet 40 milliGRAM(s) Oral daily  glucagon  Injectable 1 milliGRAM(s) IntraMuscular once  heparin   Injectable 5000 Unit(s) SubCutaneous every 8 hours  insulin lispro (ADMELOG) corrective regimen sliding scale   SubCutaneous three times a day before meals  insulin lispro Injectable (ADMELOG) 6 Unit(s) SubCutaneous three times a day before meals  losartan 100 milliGRAM(s) Oral daily  metoprolol tartrate 50 milliGRAM(s) Oral two times a day  pantoprazole    Tablet 40 milliGRAM(s) Oral before breakfast  sodium chloride 0.9%. 1000 milliLiter(s) (75 mL/Hr) IV Continuous <Continuous>  tamsulosin 0.4 milliGRAM(s) Oral at bedtime    MEDICATIONS  (PRN):  dextrose Oral Gel 15 Gram(s) Oral once PRN Blood Glucose LESS THAN 70 milliGRAM(s)/deciliter      I&O's Summary    07 Nov 2023 07:01  -  08 Nov 2023 07:00  --------------------------------------------------------  IN: 1050 mL / OUT: 1300 mL / NET: -250 mL    08 Nov 2023 07:01  -  08 Nov 2023 16:21  --------------------------------------------------------  IN: 125 mL / OUT: 1450 mL / NET: -1325 mL        PHYSICAL EXAM:  Vital Signs Last 24 Hrs  T(C): 36.9 (08 Nov 2023 13:34), Max: 36.9 (08 Nov 2023 13:34)  T(F): 98.5 (08 Nov 2023 13:34), Max: 98.5 (08 Nov 2023 13:34)  HR: 70 (08 Nov 2023 13:34) (65 - 89)  BP: 127/80 (08 Nov 2023 13:34) (122/63 - 163/80)  BP(mean): --  RR: 18 (08 Nov 2023 13:34) (17 - 18)  SpO2: 98% (08 Nov 2023 13:34) (98% - 99%)    Parameters below as of 08 Nov 2023 13:34  Patient On (Oxygen Delivery Method): room air      CONSTITUTIONAL: Well-groomed, in no apparent distress  EYES: No conjunctival or scleral injection, non-icteric; PERRLA and symmetric  ENMT: No external nasal lesions; nasal mucosa not inflamed; normal dentition; no pharyngeal injection or exudates, oral mucosa with moist membranes  RESPIRATORY: Breathing comfortably; lungs CTA without wheeze/rhonchi/rales  CARDIOVASCULAR: +S1S2, RRR, no M/G/R; no carotid bruits; pedal pulses full and symmetric; no lower extremity edema  GASTROINTESTINAL: No palpable masses or tenderness, +BS throughout, no rebound/guarding; no hepatosplenomegaly; no hernia palpated  LYMPHATIC: No cervical LAD or tenderness; no axillary LAD or tenderness; no inguinal LAD or tenderness  MUSCULOSKELETAL: Normal gait and station; no digital clubbing or cyanosis; no paraspinal tenderness; no malalignment of extremities  SKIN: No rashes or ulcers noted; no subcutaneous nodules or induration palpable  NEUROLOGIC: CN grossly intact; sensation intact in LEs b/l to light touch; normal strength and tone  PSYCHIATRIC: A+O x 3; mood and affect appropriate; appropriate insight and judgment    LABS:    11-07    139  |  100  |  20  ----------------------------<  310<H>  4.0   |  27  |  0.77    Ca    9.3      07 Nov 2023 22:22            Urinalysis Basic - ( 07 Nov 2023 22:22 )    Color: x / Appearance: x / SG: x / pH: x  Gluc: 310 mg/dL / Ketone: x  / Bili: x / Urobili: x   Blood: x / Protein: x / Nitrite: x   Leuk Esterase: x / RBC: x / WBC x   Sq Epi: x / Non Sq Epi: x / Bacteria: x        Culture - Urine (collected 05 Nov 2023 23:14)  Source: Clean Catch Clean Catch (Midstream)  Final Report (07 Nov 2023 07:09):    No growth        RADIOLOGY & ADDITIONAL TESTS:  Results Reviewed: CBC/CMP personally reviewed by me anion gap closed Cr 0.77   Imaging Personally Reviewed:  Electrocardiogram Personally Reviewed:    COORDINATION OF CARE:  Care Discussed with Consultants/Other Providers [Y/N]: Y  Prior or Outpatient Records Reviewed [Y/N]: Y

## 2023-11-08 NOTE — DISCHARGE NOTE PROVIDER - NSDCFUADDAPPT_GEN_ALL_CORE_FT
APPTS ARE READY TO BE MADE: [X] YES    Best Family or Patient Contact (if needed):    Additional Information about above appointments (if needed):    1: Follow up with PCP Dr. Paula outpatient   2: Follow up with cardiology Dr. Wahl outpatient   3: Follow up with Endocrine Dr. Ngo outpatient   4: Routine follow up with podiatry, ophthalmology outpatient     Other comments or requests:    APPTS ARE READY TO BE MADE: [X] YES    Best Family or Patient Contact (if needed):    Additional Information about above appointments (if needed):    1: Follow up with PCP Dr. Paula outpatient   2: Follow up with cardiology Dr. Wahl outpatient   3: Follow up with Endocrine Dr. Ngo outpatient   4: Routine follow up with podiatry, ophthalmology outpatient     Other comments or requests:   Patient was previously scheduled for an appointment on  11/15 with Dr. Ngo  Patient was previously scheduled for an appointment on  11/14 with Dr. Paula.  Patient/Caregiver was provided with follow up request details and prefers to call the providers office on their own to schedule.

## 2023-11-08 NOTE — DISCHARGE NOTE PROVIDER - NSDCCAREPROVSEEN_GEN_ALL_CORE_FT
Micheal, Edward Mitchell, Alec Morley, Judson Cash, Obey Holguin, Agustin Lutz, Thelma Zambrano, Tressa

## 2023-11-08 NOTE — PROGRESS NOTE ADULT - SUBJECTIVE AND OBJECTIVE BOX
ENDOCRINE FOLLOW UP     Chief Complaint: t1dm    History:     MEDICATIONS  (STANDING):  aspirin enteric coated 81 milliGRAM(s) Oral daily  atorvastatin 20 milliGRAM(s) Oral at bedtime  cefTRIAXone   IVPB 1000 milliGRAM(s) IV Intermittent every 24 hours  dextrose 5%. 1000 milliLiter(s) (50 mL/Hr) IV Continuous <Continuous>  dextrose 5%. 1000 milliLiter(s) (100 mL/Hr) IV Continuous <Continuous>  dextrose 50% Injectable 12.5 Gram(s) IV Push once  dextrose 50% Injectable 25 Gram(s) IV Push once  dextrose 50% Injectable 25 Gram(s) IV Push once  furosemide    Tablet 40 milliGRAM(s) Oral daily  glucagon  Injectable 1 milliGRAM(s) IntraMuscular once  heparin   Injectable 5000 Unit(s) SubCutaneous every 8 hours  insulin glargine Injectable (LANTUS) 20 Unit(s) SubCutaneous <User Schedule>  insulin lispro (ADMELOG) corrective regimen sliding scale   SubCutaneous three times a day before meals  insulin lispro Injectable (ADMELOG) 4 Unit(s) SubCutaneous three times a day before meals  losartan 100 milliGRAM(s) Oral daily  metoprolol tartrate 50 milliGRAM(s) Oral two times a day  pantoprazole    Tablet 40 milliGRAM(s) Oral before breakfast  sodium chloride 0.9%. 1000 milliLiter(s) (75 mL/Hr) IV Continuous <Continuous>  tamsulosin 0.4 milliGRAM(s) Oral at bedtime    MEDICATIONS  (PRN):  dextrose Oral Gel 15 Gram(s) Oral once PRN Blood Glucose LESS THAN 70 milliGRAM(s)/deciliter      Allergies    Neosporin (Rash)    Intolerances        ROS: All other systems reviewed and negative    PHYSICAL EXAM:  VITALS: T(C): 36.6 (11-08-23 @ 04:27)  T(F): 97.8 (11-08-23 @ 04:27), Max: 98.3 (11-07-23 @ 23:50)  HR: 65 (11-08-23 @ 06:20) (65 - 89)  BP: 163/80 (11-08-23 @ 06:20) (122/63 - 163/80)  RR:  (17 - 18)  SpO2:  (98% - 99%)  Wt(kg): --  GENERAL: NAD, resting comfortably   EYES: No proptosis,  anicteric  HEENT:  Atraumatic, Normocephalic, moist mucous membranes  RESPIRATORY: Nonlabored respirations on room air, normal rate/effort   CARDIOVASCULAR: Did not appear cyanotic, no lower extremity swelling visualized  GI: Soft, nontender, non distended  NEURO: Answering questions appropriately, moves all extremities spontaneously  PSYCH:  reactive affect, euthymic mood    POCT Blood Glucose.: 128 mg/dL (11-08-23 @ 08:52)  POCT Blood Glucose.: 137 mg/dL (11-08-23 @ 01:17)  POCT Blood Glucose.: 325 mg/dL (11-07-23 @ 21:33)  POCT Blood Glucose.: 420 mg/dL (11-07-23 @ 17:38)  POCT Blood Glucose.: 446 mg/dL (11-07-23 @ 14:36)  POCT Blood Glucose.: 377 mg/dL (11-07-23 @ 11:55)  POCT Blood Glucose.: 208 mg/dL (11-07-23 @ 07:57)  POCT Blood Glucose.: 171 mg/dL (11-07-23 @ 00:23)  POCT Blood Glucose.: 246 mg/dL (11-06-23 @ 22:05)  POCT Blood Glucose.: 260 mg/dL (11-06-23 @ 17:34)  POCT Blood Glucose.: 177 mg/dL (11-06-23 @ 11:22)  POCT Blood Glucose.: 107 mg/dL (11-06-23 @ 07:35)  POCT Blood Glucose.: 158 mg/dL (11-06-23 @ 02:10)  POCT Blood Glucose.: 241 mg/dL (11-05-23 @ 22:20)  POCT Blood Glucose.: 347 mg/dL (11-05-23 @ 19:49)      11-07    139  |  100  |  20  ----------------------------<  310<H>  4.0   |  27  |  0.77    eGFR: 94    Ca    9.3      11-07  Mg     1.8     11-05  Phos  2.8     11-05    TPro  7.4  /  Alb  4.1  /  TBili  0.6  /  DBili  x   /  AST  42<H>  /  ALT  34  /  AlkPhos  70  11-05      A1C with Estimated Average Glucose Result: 7.3 % (11-03-23 @ 19:15)       ENDOCRINE FOLLOW UP     Chief Complaint: t1dm    History:   He denies nausea, vomiting, diarrhea, constipation, endorses mild polyuria, polydipsia.    MEDICATIONS  (STANDING):  aspirin enteric coated 81 milliGRAM(s) Oral daily  atorvastatin 20 milliGRAM(s) Oral at bedtime  cefTRIAXone   IVPB 1000 milliGRAM(s) IV Intermittent every 24 hours  dextrose 5%. 1000 milliLiter(s) (50 mL/Hr) IV Continuous <Continuous>  dextrose 5%. 1000 milliLiter(s) (100 mL/Hr) IV Continuous <Continuous>  dextrose 50% Injectable 12.5 Gram(s) IV Push once  dextrose 50% Injectable 25 Gram(s) IV Push once  dextrose 50% Injectable 25 Gram(s) IV Push once  furosemide    Tablet 40 milliGRAM(s) Oral daily  glucagon  Injectable 1 milliGRAM(s) IntraMuscular once  heparin   Injectable 5000 Unit(s) SubCutaneous every 8 hours  insulin glargine Injectable (LANTUS) 20 Unit(s) SubCutaneous <User Schedule>  insulin lispro (ADMELOG) corrective regimen sliding scale   SubCutaneous three times a day before meals  insulin lispro Injectable (ADMELOG) 4 Unit(s) SubCutaneous three times a day before meals  losartan 100 milliGRAM(s) Oral daily  metoprolol tartrate 50 milliGRAM(s) Oral two times a day  pantoprazole    Tablet 40 milliGRAM(s) Oral before breakfast  sodium chloride 0.9%. 1000 milliLiter(s) (75 mL/Hr) IV Continuous <Continuous>  tamsulosin 0.4 milliGRAM(s) Oral at bedtime    MEDICATIONS  (PRN):  dextrose Oral Gel 15 Gram(s) Oral once PRN Blood Glucose LESS THAN 70 milliGRAM(s)/deciliter      Allergies    Neosporin (Rash)    Intolerances        ROS: All other systems reviewed and negative    PHYSICAL EXAM:  VITALS: T(C): 36.6 (11-08-23 @ 04:27)  T(F): 97.8 (11-08-23 @ 04:27), Max: 98.3 (11-07-23 @ 23:50)  HR: 65 (11-08-23 @ 06:20) (65 - 89)  BP: 163/80 (11-08-23 @ 06:20) (122/63 - 163/80)  RR:  (17 - 18)  SpO2:  (98% - 99%)  Wt(kg): --  GENERAL: NAD, resting comfortably   EYES: No proptosis,  anicteric  HEENT:  Atraumatic, Normocephalic, moist mucous membranes  RESPIRATORY: Nonlabored respirations on room air, normal rate/effort   CARDIOVASCULAR: Did not appear cyanotic, no lower extremity swelling visualized  GI: did not appear distended  NEURO: Answering questions appropriately, moves all extremities spontaneously  PSYCH:  reactive affect, euthymic mood    POCT Blood Glucose.: 128 mg/dL (11-08-23 @ 08:52)  POCT Blood Glucose.: 137 mg/dL (11-08-23 @ 01:17)  POCT Blood Glucose.: 325 mg/dL (11-07-23 @ 21:33)  POCT Blood Glucose.: 420 mg/dL (11-07-23 @ 17:38)  POCT Blood Glucose.: 446 mg/dL (11-07-23 @ 14:36)  POCT Blood Glucose.: 377 mg/dL (11-07-23 @ 11:55)  POCT Blood Glucose.: 208 mg/dL (11-07-23 @ 07:57)  POCT Blood Glucose.: 171 mg/dL (11-07-23 @ 00:23)  POCT Blood Glucose.: 246 mg/dL (11-06-23 @ 22:05)  POCT Blood Glucose.: 260 mg/dL (11-06-23 @ 17:34)  POCT Blood Glucose.: 177 mg/dL (11-06-23 @ 11:22)  POCT Blood Glucose.: 107 mg/dL (11-06-23 @ 07:35)  POCT Blood Glucose.: 158 mg/dL (11-06-23 @ 02:10)  POCT Blood Glucose.: 241 mg/dL (11-05-23 @ 22:20)  POCT Blood Glucose.: 347 mg/dL (11-05-23 @ 19:49)      11-07    139  |  100  |  20  ----------------------------<  310<H>  4.0   |  27  |  0.77    eGFR: 94    Ca    9.3      11-07  Mg     1.8     11-05  Phos  2.8     11-05    TPro  7.4  /  Alb  4.1  /  TBili  0.6  /  DBili  x   /  AST  42<H>  /  ALT  34  /  AlkPhos  70  11-05      A1C with Estimated Average Glucose Result: 7.3 % (11-03-23 @ 19:15)

## 2023-11-08 NOTE — DISCHARGE NOTE PROVIDER - NSDCCPCAREPLAN_GEN_ALL_CORE_FT
PRINCIPAL DISCHARGE DIAGNOSIS  Diagnosis: Hyperglycemia  Assessment and Plan of Treatment: You were treated for hyperglycemia and seen by Endocrinologist for evaluation of your insulin pump. At this time you should not use insulin pump and use insulin regiment given by Endocrinologist. Followup with Dr. Natasha Ngo in 1-2 weeks.      SECONDARY DISCHARGE DIAGNOSES  Diagnosis: Acute UTI  Assessment and Plan of Treatment: You were treated for UTI on this admission. Monitor for any new symptoms such as burning of urination, or pain.    Diagnosis: HLD (hyperlipidemia)  Assessment and Plan of Treatment: Continue to take Atorvastatin 20mg daily as prescribed.    Diagnosis: CAD (coronary artery disease)  Assessment and Plan of Treatment: Continue to take Atorvastatin and Aspirin as prescribed. Followup with PCP in 1-2 weeks.

## 2023-11-08 NOTE — DISCHARGE NOTE PROVIDER - HOSPITAL COURSE
72Y M with PMH DM1 on insulin pump, HTN, atrial fibrillation, BPH presenting with hyperglycemia. Patient was admitted after returning after CDU stay for issues with his insulin pump leading to hyperglycemia. Endocrinology team had evaluated patient, evaluated his pump and medications and he was discharged on 11/4/2023 when glucose was under control. He was found to have have UTI at that time and was also discharged on Cefdinir. Patient reported persistent hyperglycemia the next day which prompted his return to the hospital leading to re-evaluation by Endocrinology. Patient's insulin pump deemed to be malfunctioning, patient instructed not to use insulin pump at this time until further followup with CDE and his endocrinologist  Dr. Ngo. Patient was having episodes of hyperglycemia and labs showed that patient was in early DKA which was treated with short acting insulin every 4 hours as well as IV fluids. Patient's sugars better controlled and transitioned back to PO diet as well as insulin regiment as per endocrinology. On this admission, Cardiology team was consulted and discussed with Endocrinology that patient should not be started on SGLT-2 inhibitor at this time.     Endocrinology recommendations for discharge are: Lantus 20 units every 24hr, humalog 4 units tid premeal, low humalog correction scale (1 unit 150-200, 2 units 200-250, 3 units 250-300), low humalog bedtime correction scale (1 unit 250-300, 2 units 300-350, 3 units 350-400). Patient needs to speak with CDE and endocrinologist as well as  outpatient prior to re using insulin pump. Would not utilize an SGLT-2 inhibitor given recent DKA and T1DM.    Patient requires regular followup with Opthalmology, Podiatry, Endocrinology.    HPI:  72Y M with PMH DM1 on insulin pump, HTN, atrial fibrillation, BPH presenting with hyperglycemia. Patient was discharged from this hospital yesterday after overnight CDU stay for issues with hyperglycemia and insulin pump.  He was evaluated by endocrinology who adjusted his pump and medications and he was discharged with improvement in his glucose control yesterday. He was also diagnosed with UTI and was discharged on cefdinir for UTI. Now returning due to persistent hyperglycemia throughout the day yesterday; states he has had readings in the 300s and 400s despite trying to reduce p.o. intake in an effort to avoid further hyperglycemia and taking additional lantus and humalog which was given to him on discharge yesterday. He agrees to have increase urinary frequency which is more evident for him as compared to before as he has h/o BPH. No fevers, chills, excessive thirst, nausea/vomiting or abdominal pain. UA was positive for nitrites, leukocyte esterase on day before yesterday but repeat UA today is negative     Hospital Course:  Patient was admitted after returning after CDU stay for issues with his insulin pump leading to hyperglycemia. Endocrinology team had evaluated patient, evaluated his pump and medications and he was discharged on 11/4/2023 when glucose was under control. He was found to have have UTI at that time and was also discharged on Cefdinir. Patient reported persistent hyperglycemia the next day which prompted his return to the hospital leading to re-evaluation by Endocrinology. Patient's insulin pump deemed to be malfunctioning, patient instructed not to use insulin pump at this time until further followup with CDE and his endocrinologist  Dr. Ngo. Patient was having episodes of hyperglycemia and labs showed that patient was in early DKA which was treated with short acting insulin every 4 hours as well as IV fluids. Patient's sugars better controlled and transitioned back to PO diet as well as insulin regiment as per endocrinology. On this admission, Cardiology team was consulted and discussed with Endocrinology that patient should not be started on SGLT-2 inhibitor at this time. Pt to discharge home on lantus, humalog, and low dose correction insulin scale. Patient needs to speak with CDE and endocrinologist as well as  outpatient prior to re using insulin pump.    Important Medication Changes and Reason:  -Insulin pump discontinued  -Started on Lantus and admelog     Active or Pending Issues Requiring Follow-up:  -Follow up with PCP outpatient  -Follow up with Cardiology Dr. Wahl   -Follow up with endocrine outpatient  -Routine follow up with podiatry, ophthalmology outpatient     Advanced Directives:   [X] Full code  [ ] DNR  [ ] Hospice    Discharge Diagnoses:  -Hyperglycemia/mild DKA         Discharge/Dispo/Med rec discussed with attending  ____. Patient medically cleared for discharge Home with outpatient follow up with PCP, endocrine, cardiology, podiatry, ophthalmology            HPI:  72Y M with PMH DM1 on insulin pump, HTN, atrial fibrillation, BPH presenting with hyperglycemia. Patient was discharged from this hospital yesterday after overnight CDU stay for issues with hyperglycemia and insulin pump.  He was evaluated by endocrinology who adjusted his pump and medications and he was discharged with improvement in his glucose control yesterday. He was also diagnosed with UTI and was discharged on cefdinir for UTI. Now returning due to persistent hyperglycemia throughout the day yesterday; states he has had readings in the 300s and 400s despite trying to reduce p.o. intake in an effort to avoid further hyperglycemia and taking additional lantus and humalog which was given to him on discharge yesterday. He agrees to have increase urinary frequency which is more evident for him as compared to before as he has h/o BPH. No fevers, chills, excessive thirst, nausea/vomiting or abdominal pain. UA was positive for nitrites, leukocyte esterase on day before yesterday but repeat UA today is negative     Hospital Course:  Patient was admitted after returning after CDU stay for issues with his insulin pump leading to hyperglycemia. Endocrinology team had evaluated patient, evaluated his pump and medications and he was discharged on 11/4/2023 when glucose was under control. He was found to have have UTI at that time and was also discharged on Cefdinir. Patient reported persistent hyperglycemia the next day which prompted his return to the hospital leading to re-evaluation by Endocrinology. Patient's insulin pump deemed to be malfunctioning, patient instructed not to use insulin pump at this time until further followup with CDE and his endocrinologist  Dr. Ngo. Patient was having episodes of hyperglycemia and labs showed that patient was in early DKA which was treated with short acting insulin every 4 hours as well as IV fluids. Patient's sugars better controlled and transitioned back to PO diet as well as insulin regiment as per endocrinology. On this admission, Cardiology team was consulted and discussed with Endocrinology that patient should not be started on SGLT-2 inhibitor at this time. Pt to discharge home on lantus, humalog, and low dose correction insulin scale. Patient needs to speak with CDE and endocrinologist as well as  outpatient prior to re using insulin pump.    Important Medication Changes and Reason:  -Insulin pump discontinued  -Started on Lantus and admelog     Active or Pending Issues Requiring Follow-up:  -Follow up with PCP outpatient  -Follow up with Cardiology Dr. Wahl   -Follow up with endocrine outpatient  -Routine follow up with podiatry, ophthalmology outpatient     Advanced Directives:   [X] Full code  [ ] DNR  [ ] Hospice    Discharge Diagnoses:  -Hyperglycemia/mild DKA         Discharge/Dispo/Med rec discussed with attending Dr. Burton. Patient medically cleared for discharge Home with outpatient follow up with PCP, endocrine, cardiology, podiatry, ophthalmology

## 2023-11-08 NOTE — PROVIDER CONTACT NOTE (OTHER) - RECOMMENDATIONS
Patient: Mayda Laura    eat something

## 2023-11-09 ENCOUNTER — TRANSCRIPTION ENCOUNTER (OUTPATIENT)
Age: 73
End: 2023-11-09

## 2023-11-09 VITALS
DIASTOLIC BLOOD PRESSURE: 86 MMHG | HEART RATE: 91 BPM | SYSTOLIC BLOOD PRESSURE: 138 MMHG | RESPIRATION RATE: 18 BRPM | OXYGEN SATURATION: 100 % | TEMPERATURE: 98 F

## 2023-11-09 LAB
GLUCOSE BLDC GLUCOMTR-MCNC: 134 MG/DL — HIGH (ref 70–99)
GLUCOSE BLDC GLUCOMTR-MCNC: 134 MG/DL — HIGH (ref 70–99)
GLUCOSE BLDC GLUCOMTR-MCNC: 138 MG/DL — HIGH (ref 70–99)
GLUCOSE BLDC GLUCOMTR-MCNC: 138 MG/DL — HIGH (ref 70–99)

## 2023-11-09 PROCEDURE — 83605 ASSAY OF LACTIC ACID: CPT

## 2023-11-09 PROCEDURE — 87637 SARSCOV2&INF A&B&RSV AMP PRB: CPT

## 2023-11-09 PROCEDURE — 82947 ASSAY GLUCOSE BLOOD QUANT: CPT

## 2023-11-09 PROCEDURE — G0378: CPT

## 2023-11-09 PROCEDURE — 83735 ASSAY OF MAGNESIUM: CPT

## 2023-11-09 PROCEDURE — 99285 EMERGENCY DEPT VISIT HI MDM: CPT | Mod: 25

## 2023-11-09 PROCEDURE — 82330 ASSAY OF CALCIUM: CPT

## 2023-11-09 PROCEDURE — 85014 HEMATOCRIT: CPT

## 2023-11-09 PROCEDURE — 96374 THER/PROPH/DIAG INJ IV PUSH: CPT

## 2023-11-09 PROCEDURE — 80053 COMPREHEN METABOLIC PANEL: CPT

## 2023-11-09 PROCEDURE — 84132 ASSAY OF SERUM POTASSIUM: CPT

## 2023-11-09 PROCEDURE — 87186 SC STD MICRODIL/AGAR DIL: CPT

## 2023-11-09 PROCEDURE — 82962 GLUCOSE BLOOD TEST: CPT

## 2023-11-09 PROCEDURE — 96361 HYDRATE IV INFUSION ADD-ON: CPT

## 2023-11-09 PROCEDURE — 93005 ELECTROCARDIOGRAM TRACING: CPT

## 2023-11-09 PROCEDURE — 82010 KETONE BODYS QUAN: CPT

## 2023-11-09 PROCEDURE — 82435 ASSAY OF BLOOD CHLORIDE: CPT

## 2023-11-09 PROCEDURE — 83036 HEMOGLOBIN GLYCOSYLATED A1C: CPT

## 2023-11-09 PROCEDURE — 80048 BASIC METABOLIC PNL TOTAL CA: CPT

## 2023-11-09 PROCEDURE — 81001 URINALYSIS AUTO W/SCOPE: CPT

## 2023-11-09 PROCEDURE — 85025 COMPLETE CBC W/AUTO DIFF WBC: CPT

## 2023-11-09 PROCEDURE — 83690 ASSAY OF LIPASE: CPT

## 2023-11-09 PROCEDURE — 36415 COLL VENOUS BLD VENIPUNCTURE: CPT

## 2023-11-09 PROCEDURE — 84295 ASSAY OF SERUM SODIUM: CPT

## 2023-11-09 PROCEDURE — 99232 SBSQ HOSP IP/OBS MODERATE 35: CPT | Mod: GC

## 2023-11-09 PROCEDURE — 87086 URINE CULTURE/COLONY COUNT: CPT

## 2023-11-09 PROCEDURE — 85027 COMPLETE CBC AUTOMATED: CPT

## 2023-11-09 PROCEDURE — 81003 URINALYSIS AUTO W/O SCOPE: CPT

## 2023-11-09 PROCEDURE — 99232 SBSQ HOSP IP/OBS MODERATE 35: CPT

## 2023-11-09 PROCEDURE — 71046 X-RAY EXAM CHEST 2 VIEWS: CPT

## 2023-11-09 PROCEDURE — 82803 BLOOD GASES ANY COMBINATION: CPT

## 2023-11-09 PROCEDURE — 84100 ASSAY OF PHOSPHORUS: CPT

## 2023-11-09 PROCEDURE — 87077 CULTURE AEROBIC IDENTIFY: CPT

## 2023-11-09 PROCEDURE — 85018 HEMOGLOBIN: CPT

## 2023-11-09 RX ORDER — PANTOPRAZOLE SODIUM 20 MG/1
1 TABLET, DELAYED RELEASE ORAL
Qty: 30 | Refills: 0
Start: 2023-11-09 | End: 2023-12-08

## 2023-11-09 RX ORDER — INSULIN LISPRO 100/ML
1 VIAL (ML) SUBCUTANEOUS
Qty: 1 | Refills: 0
Start: 2023-11-09 | End: 2023-12-08

## 2023-11-09 RX ORDER — INSULIN LISPRO 100/ML
6 VIAL (ML) SUBCUTANEOUS
Qty: 1 | Refills: 0
Start: 2023-11-09 | End: 2023-12-08

## 2023-11-09 RX ORDER — INSULIN LISPRO 100/ML
1 VIAL (ML) SUBCUTANEOUS
Qty: 0 | Refills: 0 | DISCHARGE
Start: 2023-11-09

## 2023-11-09 RX ORDER — INSULIN LISPRO 100/ML
0 VIAL (ML) SUBCUTANEOUS
Refills: 0 | DISCHARGE

## 2023-11-09 RX ORDER — INSULIN GLARGINE 100 [IU]/ML
0 INJECTION, SOLUTION SUBCUTANEOUS
Refills: 0 | DISCHARGE

## 2023-11-09 RX ORDER — INSULIN GLARGINE 100 [IU]/ML
22 INJECTION, SOLUTION SUBCUTANEOUS
Qty: 1 | Refills: 0
Start: 2023-11-09 | End: 2023-12-08

## 2023-11-09 RX ADMIN — INSULIN GLARGINE 22 UNIT(S): 100 INJECTION, SOLUTION SUBCUTANEOUS at 13:50

## 2023-11-09 RX ADMIN — HEPARIN SODIUM 5000 UNIT(S): 5000 INJECTION INTRAVENOUS; SUBCUTANEOUS at 05:49

## 2023-11-09 RX ADMIN — Medication 81 MILLIGRAM(S): at 13:49

## 2023-11-09 RX ADMIN — PANTOPRAZOLE SODIUM 40 MILLIGRAM(S): 20 TABLET, DELAYED RELEASE ORAL at 05:49

## 2023-11-09 RX ADMIN — Medication 40 MILLIGRAM(S): at 05:47

## 2023-11-09 RX ADMIN — LOSARTAN POTASSIUM 100 MILLIGRAM(S): 100 TABLET, FILM COATED ORAL at 05:47

## 2023-11-09 RX ADMIN — Medication 6 UNIT(S): at 13:48

## 2023-11-09 RX ADMIN — Medication 50 MILLIGRAM(S): at 05:47

## 2023-11-09 RX ADMIN — CEFTRIAXONE 100 MILLIGRAM(S): 500 INJECTION, POWDER, FOR SOLUTION INTRAMUSCULAR; INTRAVENOUS at 10:28

## 2023-11-09 RX ADMIN — Medication 6 UNIT(S): at 08:55

## 2023-11-09 RX ADMIN — HEPARIN SODIUM 5000 UNIT(S): 5000 INJECTION INTRAVENOUS; SUBCUTANEOUS at 13:49

## 2023-11-09 NOTE — PROGRESS NOTE ADULT - SUBJECTIVE AND OBJECTIVE BOX
ENDOCRINE FOLLOW UP     Chief Complaint: t1dm    History:     MEDICATIONS  (STANDING):  aspirin enteric coated 81 milliGRAM(s) Oral daily  atorvastatin 20 milliGRAM(s) Oral at bedtime  cefTRIAXone   IVPB 1000 milliGRAM(s) IV Intermittent every 24 hours  dextrose 5%. 1000 milliLiter(s) (50 mL/Hr) IV Continuous <Continuous>  dextrose 5%. 1000 milliLiter(s) (100 mL/Hr) IV Continuous <Continuous>  dextrose 50% Injectable 12.5 Gram(s) IV Push once  dextrose 50% Injectable 25 Gram(s) IV Push once  dextrose 50% Injectable 25 Gram(s) IV Push once  furosemide    Tablet 40 milliGRAM(s) Oral daily  glucagon  Injectable 1 milliGRAM(s) IntraMuscular once  heparin   Injectable 5000 Unit(s) SubCutaneous every 8 hours  insulin glargine Injectable (LANTUS) 22 Unit(s) SubCutaneous <User Schedule>  insulin lispro (ADMELOG) corrective regimen sliding scale   SubCutaneous three times a day before meals  insulin lispro Injectable (ADMELOG) 6 Unit(s) SubCutaneous three times a day before meals  losartan 100 milliGRAM(s) Oral daily  metoprolol tartrate 50 milliGRAM(s) Oral two times a day  pantoprazole    Tablet 40 milliGRAM(s) Oral before breakfast  sodium chloride 0.9%. 1000 milliLiter(s) (75 mL/Hr) IV Continuous <Continuous>  tamsulosin 0.4 milliGRAM(s) Oral at bedtime    MEDICATIONS  (PRN):  dextrose Oral Gel 15 Gram(s) Oral once PRN Blood Glucose LESS THAN 70 milliGRAM(s)/deciliter      Allergies    Neosporin (Rash)    Intolerances        ROS: All other systems reviewed and negative    PHYSICAL EXAM:  VITALS: T(C): 36.9 (11-09-23 @ 04:40)  T(F): 98.5 (11-09-23 @ 04:40), Max: 98.5 (11-08-23 @ 13:34)  HR: 82 (11-09-23 @ 04:40) (70 - 82)  BP: 137/80 (11-09-23 @ 04:40) (127/80 - 148/80)  RR:  (18 - 18)  SpO2:  (97% - 98%)  Wt(kg): --  GENERAL: NAD, resting comfortably   EYES: No proptosis,  anicteric  HEENT:  Atraumatic, Normocephalic, moist mucous membranes  RESPIRATORY: Nonlabored respirations on room air, normal rate/effort   CARDIOVASCULAR: Did not appear cyanotic, no lower extremity swelling visualized  GI: Soft, nontender, non distended  NEURO: Answering questions appropriately, moves all extremities spontaneously  PSYCH:  reactive affect, euthymic mood    POCT Blood Glucose.: 134 mg/dL (11-09-23 @ 08:28)  POCT Blood Glucose.: 185 mg/dL (11-08-23 @ 22:12)  POCT Blood Glucose.: 335 mg/dL (11-08-23 @ 17:47)  POCT Blood Glucose.: 335 mg/dL (11-08-23 @ 14:27)  POCT Blood Glucose.: 322 mg/dL (11-08-23 @ 13:04)  POCT Blood Glucose.: 128 mg/dL (11-08-23 @ 08:52)  POCT Blood Glucose.: 137 mg/dL (11-08-23 @ 01:17)  POCT Blood Glucose.: 325 mg/dL (11-07-23 @ 21:33)  POCT Blood Glucose.: 420 mg/dL (11-07-23 @ 17:38)  POCT Blood Glucose.: 446 mg/dL (11-07-23 @ 14:36)  POCT Blood Glucose.: 377 mg/dL (11-07-23 @ 11:55)  POCT Blood Glucose.: 208 mg/dL (11-07-23 @ 07:57)  POCT Blood Glucose.: 171 mg/dL (11-07-23 @ 00:23)  POCT Blood Glucose.: 246 mg/dL (11-06-23 @ 22:05)  POCT Blood Glucose.: 260 mg/dL (11-06-23 @ 17:34)  POCT Blood Glucose.: 177 mg/dL (11-06-23 @ 11:22)      11-07    139  |  100  |  20  ----------------------------<  310<H>  4.0   |  27  |  0.77    eGFR: 94    Ca    9.3      11-07        A1C with Estimated Average Glucose Result: 7.3 % (11-03-23 @ 19:15)       ENDOCRINE FOLLOW UP     Chief Complaint: t1dm    History:   Patient has no complaints, feels good. Reports will be getting omnipod 5.    MEDICATIONS  (STANDING):  aspirin enteric coated 81 milliGRAM(s) Oral daily  atorvastatin 20 milliGRAM(s) Oral at bedtime  cefTRIAXone   IVPB 1000 milliGRAM(s) IV Intermittent every 24 hours  dextrose 5%. 1000 milliLiter(s) (50 mL/Hr) IV Continuous <Continuous>  dextrose 5%. 1000 milliLiter(s) (100 mL/Hr) IV Continuous <Continuous>  dextrose 50% Injectable 12.5 Gram(s) IV Push once  dextrose 50% Injectable 25 Gram(s) IV Push once  dextrose 50% Injectable 25 Gram(s) IV Push once  furosemide    Tablet 40 milliGRAM(s) Oral daily  glucagon  Injectable 1 milliGRAM(s) IntraMuscular once  heparin   Injectable 5000 Unit(s) SubCutaneous every 8 hours  insulin glargine Injectable (LANTUS) 22 Unit(s) SubCutaneous <User Schedule>  insulin lispro (ADMELOG) corrective regimen sliding scale   SubCutaneous three times a day before meals  insulin lispro Injectable (ADMELOG) 6 Unit(s) SubCutaneous three times a day before meals  losartan 100 milliGRAM(s) Oral daily  metoprolol tartrate 50 milliGRAM(s) Oral two times a day  pantoprazole    Tablet 40 milliGRAM(s) Oral before breakfast  sodium chloride 0.9%. 1000 milliLiter(s) (75 mL/Hr) IV Continuous <Continuous>  tamsulosin 0.4 milliGRAM(s) Oral at bedtime    MEDICATIONS  (PRN):  dextrose Oral Gel 15 Gram(s) Oral once PRN Blood Glucose LESS THAN 70 milliGRAM(s)/deciliter      Allergies    Neosporin (Rash)    Intolerances        ROS: All other systems reviewed and negative    PHYSICAL EXAM:  VITALS: T(C): 36.9 (11-09-23 @ 04:40)  T(F): 98.5 (11-09-23 @ 04:40), Max: 98.5 (11-08-23 @ 13:34)  HR: 82 (11-09-23 @ 04:40) (70 - 82)  BP: 137/80 (11-09-23 @ 04:40) (127/80 - 148/80)  RR:  (18 - 18)  SpO2:  (97% - 98%)  Wt(kg): --  GENERAL: NAD, resting comfortably   EYES: No proptosis,  anicteric  HEENT:  Atraumatic, Normocephalic, moist mucous membranes  RESPIRATORY: Nonlabored respirations on room air, normal rate/effort   CARDIOVASCULAR: Did not appear cyanotic, no lower extremity swelling visualized  GI: did not appear distended  NEURO: Answering questions appropriately, moves all extremities spontaneously  PSYCH:  reactive affect, euthymic mood    POCT Blood Glucose.: 134 mg/dL (11-09-23 @ 08:28)  POCT Blood Glucose.: 185 mg/dL (11-08-23 @ 22:12)  POCT Blood Glucose.: 335 mg/dL (11-08-23 @ 17:47)  POCT Blood Glucose.: 335 mg/dL (11-08-23 @ 14:27)  POCT Blood Glucose.: 322 mg/dL (11-08-23 @ 13:04)  POCT Blood Glucose.: 128 mg/dL (11-08-23 @ 08:52)  POCT Blood Glucose.: 137 mg/dL (11-08-23 @ 01:17)  POCT Blood Glucose.: 325 mg/dL (11-07-23 @ 21:33)  POCT Blood Glucose.: 420 mg/dL (11-07-23 @ 17:38)  POCT Blood Glucose.: 446 mg/dL (11-07-23 @ 14:36)  POCT Blood Glucose.: 377 mg/dL (11-07-23 @ 11:55)  POCT Blood Glucose.: 208 mg/dL (11-07-23 @ 07:57)  POCT Blood Glucose.: 171 mg/dL (11-07-23 @ 00:23)  POCT Blood Glucose.: 246 mg/dL (11-06-23 @ 22:05)  POCT Blood Glucose.: 260 mg/dL (11-06-23 @ 17:34)  POCT Blood Glucose.: 177 mg/dL (11-06-23 @ 11:22)      11-07    139  |  100  |  20  ----------------------------<  310<H>  4.0   |  27  |  0.77    eGFR: 94    Ca    9.3      11-07        A1C with Estimated Average Glucose Result: 7.3 % (11-03-23 @ 19:15)

## 2023-11-09 NOTE — PROGRESS NOTE ADULT - PROBLEM SELECTOR PLAN 4
Patient is off Eliquis   Only on ASA  HR is controlled on this admission
Patient is off Eliquis   Only on ASA  HR is controlled on this admission.
Patient is off Eliquis   Only on ASA  HR is controlled on this admission.

## 2023-11-09 NOTE — PROGRESS NOTE ADULT - PROBLEM SELECTOR PLAN 3
Continue ASA  History of CABG x3.
Continue ASA.  History of CABG x3.
Continue ASA  History of CABG x3

## 2023-11-09 NOTE — DIETITIAN INITIAL EVALUATION ADULT - LITERATURE/VIDEOS GIVEN
1) Carbohydrate Counting for Diabetes  2) Plate Method for Diabetes   3) Heart Healthy Carbohydrate Counting

## 2023-11-09 NOTE — PROGRESS NOTE ADULT - ASSESSMENT
Patient seen and examined, agree with above assessment and plan as transcribed above.    - d/c when ok with endocrine    Alec Mitchell MD, [facility]  [facility] [phone]

## 2023-11-09 NOTE — DISCHARGE NOTE NURSING/CASE MANAGEMENT/SOCIAL WORK - MODE OF TRANSPORTATION
MED REFILL REQUEST      PT STATES SHE USES FLUTICASONE NASAL (2 SPRAYS ) DAILY      [facility]      PLEASE CLARIFY DIRECTIONS    
Wheelchair/Stroller

## 2023-11-09 NOTE — PROGRESS NOTE ADULT - PROBLEM SELECTOR PLAN 1
Came with hyperglycemia with BSL in 300's trended down to 107  Insulin pump on hold for now, per endocrinology  Will check BSL TIDAC and HS   Diabetic diet   Consulted endocrinology--> recommend that malfunction with Insulin pump  Will monitor bG until closer to 200s and discharge tomorrow if patient sugars are controlled.    Endocrinology of: Lantus 22 units every 24hr, humalog 6 units tid premeal,   low humalog correction scale (1 unit 150-200, 2 units 200-250, 3 units 250-300), low humalog bedtime correction scale (1 unit 250-300, 2 units 300-350, 3 units 350-400).   Patient needs to speak with CDE and endocrinologist as well as  outpatient prior to re using insulin pump.
Came with hyperglycemia with BSL in 300's trended down to 107  Insulin pump on hold for now, got 20 units of lantus overnight and is on 4 units of admelog TID with low dose CSI as per endocrinology  Will check BSL TIDAC and QHS   Diabetic diet   Consulted endocrinology--> recommend that malfunction with Insulin pump--> ordered BMP, VBG, BHB  - low admelog correction scale until BG <350, then can resume admelog 4 units tid premeal, low admelog scale with meals and separate low admelog scale at bedtime  - fingersticks q4h until BG <350, do not give admelog sooner than 4hr apart as at risk of insulin stacking and hypoglycemia.   Will monitor bG until closer to 200s and discharge tomorrow if patient is not in DKA with provided regiment by   Endocrinology of: Lantus 20 units every 24hr, humalog 4 units tid premeal,   low humalog correction scale (1 unit 150-200, 2 units 200-250, 3 units 250-300), low humalog bedtime correction scale (1 unit 250-300, 2 units 300-350, 3 units 350-400).   Patient needs to speak with CDE and endocrinologist as well as  outpatient prior to re using insulin pump.
Came with hyperglycemia with BSL in 300's trended down to 107  Insulin pump on hold for now, per endocrinology  Will check BSL TIDAC and QHS   Diabetic diet   Consulted endocrinology--> recommend that malfunction with Insulin pump  Will monitor bG until closer to 200s and discharge tomorrow if patient sugars are controlled.    Endocrinology of: Lantus 22 units every 24hr, humalog 6 units tid premeal,   low humalog correction scale (1 unit 150-200, 2 units 200-250, 3 units 250-300), low humalog bedtime correction scale (1 unit 250-300, 2 units 300-350, 3 units 350-400).   Patient needs to speak with CDE and endocrinologist as well as  outpatient prior to re using insulin pump

## 2023-11-09 NOTE — PROGRESS NOTE ADULT - TIME BILLING
Time-based billing (NON-critical care).     The necessity of the time spent during the encounter on this date of service was due to:     - Ordering, reviewing, and interpreting labs, testing, and imaging.  - Independently obtaining a review of systems and performing a physical exam  - Reviewing prior hospitalization and where necessary, outpatient records.  - Counselling and educating patient  regarding interpretation of aforementioned items and plan of care.

## 2023-11-09 NOTE — DIETITIAN INITIAL EVALUATION ADULT - REASON INDICATOR FOR ASSESSMENT
RD assessment warranted for: Consult for Nutrition Support Team - "Patient with hyperglycemia, faulty insulin pump. Seen by endocrinology. RD consult recommended by Endocrine for diabetic diet." Chart reviewed, events noted.  Source: medical record, patient.

## 2023-11-09 NOTE — DIETITIAN INITIAL EVALUATION ADULT - PERTINENT LABORATORY DATA
11-07    139  |  100  |  20  ----------------------------<  310<H>  4.0   |  27  |  0.77    Ca    9.3      07 Nov 2023 22:22    POCT Blood Glucose.: 134 mg/dL (11-09-23 @ 08:28)  A1C with Estimated Average Glucose Result: 7.3 % (11-03-23 @ 19:15)  A1C with Estimated Average Glucose Result: 8.2 % (03-22-23 @ 17:20)  A1C with Estimated Average Glucose Result: 7.7 % (03-16-23 @ 03:38)   11-07    139  |  100  |  20  ----------------------------<  310<H>  4.0   |  27  |  0.77    Ca    9.3      07 Nov 2023 22:22    POCT Blood Glucose.: 134 mg/dL (09 Nov 2023 08:28)  POCT Blood Glucose.: 185 mg/dL (08 Nov 2023 22:12)  POCT Blood Glucose.: 335 mg/dL (08 Nov 2023 17:47)  POCT Blood Glucose.: 335 mg/dL (08 Nov 2023 14:27)  POCT Blood Glucose.: 322 mg/dL (08 Nov 2023 13:04)    A1C with Estimated Average Glucose Result: 7.3 % (11-03-23 @ 19:15)  A1C with Estimated Average Glucose Result: 8.2 % (03-22-23 @ 17:20)  A1C with Estimated Average Glucose Result: 7.7 % (03-16-23 @ 03:38)

## 2023-11-09 NOTE — DIETITIAN INITIAL EVALUATION ADULT - PERTINENT MEDS FT
MEDICATIONS  (STANDING):  atorvastatin 20 milliGRAM(s) Oral at bedtime  cefTRIAXone   IVPB 1000 milliGRAM(s) IV Intermittent every 24 hours  furosemide    Tablet 40 milliGRAM(s) Oral daily  heparin   Injectable 5000 Unit(s) SubCutaneous every 8 hours  insulin glargine Injectable (LANTUS) 22 Unit(s) SubCutaneous <User Schedule>  insulin lispro (ADMELOG) corrective regimen sliding scale   SubCutaneous three times a day before meals  insulin lispro Injectable (ADMELOG) 6 Unit(s) SubCutaneous three times a day before meals  losartan 100 milliGRAM(s) Oral daily  metoprolol tartrate 50 milliGRAM(s) Oral two times a day  pantoprazole    Tablet 40 milliGRAM(s) Oral before breakfast  sodium chloride 0.9%. 1000 milliLiter(s) (75 mL/Hr) IV Continuous <Continuous>  tamsulosin 0.4 milliGRAM(s) Oral at bedtime     MEDICATIONS  (STANDING):  atorvastatin 20 milliGRAM(s) Oral at bedtime  cefTRIAXone   IVPB 1000 milliGRAM(s) IV Intermittent every 24 hours  furosemide    Tablet 40 milliGRAM(s) Oral daily  heparin   Injectable 5000 Unit(s) SubCutaneous every 8 hours  insulin glargine Injectable (LANTUS) 22 Unit(s) SubCutaneous <User Schedule>  insulin lispro (ADMELOG) corrective regimen sliding scale   SubCutaneous three times a day before meals  insulin lispro Injectable (ADMELOG) 6 Unit(s) SubCutaneous three times a day before meals  losartan 100 milliGRAM(s) Oral daily  metoprolol tartrate 50 milliGRAM(s) Oral two times a day  pantoprazole    Tablet 40 milliGRAM(s) Oral before breakfast  tamsulosin 0.4 milliGRAM(s) Oral at bedtime

## 2023-11-09 NOTE — DIETITIAN INITIAL EVALUATION ADULT - NSFNSGIIOFT_GEN_A_CORE
20NICU Progress Note  This is a term male infant born 2020  2:41 PM at Gestational Age: 39w1d now at age: 4 Wks    Patient Active Problem List    Diagnosis Date Noted   • Normal  (single liveborn) 2018     Priority: Medium   •  abstinence syndrome 2018     Priority: Low   • Maternal drug dependence, antepartum (CMS/HCC) 2018     Priority: Low     Methadone, heroin, cocaine       Interim:  Remains on morphine, clonidine, and split dose phenobarbital. Last wean . Scores 4-7 in past 24 hours.  Feeding well.     Abstinence Score:   Last 24 hours: Narcotic Abstinence Screening Score  Av.9  Min: 4   Min taken time: 18 1600  Max: 7   Max taken time: 07/10/18 0345  Last 48 hours: Narcotic Abstinence Screening Score  Av  Min: 4   Min taken time: 18 1600  Max: 11   Max taken time: 18 2100  Patient Vitals for the past 16 hrs:   Narcotic Abstinence Screening Score   18 1900 6   18 2130 7   07/10/18 0045 7   07/10/18 0345 7   07/10/18 0645 5     Objective:  Vital Last Value 24 Hour Range   Temperature 98.2 °F (36.8 °C) (07/10/18 0645) Temp  Min: 98.2 °F (36.8 °C)  Max: 98.8 °F (37.1 °C)   Pulse 146 (07/10/18 0645) Pulse  Min: 116  Max: 154   Respiratory 44 (07/10/18 0645) Resp  Min: 40  Max: 72   Non-Invasive Blood Pressure 63/32 (07/10/18 0345) BP  Min: 63/32  Max: 85/58   Mean Arterial Pressure 42 (07/10/18 0345) MAP (mmHg)  Min: 42  Max: 66     Vital Today Admitted   Weight 3600 g (18 1900) Weight: 2924 g (Filed from Delivery Summary) (06/10/18 1441)     Weight over the past 48 Hours:   Patient Vitals for the past 48 hrs:   Weight   18 0000 3530 g   18 1900 3600 g   Weight change: 70 g     RA    Last Apnea:   and intervention:  None recorded    Physical Exam:  Birthweight: 6 lb 7.1 oz (2924 g) with Weight change: 70 g 23% since birth  Gen: Asleep in volunteer's lap, arouses with exam, minimal fussiness  Skin: Pink, well  perfused, warm.  Non edematous, no jaundice.   HEENT: Anterior fontanelle is open, soft and flat. Eyes without discharge. No visible thrush   CV: Normal rate and rhythm, no murmur.  Brisk capillary refill.  Pulm: Equal, clear breath sounds bilaterally.  No retractions noted.   Abd: Soft, non-tender, non-distended.  Bowel sounds are active.   Ext: Equal, spontaneous movement of all extremities.   Neuro: Hypertonic, no tremors, settles easily after exam      Fluids:    Date 18 - 07/10/18 0659 07/10/18 0700 - 18 0659   Shift 4921-7371 1327-5380 2399-3128 24 Hour Total 9064-1186 7355-9082 3108-2580 24 Hour Total   I  N  T  A  K  E   P.O.  (mL/kg) 175  (49.57) 250  (69.45) 240  (66.67) 665  (184.73)        Shift Total  (mL/kg) 175  (49.57) 250  (69.45) 240  (66.67) 665  (184.73)       O  U  T  P  U  T   Shift Total           Weight (kg) 3.53 3.6 3.6 3.6 3.6 3.6 3.6 3.6      Po ad marnie feedings Similac Sensitive    Residuals over last 24 hours: No Data Recorded  Residuals over last 48 hours: No Data Recorded  Labs:   No results found for this or any previous visit (from the past 24 hour(s)).    Medications:  Current Facility-Administered Medications   Medication Dose Route Frequency Provider Last Rate Last Dose   • morphine (1:5)  oral solution 0.192 mg  0.192 mg Oral Q2H PRN Terence Dial DO   0.192 mg at 07/10/18 0646   • cloNIDine (compounded)  oral suspension 6.9 mcg  2 mcg/kg Oral Q6H Malorie Marshall MD   6.9 mcg at 07/10/18 0346   • PHENobarbital  oral elixir 8.8 mg  2.5 mg/kg Oral Q12H Malorie Marshall MD   8.8 mg at 07/10/18 0057   • hepatitis B (ENGERIX-B) 10 MCG/0.5ML vaccine 10 mcg  0.5 mL Intramuscular Once James Winkler MD         Impression: Term male infant at 39 1/7 weeks, now corrected to 43w 3d - ANISH - methadone, as well as cocaine, heroin.  Also maternal HepC.  Mom spokes 1 ppd cigarettes.     Plan:  N:  Peak dose of morphine was 0.3mg (should wean in  increments of 0.03mg for future weans if possible). Wean morphine again today. Continue clonidine and phenobarbital (last weight adjusted 7/8).   Follow ANISH scoring, ok to be higher as now over 1 month of age  Resp: Follow in RA  CV: Follow HR/BP  FEN: PO ad marnie - follow stooling pattern and weight gain.  ID: Will need output HepC testing. Stop nystatin today    Attempted to call mom 7/10- voicemail left asking for mom to call back for an update.    I saw and examined Jefry Angeles on 2018 at 10:31 AM, and patient requires: NICU Intensive Care: Continuous monitoring of VS   Patient reports no nausea/vomiting; no diarrhea or constipation; last BM    ; bowel regimen: none

## 2023-11-09 NOTE — DIETITIAN INITIAL EVALUATION ADULT - OTHER INFO
Patient reports UBW 198lb, reports weight loss since admit, reports he weighed 198lb PTA, reports since his previous open heart surgery he has been weighing himself everyday. Patient reports he has always had a slim figure.  Dosing weight: 180.7lb (11/7, bed).  RD obtained bed weight at time of visit: 183.5lb (11/9).  IBW: 190lb, %IBW: 97% based on RD obtained weight.  Weight history per [facility]: 192lb (10/29/23), 191lb (8/22/23), 191lb (7/12/23), 195lb (6/2/23), 192lb (5/5/23), 186lb (4/14/23), 199.9lb (3/18/23), 205lb (7/11/22).   Weight appears downtrending, not clinically significant for malnutrition at this time. RD to continue to monitor weight trends as available/able.     -Ordered for IV antibiotics and lasix; s/p IVF.  -Current in-house insulin regimen: lantus 22 units daily, admelog 6 units pre-meal, Correctional sliding scale insulin.

## 2023-11-09 NOTE — DIETITIAN INITIAL EVALUATION ADULT - NSFNSADHERENCEPTAFT_GEN_A_CORE
Patient with hx DM, A1C 7.3% from 11/3/23. Patient reports blood glucose levels are checked multiple times per day at home and he utilizes an insulin pump. Per Endocrine notes: "Home Regimen: tslim x2 insulin pump with novolog insulin and dexcom g6."

## 2023-11-09 NOTE — PROGRESS NOTE ADULT - SUBJECTIVE AND OBJECTIVE BOX
C A R D I O L O G Y  **********************************     DATE OF SERVICE: 11-09-23    Patient denies chest pain or shortness of breath.   Review of symptoms otherwise negative.    MEDICATIONS:  aspirin enteric coated 81 milliGRAM(s) Oral daily  atorvastatin 20 milliGRAM(s) Oral at bedtime  cefTRIAXone   IVPB 1000 milliGRAM(s) IV Intermittent every 24 hours  dextrose 5%. 1000 milliLiter(s) IV Continuous <Continuous>  dextrose 5%. 1000 milliLiter(s) IV Continuous <Continuous>  dextrose 50% Injectable 25 Gram(s) IV Push once  dextrose 50% Injectable 12.5 Gram(s) IV Push once  dextrose 50% Injectable 25 Gram(s) IV Push once  dextrose Oral Gel 15 Gram(s) Oral once PRN  furosemide    Tablet 40 milliGRAM(s) Oral daily  glucagon  Injectable 1 milliGRAM(s) IntraMuscular once  heparin   Injectable 5000 Unit(s) SubCutaneous every 8 hours  insulin glargine Injectable (LANTUS) 22 Unit(s) SubCutaneous <User Schedule>  insulin lispro (ADMELOG) corrective regimen sliding scale   SubCutaneous three times a day before meals  insulin lispro Injectable (ADMELOG) 6 Unit(s) SubCutaneous three times a day before meals  losartan 100 milliGRAM(s) Oral daily  metoprolol tartrate 50 milliGRAM(s) Oral two times a day  pantoprazole    Tablet 40 milliGRAM(s) Oral before breakfast  sodium chloride 0.9%. 1000 milliLiter(s) IV Continuous <Continuous>  tamsulosin 0.4 milliGRAM(s) Oral at bedtime      LABS:      Hemoglobin: 10.4 g/dL (11-05 @ 22:22)      11-07    139  |  100  |  20  ----------------------------<  310<H>  4.0   |  27  |  0.77    Ca    9.3      07 Nov 2023 22:22      Creatinine Trend: 0.77<--, 1.06<--, 0.74<--, 0.64<--, 0.74<--, 0.75<--    COAGS:           PHYSICAL EXAM:  T(C): 36.9 (11-09-23 @ 13:31), Max: 36.9 (11-08-23 @ 21:52)  HR: 91 (11-09-23 @ 13:31) (76 - 91)  BP: 138/86 (11-09-23 @ 13:31) (131/74 - 148/80)  RR: 18 (11-09-23 @ 13:31) (18 - 18)  SpO2: 100% (11-09-23 @ 13:31) (97% - 100%)  Wt(kg): --    I&O's Summary    08 Nov 2023 07:01  -  09 Nov 2023 07:00  --------------------------------------------------------  IN: 125 mL / OUT: 1625 mL / NET: -1500 mL          Gen: NAD  HEENT:  (-)icterus (-)pallor  CV: N S1 S2 1/6 RONI (+)2 Pulses B/l  Resp:  Clear to auscultation B/L, normal effort  GI: (+) BS Soft, NT, ND  Lymph:  (-)Edema, (-)obvious lymphadenopathy  Skin: Warm to touch, Normal turgor  Psych: Appropriate mood and affect      TELEMETRY: None	      ASSESSMENT/PLAN: Patient is a 73 y/o male well known to our office with PMH of CAD s/p CABGx3 (3/2023), HFrEF s/p [employer] dual chamber ICD (9/2023), Type 1 DM on insulin pump, HTN, vertigo, and BPH who is admitted with hyperglycemia. Cardiology consulted for management of CAD/CHF.    #Hyperglycemia  - Management per endocrine    #UTI  - Abx per med    #CAD s/p prior CABG  - Continue ASA 81mg daily and Lipitor    #Chronic HFrEF s/p recent ICD  - Appears well compensated from CHF perspective  - Continue PO lasix  - Continue Losartan and Metoprolol  - No plans for SGLT2 inhibitor at this time given recent DKA per endocrine    - No inpatient cardiac w/u planned  - D/C planning per primary team  - Patient to f/u with Dr. Wahl after discharge    ELVIRA VincentC  Pager: [phone]

## 2023-11-09 NOTE — PROGRESS NOTE ADULT - ASSESSMENT
72Y M with PMH DM1 on insulin pump, HTN, atrial fibrillation, BPH presenting with hyperglycemia. Patient was discharged from this hospital yesterday after overnight CDU stay for issues with hyperglycemia and insulin pump.  He was evaluated by endocrinology who adjusted his pump and medications and he was discharged with improvement in his glucose control yesterday. He was also diagnosed with UTI and was discharged on cefdinir for UTI. Now returning due to persistent hyperglycemia throughout the day. Patient with concern for malfunctioning insulin pump--> early DKA now  resolved. Currently on 22U Lantus, 6 U Admelog per Endocrine. Plan for DC

## 2023-11-09 NOTE — PROGRESS NOTE ADULT - SUBJECTIVE AND OBJECTIVE BOX
[facility] Division of Hospital Medicine  Edward Burton DO  Pager (M-F, 8A-5P):  MS Teams PREFERRED        SUBJECTIVE / OVERNIGHT EVENTS:  Patient seen at bedside, no acute distress. Requests regarding when to be discharged.     MEDICATIONS  (STANDING):  aspirin enteric coated 81 milliGRAM(s) Oral daily  atorvastatin 20 milliGRAM(s) Oral at bedtime  cefTRIAXone   IVPB 1000 milliGRAM(s) IV Intermittent every 24 hours  dextrose 5%. 1000 milliLiter(s) (50 mL/Hr) IV Continuous <Continuous>  dextrose 5%. 1000 milliLiter(s) (100 mL/Hr) IV Continuous <Continuous>  dextrose 50% Injectable 25 Gram(s) IV Push once  dextrose 50% Injectable 12.5 Gram(s) IV Push once  dextrose 50% Injectable 25 Gram(s) IV Push once  furosemide    Tablet 40 milliGRAM(s) Oral daily  glucagon  Injectable 1 milliGRAM(s) IntraMuscular once  heparin   Injectable 5000 Unit(s) SubCutaneous every 8 hours  insulin glargine Injectable (LANTUS) 22 Unit(s) SubCutaneous <User Schedule>  insulin lispro (ADMELOG) corrective regimen sliding scale   SubCutaneous three times a day before meals  insulin lispro Injectable (ADMELOG) 6 Unit(s) SubCutaneous three times a day before meals  losartan 100 milliGRAM(s) Oral daily  metoprolol tartrate 50 milliGRAM(s) Oral two times a day  pantoprazole    Tablet 40 milliGRAM(s) Oral before breakfast  sodium chloride 0.9%. 1000 milliLiter(s) (75 mL/Hr) IV Continuous <Continuous>  tamsulosin 0.4 milliGRAM(s) Oral at bedtime    MEDICATIONS  (PRN):  dextrose Oral Gel 15 Gram(s) Oral once PRN Blood Glucose LESS THAN 70 milliGRAM(s)/deciliter      I&O's Summary    08 Nov 2023 07:01  -  09 Nov 2023 07:00  --------------------------------------------------------  IN: 125 mL / OUT: 1625 mL / NET: -1500 mL        PHYSICAL EXAM:  Vital Signs Last 24 Hrs  T(C): 36.9 (09 Nov 2023 04:40), Max: 36.9 (08 Nov 2023 13:34)  T(F): 98.5 (09 Nov 2023 04:40), Max: 98.5 (08 Nov 2023 13:34)  HR: 82 (09 Nov 2023 04:40) (70 - 82)  BP: 137/80 (09 Nov 2023 04:40) (127/80 - 148/80)  BP(mean): --  RR: 18 (09 Nov 2023 04:40) (18 - 18)  SpO2: 98% (09 Nov 2023 04:40) (97% - 98%)    Parameters below as of 09 Nov 2023 04:40  Patient On (Oxygen Delivery Method): room air      CONSTITUTIONAL: Well-groomed, in no apparent distress  EYES: No conjunctival or scleral injection, non-icteric; PERRLA and symmetric  ENMT: No external nasal lesions; nasal mucosa not inflamed; normal dentition; no pharyngeal injection or exudates, oral mucosa with moist membranes  RESPIRATORY: Breathing comfortably; lungs CTA without wheeze/rhonchi/rales  CARDIOVASCULAR: +S1S2, RRR, no M/G/R; no carotid bruits; pedal pulses full and symmetric; no lower extremity edema  GASTROINTESTINAL: No palpable masses or tenderness, +BS throughout, no rebound/guarding; no hepatosplenomegaly; no hernia palpated  LYMPHATIC: No cervical LAD or tenderness; no axillary LAD or tenderness; no inguinal LAD or tenderness  MUSCULOSKELETAL: Normal gait and station; no digital clubbing or cyanosis; no paraspinal tenderness; no malalignment of extremities  SKIN: No rashes or ulcers noted; no subcutaneous nodules or induration palpable  NEUROLOGIC: CN grossly intact; sensation intact in LEs b/l to light touch; normal strength and tone  PSYCHIATRIC: A+O x 3; mood and affect appropriate; appropriate insight and judgment    LABS:    11-07    139  |  100  |  20  ----------------------------<  310<H>  4.0   |  27  |  0.77    Ca    9.3      07 Nov 2023 22:22            Urinalysis Basic - ( 07 Nov 2023 22:22 )    Color: x / Appearance: x / SG: x / pH: x  Gluc: 310 mg/dL / Ketone: x  / Bili: x / Urobili: x   Blood: x / Protein: x / Nitrite: x   Leuk Esterase: x / RBC: x / WBC x   Sq Epi: x / Non Sq Epi: x / Bacteria: x          RADIOLOGY & ADDITIONAL TESTS:  Results Reviewed:   Imaging Personally Reviewed:  Electrocardiogram Personally Reviewed:    COORDINATION OF CARE:  Care Discussed with Consultants/Other Providers [Y/N]: Y  Prior or Outpatient Records Reviewed [Y/N]: Y

## 2023-11-09 NOTE — DIETITIAN INITIAL EVALUATION ADULT - REASON FOR ADMISSION
Hyperglycemia    Per chart: "72Y M with PMH DM1 on insulin pump, HTN, atrial fibrillation, BPH presenting with hyperglycemia. Patient was discharged from this hospital yesterday after overnight CDU stay for issues with hyperglycemia and insulin pump.  He was evaluated by endocrinology who adjusted his pump and medications and he was discharged with improvement in his glucose control yesterday. He was also diagnosed with UTI and was discharged on cefdinir for UTI. Now returning due to persistent hyperglycemia throughout the day. Patient with concern for malfunctioning insulin pump--> early DKA now  resolved."

## 2023-11-09 NOTE — PROGRESS NOTE ADULT - PROBLEM SELECTOR PROBLEM 4
HTN (hypertension)
Paroxysmal atrial fibrillation

## 2023-11-09 NOTE — PROGRESS NOTE ADULT - PROBLEM SELECTOR PLAN 2
Previous admission positive for UTI, Now UA negative   was discharged on cefdinir BID   ceftriaxone ordered while inpatient.
Previous admission positive for UTI, Now UA negative   was discharged on cefdinir BID   ceftriaxone ordered while inpatient
Previous admission positive for UTI, Now UA negative   was discharged on cefdinir BID   ceftriaxone ordered while inpatient   will follow up with urine culture.

## 2023-11-09 NOTE — DIETITIAN INITIAL EVALUATION ADULT - PERSON TAUGHT/METHOD
Discussed nutrition education for Type 1 DM: consuming consistent meals everyday, reviewed foods containing CHO, portion sizes of CHO, CHO counting, pairing CHO with proteins, reading food labels, monitoring intake of concentrated sweets/drinks, what to do when blood glucose levels are low, and encouraged patient to continue with monitoring blood glucose levels and staying compliant with prescribed insulin regimen.  Also briefly discussed limiting saturated fat + sodium intake for a heart healthy diet. Pt made aware RD to remain available for any questions./verbal instruction/written material/patient instructed

## 2023-11-09 NOTE — DISCHARGE NOTE NURSING/CASE MANAGEMENT/SOCIAL WORK - NSDCFUADDAPPT_GEN_ALL_CORE_FT
APPTS ARE READY TO BE MADE: [X] YES    Best Family or Patient Contact (if needed):    Additional Information about above appointments (if needed):    1: Follow up with PCP Dr. Paula outpatient   2: Follow up with cardiology Dr. Wahl outpatient   3: Follow up with Endocrine Dr. Ngo outpatient   4: Routine follow up with podiatry, ophthalmology outpatient     Other comments or requests:

## 2023-11-09 NOTE — DIETITIAN INITIAL EVALUATION ADULT - ORAL INTAKE PTA/DIET HISTORY
Patient reports good, normal appetite and PO intake at home PTA. Has been counting carbohydrates at home PTA in setting of recent open heart surgery in March of this year, also reports he has been monitoring sodium intake. Confirms no known food allergies.

## 2023-11-09 NOTE — PROGRESS NOTE ADULT - REASON FOR ADMISSION
High blood sugar

## 2023-11-09 NOTE — PROGRESS NOTE ADULT - PROBLEM SELECTOR PLAN 5
DVT PPx: On  heparin subq    Diet: Carb controlled
DVT PPx: On  heparin subq    Diet: Carb controlled.
DVT PPx: On  heparin subq    Diet: Carb controlled.

## 2023-11-09 NOTE — PROGRESS NOTE ADULT - PROBLEM/PLAN-3
Hepatology Progress Note    Date: 2/14/2021    Subjective: no acute complaints, stools normal in color, loose in consistency. Denies fever, chills, shortness of breath, chest pain, nausea, vomiting, abdominal pain or lower extremity edema.    HPI:  Christie Gonzalez is a 65 year old female with a history of EtOH cirrhosis and HCC S/P bland embolization 5/9/19 & right hepatic mass MW ablation 7/16/19. Patient's cirrhosis is complicated by portal HTN, EV, ascites, volume overload, and hepatic encephalopathy. Patient listed for transplant.     PMHx further significant for anxiety, DM II, HLD, and HTN.      Patient presents to [facility] ED on 1/11/21 from home with complaints of dizziness x 1 week and weakness. She was unable to walk down her stairs this morning. Labs in ED noted elevated ammonia, elevated SCr, and exam was remarkable for AMS and volume overload. Patient was pan cultured and admitted for further care. Pan-culture remained negative. Patient admitted to lactulose non-compliance. Mentation returned to baseline with good BMs. Patient with lasix infusion x 1 day and discharged on Bumex and Aldactone. Concern for non-compliance with medications as Lasix had not been filled since 10/2020. Patient to work on support plan for pre-transplant to assist with medication and encephalopathy. Outpatient social work appointment in place. Patient discharged in good condition 1/15/20.      Patient presented to [facility] ED 2/7/21 due to altered mentation. Patient's sister noticed that she seemed confused over the phone and called EMS. Patient had hit her head on her bed and had a laceration on the bridge of her nose. CT in the ED without acute findings. Patient admitted with further evaluation and management.   At time of admission patient reports feeling well. Patient states that she feels her mentation is at its baseline. Patient confused regarding month and year. Patient states that she was taking her lactulose as prescribed, 
DISPLAY PLAN FREE TEXT
however she was not always having 2-3 BMs/day.  She denies fevers, chills, cough, shortness of breath, abdominal pain, urinary frequency and dysuria.    2/9/21: Concern for dementia per OT. Not safe to go home. Neuropsych consulted.   2/11/21: Patient had unresponsive episode while standing, leaning to R. STAT team called. Neurology consulted, obtain repeat CT head.  Ongoing anemia, GI consulted. Zosyn started for > 100,000 enterococcus.   2/12/21: Hgb 6.6, 1U PRBCs. EGD.     Patient Active Problem List    Diagnosis Date Noted   • Dizziness      Priority: Low   • Coronary artery disease involving native heart without angina pectoris      Priority: Low   • Coagulopathy (CMS/[facility]) 04/09/2019     Priority: Low   • Elevated INR 04/09/2019     Priority: Low   • Elevated LFTs 04/09/2019     Priority: Low   • Pyelonephritis, acute 04/09/2019     Priority: Low   • Thrombocytopenia (CMS/[facility]) 04/09/2019     Priority: Low   • Tobacco use 04/09/2019     Priority: Low   • Alcoholic cirrhosis (CMS/[facility]) 02/13/2019     Priority: Low   • Annual physical exam 06/09/2014     Priority: Low   • Hyperglycemia 06/09/2014     Priority: Low   • Hemangioma of lip 06/09/2014     Priority: Low   • HTN (hypertension) 06/09/2014     Priority: Low   • Obesity 06/09/2014     Priority: Low   • Allergic rhinitis 01/03/2012     Priority: Low   • Asthma 01/03/2012     Priority: Low   • Recurrent sinusitis 01/03/2012     Priority: Low   • Anxiety disorder 01/03/2012     Priority: Low       Objective:     Vitals:    02/14/21 1216   BP: 111/69   Pulse: 83   Resp: 16   Temp: 97.9 °F (36.6 °C)     Physical Exam  General- Awake/alert in NAD. Calm/cooperative.   Skin- Warm and dry to touch. No jaundice. Abrasion to bridge of nose.   Eyes- No scleral icterus. EOMs intact.   CV- S1S2, RRR.  Pulmonary- Clear breath sounds to auscultation. On RA. Normal Respiratory Effort.  Abdomen- Round, soft, non-tender, and non-distended with palpation. No appreciable 
hepatosplenomegaly.  Extremities- No erythema. No edema.   Neuro-  No focal deficits. Strength and sensation grossly intact. Speech clear. No asterixis.  Psych- Alert and oriented to person and place, but not time. Mood and affect appropriate.     Diagnostics/Imaging: Reviewed    Assessment/Plan  1. EtOH Cirrhosis. Native MELD-Na 25.  -- Last EtOH drink in 12/2018, cessation reinforced. Last EtOH 12/2020 per EMR.  -- Patient listed inactive for OLT.   - Concern for non-compliance with medications. Also concern for social support. Patient was to meet with social work as outpatient to work on support plan.  - Selection Committee discussion 2/11/21, make inactive, ongoing Psych eval.   2. HCC.   -- S/P Ceiba embolization (5/9/19).  -- S/P R hepatic mass MW ablation (7/16/19 & 9/8/20).   -- AFP (9/13/19) 13.   -- S/P TACE (10/30).  -- 4 phase CT 12/18/20 with Segment 7 with LR5T lesion with residual. Segment 4a with 6 mm LR3 lesion, not LR4 as previously reported. Segment 4a with a 6 mm hyperdense LR3 lesion without washout.    -- Lipiodol angiogram 1/18/21; ablation 1/19/21. Repeat CT in 1 month.  3. Portal HTN / Esophageal Varices.  -- EGD (7/22/19) noted small EV with no signs of recent bleed.   -- No s/sx overt bleeding. Monitor HGB/coags.   -- Transfused 1 unit PRBC 2/12. Transfuse PRN for Hgb < 7  -- EGD 2/12 with gastritis  -- Protonix and Octreotide. Antibiotics as below.   4. Portal HTN/ Volume Overload.  -- PTA Aldactone 100 mg and Bumex 0.5mg BID held.   -- Nephrology consulted for volume management.  -- Paracentesis 1/19, Cell count negative. Repeat PRN.  -- 2 g Na diet.   5. AMS, possible dementia   -- Major cognitive disorder with near global cognitive impairment per Neuropsych (see note 2/12/21)   -- Patient was NOT encephalopathic at time of consult.   -- Patient to be placed in nursing home on discharge  6. AMS. Hepatic Encephalopathy. Grade 1.  -- HE is chronic, secondary to cirrhosis, and without 
hepatic coma.  -- Continue Lactulose, goal 3-4 BM/24 hours and Rifaximin.  - h/o multiple hospitalizations due to lactulose non-compliance.    7. UC 2/9/21 with > 100,000 Enterococcus Faecalis. Zosyn  8. DM II. On Tradjenta  9. HTN and CAD s/p stent placement 7/25/19. Continue on Plavix / bASA and Lipitor.  10. Chronic Hypotension. Midodrine 10 mg TID.  12. Pulmonary nodules and lymphadenopathy (noted on CT obtained 7/16/19).              -- Patient evaluated by Pulmonology, recommended F/U CT in 1 year                         - CT 8/24/20 with unchanged nodules.              -- Tobacco cessation reinforced.   12. DOREEN, resolved.   -- On SPA.   -- Diuretics held as above.    -- Nephrology following.  13. Concern for protein calorie malnutrition and Debility   -- RD following.    -- Prealbumin 5.0 2/9/21   -- PT/OT  14. Prophylaxis. SCDs and TEDs  15. Disposition. SW following, plans for discharge to nursing home once anemia resolved.     D/W patient     Chauncey Humphrey PA-C  Abdominal Transplant & Hepatology  Pager [phone]  2/14/2021    After 3:00 PM please call the answering service and page the JACOB on call for the service.      I have seen and examined the patient and I agree with the above mentioned assessment and plan.     65-year-old female with history of HCC status post local regional treatment and decompensated alcoholic cirrhosis hospitalized with acute hepatic encephalopathy     Subjective-continues to feel better.  No new complaints        Assessment and plan        1. Decompensated alcoholic cirrhosis-she has undergone liver transplant evaluation and listed  active .  She appears to have poor social support.    Will change status to inactive.Discussed at the evaluation committee meeting on 2/11 she will need placement and observation before activating her      2. Hepatic encephalopathy-grade 1-titrate lactulose to achieve 3-4 bowel movements per day and continue Xifaxan. Appreciate Neuropsych 
recommendations     3. HCC status post microwave ablation-post ablation .  Due for repeat CT in one  weeks     4.Anemia- secondary to bleeding duodenal ulcers-Appreciate GI recommendations.Monitor Hemoglobin, continue  PPI  .. Will check with Cardiology about conitnuation of dual antiplatelet therapy     5.Severe Protein Calorie Malnutrition- Appreciate dietician recommendations        Valdemar Raymond MD., [facility]  Transplant Hepatology  Pager:  [phone]  
DISPLAY PLAN FREE TEXT
DISPLAY PLAN FREE TEXT

## 2023-11-09 NOTE — DIETITIAN INITIAL EVALUATION ADULT - NS FNS DIET ORDER
Diet, Consistent Carbohydrate/No Snacks (11-08-23 @ 02:32) [Active]       Diet, Consistent Carbohydrate/No Snacks (11-08-23 @ 02:32) [Active]    (+ Diet Mighty Shakes 2x/day)

## 2023-11-09 NOTE — DIETITIAN INITIAL EVALUATION ADULT - ENERGY INTAKE
Patient reports good, unchanged appetite and PO intake since admit. Patient reports he was feeling thirstier than normal. Adequate (%)

## 2023-11-09 NOTE — PROGRESS NOTE ADULT - ATTENDING COMMENTS
Agree with assessment and plan as above by Dr. Cash. Reviewed all pertinent labs, glucose values, and imaging studies. Modifications made as indicated above. Pt. now on basal bolus with improved glycemic control. Would recommend d/c on basal bolus at current doses and can follow-up with endocrinologist for initiation of omnipod 5 which may work out better for him than the Tandem which continues to result in hyperglycemia and hospitalizations.     Faustino Yao D.O  [phone]
Patient is a 72-year-old male with history of type 1 diabetes, on insulin pump, tandem with control IQ algorithm, follows up at the [facility] for diabetes care, here for mild diabetic ketoacidosis in the setting of insulin pump malfunctioning.  For now recommend to continue with Lantus 22 units daily, Admelog 6 units 3 times daily with meals and low-dose sliding scale before every meal and at bedtime.  Patient will hold off on starting insulin pump until he gets a new OmniPod 5 pump.  Patient expressed frustration with tandem insulin pump company.  Regarding hyperlipidemia, continue with atorvastatin 80 mg once daily.  Regarding hypertension, management as per primary team.
Agree with Dr. Cash's assessment and plan as outlined above. Reviewed all pertinent labs, and imaging studies. Modifications made as indicated above. 72 M with T1D, HTN, BPH presented with hyperglycemia likely secondary to pump malfunction. Patient was doing well and glucose at goal, until he transitioned back to his pump last night. Glucose now 300-400. Please order STAT bmp, BHB, vbg to evaluate for DKA. Give lantus dose now and keep patient on LDSS Q4H until glucose >250. Then resume admelog 4 units TIDAC. Would discharge the patient on basal/bolus and he needs pump troubleshoot outpatient to evaluate for etiology of malfunction. Rest of the plan as above.

## 2023-11-09 NOTE — DIETITIAN INITIAL EVALUATION ADULT - ADD RECOMMEND
-Continue with Consistent Carbohydrate diet, continue with Diet Mighty Shake (contains 400kcal, 14 Gm protein). Honor food preferences as available/able.  -Monitor PO intake, diet, weight, labs, skin, GI symptoms, and BM regularity.  -RD remains available upon request and will follow up per protocol.

## 2023-11-09 NOTE — PROGRESS NOTE ADULT - ASSESSMENT
72Y M with PMH DM1 on insulin pump, HTN, atrial fibrillation, BPH presenting with hyperglycemia. Endocrinology consulted for management of T1DM.    Poorly controlled T1DM with hyperglycemia  Insulin pump  - HbA1c: 7.3  - Endocrinologist: Dr. Ngo  - Home Regimen:   tslim x2 insulin pump with novolog insulin and dexcom g6  basal  12a 0.75  3a 1.187  11a 1.19  5p 1.18  8p 1.1  TDD 27 units  ISF  12a 1:50  ICR  12a 1:15  target 110  duration 5hr  - suspect pump malfunction, patient continues to by hyperglycemic despite using pump, bolusing appropritely, unsure if bad batch of insulin or issue with pump supplies  PLAN  - start lantus 22 units daily at 2:30pm  - continue admelog 6 units tid premeal, low admelog scale with meals and separate low admelog scale at bedtime  - can do 2 am bedtime equivalent low admelog correction scale to ensure BG improving  - fingersticks before meals, bedtime and 2am  - Goal -180  - Carbohydrate consistent diet  - RD consult  - hypoglycemia protocol prn  Discharge plan:  - Discharge medications: Lantus 22 units every 24hr, humalog 6 units tid premeal, low humalog correction scale (1 unit 150-200, 2 units 200-250, 3 units 250-300), low humalog bedtime correction scale (1 unit 250-300, 2 units 300-350, 3 units 350-400). Patient needs to speak with CDE and endocrinologist as well as  outpatient prior to re using insulin pump. Would not utilize an SGLT-2 inhibitor given recent DKA and T1DM.  - Recommend routine outpatient ophthalmology, podiatry and endocrinology f/u    HTN  - Home regimen: lasix, losartan  PLAN  - Can check urine microalbumin outpatient  - Outpatient goal BP <130/80. Management per primary team.    HLD  - Home regimen: atorvastatin 80mg daily  PLAN  - Continue atorvastatin 20mg daily per primary team  - Can check lipid profile if not done recently    Discussed with primary team.    Obey Cash MD, Endocrinology Fellow  Pager [phone] from 9am to 5pm. After hours and on weekends, please call [phone].   72Y M with PMH DM1 on insulin pump, HTN, atrial fibrillation, BPH presenting with hyperglycemia. Endocrinology consulted for management of T1DM.    Poorly controlled T1DM with hyperglycemia  Insulin pump  - HbA1c: 7.3  - Endocrinologist: Dr. Ngo  - Home Regimen:   tslim x2 insulin pump with novolog insulin and dexcom g6  basal  12a 0.75  3a 1.187  11a 1.19  5p 1.18  8p 1.1  TDD 27 units  ISF  12a 1:50  ICR  12a 1:15  target 110  duration 5hr  - suspect pump malfunction, patient continues to by hyperglycemic despite using pump, bolusing appropritely, unsure if bad batch of insulin or issue with pump supplies  PLAN  - start lantus 22 units daily at 2:30pm  - continue admelog 6 units tid premeal, low admelog scale with meals and separate low admelog scale at bedtime  - can do 2 am bedtime equivalent low admelog correction scale to ensure BG improving  - fingersticks before meals, bedtime and 2am  - Goal -180  - Carbohydrate consistent diet  - RD consult  - hypoglycemia protocol prn  Discharge plan:  - Discharge medications: Lantus 22 units every 24hr (around 2:30pm), humalog 6 units tid premeal, low humalog correction scale (1 unit 150-200, 2 units 200-250, 3 units 250-300), low humalog bedtime correction scale (1 unit 250-300, 2 units 300-350, 3 units 350-400). Patient needs to speak with CDE and endocrinologist as well as  outpatient prior to re using insulin pump, reports will be getting omnipod 5 outpatient. Would not utilize an SGLT-2 inhibitor given recent DKA and T1DM.  - Recommend routine outpatient ophthalmology, podiatry and endocrinology f/u    HTN  - Home regimen: lasix, losartan  PLAN  - Can check urine microalbumin outpatient  - Outpatient goal BP <130/80. Management per primary team.    HLD  - Home regimen: atorvastatin 80mg daily  PLAN  - Continue atorvastatin 20mg daily per primary team  - Can check lipid profile if not done recently    Discussed with primary team.    Obey Cash MD, Endocrinology Fellow  Pager [phone] from 9am to 5pm. After hours and on weekends, please call [phone].

## 2023-11-09 NOTE — DISCHARGE NOTE NURSING/CASE MANAGEMENT/SOCIAL WORK - PATIENT PORTAL LINK FT
You can access the FollowMyHealth Patient Portal offered by [facility] by registering at the following website: [email or web address]. By joining [employer]’s FollowMyHealth portal, you will also be able to view your health information using other applications (apps) compatible with our system.

## 2023-11-09 NOTE — PROGRESS NOTE ADULT - PROBLEM SELECTOR PROBLEM 2
H/O insertion of insulin pump
Acute UTI
H/O insertion of insulin pump
H/O insertion of insulin pump
Acute UTI
Acute UTI

## 2023-11-09 NOTE — PROGRESS NOTE ADULT - PROVIDER SPECIALTY LIST ADULT
Cardiology
Endocrinology
Cardiology
Cardiology
Hospitalist
Endocrinology
Endocrinology
Hospitalist
Hospitalist

## 2023-11-09 NOTE — PROGRESS NOTE ADULT - PROBLEM SELECTOR PROBLEM 1
Hyperglycemia due to type 1 diabetes mellitus

## 2023-11-09 NOTE — PROGRESS NOTE ADULT - PROBLEM SELECTOR PROBLEM 3
CAD (coronary artery disease)
HLD (hyperlipidemia)
CAD (coronary artery disease)
CAD (coronary artery disease)

## 2023-11-12 NOTE — ED PROVIDER NOTE - HISTORY ATTESTATION, MLM
This is Sepsis Present on admission  SIRS criteria identified on my evaluation include: Tachycardia, with heart rate greater than 90 BPM and Leukocytosis, with WBC greater than 12,000  Clinical indicators of end organ dysfunction include Lactic Acid greater than 2 and Acute Renal Failure, with a finding of creatinine greater than 2 (patient does not have ESRD or CKD  Source is wounds  Sepsis protocol initiated  Crystalloid Fluid Administration: Fluid resuscitation ordered per standard protocol - 30 mL/kg per current or ideal body weight  IV antibiotics as appropriate for source of sepsis  Reassessment: I have reassessed the patient's hemodynamic status  Started on Zosyn and Zyvox for likely polymicrobial wound infection and possible necrotizing skin and soft tissue infection.    MRSA swab pending, BCs negative so far  Need source control with debridement   I have reviewed and confirmed nurses' notes...

## 2023-11-29 ENCOUNTER — APPOINTMENT (OUTPATIENT)
Dept: PULMONOLOGY | Facility: CLINIC | Age: 73
End: 2023-11-29
Payer: MEDICARE

## 2023-11-29 VITALS
TEMPERATURE: 97.2 F | RESPIRATION RATE: 16 BRPM | HEIGHT: 74 IN | SYSTOLIC BLOOD PRESSURE: 138 MMHG | DIASTOLIC BLOOD PRESSURE: 72 MMHG | OXYGEN SATURATION: 98 % | WEIGHT: 185 LBS | HEART RATE: 92 BPM | BODY MASS INDEX: 23.74 KG/M2

## 2023-11-29 DIAGNOSIS — K21.9 GASTRO-ESOPHAGEAL REFLUX DISEASE W/OUT ESOPHAGITIS: ICD-10-CM

## 2023-11-29 DIAGNOSIS — G47.33 OBSTRUCTIVE SLEEP APNEA (ADULT) (PEDIATRIC): ICD-10-CM

## 2023-11-29 DIAGNOSIS — R91.8 OTHER NONSPECIFIC ABNORMAL FINDING OF LUNG FIELD: ICD-10-CM

## 2023-11-29 DIAGNOSIS — J31.0 CHRONIC RHINITIS: ICD-10-CM

## 2023-11-29 DIAGNOSIS — J45.909 UNSPECIFIED ASTHMA, UNCOMPLICATED: ICD-10-CM

## 2023-11-29 PROCEDURE — 99214 OFFICE O/P EST MOD 30 MIN: CPT | Mod: 25

## 2023-11-29 PROCEDURE — 94010 BREATHING CAPACITY TEST: CPT

## 2024-01-16 NOTE — ED ADULT TRIAGE NOTE - HEIGHT IN FEET
[facility]      New Urethral Mass Note    1/16/2024    Referred by:  Kaur Knowles PA-C    HPI: Sharon Kochera is a very pleasant 75 y.o. female who is a new patient to our department referred for evaluation of a new urethral mass noted over the last 2-3 weeks. The patient reported some voiding difficulty but was prompted by blood when wiping. On exam she was noted to have a fady-urethral mass suspicious for a urethral caruncle.    She denies symptoms of irritative voiding including dysuria. She reports symptoms of obstructive voiding including decreased stream. Bladder scan PVR was 0 mL.  Her history includes no notation of urolithiasis, hematuria, prior pelvic surgery, previous prolapse or incontinence procedures or neurological symptoms/diagnoses. She denies all other prior pelvic surgeries or neurologic diagnoses. She does not report symptoms suggestive of advanced POP.     She does not use vaginal estrogen.     A review of 10+ systems was conducted with pertinent positive and negative findings documented in HPI with all other systems reviewed and negative.    Past medical, family, surgical and social history reviewed as documented in chart with pertinent positive medical, family, surgical and social history detailed in HPI.    Exam Findings:    Vaginal Mucosa: moderate atrophy  Anterior: none  Apical: no apical descent  Posterior: none  Urethral Lesions: urethral caruncle; thrombosed Const: no acute distress, conversant and alert  Eyes: anicteric, extraocular muscles intact  ENMT: normocephalic, Nl oral membranes  Cardio: no cyanosis, nl cap refill  Pulm: no tachypnea; no resp distress  Abd: soft, no tenderness  Musc: no laceration, no tenderness  Neuro: alert; oriented x 3  Skin: warm, dry; no petichiae  Psych: no anxiety; normal speech       Assessment/Plan:    Urethral Caruncle  (new, addt'l workup): She demonstrates a large, thrombosed urethral caruncle causing issues with bleeding and  possibly mild obstruction. While this may respond to topical estrogen therapy, given the size and bleeding, I think that excision is likely more practical than observation. She agrees with this.     We discussed risks of urethral caruncle excision today, including the possibility of a short-duration catheter if the excision site is large.        6

## 2024-04-20 NOTE — DISCHARGE NOTE PROVIDER - NSDCMRMEDTOKEN_GEN_ALL_CORE_FT
Aspirin Enteric Coated 81 mg oral delayed release tablet: 1 tab(s) orally once a day  cholecalciferol 25 mcg (1000 intl units) oral tablet: 1 tab(s) orally 2 times a day  Cozaar 100 mg oral tablet: 1 tab(s) orally once a day  HumaLOG 100 units/mL injectable solution: Inject via insulin pump  insulin glargine-yfgn 100 units/mL subcutaneous solution: Inject as needed  Lasix 40 mg oral tablet: 1 tab(s) orally once a day  Lipitor 20 mg oral tablet: 1 tab(s) orally once a day (at bedtime)  Metoprolol Tartrate 50 mg oral tablet: 1 tab(s) orally every 12 hours  Protonix 40 mg oral delayed release tablet: 1 tab(s) orally once a day (before a meal)  Singulair 10 mg oral tablet: 1 tab(s) orally once a day  tamsulosin 0.4 mg oral capsule: 1 cap(s) orally once a day  Vitamin C 1000 mg oral tablet: 1 tab(s) orally once a day  zinc (as acetate) 50 mg oral capsule: 1 cap(s) orally once a day   0 Admelog 100 units/mL injectable solution: 6 unit(s) injectable 3 times a day (with meals)  Aspirin Enteric Coated 81 mg oral delayed release tablet: 1 tab(s) orally once a day  cholecalciferol 25 mcg (1000 intl units) oral tablet: 1 tab(s) orally 2 times a day  Cozaar 100 mg oral tablet: 1 tab(s) orally once a day  insulin lispro: 1 application subcutaneously once a day (at bedtime) Insulin Corrective Sliding scale bedtime. Take additional insulin nightly based on sliding scale:  Take 0 Unit(s) if Glucose 0 - 250  Take 1 Unit(s) if Glucose 251 - 300  Take 2 Unit(s) if Glucose 301 - 350  Take 3 Unit(s) if Glucose 351 - 400  Take 4 Unit(s) if Glucose Greater Than 400  insulin lispro 100 units/mL injectable solution: 1 dose(s) injectable 3 times a day (with meals) Low dose insulin corrective sliding scale. Take additional insulin as needed per scale:  Take 1 Unit(s) if Glucose 151 - 200  Take 2 Unit(s) if Glucose 201 - 250  Take 3 Unit(s) if Glucose 251 - 300  Take 4 Unit(s) if Glucose 301 - 350  Take 5 Unit(s) if Glucose 351 - 400  Take 6 Unit(s) if Glucose Greater Than 400  Lantus 100 units/mL subcutaneous solution: 22 unit(s) subcutaneous once a day (at bedtime)  Lasix 40 mg oral tablet: 1 tab(s) orally once a day  Lipitor 20 mg oral tablet: 1 tab(s) orally once a day (at bedtime)  Metoprolol Tartrate 50 mg oral tablet: 1 tab(s) orally every 12 hours  Protonix 40 mg oral delayed release tablet: 1 tab(s) orally once a day (before a meal)  Singulair 10 mg oral tablet: 1 tab(s) orally once a day  tamsulosin 0.4 mg oral capsule: 1 cap(s) orally once a day  Vitamin C 1000 mg oral tablet: 1 tab(s) orally once a day  zinc (as acetate) 50 mg oral capsule: 1 cap(s) orally once a day

## 2024-04-25 NOTE — PRE-ANESTHESIA EVALUATION ADULT - NSANTHAGERD_ENT_A_CORE
Left message to notify patient regarding response to issues with sleep aid from Dr THOMPSON Velasquez DO.   Yes

## 2024-05-07 ENCOUNTER — APPOINTMENT (OUTPATIENT)
Dept: PULMONOLOGY | Facility: CLINIC | Age: 74
End: 2024-05-07
Payer: MEDICARE

## 2024-05-07 DIAGNOSIS — G47.33 OBSTRUCTIVE SLEEP APNEA (ADULT) (PEDIATRIC): ICD-10-CM

## 2024-05-07 PROCEDURE — 99213 OFFICE O/P EST LOW 20 MIN: CPT

## 2024-05-07 NOTE — REASON FOR VISIT
[Home] : at home, [unfilled] , at the time of the visit. [Medical Office: ([facility])___] : at the medical office located in  [Spouse] : spouse [Patient] : the patient [Self] : self [This encounter was initiated by telehealth (audio with video) and converted to telephone (audio only) due to technical difficulties.] : This encounter was initiated by telehealth (audio with video) and converted to telephone (audio only) due to technical difficulties. [Follow-Up] : a follow-up visit [Sleep Apnea] : sleep apnea

## 2024-05-07 NOTE — HISTORY OF PRESENT ILLNESS
[TextBox_4] : This is a 72 year old male with PMHx of HTN, type 1 diabetes-on insulin pump, vertigo, BPH, COVID in August 2022 with pneumonia, who is here to follow up for treatment options for sleep apnea.  Spouse, Kait also present during telehealth visit.   -reports he is still unable to use the CPAP device; states mask was uncomfortable, with hoses detaching. -reports he will not use the device and wants to return device back to [facility]. -reports PAP device causes him to toss and turn and worsens his sleep  Patient denies any other respiratory concerns.

## 2024-05-07 NOTE — ASSESSMENT
[FreeTextEntry1] : This is a 73 year old male with PMHx of HTN, type 1 diabetes-on insulin pump, vertigo, BPH, COVID in August 2022 with pneumonia, who is here to follow up with pulmonary medicine due to an abnormal CXR and COURTNEY. Pt's COURTNEY is likely multifactorial due to recent surgery, recovering from surgery, deconditioning, recent pleural effusion, as well as some mild reactive airways disease.  We can place him on inhaler therapy to see if it will help him at all. He would like to be conservative with meds as much as possible.  In regard to the 9 mm right upper lobe nodular opacity-that has resolved and now has few less than .4cm nodules in both upper lobes- we will watch these.  Patient presents for telehealth visit for follow up therapy treatment options for sleep apnea  Problem 1: Mild Obstructive sleep apnea (unrefreshed from sleep, poor sleep quality, snoring) -Currently not using CPAP; offered alternative therapy options including DD, Inspire therapy; patient declined both options.  -DC CPAP therapy order placed to have device returned as per pt's request ([facility]) -Recommended positional therapy and weight reduction.   s/p PSG CPAP titration study 9/28/2023  AHI 14.9; recommended CPAP pressure of 91hhO40 s/p HST 7/9/2023  AHI 11.1;  7/10/2023 AHI 49.6   -I have discussed the negative health consequences associated with untreated obstructive and central sleep apnea including heart conditions/MI, hypertension, diabetes, chronic inflammation, memory issues, stroke, obesity, decreased libido, sleep related accidents, as well as anxiety and depression.  f/u with Dr. Her 7/16/2024  Encouraged patient to contact office for any issues/concerns.

## 2024-07-16 ENCOUNTER — APPOINTMENT (OUTPATIENT)
Dept: PULMONOLOGY | Facility: CLINIC | Age: 74
End: 2024-07-16
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
## 2024-10-07 NOTE — ED PROVIDER NOTE - NS ED NOTE AC HIGH RISK COUNTRIES
No MEDICATIONS  (PRN):  acetaminophen   Oral Tab/Cap - Peds. 650 milliGRAM(s) Oral every 6 hours PRN Temp greater or equal to 38 C (100.4 F), Mild Pain (1 - 3), Moderate Pain (4 - 6), Severe Pain (7 - 10)  albuterol  90 MICROgram(s) HFA Inhaler - Peds 2 Puff(s) Inhalation every 4 hours PRN Bronchospasm  bismuth subsalicylate Liquid 30 milliLiter(s) Oral three times a day PRN GI upset  cloNIDine  Oral Tab/Cap - Peds 0.1 milliGRAM(s) Oral two times a day PRN hyperactivity  diphenhydrAMINE IntraMuscular Injection - Peds 50 milliGRAM(s) IntraMuscular once PRN Agitation  ibuprofen  Oral Tab/Cap - Peds. 600 milliGRAM(s) Oral every 6 hours PRN Moderate Pain (4 - 6), Severe Pain (7 - 10)  LORazepam  Oral Tab/Cap - Peds 1 milliGRAM(s) Oral three times a day PRN Anxiety  melatonin Oral Tab/Cap - Peds 5 milliGRAM(s) Oral at bedtime PRN Insomnia  OLANZapine  Oral Tab/Cap - Peds 5 milliGRAM(s) Oral every 4 hours PRN agitation  OLANZapine IntraMuscular Injection - Peds 10 milliGRAM(s) IntraMuscular once PRN Agitation  polyethylene glycol 3350 Oral Powder - Peds 17 Gram(s) Oral daily PRN Constipation

## 2025-01-23 NOTE — OCCUPATIONAL THERAPY INITIAL EVALUATION ADULT - LIGHT TOUCH SENSATION, LUE, REHAB EVAL
within normal limits
I do not have a steady place to live (I am temporarily staying with others, in a hotel, in a shelter, living outside on the street, on a beach, in a car, abandoned building, bus or train station, or in a park)

## 2025-02-05 ENCOUNTER — APPOINTMENT (OUTPATIENT)
Dept: PULMONOLOGY | Facility: CLINIC | Age: 75
End: 2025-02-05
Payer: MEDICARE

## 2025-02-05 ENCOUNTER — NON-APPOINTMENT (OUTPATIENT)
Age: 75
End: 2025-02-05

## 2025-02-05 VITALS
WEIGHT: 192 LBS | HEART RATE: 58 BPM | HEIGHT: 74 IN | OXYGEN SATURATION: 98 % | DIASTOLIC BLOOD PRESSURE: 78 MMHG | SYSTOLIC BLOOD PRESSURE: 130 MMHG | BODY MASS INDEX: 24.64 KG/M2 | RESPIRATION RATE: 16 BRPM | TEMPERATURE: 98.06 F

## 2025-02-05 DIAGNOSIS — J45.20 MILD INTERMITTENT ASTHMA, UNCOMPLICATED: ICD-10-CM

## 2025-02-05 DIAGNOSIS — R91.1 SOLITARY PULMONARY NODULE: ICD-10-CM

## 2025-02-05 DIAGNOSIS — J31.0 CHRONIC RHINITIS: ICD-10-CM

## 2025-02-05 DIAGNOSIS — K21.9 GASTRO-ESOPHAGEAL REFLUX DISEASE W/OUT ESOPHAGITIS: ICD-10-CM

## 2025-02-05 DIAGNOSIS — R91.8 OTHER NONSPECIFIC ABNORMAL FINDING OF LUNG FIELD: ICD-10-CM

## 2025-02-05 DIAGNOSIS — Z87.891 PERSONAL HISTORY OF NICOTINE DEPENDENCE: ICD-10-CM

## 2025-02-05 DIAGNOSIS — R06.02 SHORTNESS OF BREATH: ICD-10-CM

## 2025-02-05 DIAGNOSIS — Z82.61 FAMILY HISTORY OF ARTHRITIS: ICD-10-CM

## 2025-02-05 DIAGNOSIS — Z78.9 OTHER SPECIFIED HEALTH STATUS: ICD-10-CM

## 2025-02-05 DIAGNOSIS — G47.33 OBSTRUCTIVE SLEEP APNEA (ADULT) (PEDIATRIC): ICD-10-CM

## 2025-02-05 PROCEDURE — 94727 GAS DIL/WSHOT DETER LNG VOL: CPT

## 2025-02-05 PROCEDURE — 94729 DIFFUSING CAPACITY: CPT

## 2025-02-05 PROCEDURE — 99204 OFFICE O/P NEW MOD 45 MIN: CPT | Mod: 25

## 2025-02-05 PROCEDURE — 94010 BREATHING CAPACITY TEST: CPT

## 2025-02-05 PROCEDURE — 95012 NITRIC OXIDE EXP GAS DETER: CPT

## 2025-02-05 RX ORDER — BUDESONIDE AND FORMOTEROL FUMARATE DIHYDRATE 160; 4.5 UG/1; UG/1
160-4.5 AEROSOL RESPIRATORY (INHALATION) TWICE DAILY
Qty: 1 | Refills: 4 | Status: ACTIVE | COMMUNITY
Start: 2025-02-05 | End: 1900-01-01

## 2025-02-05 RX ORDER — IPRATROPIUM BROMIDE 42 UG/1
0.06 SPRAY, METERED NASAL 3 TIMES DAILY
Qty: 1 | Refills: 5 | Status: ACTIVE | COMMUNITY
Start: 2025-02-05 | End: 1900-01-01

## 2025-02-17 ENCOUNTER — OUTPATIENT (OUTPATIENT)
Dept: OUTPATIENT SERVICES | Facility: HOSPITAL | Age: 75
LOS: 1 days | End: 2025-02-17
Payer: MEDICARE

## 2025-02-17 ENCOUNTER — APPOINTMENT (OUTPATIENT)
Dept: CT IMAGING | Facility: IMAGING CENTER | Age: 75
End: 2025-02-17
Payer: MEDICARE

## 2025-02-17 DIAGNOSIS — Z96.7 PRESENCE OF OTHER BONE AND TENDON IMPLANTS: Chronic | ICD-10-CM

## 2025-02-17 DIAGNOSIS — R91.8 OTHER NONSPECIFIC ABNORMAL FINDING OF LUNG FIELD: ICD-10-CM

## 2025-02-17 DIAGNOSIS — Z95.1 PRESENCE OF AORTOCORONARY BYPASS GRAFT: Chronic | ICD-10-CM

## 2025-02-17 PROCEDURE — 71250 CT THORAX DX C-: CPT | Mod: 26

## 2025-02-17 PROCEDURE — 71250 CT THORAX DX C-: CPT

## 2025-03-04 ENCOUNTER — RX CHANGE (OUTPATIENT)
Age: 75
End: 2025-03-04

## 2025-03-04 RX ORDER — IPRATROPIUM BROMIDE 42 UG/1
0.06 SPRAY, METERED NASAL 3 TIMES DAILY
Qty: 3 | Refills: 2 | Status: ACTIVE | COMMUNITY
Start: 1900-01-01 | End: 1900-01-01

## 2025-03-18 NOTE — H&P CARDIOLOGY - GASTROINTESTINAL
well developed, well nourished , in no acute distress , ambulating without difficulty , normal communication ability Soft, non-tender, no hepatosplenomegaly, normal bowel sounds

## 2025-04-10 ENCOUNTER — APPOINTMENT (OUTPATIENT)
Dept: PULMONOLOGY | Facility: CLINIC | Age: 75
End: 2025-04-10
Payer: MEDICARE

## 2025-04-10 ENCOUNTER — EMERGENCY (EMERGENCY)
Facility: HOSPITAL | Age: 75
LOS: 1 days | End: 2025-04-10
Attending: EMERGENCY MEDICINE
Payer: MEDICARE

## 2025-04-10 ENCOUNTER — NON-APPOINTMENT (OUTPATIENT)
Age: 75
End: 2025-04-10

## 2025-04-10 VITALS
DIASTOLIC BLOOD PRESSURE: 70 MMHG | HEART RATE: 69 BPM | TEMPERATURE: 97.7 F | BODY MASS INDEX: 24.9 KG/M2 | SYSTOLIC BLOOD PRESSURE: 130 MMHG | WEIGHT: 194 LBS | OXYGEN SATURATION: 98 % | RESPIRATION RATE: 16 BRPM | HEIGHT: 74 IN

## 2025-04-10 VITALS
DIASTOLIC BLOOD PRESSURE: 74 MMHG | TEMPERATURE: 97 F | WEIGHT: 187.61 LBS | HEIGHT: 74 IN | HEART RATE: 66 BPM | OXYGEN SATURATION: 99 % | SYSTOLIC BLOOD PRESSURE: 144 MMHG | RESPIRATION RATE: 18 BRPM

## 2025-04-10 DIAGNOSIS — Z96.7 PRESENCE OF OTHER BONE AND TENDON IMPLANTS: Chronic | ICD-10-CM

## 2025-04-10 DIAGNOSIS — J45.20 MILD INTERMITTENT ASTHMA, UNCOMPLICATED: ICD-10-CM

## 2025-04-10 DIAGNOSIS — Z95.1 PRESENCE OF AORTOCORONARY BYPASS GRAFT: Chronic | ICD-10-CM

## 2025-04-10 DIAGNOSIS — J31.0 CHRONIC RHINITIS: ICD-10-CM

## 2025-04-10 DIAGNOSIS — G47.33 OBSTRUCTIVE SLEEP APNEA (ADULT) (PEDIATRIC): ICD-10-CM

## 2025-04-10 LAB
APPEARANCE UR: ABNORMAL
BACTERIA # UR AUTO: ABNORMAL /HPF
BILIRUB UR-MCNC: NEGATIVE — SIGNIFICANT CHANGE UP
COLOR SPEC: YELLOW — SIGNIFICANT CHANGE UP
COMMENT - URINE: SIGNIFICANT CHANGE UP
DIFF PNL FLD: NEGATIVE — SIGNIFICANT CHANGE UP
EPI CELLS # UR: PRESENT
GLUCOSE UR QL: NEGATIVE MG/DL — SIGNIFICANT CHANGE UP
KETONES UR-MCNC: ABNORMAL MG/DL
LEUKOCYTE ESTERASE UR-ACNC: ABNORMAL
NITRITE UR-MCNC: NEGATIVE — SIGNIFICANT CHANGE UP
PH UR: 7.5 — SIGNIFICANT CHANGE UP (ref 5–8)
PROT UR-MCNC: ABNORMAL MG/DL
RBC CASTS # UR COMP ASSIST: 2 /HPF — SIGNIFICANT CHANGE UP (ref 0–4)
SP GR SPEC: 1.01 — SIGNIFICANT CHANGE UP (ref 1–1.03)
UROBILINOGEN FLD QL: 0.2 MG/DL — SIGNIFICANT CHANGE UP (ref 0.2–1)
WBC UR QL: ABNORMAL /HPF (ref 0–5)

## 2025-04-10 PROCEDURE — 95012 NITRIC OXIDE EXP GAS DETER: CPT

## 2025-04-10 PROCEDURE — 87086 URINE CULTURE/COLONY COUNT: CPT

## 2025-04-10 PROCEDURE — 99214 OFFICE O/P EST MOD 30 MIN: CPT | Mod: 25

## 2025-04-10 PROCEDURE — 94010 BREATHING CAPACITY TEST: CPT

## 2025-04-10 PROCEDURE — 99284 EMERGENCY DEPT VISIT MOD MDM: CPT | Mod: FS

## 2025-04-10 PROCEDURE — 87077 CULTURE AEROBIC IDENTIFY: CPT

## 2025-04-10 PROCEDURE — 99283 EMERGENCY DEPT VISIT LOW MDM: CPT

## 2025-04-10 PROCEDURE — 81001 URINALYSIS AUTO W/SCOPE: CPT

## 2025-04-10 PROCEDURE — 87186 SC STD MICRODIL/AGAR DIL: CPT

## 2025-04-10 RX ORDER — CEFPODOXIME PROXETIL 200 MG/1
1 TABLET, FILM COATED ORAL
Qty: 20 | Refills: 0
Start: 2025-04-10 | End: 2025-04-19

## 2025-04-10 RX ORDER — CEFUROXIME SODIUM 1.5 G
1 VIAL (EA) INJECTION
Qty: 20 | Refills: 0
Start: 2025-04-10 | End: 2025-04-19

## 2025-04-10 NOTE — ED PROVIDER NOTE - CLINICAL SUMMARY MEDICAL DECISION MAKING FREE TEXT BOX
74-year-old male with a past medical history of diabetes type 1, paroxysmal A-fib, coronary artery disease, BPH, hypertension, CABG x 3, AICD presenting with burning on urination and urinary frequency for 2 weeks.  Patient reports that he self catheterizes due to his BPH and has for the last 5 years.  Denies fevers, back pain, nausea, vomiting, diarrhea, abdominal pain.    Will obtain UA/UC.  Patient without systemic symptoms no concerns for pyelonephritis at this time.

## 2025-04-10 NOTE — ED PROVIDER NOTE - NSFOLLOWUPINSTRUCTIONS_ED_ALL_ED_FT
Follow up with your Urologist within 1 week.     Antibiotics for your urinary tract infection were sent to the pharmacy.  Take the medication as directed until all the medication is completed, even if you are feeling better    You can take Motrin (ibuprofen) 600 mg every 6 hours or Tylenol (acetaminophen) 1000 mg every 6 hours as needed for pain or fever.     If you experience any new or worsening symptoms or if you are concerned you can always come back to the emergency for a re-evaluation.

## 2025-04-10 NOTE — ED ADULT TRIAGE NOTE - INTERNATIONAL TRAVEL
Detail Level: Simple Procedure To Be Performed At Next Visit: ED&C Instructions (Optional): Will treat these lesions with ED&C at the time of other Mohs surgeries. No

## 2025-04-10 NOTE — ED PROVIDER NOTE - PROGRESS NOTE DETAILS
Patient with positive UTI.  Will start patient on antibiotics.  Reviewed old urinary cultures in [facility].  Patient usually with Klebsiella and it is pansensitive.  Will start patient on cefpodoxime. Patient to follow up with Urologist. Pt is well appearing walking with steady gait, stable for discharge and follow up without fail with medical doctor. I had a detailed discussion with the patient and/or guardian regarding the historical points, exam findings, and any diagnostic results supporting the discharge diagnosis. Pt educated on care and need for follow up. Strict return instructions and red flag signs and symptoms discussed with patient. Questions answered. Pt shows understanding of discharge information and agrees to follow.

## 2025-04-10 NOTE — ED PROVIDER NOTE - TOBACCO USE
Principal Discharge DX:	Malignant neoplasm of middle lobe of right lung  Goal:	complete recovery  Instructions for follow-up, activity and diet:	Follow with Oncology Dr. Brenna mayorga 1 week , get PET scan by Oncology to evaluate for metastasis and then chemo and RT as per oncology recommendations  Secondary Diagnosis:	Acute deep vein thrombosis (DVT) of left lower extremity, unspecified vein  Instructions for follow-up, activity and diet:	Continue xarelto 15mg PO BID for total 21 days (until 3/25) and then from 3/26 change dose to xarelto 20 mg Q daily.  Secondary Diagnosis:	Fever, unspecified fever cause  Instructions for follow-up, activity and diet:	Fever is likely due to malignancy and acute DVT, fever resolving, take tylenol as needed for fever.  you completed 5 days of broad spectrum IV antibiotics course.  Secondary Diagnosis:	Adrenal mass  Instructions for follow-up, activity and diet:	follow with Endocrinology Dr. Barnett and follow metanephrine levels (pending lab results) by her office.  Secondary Diagnosis:	Hypothyroidism (acquired)  Instructions for follow-up, activity and diet:	continue synthroid  follow up with endocrinologist Dr. Barnett  Secondary Diagnosis:	Weakness  Instructions for follow-up, activity and diet:	Chronic b/l LE weakness due to some spine problem/lesion as per family   MRI L spine c/w DJD of spine  PT/rehab  Secondary Diagnosis:	Decubitus ulcer of sacral region, stage 1  Instructions for follow-up, activity and diet:	stage 1 sacral Pressure U, frequent positioning to release pressure, keep area clean and dry Never smoker

## 2025-04-10 NOTE — ED PROVIDER NOTE - PATIENT PORTAL LINK FT
You can access the FollowMyHealth Patient Portal offered by [facility] by registering at the following website: [email or web address]. By joining [employer]’s FollowMyHealth portal, you will also be able to view your health information using other applications (apps) compatible with our system.

## 2025-04-10 NOTE — ED PROVIDER NOTE - OBJECTIVE STATEMENT
74-year-old male with a past medical history of diabetes type 1, paroxysmal A-fib, coronary artery disease, BPH, hypertension, CABG x 3, AICD presenting with burning on urination and urinary frequency for 2 weeks.  Patient reports that he self catheterizes due to his BPH and has for the last 5 years.  Denies fevers, back pain, nausea, vomiting, diarrhea, abdominal pain.

## 2025-04-10 NOTE — ED ADULT NURSE NOTE - OBJECTIVE STATEMENT
Patient presented to the ED complaining of burning while urinating and increased frequency for 2 weeks. Patient denies any blood. Patient states he self catherizes himself and has done it for around 5 years and have not had UTIs.

## 2025-04-13 LAB
-  AMOXICILLIN/CLAVULANIC ACID: SIGNIFICANT CHANGE UP
-  AMOXICILLIN/CLAVULANIC ACID: SIGNIFICANT CHANGE UP
-  AMPICILLIN/SULBACTAM: SIGNIFICANT CHANGE UP
-  AMPICILLIN/SULBACTAM: SIGNIFICANT CHANGE UP
-  AMPICILLIN: SIGNIFICANT CHANGE UP
-  AMPICILLIN: SIGNIFICANT CHANGE UP
-  AZTREONAM: SIGNIFICANT CHANGE UP
-  AZTREONAM: SIGNIFICANT CHANGE UP
-  CEFAZOLIN: SIGNIFICANT CHANGE UP
-  CEFAZOLIN: SIGNIFICANT CHANGE UP
-  CEFEPIME: SIGNIFICANT CHANGE UP
-  CEFEPIME: SIGNIFICANT CHANGE UP
-  CEFOXITIN: SIGNIFICANT CHANGE UP
-  CEFOXITIN: SIGNIFICANT CHANGE UP
-  CEFTRIAXONE: SIGNIFICANT CHANGE UP
-  CEFTRIAXONE: SIGNIFICANT CHANGE UP
-  CEFUROXIME: SIGNIFICANT CHANGE UP
-  CIPROFLOXACIN: SIGNIFICANT CHANGE UP
-  CIPROFLOXACIN: SIGNIFICANT CHANGE UP
-  ERTAPENEM: SIGNIFICANT CHANGE UP
-  ERTAPENEM: SIGNIFICANT CHANGE UP
-  GENTAMICIN: SIGNIFICANT CHANGE UP
-  GENTAMICIN: SIGNIFICANT CHANGE UP
-  IMIPENEM: SIGNIFICANT CHANGE UP
-  LEVOFLOXACIN: SIGNIFICANT CHANGE UP
-  LEVOFLOXACIN: SIGNIFICANT CHANGE UP
-  MEROPENEM: SIGNIFICANT CHANGE UP
-  MEROPENEM: SIGNIFICANT CHANGE UP
-  NITROFURANTOIN: SIGNIFICANT CHANGE UP
-  NITROFURANTOIN: SIGNIFICANT CHANGE UP
-  PIPERACILLIN/TAZOBACTAM: SIGNIFICANT CHANGE UP
-  PIPERACILLIN/TAZOBACTAM: SIGNIFICANT CHANGE UP
-  TOBRAMYCIN: SIGNIFICANT CHANGE UP
-  TOBRAMYCIN: SIGNIFICANT CHANGE UP
-  TRIMETHOPRIM/SULFAMETHOXAZOLE: SIGNIFICANT CHANGE UP
-  TRIMETHOPRIM/SULFAMETHOXAZOLE: SIGNIFICANT CHANGE UP
CULTURE RESULTS: ABNORMAL
METHOD TYPE: SIGNIFICANT CHANGE UP
METHOD TYPE: SIGNIFICANT CHANGE UP
ORGANISM # SPEC MICROSCOPIC CNT: ABNORMAL
SPECIMEN SOURCE: SIGNIFICANT CHANGE UP

## 2025-04-15 RX ORDER — CIPROFLOXACIN HCL 250 MG
1 TABLET ORAL
Qty: 6 | Refills: 0
Start: 2025-04-15 | End: 2025-04-17

## 2025-06-24 NOTE — DIETITIAN INITIAL EVALUATION ADULT - NSFNSGIIOFT_GEN_A_CORE
Resulted   03-27-23 @ 07:01  -  03-28-23 @ 07:00  --------------------------------------------------------  OUT:    Chest Tube (mL): 85 mL    Chest Tube (mL): 515 mL    Chest Tube (mL): 230 mL  Total OUT: 830 mL    Total NET: -830 mL      03-28-23 @ 07:01 - 03-28-23 @ 10:35  --------------------------------------------------------  OUT:    Chest Tube (mL): 0 mL    Chest Tube (mL): 40 mL    Chest Tube (mL): 20 mL  Total OUT: 60 mL    Total NET: -60 mL

## (undated) DEVICE — SYS VEIN HARVESTING VIRTUOSAPH PLUS W/ RADIAL

## (undated) DEVICE — SPECIMEN CONTAINER 100ML

## (undated) DEVICE — GOWN LG

## (undated) DEVICE — SUMP PERICARDIAL 20FR 1/4" ADULT

## (undated) DEVICE — SYR LUER LOK 20CC

## (undated) DEVICE — SUT PROLENE 6-0 4-24" C-1

## (undated) DEVICE — PACING CABLE A/V TEMP SCREW DOWN 6FT

## (undated) DEVICE — SUT BLUNT SZ 5

## (undated) DEVICE — VESSEL LOOP MAXI-RED  0.120" X 16"

## (undated) DEVICE — BLADE SCALPEL SAFETYLOCK #15

## (undated) DEVICE — SAW BLADE MICROAIRE STERNUM 1X34X9.4MM

## (undated) DEVICE — DRAPE 1/2 SHEET 40X57"

## (undated) DEVICE — AORTIC PUNCH 4.0MM LONG LENGTH HANDLE

## (undated) DEVICE — SYR LUER LOK 30CC

## (undated) DEVICE — AORTIC PUNCH 4.0MM STANDARD HANDLE

## (undated) DEVICE — MEDTRONIC CLEARVIEW BLOWER MISTER KIT W TUBING SET

## (undated) DEVICE — SYR LUER LOK 1CC

## (undated) DEVICE — BLADE SCALPEL SAFETYLOCK #10

## (undated) DEVICE — FOLEY TRAY 16FR 5CC LF LUBRISIL ADVANCE TEMP CLOSED

## (undated) DEVICE — SUT SOFSILK 2 60" TIES

## (undated) DEVICE — SUT PLEDGET SOFT 1/2 X 5/16 X 1/16" X6

## (undated) DEVICE — SYR LUER LOK 50CC

## (undated) DEVICE — DRAIN RESERVOIR FOR JACKSON PRATT 100CC CARDINAL

## (undated) DEVICE — SUCTION YANKAUER NO CONTROL VENT

## (undated) DEVICE — SENSOR MYOCARDIAL TEMP 15MM

## (undated) DEVICE — CHEST DRAIN PLEUR-EVAC WET/WET ADULT-PEDS SINGLE (QUICK)

## (undated) DEVICE — SUT SOFSILK 2-0 18" V-20 (POP-OFF)

## (undated) DEVICE — SUT PROLENE 8-0 24" BV175-6

## (undated) DEVICE — GLV 7 PROTEXIS (WHITE)

## (undated) DEVICE — SUT BOOT STANDARD (YELLOW) 5 PAIR

## (undated) DEVICE — AORTIC PUNCH 5MM STANDARD HANDLE

## (undated) DEVICE — NDL COUNTER FOAM AND MAGNET 40-70

## (undated) DEVICE — POSITIONER CARDIAC BUMP

## (undated) DEVICE — MULTIPLE PERFUSION SET FEMALE 1 INLET LEG W 4 LEGS 15" (BLUE/RED)

## (undated) DEVICE — DRAPE TOWEL BLUE 17" X 24"

## (undated) DEVICE — BULLDOG SPRING CLIP LATIS/LATIS 6MM 1/2 FORCE (BLUE)

## (undated) DEVICE — SUT DOUBLE 6 WIRE STERNAL

## (undated) DEVICE — PACK CARDIAC YELLOW

## (undated) DEVICE — GLV 6 PROTEXIS W HYDROGEL

## (undated) DEVICE — GLV 7.5 PROTEXIS (WHITE)

## (undated) DEVICE — DRAPE MAYO STAND 30"

## (undated) DEVICE — GLV 8 PROTEXIS (WHITE)

## (undated) DEVICE — DRSG OPSITE 13.75 X 4"

## (undated) DEVICE — GLV 8.5 PROTEXIS (WHITE)

## (undated) DEVICE — SUT PLEDGET PRE PUNCH 4.8 X 9.5 X 1.5 MM

## (undated) DEVICE — SUT POLYSORB 0 36" GS-25 UNDYED

## (undated) DEVICE — SOL IRR POUR NS 0.9% 500ML

## (undated) DEVICE — POSITIONER FOAM EGG CRATE ULNAR 2PCS (PINK)

## (undated) DEVICE — SOL IRR BAG NS 0.9% 3000ML

## (undated) DEVICE — BLADE SCALPEL SAFETYLOCK #11

## (undated) DEVICE — GETINGE VASOVIEW 7 ENDOSCOPIC VESSEL HARVESTING SYSTEM

## (undated) DEVICE — BEAVER BLADE MINI SHARP ALL ROUND (BLUE)

## (undated) DEVICE — SOL IRR POUR H2O 250ML

## (undated) DEVICE — DRSG TEGADERM 4X4.75"

## (undated) DEVICE — NDL HYPO SAFE 18G X 1.5" (PINK)

## (undated) DEVICE — SAW BLADE MICROAIRE OSCILLATING LG 0.9X64X33.5MM

## (undated) DEVICE — PACK UNIVERSAL CARDIAC

## (undated) DEVICE — SUT PROLENE 7-0 4-24" BV-1

## (undated) DEVICE — DRAIN CHANNEL 19FR ROUND FULL FLUTED

## (undated) DEVICE — SUT POLYSORB 2-0 36" GS-25 UNDYED

## (undated) DEVICE — Device

## (undated) DEVICE — DOPPLER PROBE 20MHZ DISP

## (undated) DEVICE — PACK UNIVERSAL CARDIAC SUPPLEMNTAL B

## (undated) DEVICE — STABILIZER HAND ASSISTANT ATTACHMENT W STABLESOFT 2S

## (undated) DEVICE — TOURNIQUET SET 12FR (1 RED, 1 BLUE, 1 SNARE) 7"

## (undated) DEVICE — SPONGE PEANUT AUTO COUNT

## (undated) DEVICE — TUBING SUCTION 20FT

## (undated) DEVICE — SUT PROLENE 6-0 4-30" C-1

## (undated) DEVICE — DRSG OPSITE 2.5 X 2"

## (undated) DEVICE — SUCTION CATH ARGYLE WHISTLE TIP 14FR STRAIGHT PACKED

## (undated) DEVICE — SYNOVIS VASCULAR PROBE 1.5MM 15CM

## (undated) DEVICE — SYR ASEPTO

## (undated) DEVICE — DRAPE LIGHT HANDLE COVER (BLUE)

## (undated) DEVICE — SUT STAINLESS STEEL 5 18" SCC

## (undated) DEVICE — ELCTR BOVIE TIP BLADE VALLEYLAB 6.5"

## (undated) DEVICE — SOL NORMOSOL-R PH7.4 1000ML

## (undated) DEVICE — SUT SURGICAL STEEL 6 30" BP-1

## (undated) DEVICE — DRSG ACE BANDAGE 6"

## (undated) DEVICE — TUBING TRUWAVE PRESSURE MALE/FEMALE 72"

## (undated) DEVICE — WARMING BLANKET DUO-THERM HYPER/HYPOTHERM ADULT

## (undated) DEVICE — SUT SOFSILK 4-0 24" CV-15

## (undated) DEVICE — GOWN TRIMAX LG

## (undated) DEVICE — DRSG CURITY GAUZE SPONGE 4 X 4" 12-PLY

## (undated) DEVICE — DRAPE SLUSH / WARMER 44 X 66"

## (undated) DEVICE — CHEST DRAIN OASIS DRY SUCTION WATER SEAL

## (undated) DEVICE — VISITEC 4X4

## (undated) DEVICE — SUT PROLENE 4-0 36" BB

## (undated) DEVICE — DRSG DERMABOND PRINEO 60CM

## (undated) DEVICE — SUT SOFSILK 0 30" V-20

## (undated) DEVICE — ELCTR BOVIE TIP CLEANER SCRATCH PAD

## (undated) DEVICE — FILTER REINFUSION FOR SALVAGED BLOOD DISP

## (undated) DEVICE — GLV 6.5 PROTEXIS (WHITE)

## (undated) DEVICE — LAP PAD 18 X 18"

## (undated) DEVICE — SUT BIOSYN 4-0 18" P-12

## (undated) DEVICE — PREP CHLORAPREP HI-LITE ORANGE 26ML

## (undated) DEVICE — SUT BOOT STANDARD (ASSORTED) 5 PAIR

## (undated) DEVICE — SET PERF CARDIOPLEGIA 4 ARM STRL

## (undated) DEVICE — BULLDOG SPRING CLIP 6MM SOFT/SOFT